# Patient Record
Sex: MALE | Race: ASIAN | NOT HISPANIC OR LATINO | Employment: PART TIME | URBAN - METROPOLITAN AREA
[De-identification: names, ages, dates, MRNs, and addresses within clinical notes are randomized per-mention and may not be internally consistent; named-entity substitution may affect disease eponyms.]

---

## 2017-01-18 ENCOUNTER — TRANSFERRED RECORDS (OUTPATIENT)
Dept: HEALTH INFORMATION MANAGEMENT | Facility: CLINIC | Age: 32
End: 2017-01-18

## 2017-02-03 ENCOUNTER — OFFICE VISIT (OUTPATIENT)
Dept: FAMILY MEDICINE | Facility: CLINIC | Age: 32
End: 2017-02-03

## 2017-02-03 VITALS
HEIGHT: 63 IN | DIASTOLIC BLOOD PRESSURE: 60 MMHG | BODY MASS INDEX: 31.18 KG/M2 | SYSTOLIC BLOOD PRESSURE: 130 MMHG | TEMPERATURE: 97.4 F | WEIGHT: 176 LBS | HEART RATE: 66 BPM

## 2017-02-03 DIAGNOSIS — E13.9 OTHER SPECIFIED DIABETES MELLITUS WITHOUT COMPLICATION, WITHOUT LONG-TERM CURRENT USE OF INSULIN (H): Primary | ICD-10-CM

## 2017-02-03 DIAGNOSIS — R19.7 DIARRHEA, UNSPECIFIED TYPE: ICD-10-CM

## 2017-02-03 DIAGNOSIS — F17.219 CIGARETTE NICOTINE DEPENDENCE WITH NICOTINE-INDUCED DISORDER: ICD-10-CM

## 2017-02-03 DIAGNOSIS — E11.9 TYPE 2 DIABETES MELLITUS WITHOUT COMPLICATION, WITHOUT LONG-TERM CURRENT USE OF INSULIN (H): ICD-10-CM

## 2017-02-03 DIAGNOSIS — B19.10 HEPATITIS B INFECTION WITHOUT DELTA AGENT WITHOUT HEPATIC COMA, UNSPECIFIED CHRONICITY: ICD-10-CM

## 2017-02-03 DIAGNOSIS — R11.2 NAUSEA AND VOMITING, INTRACTABILITY OF VOMITING NOT SPECIFIED, UNSPECIFIED VOMITING TYPE: ICD-10-CM

## 2017-02-03 LAB — HBA1C MFR BLD: 6.5 % (ref 4.1–5.7)

## 2017-02-03 NOTE — Clinical Note
February 3, 2017      Morhonda Burks Farhan  [address] [place] [address]        Dear Ministerio Martines,    This patient has been successfully treated for latent TB (this means he was exposed to TB but is not contagious).. He was treated with 9 months of a medication in [id number]. This was done at [facility]. We are currently trying to obtain those records. Therefore there is no point in doing a Mantoux skin test as it will always be positive with his history of latent TB        Sincerely,    Cristi Breen, DO

## 2017-02-03 NOTE — PROGRESS NOTES
Smoking Cessation Visit    Subjective  Discussed tobacco use history with patient.  Patient smokes 3 cigs per day and has smoked for 18 years.   Patient chews tobacco: No   Patient smokes cigars: No   Has tried to quit 0 times previously.     Successful cessation in the past: No    Pharmacotherapy used in the past:   None    Patient rated importance of quitting smoking: important 7-10/10  Patient believes they can be successful in smoking cessation: not established    Motivators for quitting smoking:  Health and co-morbid conditions  Triggers for smoking:  Eating food  Barriers to quittin years of smoking/habit    Objective  Fagerstrom Score: deferred    Assessment/Plan  Tobacco cessation stage of change: Action--Making specific, observable changes    Nicotrol Inhaler was prescribed.   1. Discussed health risks of tobacco use and health benefits of quitting.  2. Set a quit date with the patient for 17.  3. Discussed ways to cope with cravings and deal with triggers:  Using inhaler with mimmick act of smoking  4. Discussed different treatment options to assist in tobacco cessation.  The decision was made to use: Nicotrol as above.  Discussed mechanisms, proper use, and potential adverse effects.  Start date of medication will be 17.  Medication dose/schedule is as follows: 6-16 cartridges per day.  Prescription faxed to pharmacy.  5. Discussed getting ready for the quit date, such as telling friends/family, marking calendar, washing clothes/coats that smell like smoke, cleaning/freshening the home/car, and throwing out cigarettes/smoking paraphernalia the night before the quit date.  6. Discussed ways to start making changes before the quit date, such as rating each cigarette and only smoking those needed, starting to cut down, switching brands, only smoking outside.  7. Social support: Not discussed  8. Discussed that this is a long-term commitment and they can t even have  just one puff .    9. Discussed rewarding themselves for successful cessation.  10. Discussed other resources, such as help phone lines, support groups, online resources.    The smoking cessation plan above  was done in accordance with the collaborative practice agreement I have with Dr. Breen.  I spent 20 minutes counseling this patient.    Robbin Trejo MD PGY2  Suzanne Cruz, Pharm.D.    Patient s case reviewed. I agree with the written assessment and plan of care.    Suzanne Cruz, PharmD.

## 2017-02-03 NOTE — PROGRESS NOTES
Preceptor attestation:  Patient seen and discussed with the resident. Assessment and plan reviewed with resident and agreed upon.  Supervising physician: Jack Ventura  [facility]

## 2017-02-03 NOTE — MR AVS SNAPSHOT
"              After Visit Summary   2/3/2017    Reinaldo Real    MRN: [id number]           Patient Information     Date Of Birth          1985        Visit Information        Provider Department      2/3/2017 8:00 AM Cristi Breen DO [facility]        Today's Diagnoses     Other specified diabetes mellitus without complication, without long-term current use of insulin (H)    -  1     Nausea and vomiting, intractability of vomiting not specified, unspecified vomiting type         Diarrhea, unspecified type         Hepatitis B infection without delta agent without hepatic coma, unspecified chronicity         Type 2 diabetes mellitus without complication, without long-term current use of insulin (H)         Cigarette nicotine dependence with nicotine-induced disorder           Care Instructions    Diabetes: check hemoglobin A1c (measure of blood sugars over last 3 months), antibodies to see if can tell if you have type 1 or type 2  Need pre-op exam : can schedule next week        Follow-ups after your visit        Who to contact     Please call your clinic at [phone] to:    Ask questions about your health    Make or cancel appointments    Discuss your medicines    Learn about your test results    Speak to your doctor   If you have compliments or concerns about an experience at your clinic, or if you wish to file a complaint, please contact [facility] Patient Relations at [phone] or email us at [email or web address]         Additional Information About Your Visit        Care EveryWhere ID     This is your Care EveryWhere ID. This could be used by other organizations to access your [place] medical records  [id number]        Your Vitals Were     Pulse Temperature Height BMI (Body Mass Index)          66 97.4  F (36.3  C) (Oral) 5' 3\" (160 cm) 31.18 kg/m2         Blood Pressure from Last 3 Encounters:   02/03/17 130/60   12/19/16 120/81   11/30/16 121/81 "    Weight from Last 3 Encounters:   02/03/17 176 lb (79.833 kg)   12/19/16 178 lb 9.6 oz (81.012 kg)   11/30/16 176 lb 9.6 oz (80.105 kg)              We Performed the Following     C-peptide     Hemoglobin A1c ([facility])     Insulin antibody     Islet cell antibody IgG          Today's Medication Changes          These changes are accurate as of: 2/3/17  9:22 AM.  If you have any questions, ask your nurse or doctor.               Start taking these medicines.        Dose/Directions    nicotine 10 MG Inhaler   Commonly known as:  NICOTROL   Used for:  Cigarette nicotine dependence with nicotine-induced disorder   Started by:  Cristi Breen DO        Dose:  6-16 cartridge   Inhale 6-16 cartridge into the lungs daily as needed for smoking cessation   Quantity:  1 Box   Refills:  3            Where to get your medicines      These medications were sent to [facility] - [place], [place] - [address], [place] [address]     Phone:  [phone]    - nicotine 10 MG Inhaler  - ranitidine 150 MG tablet             Primary Care Provider Office Phone # Fax #    Cristi Kecia Breen [phone]       [address] [address]        Thank you!     Thank you for choosing [facility]  for your care. Our goal is always to provide you with excellent care. Hearing back from our patients is one way we can continue to improve our services. Please take a few minutes to complete the written survey that you may receive in the mail after your visit with us. Thank you!             Your Updated Medication List - Protect others around you: Learn how to safely use, store and throw away your medicines at www.disposemymeds.org.          This list is accurate as of: 2/3/17  9:22 AM.  Always use your most recent med list.                   Brand Name Dispense Instructions for use    acetaminophen 325 MG tablet    TYLENOL    100 tablet    Take 2 tablets  (650 mg) by mouth every 6 hours as needed for mild pain       bismuth subsalicylate 262 MG/15ML suspension    PEPTO BISMOL    840 mL    Take 15 mLs by mouth every 6 hours as needed for indigestion       cyclobenzaprine 5 MG tablet    FLEXERIL    60 tablet    Take 1 tablet (5 mg) by mouth 3 times daily as needed for muscle spasms       gabapentin 300 MG capsule    NEURONTIN    90 capsule    Take 1 capsule (300 mg) by mouth At Bedtime       metFORMIN 500 MG tablet    GLUCOPHAGE    180 tablet    Take 1 tablet (500 mg) by mouth 2 times daily (with meals)       nicotine 10 MG Inhaler    NICOTROL    1 Box    Inhale 6-16 cartridge into the lungs daily as needed for smoking cessation       ondansetron 4 MG tablet    ZOFRAN    24 tablet    Take 1 tablet (4 mg) by mouth every 6 hours as needed for nausea or vomiting       ranitidine 150 MG tablet    ZANTAC    180 tablet    Take 1 tablet (150 mg) by mouth 2 times daily

## 2017-02-03 NOTE — NURSING NOTE
name: Jj Najera  Language: Jenifer  Agency: [facility]  Phone number: [phone]

## 2017-02-03 NOTE — NURSING NOTE
Faxed release information to [facility] at [phone] to request TB treatment record per Dr Breen by chey

## 2017-02-03 NOTE — PATIENT INSTRUCTIONS
Diabetes: check hemoglobin A1c (measure of blood sugars over last 3 months), antibodies to see if can tell if you have type 1 or type 2  Need pre-op exam : can schedule next week

## 2017-02-03 NOTE — PROGRESS NOTES
"Nursing Notes:   Kandy Silva, FAISAL  2/3/2017  8:21 AM  Signed   name: Jj Najera  Language: Jenifer  Agency: [employer]  Phone number: [phone]      Kandy Silva FAISAL  2/3/2017  8:39 AM  Signed  Faxed release information to [facility] at [phone] to request TB treatment record per Dr Breen by kandy Silva Alfonsonataly [facility]  2/3/2017 10:22 AM  Signed  Faxed release information to [facility] at [phone] per Dr. Breen by kandy  Chief Complaint   Patient presents with     Physical     complete physical examination, quit smoking and talk about TB test     Blood pressure 130/60, pulse 66, temperature 97.4  F (36.3  C), temperature source Oral, height 5' 3\" (160 cm), weight 176 lb (79.833 kg).    Assessment and Plan     Continued dictation on Moo Thaw:     ASSESSMENT AND PLAN:     1.  Diabetes mellitus, unspecified.  Most likely Type II diabetes, given patient's BMI of 30; however, he has a sister who was diagnosed with diabetes in her early 30s as well.  I'm concerned that maybe he has a different subtype of diabetes, so we'll perform antibody testing to rule out Type I and screen for other types of diabetes at this time.  We'll also check C-peptide level.  We'll continue metformin 1000 mg a day.  We'll work on getting him set up for an eye exam at next visit.   2.  Chronic hepatitis B:  He follows with [facility] for this.  They would like to do a liver biopsy to delineate if we are dealing with fatty liver and cirrhosis or just fatty liver.   2.  Latent TB:  Patient states that he completed treatment for this in [id number].  We'll work on obtaining those records and we'll send his  a letter stating that he reports completing treatment through [facility].   4.  Postprandial vomiting:  We sent him to have EGD for this.  We'll work on getting him set up to see [facility] again to have this done.  He denies early satiety, so I don't think this is gastroparesis.  I think he likely " has H. pylori gastritis, with or without ulcer.         Options for treatment and follow-up care were reviewed with the patient and/or guardian. Reinaldo Real and/or guardian engaged in the decision making process and verbalized understanding of the options discussed and agreed with the final plan.  Patient discussed with Dr. Ventura.   DO KENYON Suarez       Reinaldo Real is a 31 year old  Male SUBJECTIVE:   Reinaldo Real presents today for followup.  He has past medical history of latent TB, chronic hepatitis B, and diabetes.   Patient still endorses postprandial vomiting with most meals.  He says that it is improved since he is on ranitidine b.i.d., which he continues to take.  He also notes left upper quadrant discomfort that is worsened with foods.  Of note, he was treated for H. pylori, based on antibody testing, in the spring of 2016.  He saw GI for this problem in 2014, and it was recommended that he have an EGD, which unfortunately he has not had.  Additionally, he has chronic hepatitis B, which he saw [facility] for in December 2016.  They recommended a liver biopsy because a recent ultrasound showed coarse echotexture changes that could be consistent with fibrosis and he likely had fatty liver disease as well. Biopsy would help delineate if there was cirrhosis.     In regards to patient's diabetes, he continues to take Metformin 1000 mg daily.  We talked about increasing this dose at the last visit, but he did not want to do that at the time.  He tolerates the medicine well and doesn't have loose stools.  He denies polyuria, polydipsia, or blurry vision.     The patient also requests Mantoux TB test for his .  Looking at the record, it looks like he was treated through [facility] (via [facility]) for latent TB in late 2013 through June 2014.  He stated that he had certificate stating that the treatment was completed, but he lost it.  I told  him that I could send this officer a letter stating that isn't worth doing a skin test, because it would be positive, and he was already treated for latent TB.  We can also work on obtaining those records and I told him to get that officer a release of information so they could access those records as well.  No active night sweats, fever, or chills.         Patient Active Problem List   Diagnosis     TB lung, latent     Hepatitis B infection     Immune to hepatitis A     Perianal abscess     External hemorrhoids     Loose stools     Left-sided low back pain without sciatica     Screening for depression     Type 2 diabetes mellitus without complication, without long-term current use of insulin (H)     Hepatic cirrhosis (H)     Current Outpatient Prescriptions   Medication Sig Dispense Refill     ranitidine (ZANTAC) 150 MG tablet Take 1 tablet (150 mg) by mouth 2 times daily 180 tablet 3     nicotine (NICOTROL) 10 MG Inhaler Inhale 6-16 cartridge into the lungs daily as needed for smoking cessation 1 Box 3     metFORMIN (GLUCOPHAGE) 500 MG tablet Take 1 tablet (500 mg) by mouth 2 times daily (with meals) 180 tablet 1     cyclobenzaprine (FLEXERIL) 5 MG tablet Take 1 tablet (5 mg) by mouth 3 times daily as needed for muscle spasms 60 tablet 1     ondansetron (ZOFRAN) 4 MG tablet Take 1 tablet (4 mg) by mouth every 6 hours as needed for nausea or vomiting 24 tablet 0     acetaminophen (TYLENOL) 325 MG tablet Take 2 tablets (650 mg) by mouth every 6 hours as needed for mild pain 100 tablet 0     bismuth subsalicylate (PEPTO BISMOL) 262 MG/15ML suspension Take 15 mLs by mouth every 6 hours as needed for indigestion 840 mL 6     gabapentin (NEURONTIN) 300 MG capsule Take 1 capsule (300 mg) by mouth At Bedtime 90 capsule 3     Allergies   Allergen Reactions     Nkda [No Known Drug Allergies]      Family History   Problem Relation Age of Onset     Coronary Artery Disease Mother      Hypertension Mother      DIABETES No  "family hx of      Results for orders placed or performed in visit on 02/03/17 (from the past 24 hour(s))   Hemoglobin A1c ([facility])   Result Value Ref Range    Hemoglobin A1C 6.5 (H) 4.1 - 5.7 %            Review of Systems:   As Above             Physical Exam:     Filed Vitals:    02/03/17 0816   BP: 130/60   Pulse: 66   Temp: 97.4  F (36.3  C)   TempSrc: Oral   Height: 5' 3\" (160 cm)   Weight: 176 lb (79.833 kg)     Body mass index is 31.18 kg/(m^2).    Continued dictation on Moo Farhan:     OBJECTIVE:     HEART:  Regular rate and rhythm without murmurs.   LUNGS:  Clear to auscultation throughout.   ABDOMEN:  Bowel sounds are active throughout.  Mild tenderness in the left upper quadrant, but no tenderness with palpation elsewhere.   EXTREMITIES:  No evidence of skin breakdown in both feet.  Dorsalis pedis pulses are +2.  Monofilament testing was 10/10, with good sensation bilaterally.         Office Visit on 12/19/2016   Component Date Value Ref Range Status     Microalbumin, Urine 12/19/2016 <0.50  0.00 - 1.99 mg/dL Final     Creatinine, Urine 12/19/2016 60.6   Final     Albumin Urine mg/g Cr 12/19/2016 See Note.  <=19.9 mg/g Final    Comment: \"Unable to calculate: Creatinine and/or Microalbumin value below detectable   level\"       TSH 12/19/2016 1.00  0.30 - 5.00 uIU/mL Final     Cholesterol 12/19/2016 216* <=199 mg/dL Final     Triglycerides 12/19/2016 244* <=149 mg/dL Final     HDL Cholesterol 12/19/2016 29* >=40 mg/dL Final     LDL Cholesterol Calculated 12/19/2016 138* <=129 mg/dL Final     Fasting? 12/19/2016 Unknown   Final     "

## 2017-02-03 NOTE — NURSING NOTE
Faxed release information to [facility] at [phone] per Dr. Breen by chey

## 2017-02-09 ENCOUNTER — OFFICE VISIT (OUTPATIENT)
Dept: FAMILY MEDICINE | Facility: CLINIC | Age: 32
End: 2017-02-09

## 2017-02-09 VITALS
OXYGEN SATURATION: 98 % | SYSTOLIC BLOOD PRESSURE: 122 MMHG | HEART RATE: 84 BPM | HEIGHT: 64 IN | DIASTOLIC BLOOD PRESSURE: 80 MMHG | TEMPERATURE: 98.1 F | WEIGHT: 177 LBS | BODY MASS INDEX: 30.22 KG/M2

## 2017-02-09 DIAGNOSIS — Z01.818 PREOP GENERAL PHYSICAL EXAM: Primary | ICD-10-CM

## 2017-02-09 DIAGNOSIS — R11.2 NAUSEA AND VOMITING, INTRACTABILITY OF VOMITING NOT SPECIFIED, UNSPECIFIED VOMITING TYPE: ICD-10-CM

## 2017-02-09 NOTE — PROGRESS NOTES
Preceptor attestation:  Patient seen and discussed with the resident. Assessment and plan reviewed with resident and agreed upon.  Supervising physician: Jack Ventura  [facility]

## 2017-02-09 NOTE — PATIENT INSTRUCTIONS
[facility]   [facility]  [facility], [address]  [place], [place] [address]  [place]  Phone: [phone]  EGD-Upper Endoscopy   Date: Thursday February 16 2017   Time: 10:45AM Dr Basilio     Nothing to eat or drink after 11:45 PM on Wednesday     If you have any questions or need to reschedule please call the number listed above.  Any other concerns please call our referral coordinator at [phone]    Le  Care Coordinator     GAVE TO AMARILYS GREEN  Presurgery Checklist  You are scheduled to have surgery. The healthcare staff will try to make your stay comfortable. Use the guidelines below to remind yourself what to do before surgery. Be sure to follow any specific pre-op instructions from your surgeon or nurse.   Preparing for Surgery  Ask your surgeon if you ll need a blood transfusion during surgery and if so, how to prepare for it. In some cases, you can donate blood before surgery. If needed, this blood can be given back (transfused) to you during or after surgery.  If you are having abdominal surgery, ask what you need to do to clear your bowel.  Tell your surgeon if you have allergies to any medications or foods.  Arrange for an adult family member or friend to drive you home after surgery. If possible, have someone ready to help you at home as you recover.  Call the surgeon if you get a cold, fever, sore throat, diarrhea, or other health problem just before surgery. Your surgeon can decide whether or not to postpone the surgery.  Medications  Tell your surgeon about all medications you take, including prescription and over-the-counter products such as herbal remedies and vitamins. Ask if you should continue taking them.  If you take ibuprofen, naproxen, or  blood thinners  such as aspirin, clopidogrel (Plavix), or warfarin (Coumadin), ask your surgeon whether you should stop taking them and how long before surgery you should stop.  You may be told to take  antibiotics just before surgery to prevent infection. If so, follow instructions carefully on how to take them.  If you are told to take medications called anticoagulants to prevent blood clots after surgery, be sure to follow the instructions on how to take them.  Stop Smoking  If you smoke, healing may take longer. So at least 2 week(s) before surgery, stop smoking.  Bathing or Showering Before Surgery  If instructed, wash with antibacterial soap. Afterward, do not use lotions or powders.  If you are having surgery on the head, you may be asked to shampoo with antibacterial soap. Follow instructions for doing so.  Do Not Remove Hair from the Surgery Site  Do not shave hair from the incision site, unless you are given specific instructions to do so. Usually, if hair needs to be removed, it will be done at the hospital right before surgery.  Don t Eat or Drink  Your doctor will tell you when to stop eating and drinking. If you do not follow your doctor's instructions, your procedure may be postponed or rescheduled for another day.  If your surgeon tells you to continue any medications, take them with small sips of water.  You can brush your teeth and rinse your mouth, but don t swallow any water.  Day of Surgery  Do not wear makeup. Do not use perfume, deodorant, or hairspray. Remove nail polish and artificial nails.  Leave jewelry (including rings), watches, and other valuables at home.  Be sure to bring health insurance cards or forms and a photo ID.  Bring a list of your medications (include the name, dose, how often you take them, and the time last dose was taken).  Arrive on time at the hospital or surgery facility.      - GI appointment is February 22nd at 7:40 AM  - we will call you to schedule EGD (upper GI scope)

## 2017-02-09 NOTE — PROGRESS NOTES
[address] [address]  Phone: [phone]  Fax: [phone]    2/9/2017    Adult PRE-OP Evaluation:    Reinaldo Real, 1985 presents for pre-operative evaluation and assessment as requested, prior to undergoing surgery/procedure for treatment of  Post prandial vomiting  Proposed procedure: EGD    Date of Surgery/ Procedure: Unsure  Hospital/Surgical Facility: [facility]     Primary Physician: Cristi Breen  Type of Anesthesia Anticipated: to be determined  History of anesthesia complications: NONE  History of  abnormal bleeding: NONE   History of blood transfusions: NO  Patient has a Health Care Directive or Living Will:  NO    Preoperative Questions   1. NO - Do you have a history of heart attack, stroke, stent, bypass or surgery on an artery in the head, neck, heart or legs?  2. NO - Do you ever have any pain or discomfort in your chest?  3. NO - Have you ever had a severe pain across the front of your chest lasting for half an hour or more?  4. NO - Do you have a history of Congestive Heart Failure?  5. NO - Are you troubled by shortness of breath when: walking on the level/ up a slight hill/ at night?  6. NO - Does your chest ever sound wheezy or whistling?  7. NO - Do you currently have a cold, bronchitis or other respiratory infection?  8. NO - Have you had a cold, bronchitis or other respiratory infection within the last 2 weeks?  9. NO - Do you usually have a cough?  10. NO - Do you sometimes get pains in the calves of your legs when you walk?  11. NO - Do you or anyone in your family have previous history of blood clots?  12. NO - Do you or does anyone in your family have a serious bleeding problem such as prolonged bleeding following surgeries or cuts?  13. NO - Have you ever had problems with anemia or been told to take iron pills?  14. NO - Have you had any abnormal blood loss such as black, tarry or bloody stools, or abnormal vaginal bleeding?  15. NO - Have you ever  had a blood transfusion?  16. NO - Have you or any of your relatives ever had problems with anesthesia?  17. NO - Do you have sleep apnea, excessive snoring or daytime drowsiness?  18. NO - Do you have any prosthetic heart valves?  19. NO - Do you have prosthetic joints?  20. NO - Is there any chance that you may be pregnant?    Patient Active Problem List   Diagnosis     TB lung, latent     Hepatitis B infection     Immune to hepatitis A     Perianal abscess     External hemorrhoids     Loose stools     Left-sided low back pain without sciatica     Screening for depression     Type 2 diabetes mellitus without complication, without long-term current use of insulin (H)     Hepatic cirrhosis (H)         Current Outpatient Prescriptions on File Prior to Visit:  ranitidine (ZANTAC) 150 MG tablet Take 1 tablet (150 mg) by mouth 2 times daily   nicotine (NICOTROL) 10 MG Inhaler Inhale 6-16 cartridge into the lungs daily as needed for smoking cessation   metFORMIN (GLUCOPHAGE) 500 MG tablet Take 1 tablet (500 mg) by mouth 2 times daily (with meals)     No current facility-administered medications on file prior to visit.    OTC products: None, except as noted above    Allergies   Allergen Reactions     Nkda [No Known Drug Allergies]      Latex Allergy: NO    Social History     Social History     Marital Status:      Spouse Name: N/A     Number of Children: N/A     Years of Education: N/A     Social History Main Topics     Smoking status: Current Some Day Smoker     Last Attempt to Quit: 11/29/2013     Smokeless tobacco: None     Alcohol Use: Yes      Comment: Drinks beer sometimes.     Drug Use: No     Sexual Activity: Not Asked     Other Topics Concern     None     Social History Narrative       REVIEW OF SYSTEMS:   Constitutional, HEENT, cardiovascular, pulmonary, GI, , musculoskeletal, neuro, skin, endocrine and psych systems are negative, except as otherwise noted.    EXAM:     Patient Vitals for the past 24  "hrs:   BP Temp Temp src Pulse SpO2 Height Weight   02/09/17 1409 122/80 mmHg 98.1  F (36.7  C) Oral 84 98 % 5' 3.5\" (161.3 cm) 177 lb (80.287 kg)     Body mass index is 30.86 kg/(m^2).  GENERAL: healthy, alert and no distress  EYES: Eyes grossly normal to inspection, extraocular movements - intact, and PERRL  HENT: ear canals- normal; TMs- normal; Nose- normal; Mouth- no ulcers, no lesions  NECK: no tenderness, no adenopathy  RESP: lungs clear to auscultation - no rales, no rhonchi, no wheezes  CV: regular rates and rhythm, normal S1 S2, no S3 or S4 and no murmur, no click or rub -  ABDOMEN: soft, mild LUQ tenderness,no masses, normal bowel sounds  MS: extremities- no gross deformities noted, no edema  SKIN: no suspicious lesions, no rashes  NEURO: strength and tone- normal, sensory exam- grossly normal, mentation- intact, speech- normal,   BACK: no CVA tenderness, no paralumbar tenderness  PSYCH: Alert and oriented times 3; speech- coherent , normal rate and volume; able to articulate logical thoughts  LYMPHATICS: ant. cervical- normal, post. cervical- normal    DIAGNOSTICS:      Hemoglobin (indicated for history of anemia or procedure with significant blood loss such as tonsillectomy, major intraperitoneal surgery, vascular surgery, major spine surgery, total joint replacement)  Serum Potassium  Serum Creatinine    RISK ASSESSMENT:     Cardiovascular Risk:  -Patient is able to participate in strenuous activities without chest pain.  -The patient does not have chest pain at rest or with exertion  -Patient does not have a history of congestive heart failure.    -The patient does not have a history of stroke and does not have a history of valvular disease.    Pulmonary Risk:  -In terms of risk factors for pulmonary complication, the patient has no risk factors    Perioperative Complications:  -The patient does not have a history of bleeding or clotting problems in the past.    -The patient has not had surgery " previously.    -The patient does not have a family history of any anesthesia or surgical complications.      IMPRESSION:   Reason for surgery/procedure: post prandial vomiting    The proposed surgical procedure is considered LOW risk.    For above listed surgery and anesthesia:   Patient is at low risk for surgery/procedure and perioperative/procedure complications.    RECOMMENDATIONS:     Labs:  Hgb, K+ and Creatininge    Fasting:  NPO for 12 hours prior to surgery    Preop Plan:  --Approval given to proceed with proposed procedure, without further diagnostic evaluation    Medications:  Patient should take their regular medications the morning of surgery unless otherwise instructed.      Staffed with Dr. Audrey Breen, DO    Please contact our office if there are any further questions or information required about this patient.

## 2017-02-09 NOTE — MR AVS SNAPSHOT
After Visit Summary   2/9/2017    Reinaldo Real    MRN: [id number]           Patient Information     Date Of Birth          1985        Visit Information        Provider Department      2/9/2017 2:10 PM Cristi Breen DO [facility]        Today's Diagnoses     Preop general physical exam    -  1     Nausea and vomiting, intractability of vomiting not specified, unspecified vomiting type           Care Instructions      Presurgery Checklist  You are scheduled to have surgery. The healthcare staff will try to make your stay comfortable. Use the guidelines below to remind yourself what to do before surgery. Be sure to follow any specific pre-op instructions from your surgeon or nurse.   Preparing for Surgery  Ask your surgeon if you ll need a blood transfusion during surgery and if so, how to prepare for it. In some cases, you can donate blood before surgery. If needed, this blood can be given back (transfused) to you during or after surgery.  If you are having abdominal surgery, ask what you need to do to clear your bowel.  Tell your surgeon if you have allergies to any medications or foods.  Arrange for an adult family member or friend to drive you home after surgery. If possible, have someone ready to help you at home as you recover.  Call the surgeon if you get a cold, fever, sore throat, diarrhea, or other health problem just before surgery. Your surgeon can decide whether or not to postpone the surgery.  Medications  Tell your surgeon about all medications you take, including prescription and over-the-counter products such as herbal remedies and vitamins. Ask if you should continue taking them.  If you take ibuprofen, naproxen, or  blood thinners  such as aspirin, clopidogrel (Plavix), or warfarin (Coumadin), ask your surgeon whether you should stop taking them and how long before surgery you should stop.  You may be told to take antibiotics just before surgery to prevent infection.  If so, follow instructions carefully on how to take them.  If you are told to take medications called anticoagulants to prevent blood clots after surgery, be sure to follow the instructions on how to take them.  Stop Smoking  If you smoke, healing may take longer. So at least 2 week(s) before surgery, stop smoking.  Bathing or Showering Before Surgery  If instructed, wash with antibacterial soap. Afterward, do not use lotions or powders.  If you are having surgery on the head, you may be asked to shampoo with antibacterial soap. Follow instructions for doing so.  Do Not Remove Hair from the Surgery Site  Do not shave hair from the incision site, unless you are given specific instructions to do so. Usually, if hair needs to be removed, it will be done at the hospital right before surgery.  Don t Eat or Drink  Your doctor will tell you when to stop eating and drinking. If you do not follow your doctor's instructions, your procedure may be postponed or rescheduled for another day.  If your surgeon tells you to continue any medications, take them with small sips of water.  You can brush your teeth and rinse your mouth, but don t swallow any water.  Day of Surgery  Do not wear makeup. Do not use perfume, deodorant, or hairspray. Remove nail polish and artificial nails.  Leave jewelry (including rings), watches, and other valuables at home.  Be sure to bring health insurance cards or forms and a photo ID.  Bring a list of your medications (include the name, dose, how often you take them, and the time last dose was taken).  Arrive on time at the hospital or surgery facility.      - GI appointment is February 22nd at 7:40 AM  - we will call you to schedule EGD (upper GI scope)        Follow-ups after your visit        Additional Services     GASTROENTEROLOGY ADULT REF PROCEDURE ONLY       Last Lab Result: CREATININE (mg/dL)       Date                     Value                 11/10/2016               0.7             "  ----------  Body mass index is 30.86 kg/(m^2).     Needed:  Yes  Language:  Jenifer    Patient will be contacted to schedule procedure.     Please be aware that coverage of these services is subject to the terms and limitations of your health insurance plan.  Call member services at your health plan with any benefit or coverage questions.  Any procedures must be performed at a [place] facility OR coordinated by your clinic's referral office.    Please bring the following with you to your appointment:    (1) Any X-Rays, CTs or MRIs which have been performed.  Contact the facility where they were done to arrange for  prior to your scheduled appointment.    (2) List of current medications   (3) This referral request   (4) Any documents/labs given to you for this referral                  Who to contact     Please call your clinic at [phone] to:    Ask questions about your health    Make or cancel appointments    Discuss your medicines    Learn about your test results    Speak to your doctor   If you have compliments or concerns about an experience at your clinic, or if you wish to file a complaint, please contact [facility] Patient Relations at [phone] or email us at [email or web address]         Additional Information About Your Visit        Care EveryWhere ID     This is your Care EveryWhere ID. This could be used by other organizations to access your [place] medical records  [id number]        Your Vitals Were     Pulse Temperature Height BMI (Body Mass Index) Pulse Oximetry       84 98.1  F (36.7  C) (Oral) 5' 3.5\" (161.3 cm) 30.86 kg/m2 98%        Blood Pressure from Last 3 Encounters:   02/09/17 122/80   02/03/17 130/60   12/19/16 120/81    Weight from Last 3 Encounters:   02/09/17 177 lb (80.287 kg)   02/03/17 176 lb (79.833 kg)   12/19/16 178 lb 9.6 oz (81.012 kg)              We Performed the Following     GASTROENTEROLOGY ADULT REF PROCEDURE " ONLY        Primary Care Provider Office Phone # Fax #    Cristi Breen [phone]       [address] [address]        Thank you!     Thank you for choosing [facility]  for your care. Our goal is always to provide you with excellent care. Hearing back from our patients is one way we can continue to improve our services. Please take a few minutes to complete the written survey that you may receive in the mail after your visit with us. Thank you!             Your Updated Medication List - Protect others around you: Learn how to safely use, store and throw away your medicines at www.disposemymeds.org.          This list is accurate as of: 2/9/17  3:09 PM.  Always use your most recent med list.                   Brand Name Dispense Instructions for use    metFORMIN 500 MG tablet    GLUCOPHAGE    180 tablet    Take 1 tablet (500 mg) by mouth 2 times daily (with meals)       nicotine 10 MG Inhaler    NICOTROL    1 Box    Inhale 6-16 cartridge into the lungs daily as needed for smoking cessation       ranitidine 150 MG tablet    ZANTAC    180 tablet    Take 1 tablet (150 mg) by mouth 2 times daily

## 2017-02-11 DIAGNOSIS — Z01.818 PREOPERATIVE EXAMINATION: Primary | ICD-10-CM

## 2017-02-14 DIAGNOSIS — Z01.818 PREOPERATIVE EXAMINATION: ICD-10-CM

## 2017-02-14 LAB
% GRANULOCYTES: 66.6 %G (ref 40–75)
BUN SERPL-MCNC: 8.1 MG/DL (ref 7–21)
CALCIUM SERPL-MCNC: 9.7 MG/DL (ref 8.5–10.1)
CHLORIDE SERPLBLD-SCNC: 101.7 MMOL/L (ref 98–110)
CO2 SERPL-SCNC: 30.1 MMOL/L (ref 20–32)
CREAT SERPL-MCNC: 0.7 MG/DL (ref 0.7–1.3)
GFR SERPL CREATININE-BSD FRML MDRD: 138 ML/MIN/1.7 M2
GLUCOSE SERPL-MCNC: 272.9 MG'DL (ref 70–99)
GRANULOCYTES #: 5.1 K/UL (ref 1.6–8.3)
HCT VFR BLD AUTO: 50.1 % (ref 40–53)
HEMOGLOBIN: 16.5 G/DL (ref 13.3–17.7)
LYMPHOCYTES # BLD AUTO: 2 K/UL (ref 0.8–5.3)
LYMPHOCYTES NFR BLD AUTO: 26.3 %L (ref 20–48)
MCH RBC QN AUTO: 30.6 PG (ref 26.5–35)
MCHC RBC AUTO-ENTMCNC: 32.9 G/DL (ref 32–36)
MCV RBC AUTO: 92.9 FL (ref 78–100)
MID #: 0.5 K/UL (ref 0–2.2)
MID %: 7.1 %M (ref 0–20)
PLATELET # BLD AUTO: 167 K/UL (ref 150–450)
POTASSIUM SERPL-SCNC: 3.9 MMOL/DL (ref 3.2–4.6)
RBC # BLD AUTO: 5.4 M/UL (ref 4.4–5.9)
SODIUM SERPL-SCNC: 134.5 MMOL/L (ref 132–142)
WBC # BLD AUTO: 7.6 K/UL (ref 4–11)

## 2017-02-16 ENCOUNTER — TRANSFERRED RECORDS (OUTPATIENT)
Dept: HEALTH INFORMATION MANAGEMENT | Facility: CLINIC | Age: 32
End: 2017-02-16

## 2017-03-17 ENCOUNTER — OFFICE VISIT (OUTPATIENT)
Dept: FAMILY MEDICINE | Facility: CLINIC | Age: 32
End: 2017-03-17

## 2017-03-17 DIAGNOSIS — H91.92 HEARING LOSS, LEFT: Primary | ICD-10-CM

## 2017-03-17 NOTE — MR AVS SNAPSHOT
"              After Visit Summary   3/17/2017    Reinaldo Real    MRN: [id number]           Patient Information     Date Of Birth          1985        Visit Information        Provider Department      3/17/2017 2:10 PM Cristi Breen DO [facility]        Today's Diagnoses     Hearing loss, left    -  1       Follow-ups after your visit        Who to contact     Please call your clinic at [phone] to:    Ask questions about your health    Make or cancel appointments    Discuss your medicines    Learn about your test results    Speak to your doctor   If you have compliments or concerns about an experience at your clinic, or if you wish to file a complaint, please contact [facility] Patient Relations at [phone] or email us at [email or web address]         Additional Information About Your Visit        Care EveryWhere ID     This is your Care EveryWhere ID. This could be used by other organizations to access your [place] medical records  [id number]        Your Vitals Were     Pulse Temperature Height Pulse Oximetry BMI (Body Mass Index)       78 98  F (36.7  C) (Oral) 5' 3.5\" (161.3 cm) 98% 30.2 kg/m2        Blood Pressure from Last 3 Encounters:   03/20/17 113/74   02/09/17 122/80   02/03/17 130/60    Weight from Last 3 Encounters:   03/20/17 173 lb 3.2 oz (78.6 kg)   02/09/17 177 lb (80.3 kg)   02/03/17 176 lb (79.8 kg)              Today, you had the following     No orders found for display       Primary Care Provider Office Phone # Fax #    Cristi Breen [phone]       [address] [address]        Thank you!     Thank you for choosing [facility]  for your care. Our goal is always to provide you with excellent care. Hearing back from our patients is one way we can continue to improve our services. Please take a few minutes to complete the written survey that you may receive in the " mail after your visit with us. Thank you!             Your Updated Medication List - Protect others around you: Learn how to safely use, store and throw away your medicines at www.disposemymeds.org.          This list is accurate as of: 3/17/17 11:59 PM.  Always use your most recent med list.                   Brand Name Dispense Instructions for use    metFORMIN 500 MG tablet    GLUCOPHAGE    180 tablet    Take 1 tablet (500 mg) by mouth 2 times daily (with meals)       nicotine 10 MG Inhaler    NICOTROL    1 Box    Inhale 6-16 cartridge into the lungs daily as needed for smoking cessation       ranitidine 150 MG tablet    ZANTAC    180 tablet    Take 1 tablet (150 mg) by mouth 2 times daily

## 2017-03-20 VITALS
SYSTOLIC BLOOD PRESSURE: 113 MMHG | WEIGHT: 173.2 LBS | BODY MASS INDEX: 29.57 KG/M2 | OXYGEN SATURATION: 98 % | HEART RATE: 78 BPM | TEMPERATURE: 98 F | HEIGHT: 64 IN | DIASTOLIC BLOOD PRESSURE: 74 MMHG

## 2017-03-20 NOTE — PROGRESS NOTES
There are no exam notes on file for this visit.  No chief complaint on file.    There were no vitals taken for this visit.    Assessment and Plan   There are no diagnoses linked to this encounter.    1.Unilateral hearing loss: ENT referral    2. Will work  On obtaining [facility] EGD results for the patient    Options for treatment and follow-up care were reviewed with the patient and/or guardian. Reinaldo Real and/or guardian engaged in the decision making process and verbalized understanding of the options discussed and agreed with the final plan.  Patient discussed with Dr. Mccoy.   Cristi Breen, DO         HPI       Reinaldo Real is a 32 year old  male presents today for follow up of lab results and ENT referral for left sided hearing loss.    Patient had an EGD a few weeks ago through [facility]. He has not heard the results of this study yet and we have not received those either.    Also he would like an ENT referral for left sided hearing loss. He has spoken of this before but there must have been some miscommunication as this referral never got set up. Has had hearing loss in that ear for over ten years. [place] and also had to have a cockroach removed from that ear. At that time he did have some white drainage in that ear after that was removed and he says he was put on penicillin at that time. No trauma to the head.      Patient Active Problem List   Diagnosis     TB lung, latent     Hepatitis B infection     Immune to hepatitis A     Perianal abscess     External hemorrhoids     Loose stools     Left-sided low back pain without sciatica     Screening for depression     Type 2 diabetes mellitus without complication, without long-term current use of insulin (H)     Hepatic cirrhosis (H)     Current Outpatient Prescriptions   Medication Sig Dispense Refill     ranitidine (ZANTAC) 150 MG tablet Take 1 tablet (150 mg) by mouth 2 times daily 180 tablet 3      nicotine (NICOTROL) 10 MG Inhaler Inhale 6-16 cartridge into the lungs daily as needed for smoking cessation 1 Box 3     metFORMIN (GLUCOPHAGE) 500 MG tablet Take 1 tablet (500 mg) by mouth 2 times daily (with meals) 180 tablet 1     Allergies   Allergen Reactions     Nkda [No Known Drug Allergies]      Family History   Problem Relation Age of Onset     Coronary Artery Disease Mother      Hypertension Mother      DIABETES No family hx of      No results found for this or any previous visit (from the past 24 hour(s)).         Review of Systems:   As Above             Physical Exam:   There were no vitals filed for this visit.  There is no height or weight on file to calculate BMI.    GENERAL:: healthy, alert and no distress  HENT: ear canals- normal; TMs- normal; Nose- normal; Mouth- no ulcers, no lesions    Orders Only on 02/14/2017   Component Date Value Ref Range Status     WBC 02/14/2017 7.6  4.0 - 11.0 K/uL Final     Lymphocytes # 02/14/2017 2.0  0.8 - 5.3 K/uL Final     % Lymphocytes 02/14/2017 26.3  20.0 - 48.0 %L Final     Mid # 02/14/2017 0.5  0.0 - 2.2 K/uL Final     Mid % 02/14/2017 7.1  0.0 - 20.0 %M Final     GRANULOCYTES # 02/14/2017 5.1  1.6 - 8.3 K/uL Final     % Granulocytes 02/14/2017 66.6  40.0 - 75.0 %G Final     RBC 02/14/2017 5.4  4.4 - 5.9 M/uL Final     Hemoglobin 02/14/2017 16.5  13.3 - 17.7 g/dL Final     Hematocrit 02/14/2017 50.1  40.0 - 53.0 % Final     MCV 02/14/2017 92.9  78.0 - 100.0 fL Final     MCH 02/14/2017 30.6  26.5 - 35.0 Final     MCHC 02/14/2017 32.9  32.0 - 36.0 g/dL Final     Platelets 02/14/2017 167.0  150.0 - 450.0 K/uL Final     Urea Nitrogen 02/14/2017 8.1  7.0 - 21.0 mg/dL Final     Calcium 02/14/2017 9.7  8.5 - 10.1 mg/dL Final     Chloride 02/14/2017 101.7  98.0 - 110.0 mmol/L Final     Carbon Dioxide 02/14/2017 30.1  20.0 - 32.0 mmol/L Final     Creatinine 02/14/2017 0.7  0.7 - 1.3 mg/dL Final     Glucose 02/14/2017 272.9* 70.0 - 99.0 mg'dL Final     Potassium  02/14/2017 3.9  3.2 - 4.6 mmol/dL Final     Sodium 02/14/2017 134.5  132.0 - 142.0 mmol/L Final     GFR Estimate 02/14/2017 138.0  mL/min/1.7 m2 Final     GFR Estimate If Black 02/14/2017 167.0  mL/min/1.7 m2 Final

## 2017-03-20 NOTE — NURSING NOTE
name: Farhan Chauhan  Language: Jenifer  Agency: [facility]  Phone number: [phone]

## 2017-03-22 ENCOUNTER — TRANSFERRED RECORDS (OUTPATIENT)
Dept: HEALTH INFORMATION MANAGEMENT | Facility: CLINIC | Age: 32
End: 2017-03-22

## 2017-03-26 NOTE — PROGRESS NOTES
Preceptor attestation:  Patient seen and discussed with the resident. Assessment and plan reviewed with resident and agreed upon.  Supervising physician: Francisco Burgess  [facility]

## 2017-04-11 DIAGNOSIS — H91.92 HEARING LOSS IN LEFT EAR: Primary | ICD-10-CM

## 2017-04-12 NOTE — PATIENT INSTRUCTIONS
[facility]  [address]  [place] [address]  Main Number: [phone]  Date: Wednesday April 26 2017   Time: 9:15 AM  Dr Torres     If you have any questions or need to reschedule please call the number listed above.  Any other concerns please call our referral coordinator at [phone]    Le  Care Coordinator     GAVE TO AMARILYS GREEN

## 2017-04-26 ENCOUNTER — TRANSFERRED RECORDS (OUTPATIENT)
Dept: HEALTH INFORMATION MANAGEMENT | Facility: CLINIC | Age: 32
End: 2017-04-26

## 2017-05-24 DIAGNOSIS — E11.9 TYPE 2 DIABETES MELLITUS WITHOUT COMPLICATION, WITHOUT LONG-TERM CURRENT USE OF INSULIN (H): ICD-10-CM

## 2017-07-03 ENCOUNTER — TRANSFERRED RECORDS (OUTPATIENT)
Dept: HEALTH INFORMATION MANAGEMENT | Facility: CLINIC | Age: 32
End: 2017-07-03

## 2017-07-24 ENCOUNTER — TRANSFERRED RECORDS (OUTPATIENT)
Dept: HEALTH INFORMATION MANAGEMENT | Facility: CLINIC | Age: 32
End: 2017-07-24

## 2017-10-13 ENCOUNTER — ALLIED HEALTH/NURSE VISIT (OUTPATIENT)
Dept: FAMILY MEDICINE | Facility: CLINIC | Age: 32
End: 2017-10-13

## 2017-10-13 DIAGNOSIS — Z23 NEED FOR IMMUNIZATION AGAINST INFLUENZA: Primary | ICD-10-CM

## 2017-10-13 NOTE — NURSING NOTE
"Injectable Influenza Immunization Documentation    1.  Has the patient received the information for the injectable influenza vaccine? YES     2. Is the patient 6 months of age or older? YES     3. Does the patient have any of the following contraindications?         Severe allergy to eggs? No     Severe allergic reaction to previous influenza vaccines? No   Severe allergy to latex? No       History of Guillain-Glenwood syndrome? No     Currently have a temperature greater than 100.4F? No        4.  Severely egg allergic patients should have flu vaccine eligibility assessed by an MD, RN, or pharmacist, and those who received flu vaccine should be observed for 15 min by an MD, RN, Pharmacist, Medical Technician, or member of clinic staff.\": YES    5. Latex-allergic patients should be given latex-free influenza vaccine. Please reference the Vaccine latex table to determine if your clinic s product is latex-containing.       Vaccination given by Jodee Jang CMA          "

## 2017-10-13 NOTE — MR AVS SNAPSHOT
After Visit Summary   10/13/2017    Reinaldo Real    MRN: [id number]           Patient Information     Date Of Birth          1985        Visit Information        Provider Department      10/13/2017 9:50 AM [facility] FLU CLINIC [facility]        Today's Diagnoses     Need for immunization against influenza    -  1       Follow-ups after your visit        Who to contact     Please call your clinic at [phone] to:    Ask questions about your health    Make or cancel appointments    Discuss your medicines    Learn about your test results    Speak to your doctor   If you have compliments or concerns about an experience at your clinic, or if you wish to file a complaint, please contact [facility] Patient Relations at [phone] or email us at [email or web address]         Additional Information About Your Visit        MyChart Information     [employer] is an electronic gateway that provides easy, online access to your medical records. With [employer], you can request a clinic appointment, read your test results, renew a prescription or communicate with your care team.     To sign up for Jajaht visit the website at [email or web address]   You will be asked to enter the access code listed below, as well as some personal information. Please follow the directions to create your username and password.     Your access code is: [email or web address]  Expires: 2018 11:43 AM     Your access code will  in 90 days. If you need help or a new code, please contact your [facility] or call [phone] for assistance.        Care EveryWhere ID     This is your Care EveryWhere ID. This could be used by other organizations to access your [place] medical records  [id number]         Blood Pressure from Last 3 Encounters:   17 113/74   17 122/80   17 130/60    Weight from Last 3 Encounters:   17 173 lb 3.2 oz (78.6 kg)    02/09/17 177 lb (80.3 kg)   02/03/17 176 lb (79.8 kg)              We Performed the Following     ADMIN VACCINE, INITIAL     FLU Vaccine, 3 YRS +, Quadrivalent        Primary Care Provider Office Phone # Fax #    Cristi Breen [phone]       [address] [facility] [address]        Equal Access to Services     [facility] : Hadii aad ku hadasho Soomaali, waaxda luqadaha, qaybta kaalmada adeegyada, waxay idiin hayaan adeeg kharash la'aan . So [facility] [phone].    ATENCIÓN: Si habla español, tiene a meraz disposición servicios gratuitos de asistencia lingüística. Llame al [phone].    We comply with applicable federal civil rights laws and [place] laws. We do not discriminate on the basis of race, color, national origin, age, disability, sex, sexual orientation, or gender identity.            Thank you!     Thank you for choosing [facility]  for your care. Our goal is always to provide you with excellent care. Hearing back from our patients is one way we can continue to improve our services. Please take a few minutes to complete the written survey that you may receive in the mail after your visit with us. Thank you!             Your Updated Medication List - Protect others around you: Learn how to safely use, store and throw away your medicines at www.disposemymeds.org.          This list is accurate as of: 10/13/17 11:43 AM.  Always use your most recent med list.                   Brand Name Dispense Instructions for use Diagnosis    metFORMIN 500 MG tablet    GLUCOPHAGE    180 tablet    Take 1 tablet (500 mg) by mouth 2 times daily (with meals)    Type 2 diabetes mellitus without complication, without long-term current use of insulin (H)       nicotine 10 MG Inhaler    NICOTROL    1 Box    Inhale 6-16 cartridge into the lungs daily as needed for smoking cessation    Cigarette nicotine dependence with nicotine-induced disorder       ranitidine 150 MG tablet    ZANTAC    180  tablet    Take 1 tablet (150 mg) by mouth 2 times daily    Nausea and vomiting, intractability of vomiting not specified, unspecified vomiting type, Diarrhea, unspecified type, Hepatitis B infection without delta agent without hepatic coma, unspecified chronicity

## 2017-11-09 ENCOUNTER — OFFICE VISIT (OUTPATIENT)
Dept: FAMILY MEDICINE | Facility: CLINIC | Age: 32
End: 2017-11-09

## 2017-11-09 VITALS
HEIGHT: 63 IN | TEMPERATURE: 97.8 F | SYSTOLIC BLOOD PRESSURE: 124 MMHG | HEART RATE: 72 BPM | BODY MASS INDEX: 30.62 KG/M2 | WEIGHT: 172.8 LBS | DIASTOLIC BLOOD PRESSURE: 79 MMHG

## 2017-11-09 DIAGNOSIS — G89.29 CHRONIC LEFT-SIDED LOW BACK PAIN WITHOUT SCIATICA: Primary | ICD-10-CM

## 2017-11-09 DIAGNOSIS — M54.50 CHRONIC LEFT-SIDED LOW BACK PAIN WITHOUT SCIATICA: Primary | ICD-10-CM

## 2017-11-09 RX ORDER — CYCLOBENZAPRINE HCL 5 MG
5 TABLET ORAL 3 TIMES DAILY PRN
Qty: 42 TABLET | Refills: 0 | Status: SHIPPED | OUTPATIENT
Start: 2017-11-09 | End: 2018-07-02

## 2017-11-09 RX ORDER — ACETAMINOPHEN 325 MG/1
650 TABLET ORAL EVERY 6 HOURS PRN
Qty: 100 TABLET | Refills: 1 | Status: SHIPPED | OUTPATIENT
Start: 2017-11-09 | End: 2018-07-02

## 2017-11-09 NOTE — PROGRESS NOTES
Preceptor attestation:  Patient seen and discussed with the resident. Assessment and plan reviewed with resident and agreed upon.  Supervising physician: Francisco Burgess  [facility]

## 2017-11-09 NOTE — PATIENT INSTRUCTIONS
Take tylenol as needed for pain    Take flexeril at night time to help with back pain    Try these home stretches at least twice a day    Work with our referral coordinators to set up pool therapy    Follow-up appointment with Dr. Breen in 2-3 weeks to discuss diabetes and follow-up on back pain          Referral for [facility] has [facility]  They will contact patient to schedule.   Phone:[phone]  Fax:[phone]  Lanie  11/09/17

## 2017-11-09 NOTE — MR AVS SNAPSHOT
After Visit Summary   11/9/2017    Reinaldo Real    MRN: [id number]           Patient Information     Date Of Birth          1985        Visit Information        Provider Department      11/9/2017 11:00 AM Robbin Trejo MD [facility]        Today's Diagnoses     Chronic left-sided low back pain without sciatica    -  1      Care Instructions    Take tylenol as needed for pain    Take flexeril at night time to help with back pain    Try these home stretches at least twice a day    Work with our referral coordinators to set up pool therapy    Follow-up appointment with Dr. Breen in 2-3 weeks to discuss diabetes and follow-up on back pain              Follow-ups after your visit        Additional Services     PHYSICAL THERAPY REFERRAL       Patient interested in pool therapy                  Future tests that were ordered for you today     Open Future Orders        Priority Expected Expires Ordered    PHYSICAL THERAPY REFERRAL Routine  1/9/2018 11/9/2017            Who to contact     Please call your clinic at [phone] to:    Ask questions about your health    Make or cancel appointments    Discuss your medicines    Learn about your test results    Speak to your doctor   If you have compliments or concerns about an experience at your clinic, or if you wish to file a complaint, please contact [facility] Patient Relations at [phone] or email us at [email or web address]         Additional Information About Your Visit        Zipongohart Information     Just[employer] is an electronic gateway that provides easy, online access to your medical records. With Just[employer], you can request a clinic appointment, read your test results, renew a prescription or communicate with your care team.     To sign up for G-modet visit the website at [email or web address]   You will be asked to enter the access code listed below, as well as some personal information. Please  "follow the directions to create your username and password.     Your access code is: [email or web address]  Expires: 2018 10:43 AM     Your access code will  in 90 days. If you need help or a new code, please contact your [facility] or call [phone] for assistance.        Care EveryWhere ID     This is your Care EveryWhere ID. This could be used by other organizations to access your [place] medical records  [id number]        Your Vitals Were     Pulse Temperature Height BMI (Body Mass Index)          72 97.8  F (36.6  C) (Oral) 5' 3\" (160 cm) 30.61 kg/m2         Blood Pressure from Last 3 Encounters:   17 124/79   17 113/74   17 122/80    Weight from Last 3 Encounters:   17 172 lb 12.8 oz (78.4 kg)   17 173 lb 3.2 oz (78.6 kg)   17 177 lb (80.3 kg)                 Today's Medication Changes          These changes are accurate as of: 17 11:10 AM.  If you have any questions, ask your nurse or doctor.               Start taking these medicines.        Dose/Directions    acetaminophen 325 MG tablet   Commonly known as:  TYLENOL   Used for:  Chronic left-sided low back pain without sciatica   Started by:  Robbin Trejo MD        Dose:  650 mg   Take 2 tablets (650 mg) by mouth every 6 hours as needed for mild pain   Quantity:  100 tablet   Refills:  1       cyclobenzaprine 5 MG tablet   Commonly known as:  FLEXERIL   Used for:  Chronic left-sided low back pain without sciatica   Started by:  Robbin Trejo MD        Dose:  5 mg   Take 1 tablet (5 mg) by mouth 3 times daily as needed for muscle spasms   Quantity:  42 tablet   Refills:  0            Where to get your medicines      These medications were sent to [facility] [place], [place] - [address], [place] [address]     Phone:  [phone]     acetaminophen 325 MG tablet    cyclobenzaprine 5 MG tablet                Primary Care " Provider Office Phone # Fax #    Cristi Breen [phone]       [address] [facility] [address]        Equal Access to Services     NELLI LUCAS : Hadii aad ku hadericka Sogetachew, waaxda luqadaha, qaybta kaalmada leighann, donald kitchen joshuaroman villasenorchintan odonnell. So [facility] [phone].    ATENCIÓN: Si habla español, tiene a meraz disposición servicios gratuitos de asistencia lingüística. Llame al [phone].    We comply with applicable federal civil rights laws and [place] laws. We do not discriminate on the basis of race, color, national origin, age, disability, sex, sexual orientation, or gender identity.            Thank you!     Thank you for choosing [facility]  for your care. Our goal is always to provide you with excellent care. Hearing back from our patients is one way we can continue to improve our services. Please take a few minutes to complete the written survey that you may receive in the mail after your visit with us. Thank you!             Your Updated Medication List - Protect others around you: Learn how to safely use, store and throw away your medicines at www.disposemymeds.org.          This list is accurate as of: 11/9/17 11:10 AM.  Always use your most recent med list.                   Brand Name Dispense Instructions for use Diagnosis    acetaminophen 325 MG tablet    TYLENOL    100 tablet    Take 2 tablets (650 mg) by mouth every 6 hours as needed for mild pain    Chronic left-sided low back pain without sciatica       cyclobenzaprine 5 MG tablet    FLEXERIL    42 tablet    Take 1 tablet (5 mg) by mouth 3 times daily as needed for muscle spasms    Chronic left-sided low back pain without sciatica       metFORMIN 500 MG tablet    GLUCOPHAGE    180 tablet    Take 1 tablet (500 mg) by mouth 2 times daily (with meals)    Type 2 diabetes mellitus without complication, without long-term current use of insulin (H)       nicotine 10 MG Inhaler    NICOTROL    1 Box     Inhale 6-16 cartridge into the lungs daily as needed for smoking cessation    Cigarette nicotine dependence with nicotine-induced disorder       ranitidine 150 MG tablet    ZANTAC    180 tablet    Take 1 tablet (150 mg) by mouth 2 times daily    Nausea and vomiting, intractability of vomiting not specified, unspecified vomiting type, Diarrhea, unspecified type, Hepatitis B infection without delta agent without hepatic coma, unspecified chronicity

## 2017-11-09 NOTE — PROGRESS NOTES
"       SUBJECTIVE       Moo Kimmie Real is a 32 year old  male with a PMH significant for:     Patient Active Problem List   Diagnosis     TB lung, latent     Hepatitis B infection     Immune to hepatitis A     Perianal abscess     External hemorrhoids     Loose stools     Left-sided low back pain without sciatica     Screening for depression     Type 2 diabetes mellitus without complication, without long-term current use of insulin (H)     Hepatic cirrhosis (H)     Patient presents with:  Back Pain: Pt. states that he has lower back pain on lower left side of back, sharp pain in back and pain shoots down left leg, has had for a long time pain comes and goes   Shoulder Pain: Pain in both shoulder that come and goes has had pain for a long time now  Normal bowel/bladder function. Says he is having left sided lower back pain and in his left flank. Aggravating factors include lifting. Patient had flexeril prescribed to him middle of last year, but states it can make him drowsy while on it.   He has not been taking pain meds for his condition. Did physical therapy in the past for his back , but doesn't want to go back as he didn't like it. Occasionally has sciatica down his left leg but not that frequently.     SH: has cut down smoking to 1-2 cigarettes per day    PMH, Medications and Allergies were reviewed and updated as needed.    ROS: As above per HPI        OBJECTIVE     Vitals:    11/09/17 1050   BP: 124/79   Pulse: 72   Temp: 97.8  F (36.6  C)   TempSrc: Oral   Weight: 172 lb 12.8 oz (78.4 kg)   Height: 5' 3\" (160 cm)     Body mass index is 30.61 kg/(m^2).    GEN: NAD, AAox3, appears stated age  CV: cap refill < 2 seconds  NECK: supple, trachea midline  HEENT: EOMI, head normocephalic, sclera anicteric, trachea midline  PULM: clear bilaterally without wheezes/rhonchi/rales, non-labored  ABD: obese, soft, non-tender, + BS in all quadrants  BACK: modified straight leg negative bilaterally, left paraspinal muscles are " more stiff than right side. No pain over SI joints. Intact sensation.   NEURO: GCS 15, patellar reflexes are 2+ bilaterally  GAIT: normal  PSYCH: euthymic, linear thoughts, no delusions/hallucinations, comprehensible    LABS/IMAGING/EKG  No results found for this or any previous visit (from the past 24 hour(s)).    ASSESSMENT AND PLAN     Chronic left-sided low back pain without sciatica  Try to set him up with pool therapy. I provided him with some home low back exercises. Flexeril at night time to help with muscle stiffness. Avoid NSAIDs given GI history, we'll try tylenol.   - acetaminophen (TYLENOL) 325 MG tablet  Dispense: 100 tablet; Refill: 1  - cyclobenzaprine (FLEXERIL) 5 MG tablet  Dispense: 42 tablet; Refill: 0  - PHYSICAL THERAPY REFERRAL    RTC in 2-3 weeks for follow up of back pain and review diabetes or sooner if develops new or worsening symptoms.      Robbin Trejo MD PGY3  [facility]    Discussed with Dr. Francisco Mccoy who agrees with the above assessment and plan.

## 2017-11-09 NOTE — NURSING NOTE
name: Farhan Chauhan  Language: Jenifer  Agency: [facility]  Phone number: [phone]

## 2017-12-05 ENCOUNTER — OFFICE VISIT (OUTPATIENT)
Dept: FAMILY MEDICINE | Facility: CLINIC | Age: 32
End: 2017-12-05

## 2017-12-05 VITALS
HEART RATE: 71 BPM | TEMPERATURE: 97.8 F | BODY MASS INDEX: 31.28 KG/M2 | DIASTOLIC BLOOD PRESSURE: 73 MMHG | WEIGHT: 176.6 LBS | SYSTOLIC BLOOD PRESSURE: 116 MMHG

## 2017-12-05 DIAGNOSIS — K74.60 CIRRHOSIS OF LIVER WITHOUT ASCITES, UNSPECIFIED HEPATIC CIRRHOSIS TYPE (H): ICD-10-CM

## 2017-12-05 DIAGNOSIS — E11.9 TYPE 2 DIABETES MELLITUS WITHOUT COMPLICATION, WITHOUT LONG-TERM CURRENT USE OF INSULIN (H): ICD-10-CM

## 2017-12-05 DIAGNOSIS — T16.1XXA FB EAR, RIGHT, INITIAL ENCOUNTER: ICD-10-CM

## 2017-12-05 DIAGNOSIS — I85.00 ESOPHAGEAL VARICES WITHOUT BLEEDING, UNSPECIFIED ESOPHAGEAL VARICES TYPE (H): Primary | ICD-10-CM

## 2017-12-05 DIAGNOSIS — E11.9 TYPE 2 DIABETES MELLITUS WITHOUT COMPLICATION, WITHOUT LONG-TERM CURRENT USE OF INSULIN (H): Primary | ICD-10-CM

## 2017-12-05 LAB
CHOLEST SERPL-MCNC: 200.9 MG/DL (ref 0–200)
CHOLEST/HDLC SERPL: 6.2 {RATIO} (ref 0–5)
CREAT UR-MCNC: 98.4 MG/DL
HBA1C MFR BLD: 7.1 % (ref 4.1–5.7)
HDLC SERPL-MCNC: 32.5 MG/DL
LDLC SERPL CALC-MCNC: 90 MG/DL (ref 0–129)
MICROALBUMIN UR-MCNC: <0.5 MG/DL (ref 0–1.99)
MICROALBUMIN/CREAT UR: NORMAL MG/G
TRIGL SERPL-MCNC: 394.2 MG/DL (ref 0–150)
VLDL CHOLESTEROL: 78.8 MG/DL (ref 7–32)

## 2017-12-05 RX ORDER — PROPRANOLOL HYDROCHLORIDE 20 MG/1
20 TABLET ORAL 2 TIMES DAILY
Qty: 90 TABLET | Refills: 1 | Status: SHIPPED | OUTPATIENT
Start: 2017-12-05 | End: 2018-08-30

## 2017-12-05 NOTE — PROGRESS NOTES
Nursing Notes:   Yue Cervantes  12/5/2017  9:39 AM  Signed   name: Farhan Chauhan  Language: Jenifer  Agency: [employer]  Phone number: [phone]  Chief Complaint   Patient presents with     RECHECK     f.u on back pain it is an on going pain     Mass     on his right side he has a mass that has been there for a month or so      Blood pressure 116/73, pulse 71, temperature 97.8  F (36.6  C), temperature source Oral, weight 176 lb 9.6 oz (80.1 kg).      (K74.60) Cirrhosis of liver without ascites, unspecified hepatic cirrhosis type (H)  Comment:   -  Patient needs to F/U with [place] [facility] (Mayra Strange) to discuss MRI results and to discuss possible iron testing. Patient has reportedly declined recommended liver biopsy.  - con to avoid alcohol  - [facility] not convinced varices are presents so will hold off on propanolol until F/U with [facility]     (T16.1XXA) FB ear, right, initial encounter  Comment: Removed    DM 2: Patient wanted to avoid increasing metformin.  - Had extensive discussion on diet and exercise. Patient will try and decrease carbs.  -recheck A1c in 3 months    Low back pain:  - low doses of tylenol with liver pathology  - low back exercises given  - F/U if no better in 10-14 days    F/U in 2-3 months    Options for treatment and follow-up care were reviewed with the patient and/or guardian. Reinaldo Real and/or guardian engaged in the decision making process and verbalized understanding of the options discussed and agreed with the final plan.  Patient discussed with Dr. Ling.   Cristi Breen, DO         HPI       Reinaldo Real is a 32 year old  male with a PMHX of hep b, DM 2 who is here today for Dm 2, right ear pain    - Currently hearing crunching sound or ringing in right ear. Feels like something stuck in ear, thinks it may be a Q tip Going on for 3 weeks. No pain. No decrease in hearing. No drainage. Of note, he has a hard time hearing form left ear (fell form tree as child) & reports seeing ENT and  they say they cannot help.     DM 2  - no check BS at home  - taking metformin: take two pills a day  - older sister has diabetes, reports that she uses insulin, she was diagnosed when she was diagnosed when she was 35  - no polydipsia, polyuria, N/T in feet  - not UTD on eye exam    Low back  - left side  - a few days  - non radiating  -achy  - intermittent  - PT in past has helped  - not taken meds this time  - no numbness, urinary/fecal incontinence      Liver: Hx hep B  - Patient needs to F/U with [place] [facility] (Mayra Strange) to discuss MRI results and to discuss possible iron testing. Patient has reportedly declined recommended liver biopsy.        Patient Active Problem List   Diagnosis     TB lung, latent     Hepatitis B infection     Immune to hepatitis A     Perianal abscess     External hemorrhoids     Loose stools     Left-sided low back pain without sciatica     Screening for depression     Type 2 diabetes mellitus without complication, without long-term current use of insulin (H)     Hepatic cirrhosis (H)     Current Outpatient Prescriptions   Medication Sig Dispense Refill     OMEPRAZOLE PO        acetaminophen (TYLENOL) 325 MG tablet Take 2 tablets (650 mg) by mouth every 6 hours as needed for mild pain 100 tablet 1     cyclobenzaprine (FLEXERIL) 5 MG tablet Take 1 tablet (5 mg) by mouth 3 times daily as needed for muscle spasms 42 tablet 0     metFORMIN (GLUCOPHAGE) 500 MG tablet Take 1 tablet (500 mg) by mouth 2 times daily (with meals) 180 tablet 1     ranitidine (ZANTAC) 150 MG tablet Take 1 tablet (150 mg) by mouth 2 times daily 180 tablet 3     nicotine (NICOTROL) 10 MG Inhaler Inhale 6-16 cartridge into the lungs daily as needed for smoking cessation (Patient not taking: Reported on 12/5/2017) 1 Box 3     Allergies   Allergen Reactions     Nkda [No Known Drug Allergies]      Family History   Problem Relation Age of Onset     Coronary Artery Disease Mother      Hypertension Mother      DIABETES No  family hx of      Results for orders placed or performed in visit on 12/05/17 (from the past 24 hour(s))   Hemoglobin A1c ([facility])   Result Value Ref Range    Hemoglobin A1C 7.1 (H) 4.1 - 5.7 %   Lipid Panel ([place])   Result Value Ref Range    Cholesterol 200.9 (H) 0.0 - 200.0 mg/dL    Cholesterol/HDL Ratio 6.2 (H) 0.0 - 5.0    HDL Cholesterol 32.5 (L) >40.0 mg/dL    LDL Cholesterol Calculated 90 0 - 129 mg/dL    Triglycerides 394.2 (H) 0.0 - 150.0 mg/dL    VLDL Cholesterol 78.8 (H) 7.0 - 32.0 mg/dL            Review of Systems:   As Above             Physical Exam:     Vitals:    12/05/17 0938   BP: 116/73   Pulse: 71   Temp: 97.8  F (36.6  C)   TempSrc: Oral   Weight: 176 lb 9.6 oz (80.1 kg)     Body mass index is 31.28 kg/(m^2).    GENERAL: healthy, alert, well nourished, well hydrated, no distress  HENT: right ear with 1 cm round cotton ball  NECK: no tenderness, no adenopathy, no asymmetry, no masses, no stiffness; thyroid- normal to palpation  RESP: lungs clear to auscultation - no rales, no rhonchi, no wheezes  CV: regular rates and rhythm, normal S1 S2, no S3 or S4 and no murmur, no click or rub -  ABDOMEN: soft, no tenderness, no  hepatosplenomegaly, no masses, normal bowel sounds      Obtained verbal consent with  to remove cotton ball from right ear. Risks (bleeding, infection, ear drum injury/perforation) and benefits explained and patient agreed to intervention. 1 cm round cotton object from end of Q tip removed on first try with alligator forceps.    Orders Only on 12/05/2017   Component Date Value Ref Range Status     Hemoglobin A1C 12/05/2017 7.1* 4.1 - 5.7 % Final     Cholesterol 12/05/2017 200.9* 0.0 - 200.0 mg/dL Final     Cholesterol/HDL Ratio 12/05/2017 6.2* 0.0 - 5.0 Final     HDL Cholesterol 12/05/2017 32.5* >40.0 mg/dL Final     LDL Cholesterol Calculated 12/05/2017 90  0 - 129 mg/dL Final     Triglycerides 12/05/2017 394.2* 0.0 - 150.0 mg/dL Final     VLDL Cholesterol  12/05/2017 78.8* 7.0 - 32.0 mg/dL Final

## 2017-12-05 NOTE — MR AVS SNAPSHOT
After Visit Summary   12/5/2017    Reinaldo Real    MRN: [id number]           Patient Information     Date Of Birth          1985        Visit Information        Provider Department      12/5/2017 9:20 AM Cristi Breen,  [facility]        Today's Diagnoses     Esophageal varices without bleeding, unspecified esophageal varices type (H)    -  1    Cirrhosis of liver without ascites, unspecified hepatic cirrhosis type (H)          Care Instructions    Diabetes: work on diet and exercsie: even walking 20-30 minutes a day    Stomach/iver: start propanolol 20 mg twice a day, make sure you have follow up with stomach/GI doctor.  - no alcohol    Low back: continue exercises: can do muscle relaxant, can do tylenol two pills three times a day    F/U in a 6-8 weeks or sooner if back pain worsens          Follow-ups after your visit        Who to contact     Please call your clinic at [phone] to:    Ask questions about your health    Make or cancel appointments    Discuss your medicines    Learn about your test results    Speak to your doctor   If you have compliments or concerns about an experience at your clinic, or if you wish to file a complaint, please contact [facility] Patient Relations at [phone] or email us at [email or web address]         Additional Information About Your Visit        MyChart Information     [employer] is an electronic gateway that provides easy, online access to your medical records. With Fair [employer], you can request a clinic appointment, read your test results, renew a prescription or communicate with your care team.     To sign up for [employer] visit the website at [email or web address]   You will be asked to enter the access code listed below, as well as some personal information. Please follow the directions to create your username and password.     Your access code is: [email or web address]  Expires: 1/11/2018 10:43 AM     Your  access code will  in 90 days. If you need help or a new code, please contact your [facility] or call [phone] for assistance.        Care EveryWhere ID     This is your Care EveryWhere ID. This could be used by other organizations to access your [place] medical records  [id number]        Your Vitals Were     Pulse Temperature BMI (Body Mass Index)             71 97.8  F (36.6  C) (Oral) 31.28 kg/m2          Blood Pressure from Last 3 Encounters:   17 116/73   17 124/79   17 113/74    Weight from Last 3 Encounters:   17 176 lb 9.6 oz (80.1 kg)   17 172 lb 12.8 oz (78.4 kg)   17 173 lb 3.2 oz (78.6 kg)              Today, you had the following     No orders found for display         Today's Medication Changes          These changes are accurate as of: 17 11:04 AM.  If you have any questions, ask your nurse or doctor.               Start taking these medicines.        Dose/Directions    propranolol 20 MG tablet   Commonly known as:  INDERAL   Used for:  Esophageal varices without bleeding, unspecified esophageal varices type (H)   Started by:  Cristi Breen DO        Dose:  20 mg   Take 1 tablet (20 mg) by mouth 2 times daily   Quantity:  90 tablet   Refills:  1            Where to get your medicines      These medications were sent to [facility] - [place], [place] - [address], [place] [address]     Phone:  [phone]     propranolol 20 MG tablet                Primary Care Provider Office Phone # Fax #    Cristi Breen [phone]       [address]  [facility] [address]        Equal Access to Services     NELLI LUCAS AH: Hadii sourav herediao Sojoséali, waaxda luqadaha, qaybta kaalmada adeegyada, donald herringn damon odonnell. So [facility] [phone].    ATENCIÓN: Si habla español, tiene a meraz disposición servicios gratuitos de asistencia lingüística.  Kasi saavedra [phone].    We comply with applicable federal civil rights laws and [place] laws. We do not discriminate on the basis of race, color, national origin, age, disability, sex, sexual orientation, or gender identity.            Thank you!     Thank you for choosing [facility]  for your care. Our goal is always to provide you with excellent care. Hearing back from our patients is one way we can continue to improve our services. Please take a few minutes to complete the written survey that you may receive in the mail after your visit with us. Thank you!             Your Updated Medication List - Protect others around you: Learn how to safely use, store and throw away your medicines at www.disposemymeds.org.          This list is accurate as of: 12/5/17 11:04 AM.  Always use your most recent med list.                   Brand Name Dispense Instructions for use Diagnosis    acetaminophen 325 MG tablet    TYLENOL    100 tablet    Take 2 tablets (650 mg) by mouth every 6 hours as needed for mild pain    Chronic left-sided low back pain without sciatica       cyclobenzaprine 5 MG tablet    FLEXERIL    42 tablet    Take 1 tablet (5 mg) by mouth 3 times daily as needed for muscle spasms    Chronic left-sided low back pain without sciatica       metFORMIN 500 MG tablet    GLUCOPHAGE    180 tablet    Take 1 tablet (500 mg) by mouth 2 times daily (with meals)    Type 2 diabetes mellitus without complication, without long-term current use of insulin (H)       nicotine 10 MG Inhaler    NICOTROL    1 Box    Inhale 6-16 cartridge into the lungs daily as needed for smoking cessation    Cigarette nicotine dependence with nicotine-induced disorder       OMEPRAZOLE PO           propranolol 20 MG tablet    INDERAL    90 tablet    Take 1 tablet (20 mg) by mouth 2 times daily    Esophageal varices without bleeding, unspecified esophageal varices type (H)       ranitidine 150 MG tablet    ZANTAC    180 tablet    Take 1  tablet (150 mg) by mouth 2 times daily    Nausea and vomiting, intractability of vomiting not specified, unspecified vomiting type, Diarrhea, unspecified type, Hepatitis B infection without delta agent without hepatic coma, unspecified chronicity

## 2017-12-05 NOTE — PROGRESS NOTES
Preceptor attestation:  Patient seen and discussed with the resident. Assessment and plan reviewed with resident and agreed upon.  Supervising physician: Bernardo Ling  [facility]  Body mass index is 31.28 kg/(m^2).

## 2018-02-13 ENCOUNTER — OFFICE VISIT (OUTPATIENT)
Dept: FAMILY MEDICINE | Facility: CLINIC | Age: 33
End: 2018-02-13
Payer: COMMERCIAL

## 2018-02-13 VITALS
SYSTOLIC BLOOD PRESSURE: 110 MMHG | TEMPERATURE: 97.8 F | DIASTOLIC BLOOD PRESSURE: 77 MMHG | HEART RATE: 63 BPM | BODY MASS INDEX: 29.26 KG/M2 | OXYGEN SATURATION: 99 % | HEIGHT: 64 IN | WEIGHT: 171.4 LBS

## 2018-02-13 DIAGNOSIS — M54.42 CHRONIC LEFT-SIDED LOW BACK PAIN WITH LEFT-SIDED SCIATICA: ICD-10-CM

## 2018-02-13 DIAGNOSIS — R30.0 DYSURIA: Primary | ICD-10-CM

## 2018-02-13 DIAGNOSIS — G89.29 CHRONIC LEFT-SIDED LOW BACK PAIN WITH LEFT-SIDED SCIATICA: ICD-10-CM

## 2018-02-13 LAB
BACTERIA: NORMAL
BILIRUBIN UR: NEGATIVE
BLOOD UR: ABNORMAL
CASTS: NORMAL /LPF
CRYSTAL URINE: NORMAL /LPF
EPITHELIAL CELLS UR: NORMAL /LPF (ref 0–2)
GLUCOSE URINE: ABNORMAL
KETONES UR QL: NEGATIVE
LEUKOCYTE ESTERASE UR: NEGATIVE
MUCOUS URINE: NORMAL LPF
NITRITE UR QL STRIP: NEGATIVE
PH UR STRIP: 5.5 [PH] (ref 5–7)
PROTEIN UR: NEGATIVE
RBC URINE: NORMAL /HPF
SP GR UR STRIP: 1.01
UROBILINOGEN UR STRIP-ACNC: ABNORMAL
WBC URINE: NORMAL /HPF

## 2018-02-13 RX ORDER — METHYLPREDNISOLONE 4 MG
TABLET, DOSE PACK ORAL
Qty: 21 TABLET | Refills: 0 | Status: SHIPPED | OUTPATIENT
Start: 2018-02-13 | End: 2018-07-02

## 2018-02-13 RX ORDER — CYCLOBENZAPRINE HCL 5 MG
5 TABLET ORAL 2 TIMES DAILY PRN
Qty: 30 TABLET | Refills: 0 | Status: SHIPPED | OUTPATIENT
Start: 2018-02-13 | End: 2018-07-02

## 2018-02-13 NOTE — PATIENT INSTRUCTIONS
CT ABDOMEN:  [facility]  [address] [place] [address]  [phone]  Date:   Friday February 16, 2018  Time:   2:30 PM  Nothing to eat or drink  3 hours prior to appointment.   has been requested for this appointment.  [facility] WILL PICK-UP AT 1:30 PM  [phone]  Given to .  Darlin Boogie  2/13/18

## 2018-02-13 NOTE — PROGRESS NOTES
"{  Family History   Problem Relation Age of Onset     Coronary Artery Disease Mother      Hypertension Mother      DIABETES No family hx of      Social History     Social History     Marital status:      Spouse name: N/A     Number of children: N/A     Years of education: N/A     Social History Main Topics     Smoking status: Current Some Day Smoker     Last attempt to quit: 11/29/2013     Smokeless tobacco: Never Used     Alcohol use Yes      Comment: Drinks beer sometimes.     Drug use: No     Sexual activity: Not Asked     Other Topics Concern     None     Social History Narrative       There are no exam notes on file for this visit.  Chief Complaint   Patient presents with     Back Pain     Chronic on going lower back pain, not getting any better.       Shoulder Pain     Wrist, shoulder and neck pain, started last night.       Other     Patient states he went to his GI appointment but when he arrived their machine was not working.  So he had to rescheudle it but never reschedule.       dysuria     Burning sensation when urinates for 2 days.       Blood pressure 110/77, pulse 63, temperature 97.8  F (36.6  C), temperature source Oral, height 5' 3.75\" (161.9 cm), weight 171 lb 6.4 oz (77.7 kg), SpO2 99 %.      S:    Patient reports that he has trouble urinating. Started 2 days ago. Reports that there is burning with urination. There is no rash, lesions, or discharge from penis. Has not happened before. No Flank pain but history of chronic back pain. Admits to feeling chills 2 days ago but improved after taking tylenol    Regarding back pain. Chronic pain. Inciting event is exercise 2- 3 days ago. Described as burning. Pain is  constant. Excerbated by lifting, relieved with \"pills\" given in the past. No Numbness, no tingling, no incontinence, and admits to radiation down to left back. Sensation is intact in groin area.    O:  /77  Pulse 63  Temp 97.8  F (36.6  C) (Oral)  Ht 5' 3.75\" (161.9 cm)  Wt " 171 lb 6.4 oz (77.7 kg)  SpO2 99%  BMI 29.65 kg/m2    General Appearance: healthy ,alert, active, no distress  Head/Neck: Normocephalic. No masses, lesions, tenderness or abnormalities  Eyes: conjunctiva clear, PERRL,EOM intact  Ears: External ears normal. Canals clear. TM's normal.  Heart: regular rate and rhythm with normal S1, S2; no murmur, rub or gallops  Lungs: clear to auscultation, with normal respiratory effort  Extremities: no peripheral edema, peripheral pulses normal  Back:normal Gait, full ROM. Pain with extension and SB left, no point tenderness, positve straight leg on left with radiation of pain to knee, Negative Dagmar. Sensation  intact, Strength 5/5  Mental Status: cooperative, normal affect, no gross thought process defects during casual conversation.    A/P:  1. Dysuria  Patient has burning with urination on groin and back. Increase frequency. No LE or nitrites but concern for stones as had trace intact blood on UAm, non noted on micro. Will get CT to scan for stones. DDx include STD/ STI, prostitis, epididymitis. If negative CT, will do prostate check,  exam and consider ABx  - Urinalysis, Micro If ([facility])  - CT ABDOMEN PELVIS W/O CONTRAST; Future  - Urine Microscopic ([facility])    2. Chronic left-sided low back pain with left-sided sciatica  Chronic issue but new sciatic on presentation today. No other neurological symptoms and most pain on paraspinal muscles. Discussed exercising, losing weight and treating symptomatically with steroids and flexeril. No imaging at this time as no other neurological symptoms but can consider MRI in future if pain worsens. Discussed when to call us or go to ED including increased pain, numbness, tingling, rash. Unlikely to be meningitis, fracture, spondy although on differential. Rx steroids, flexaril.  - methylPREDNISolone (MEDROL DOSEPAK) 4 MG tablet; Follow package instructions  Dispense: 21 tablet; Refill: 0  - cyclobenzaprine (FLEXERIL) 5 MG tablet; Take  1 tablet (5 mg) by mouth 2 times daily as needed for muscle spasms  Dispense: 30 tablet; Refill: 0  - F/u in 2 weeks or sooner if worsening pain.    Jj Higuera  Family Medicine Resident PGY3    Patient Instructions   CT ABDOMEN:  [facility]  [address] [place] [address]  [phone]  Date:   Friday February 16, 2018  Time:   2:30 PM  Nothing to eat or drink  3 hours prior to appointment.   has been requested for this appointment.  [facility] WILL PICK-UP AT 1:30 PM  [phone]  Given to .  Darlin PEREZ Pack  2/13/18

## 2018-02-13 NOTE — MR AVS SNAPSHOT
"              After Visit Summary   2018    Reinaldo Real    MRN: [id number]           Patient Information     Date Of Birth          1985        Visit Information        Provider Department      2018 8:40 AM Jj Higuera DO [facility]        Today's Diagnoses     Dysuria    -  1    Chronic left-sided low back pain with left-sided sciatica           Follow-ups after your visit        Future tests that were ordered for you today     Open Future Orders        Priority Expected Expires Ordered    CT ABDOMEN PELVIS W/O CONTRAST Routine  2019            Who to contact     Please call your clinic at [phone] to:    Ask questions about your health    Make or cancel appointments    Discuss your medicines    Learn about your test results    Speak to your doctor            Additional Information About Your Visit        MyChart Information     [employer] is an electronic gateway that provides easy, online access to your medical records. With [employer], you can request a clinic appointment, read your test results, renew a prescription or communicate with your care team.     To sign up for [employer] visit the website at [email or web address]   You will be asked to enter the access code listed below, as well as some personal information. Please follow the directions to create your username and password.     Your access code is: [email or web address]  Expires: 2018  9:22 AM     Your access code will  in 90 days. If you need help or a new code, please contact your [facility] or call [phone] for assistance.        Care EveryWhere ID     This is your Care EveryWhere ID. This could be used by other organizations to access your [place] medical records  [id number]        Your Vitals Were     Pulse Temperature Height Pulse Oximetry BMI (Body Mass Index)       63 97.8  F (36.6  C) (Oral) 5' 3.75\" (161.9 cm) 99% 29.65 kg/m2        Blood Pressure from Last 3 " Encounters:   02/13/18 110/77   12/05/17 116/73   11/09/17 124/79    Weight from Last 3 Encounters:   02/13/18 171 lb 6.4 oz (77.7 kg)   12/05/17 176 lb 9.6 oz (80.1 kg)   11/09/17 172 lb 12.8 oz (78.4 kg)              We Performed the Following     Urinalysis, Micro If ([facility])          Today's Medication Changes          These changes are accurate as of 2/13/18  9:22 AM.  If you have any questions, ask your nurse or doctor.               Start taking these medicines.        Dose/Directions    methylPREDNISolone 4 MG tablet   Commonly known as:  MEDROL DOSEPAK   Used for:  Chronic left-sided low back pain with left-sided sciatica   Started by:  Jj Higuera DO        Follow package instructions   Quantity:  21 tablet   Refills:  0         These medicines have changed or have updated prescriptions.        Dose/Directions    * cyclobenzaprine 5 MG tablet   Commonly known as:  FLEXERIL   This may have changed:  Another medication with the same name was added. Make sure you understand how and when to take each.   Used for:  Chronic left-sided low back pain without sciatica   Changed by:  Jj Higuera DO        Dose:  5 mg   Take 1 tablet (5 mg) by mouth 3 times daily as needed for muscle spasms   Quantity:  42 tablet   Refills:  0       * cyclobenzaprine 5 MG tablet   Commonly known as:  FLEXERIL   This may have changed:  You were already taking a medication with the same name, and this prescription was added. Make sure you understand how and when to take each.   Used for:  Chronic left-sided low back pain with left-sided sciatica   Changed by:  Jj Higuera DO        Dose:  5 mg   Take 1 tablet (5 mg) by mouth 2 times daily as needed for muscle spasms   Quantity:  30 tablet   Refills:  0       * Notice:  This list has 2 medication(s) that are the same as other medications prescribed for you. Read the directions carefully, and ask your doctor or other care provider to review them with you.         Where to get  your medicines      These medications were sent to [facility] - [place], [place] - [address]  [address], [place] [address]     Phone:  [phone]     cyclobenzaprine 5 MG tablet    methylPREDNISolone 4 MG tablet                Primary Care Provider Office Phone # Fax #    Cristi Breen [phone]       [address]  [facility] [address]        Equal Access to Services     NELLI LUCAS : Hadii aad ku hadasho Soomaali, waaxda luqadaha, qaybta kaalmada adeegyada, waxay idiin hayaan adeeg kharash latavia . So [facility] [phone].    ATENCIÓN: Si caroline valles, tiene a meraz disposición servicios gratuitos de asistencia lingüística. [facility] [phone].    We comply with applicable federal civil rights laws and [place] laws. We do not discriminate on the basis of race, color, national origin, age, disability, sex, sexual orientation, or gender identity.            Thank you!     Thank you for choosing [facility]  for your care. Our goal is always to provide you with excellent care. Hearing back from our patients is one way we can continue to improve our services. Please take a few minutes to complete the written survey that you may receive in the mail after your visit with us. Thank you!             Your Updated Medication List - Protect others around you: Learn how to safely use, store and throw away your medicines at www.disposemymeds.org.          This list is accurate as of 2/13/18  9:22 AM.  Always use your most recent med list.                   Brand Name Dispense Instructions for use Diagnosis    acetaminophen 325 MG tablet    TYLENOL    100 tablet    Take 2 tablets (650 mg) by mouth every 6 hours as needed for mild pain    Chronic left-sided low back pain without sciatica       * cyclobenzaprine 5 MG tablet    FLEXERIL    42 tablet    Take 1 tablet (5 mg) by mouth 3 times daily as needed for muscle spasms    Chronic left-sided low back pain without  sciatica       * cyclobenzaprine 5 MG tablet    FLEXERIL    30 tablet    Take 1 tablet (5 mg) by mouth 2 times daily as needed for muscle spasms    Chronic left-sided low back pain with left-sided sciatica       metFORMIN 500 MG tablet    GLUCOPHAGE    180 tablet    Take 1 tablet (500 mg) by mouth 2 times daily (with meals)    Type 2 diabetes mellitus without complication, without long-term current use of insulin (H)       methylPREDNISolone 4 MG tablet    MEDROL DOSEPAK    21 tablet    Follow package instructions    Chronic left-sided low back pain with left-sided sciatica       nicotine 10 MG Inhaler    NICOTROL    1 Box    Inhale 6-16 cartridge into the lungs daily as needed for smoking cessation    Cigarette nicotine dependence with nicotine-induced disorder       OMEPRAZOLE PO           propranolol 20 MG tablet    INDERAL    90 tablet    Take 1 tablet (20 mg) by mouth 2 times daily    Esophageal varices without bleeding, unspecified esophageal varices type (H)       ranitidine 150 MG tablet    ZANTAC    180 tablet    Take 1 tablet (150 mg) by mouth 2 times daily    Nausea and vomiting, intractability of vomiting not specified, unspecified vomiting type, Diarrhea, unspecified type, Hepatitis B infection without delta agent without hepatic coma, unspecified chronicity       * Notice:  This list has 2 medication(s) that are the same as other medications prescribed for you. Read the directions carefully, and ask your doctor or other care provider to review them with you.

## 2018-02-13 NOTE — PROGRESS NOTES
"Preceptor attestation:  Vital signs reviewed: /77  Pulse 63  Temp 97.8  F (36.6  C) (Oral)  Ht 5' 3.75\" (161.9 cm)  Wt 171 lb 6.4 oz (77.7 kg)  SpO2 99%  BMI 29.65 kg/m2    Patient seen and discussed with the resident. Assessment and plan reviewed with resident and agreed upon.    Supervising physician: Rekha Galeas MD  [facility]  "

## 2018-02-15 ASSESSMENT — PATIENT HEALTH QUESTIONNAIRE - PHQ9: SUM OF ALL RESPONSES TO PHQ QUESTIONS 1-9: 0

## 2018-02-19 DIAGNOSIS — R30.0 DYSURIA: ICD-10-CM

## 2018-07-02 ENCOUNTER — OFFICE VISIT (OUTPATIENT)
Dept: FAMILY MEDICINE | Facility: CLINIC | Age: 33
End: 2018-07-02
Payer: COMMERCIAL

## 2018-07-02 VITALS
WEIGHT: 170.2 LBS | TEMPERATURE: 98.1 F | HEIGHT: 64 IN | SYSTOLIC BLOOD PRESSURE: 112 MMHG | OXYGEN SATURATION: 98 % | HEART RATE: 72 BPM | BODY MASS INDEX: 29.06 KG/M2 | DIASTOLIC BLOOD PRESSURE: 73 MMHG | RESPIRATION RATE: 20 BRPM

## 2018-07-02 DIAGNOSIS — E11.9 TYPE 2 DIABETES MELLITUS WITHOUT COMPLICATION, WITHOUT LONG-TERM CURRENT USE OF INSULIN (H): ICD-10-CM

## 2018-07-02 DIAGNOSIS — E88.810 METABOLIC SYNDROME: Primary | ICD-10-CM

## 2018-07-02 DIAGNOSIS — K74.60 CIRRHOSIS OF LIVER WITHOUT ASCITES, UNSPECIFIED HEPATIC CIRRHOSIS TYPE (H): ICD-10-CM

## 2018-07-02 LAB
ALBUMIN SERPL-MCNC: 4.3 MG/DL (ref 3.9–5.1)
ALP SERPL-CCNC: 155.8 U/L (ref 40–150)
ALT SERPL-CCNC: 47.5 U/L (ref 0–45)
AST SERPL-CCNC: 30 U/L (ref 0–55)
BILIRUB SERPL-MCNC: 0.3 MG/DL (ref 0.2–1.3)
BUN SERPL-MCNC: 9.8 MG/DL (ref 7–21)
CALCIUM SERPL-MCNC: 9.5 MG/DL (ref 8.5–10.1)
CHLORIDE SERPLBLD-SCNC: 97.8 MMOL/L (ref 98–110)
CO2 SERPL-SCNC: 25.2 MMOL/L (ref 20–32)
CREAT SERPL-MCNC: 0.9 MG/DL (ref 0.7–1.3)
GFR SERPL CREATININE-BSD FRML MDRD: >90 ML/MIN/1.7 M2
GLUCOSE SERPL-MCNC: 457.3 MG'DL (ref 70–99)
HBA1C MFR BLD: 10.2 % (ref 4.1–5.7)
POTASSIUM SERPL-SCNC: 4.2 MMOL/DL (ref 3.2–4.6)
PROT SERPL-MCNC: 7.6 G/DL (ref 6.8–8.8)
SODIUM SERPL-SCNC: 132.1 MMOL/L (ref 132–142)

## 2018-07-02 NOTE — PATIENT INSTRUCTIONS
Labs today  Goal: Exercise every day, less rice  See liver doctor    Back in 2 weeks to discuss results  Dr. Lexi Parsons    GASTRO REFERRAL:  Orders and demographics faxed to [facility] and they will contact you for scheduling within 2-5 business days.   PH:  [phone]   FAX:  [phone]  Darlin Pack  7/2/18

## 2018-07-02 NOTE — MR AVS SNAPSHOT
After Visit Summary   7/2/2018    Reinaldo Real    MRN: [id number]           Patient Information     Date Of Birth          1985        Visit Information        Provider Department      7/2/2018 2:10 PM Lexi Parsons MD [facility]        Today's Diagnoses     Metabolic syndrome    -  1    Type 2 diabetes mellitus without complication, without long-term current use of insulin (H)        Cirrhosis of liver without ascites, unspecified hepatic cirrhosis type (H)          Care Instructions    Labs today  Goal: Exercise every day, less rice  See liver doctor    Back in 2 weeks to discuss results  Dr. Lexi Parsons          Follow-ups after your visit        Additional Services     GASTROENTEROLOGY ADULT REF CONSULT ONLY       Preferred Location: [place] [facility] [phone]    Hx of cirrhosis, was folllowing with [facility], lapse of insurance now reinstated      Please be aware that coverage of these services is subject to the terms and limitations of your health insurance plan.  Call member services at your health plan with any benefit or coverage questions.  Any procedures must be performed at a [facility] facility OR coordinated by your clinic's referral office.    Please bring the following with you to your appointment:    (1) Any X-Rays, CTs or MRIs which have been performed.  Contact the facility where they were done to arrange for  prior to your scheduled appointment.    (2) List of current medications   (3) This referral request   (4) Any documents/labs given to you for this referral                  Who to contact     Please call your clinic at [phone] to:    Ask questions about your health    Make or cancel appointments    Discuss your medicines    Learn about your test results    Speak to your doctor            Additional Information About Your Visit        [email or web address] Information     Tapomat is an electronic gateway that provides easy, online access to your medical records. With  "MyChart, you can request a clinic appointment, read your test results, renew a prescription or communicate with your care team.     To sign up for What[employer]ont visit the website at [email or web address]   You will be asked to enter the access code listed below, as well as some personal information. Please follow the directions to create your username and password.     Your access code is: [email or web address]  Expires: 2018  2:51 PM     Your access code will  in 90 days. If you need help or a new code, please contact your [facility] or call [phone] for assistance.        Care EveryWhere ID     This is your Care EveryWhere ID. This could be used by other organizations to access your [facility] medical records  [id number]        Your Vitals Were     Pulse Temperature Respirations Height Pulse Oximetry BMI (Body Mass Index)    72 98.1  F (36.7  C) (Oral) 20 5' 3.5\" (161.3 cm) 98% 29.68 kg/m2       Blood Pressure from Last 3 Encounters:   18 112/73   18 110/77   17 116/73    Weight from Last 3 Encounters:   18 170 lb 3.2 oz (77.2 kg)   18 171 lb 6.4 oz (77.7 kg)   17 176 lb 9.6 oz (80.1 kg)              We Performed the Following     Comprehensive Metabolic Panel ([place])     GASTROENTEROLOGY ADULT REF CONSULT ONLY     Hemoglobin A1c ([facility])          Today's Medication Changes          These changes are accurate as of 18  2:51 PM.  If you have any questions, ask your nurse or doctor.               Stop taking these medicines if you haven't already. Please contact your care team if you have questions.     acetaminophen 325 MG tablet   Commonly known as:  TYLENOL   Stopped by:  Lexi Parsons MD           cyclobenzaprine 5 MG tablet   Commonly known as:  FLEXERIL   Stopped by:  Lexi Parsons MD           methylPREDNISolone 4 MG tablet   Commonly known as:  MEDROL DOSEPAK   Stopped by:  Lexi Parsons MD           nicotine " 10 MG Inhaler   Commonly known as:  NICOTROL   Stopped by:  Lexi Parsons MD           OMEPRAZOLE PO   Stopped by:  Lexi Parsons MD           ranitidine 150 MG tablet   Commonly known as:  ZANTAC   Stopped by:  Lexi Parsons MD                    Primary Care Provider Office Phone #    Natalia Duarte [phone]       [facility] [address] [place] [address]        Equal Access to Services     : Hadii aad ku hadasho Soomaali, waaxda luqadaha, qaybta kaalmada adeegyada, waxay idiin hayaan adeeg kharash la'aan ah. So [facility] [phone].    ATENCIÓN: Si habla español, tiene a meraz disposición servicios gratuitos de asistencia lingüística. Llame al [phone].    We comply with applicable federal civil rights laws and [place] laws. We do not discriminate on the basis of race, color, national origin, age, disability, sex, sexual orientation, or gender identity.            Thank you!     Thank you for choosing [facility]  for your care. Our goal is always to provide you with excellent care. Hearing back from our patients is one way we can continue to improve our services. Please take a few minutes to complete the written survey that you may receive in the mail after your visit with us. Thank you!             Your Updated Medication List - Protect others around you: Learn how to safely use, store and throw away your medicines at www.disposemymeds.org.          This list is accurate as of 7/2/18  2:51 PM.  Always use your most recent med list.                   Brand Name Dispense Instructions for use Diagnosis    metFORMIN 500 MG tablet    GLUCOPHAGE    180 tablet    Take 1 tablet (500 mg) by mouth 2 times daily (with meals)    Type 2 diabetes mellitus without complication, without long-term current use of insulin (H)       propranolol 20 MG tablet    INDERAL    90 tablet    Take 1 tablet (20 mg) by mouth 2 times daily    Esophageal varices without bleeding,  unspecified esophageal varices type (H)

## 2018-07-02 NOTE — PROGRESS NOTES
SUBJECTIVE     Chief Complaint   Patient presents with     Diabetes     Other     Due to lack of insurance patient is currently not taking any of his medication       Subjective: Reinaldo Real is a 33 year old male with chronic hep B, cirrhosis, metabolic syndrome here for diabetes check.    Hasn't seen the doctor in awhile due to insurance issues. Hasn't taken medications for 3 months. No symptoms. Working on diet and losing weight. He does note that muscles are weak in his calves and also notes cramping. Notes sugary drinks makes his stomach hurt.    He feels like his diabetes is getting better, however on ROS endorses polyphagia, polydipsia, increased frequency of urination. Also down approx 5 lbs from 1 year ago. He tells me he eats traditional Jenifer diet with purvis, beef, pork, and veggies, but most of his plate is rice.     Also has hepatic cirrhosis. Hasn't been to see GI for *awhile*. Last visit I see was 7/3/17. Per that visit, he had sporadically been seen about every other year. Biopsy was recommended at one time. Endoscopy from 02/2017 showed possible small esophageal varices. Per their chart review there was an MRI that was reportably normal and the cirrhosis wasn't a for sure diagnosis. Elastography was ordered but I don't see a result for this. Has a remote history of alcohol use but nothing recently. Doesn't use over the counter or herbal remedies.       ROS:    General: No fevers  Skin: No new rashes  CV: No chest pain  Resp: No shortness of breath     PMH/PSH, FamHx, Meds, Allergies reviewed and updated as needed.    Social History     Social History     Marital status:      Spouse name: N/A     Number of children: N/A     Years of education: N/A     Social History Main Topics     Smoking status: Current Some Day Smoker     Last attempt to quit: 11/29/2013     Smokeless tobacco: Never Used     Alcohol use Yes      Comment: Drinks beer sometimes.     Drug use: No     Sexual activity: Not  "Asked     Other Topics Concern     None     Social History Narrative           OBJECTIVE     /73 (BP Location: Right arm, Patient Position: Sitting, Cuff Size: Adult Regular)  Pulse 72  Temp 98.1  F (36.7  C) (Oral)  Resp 20  Ht 5' 3.5\" (161.3 cm)  Wt 170 lb 3.2 oz (77.2 kg)  SpO2 98%  BMI 29.68 kg/m2  Body mass index is 29.68 kg/(m^2).    Physical exam:  Gen: No acute distress  HEENT: Sclera are anicteric.  CV: Regular rate and rhythm, no murmurs/rubs/gallops  Resp: Clear to auscultation bilaterally, no crackles or wheezes  ABD: soft, somewhat protuberant with possible mild bulging flanks but no fluid wave. nontender, no masses, no rebound. No hepatomegaly by percussion.   Skin: no obvious jaundice  Psych: Mood and affect appropriate, mentation appears normal.    Results for orders placed or performed in visit on 07/02/18 (from the past 24 hour(s))   Hemoglobin A1c ([facility])   Result Value Ref Range    Hemoglobin A1C 10.2 (H) 4.1 - 5.7 %   Comprehensive Metabolic Panel ([place])   Result Value Ref Range    Albumin 4.3 3.9 - 5.1 mg/dL    Alkaline Phosphatase 155.8 (H) 40.0 - 150.0 U/L    ALT 47.5 (H) 0.0 - 45.0 U/L    AST 30.0 0.0 - 55.0 U/L    Bilirubin Total 0.3 0.2 - 1.3 mg/dL    Urea Nitrogen 9.8 7.0 - 21.0 mg/dL    Calcium 9.5 8.5 - 10.1 mg/dL    Chloride 97.8 (L) 98.0 - 110.0 mmol/L    Carbon Dioxide 25.2 20.0 - 32.0 mmol/L    Creatinine 0.9 0.7 - 1.3 mg/dL    Glucose 457.3 (HH) 70.0 - 99.0 mg'dL    Potassium 4.2 3.2 - 4.6 mmol/dL    Sodium 132.1 132.0 - 142.0 mmol/L    Protein Total 7.6 6.8 - 8.8 g/dL    GFR Estimate >90 >60.0 mL/min/1.7 m2    GFR Estimate If Black >90 >60.0 mL/min/1.7 m2    Narrative    Panic Value reported and repeated back at 7/2/2018 4:09 PM to Camila by   orquidea/tyrone.         ASSESSMENT AND PLAN     Reinaldo Real is a 33 year old male here for diabetes check    1. Metabolic syndrome  2. Type 2 diabetes mellitus without complication, without long-term current use of insulin " (H)  Patient states he is working on diet and exercise, however specifically eats a lot of rice and has cut down on sugary drinks. He has symptomatic hyperglycemia with polyphagia, polydipsia, weight loss. His last A1C was 7.1 in 12/2017. He was originally prescribed metformin, however is now 10.2 with glucose >400. At the time of visit, patient had not gotten labs back yet and had been told to schedule a follow up appt in 2 weeks for results. He is significantly hyperglycemic, however no acidosis. Given the severity of his hyperglycemia, will have clinic staff try and contact him to come in at the end of this week. Will likely need to start on insulin as well as resume metformin. Also could consider statin, although outside of age range for ACC/AHA recommendations would likely have benefit. Will also need much diabetic education.   - Hemoglobin A1c ([facility])  - Comprehensive Metabolic Panel ([place])      3. Cirrhosis of liver without ascites, unspecified hepatic cirrhosis type (H)  Sounds like unclear diagnosis per GI's last note with intermittent follow up over the years. Also has chronic B and likely fatty liver. Open to seeing GI again now that he has insurance. Will defer hepatitis B labs until he sees GI again. Clearly has metabolic syndrome with hypertriglyceridemia, low HDL, abdominal obesity and diabetes.   - Comprehensive Metabolic Panel ([place])  - GASTROENTEROLOGY ADULT REF CONSULT ONLY              Lexi Parsons MD, PGY-2  I precepted today with Jack Ventura MD.

## 2018-07-02 NOTE — LETTER
July 12, 2018      Reinaldo Kimmie Real  [address] [place] [address]        Dear Reinaldo,  These labs show your blood sugar is very high and we would recommend medication for your diabetes control, which I think would help you feel a lot better. Please come back to clinic as soon as you are able to discuss this further.   Please see below for your test results.    Resulted Orders   Hemoglobin A1c ([facility])   Result Value Ref Range    Hemoglobin A1C 10.2 (H) 4.1 - 5.7 %   Comprehensive Metabolic Panel ([place])   Result Value Ref Range    Albumin 4.3 3.9 - 5.1 mg/dL    Alkaline Phosphatase 155.8 (H) 40.0 - 150.0 U/L    ALT 47.5 (H) 0.0 - 45.0 U/L    AST 30.0 0.0 - 55.0 U/L    Bilirubin Total 0.3 0.2 - 1.3 mg/dL    Urea Nitrogen 9.8 7.0 - 21.0 mg/dL    Calcium 9.5 8.5 - 10.1 mg/dL    Chloride 97.8 (L) 98.0 - 110.0 mmol/L    Carbon Dioxide 25.2 20.0 - 32.0 mmol/L    Creatinine 0.9 0.7 - 1.3 mg/dL    Glucose 457.3 (HH) 70.0 - 99.0 mg'dL    Potassium 4.2 3.2 - 4.6 mmol/dL    Sodium 132.1 132.0 - 142.0 mmol/L    Protein Total 7.6 6.8 - 8.8 g/dL    GFR Estimate >90 >60.0 mL/min/1.7 m2    GFR Estimate If Black >90 >60.0 mL/min/1.7 m2    Narrative    Panic Value reported and repeated back at 7/2/2018 4:09 PM to Camila by   orquidea/tyrone.       If you have any questions, please call the clinic to make an appointment.    Sincerely,    Lexi Parsons MD

## 2018-07-02 NOTE — NURSING NOTE
Due to patient being non-English speaking/uses sign language, an  was used for this visit. Only for face-to-face interpretation by an external agency, date and length of interpretation can be found on the scanned worksheet.     name: Jordan Damian  Agency: [employer]  Language: Jenifer   Telephone number: [phone]  Type of interpretation: Face-to-face, spoken

## 2018-07-03 NOTE — PROGRESS NOTES
Preceptor Attestation:   Patient seen, evaluated and discussed with the resident. I have verified the content of the note, which accurately reflects my assessment of the patient and the plan of care.   Supervising Physician:  Jack Ventura MD

## 2018-07-11 ENCOUNTER — DOCUMENTATION ONLY (OUTPATIENT)
Dept: FAMILY MEDICINE | Facility: CLINIC | Age: 33
End: 2018-07-11

## 2018-07-11 NOTE — PROGRESS NOTES
No showed 7/11 visit. Discussed with PCS, will have  (was seen by Farhan Chauhan last time) call and stress importance of making appointment in the next week.     Lexi Parsons MD, PGY-3  [facility]  Pager: [phone]

## 2018-07-20 ENCOUNTER — OFFICE VISIT (OUTPATIENT)
Dept: FAMILY MEDICINE | Facility: CLINIC | Age: 33
End: 2018-07-20
Payer: COMMERCIAL

## 2018-07-20 VITALS
SYSTOLIC BLOOD PRESSURE: 132 MMHG | WEIGHT: 169.8 LBS | DIASTOLIC BLOOD PRESSURE: 79 MMHG | RESPIRATION RATE: 20 BRPM | OXYGEN SATURATION: 97 % | TEMPERATURE: 98.7 F | HEART RATE: 72 BPM | BODY MASS INDEX: 29.61 KG/M2

## 2018-07-20 DIAGNOSIS — E11.9 TYPE 2 DIABETES MELLITUS WITHOUT COMPLICATION, WITHOUT LONG-TERM CURRENT USE OF INSULIN (H): Primary | ICD-10-CM

## 2018-07-20 DIAGNOSIS — H92.02 LEFT EAR PAIN: ICD-10-CM

## 2018-07-20 RX ORDER — LANCING DEVICE
EACH MISCELLANEOUS
Qty: 1 EACH | Refills: 0 | Status: SHIPPED | OUTPATIENT
Start: 2018-07-20 | End: 2022-12-15

## 2018-07-20 NOTE — MR AVS SNAPSHOT
After Visit Summary   2018    Reinaldo Real    MRN: [id number]           Patient Information     Date Of Birth          1985        Visit Information        Provider Department      2018 1:10 PM Natalia Duarte MD [facility]        Today's Diagnoses     Type 2 diabetes mellitus without complication, without long-term current use of insulin (H)    -  1    Right ear pain           Follow-ups after your visit        Follow-up notes from your care team     Return in about 4 weeks (around 2018).      Who to contact     Please call your clinic at [phone] to:    Ask questions about your health    Make or cancel appointments    Discuss your medicines    Learn about your test results    Speak to your doctor            Additional Information About Your Visit        MyChart Information     Excelerat is an electronic gateway that provides easy, online access to your medical records. With Skwibl, you can request a clinic appointment, read your test results, renew a prescription or communicate with your care team.     To sign up for Excelerat visit the website at [email or web address]   You will be asked to enter the access code listed below, as well as some personal information. Please follow the directions to create your username and password.     Your access code is: [email or web address]  Expires: 2018  2:51 PM     Your access code will  in 90 days. If you need help or a new code, please contact your [facility] or call [phone] for assistance.        Care EveryWhere ID     This is your Care EveryWhere ID. This could be used by other organizations to access your [facility] medical records  [id number]        Your Vitals Were     Pulse Temperature Respirations Pulse Oximetry BMI (Body Mass Index)       72 98.7  F (37.1  C) (Oral) 20 97% 29.61 kg/m2        Blood Pressure from Last 3 Encounters:   18 132/79   18 112/73   18  110/77    Weight from Last 3 Encounters:   07/20/18 169 lb 12.8 oz (77 kg)   07/02/18 170 lb 3.2 oz (77.2 kg)   02/13/18 171 lb 6.4 oz (77.7 kg)              Today, you had the following     No orders found for display         Today's Medication Changes          These changes are accurate as of 7/20/18  2:03 PM.  If you have any questions, ask your nurse or doctor.               Start taking these medicines.        Dose/Directions    blood glucose lancing device   Commonly known as:  no brand specified   Used for:  Type 2 diabetes mellitus without complication, without long-term current use of insulin (H)   Started by:  Natalia Duarte MD        Use to test blood sugars 2 times weekly or as directed.   Quantity:  1 each   Refills:  0       blood glucose monitoring meter device kit   Commonly known as:  no brand specified   Used for:  Type 2 diabetes mellitus without complication, without long-term current use of insulin (H)   Started by:  Natalia Duarte MD        Use to test blood sugar 2 times weekly or as directed.   Quantity:  1 kit   Refills:  0       blood glucose monitoring test strip   Commonly known as:  no brand specified   Used for:  Type 2 diabetes mellitus without complication, without long-term current use of insulin (H)   Started by:  Natalia Duarte MD        Use to test blood sugars 2 times weekly or as directed   Quantity:  100 strip   Refills:  0         These medicines have changed or have updated prescriptions.        Dose/Directions    metFORMIN 500 MG tablet   Commonly known as:  GLUCOPHAGE   This may have changed:    - how much to take  - how to take this  - when to take this  - additional instructions   Used for:  Type 2 diabetes mellitus without complication, without long-term current use of insulin (H)   Changed by:  Natalia Duarte MD        1 tablet in am week 1. 1 tablet in am and pm week 2. 2 tablets in am and 1 tablet in the pm week 3. 2 tablets in am and pm week  4.   Quantity:  180 tablet   Refills:  0            Where to get your medicines      These medications were sent to [facility] - [place], [place] - [address]  [address], [place] [address]     Phone:  [phone]     blood glucose lancing device    blood glucose monitoring meter device kit    blood glucose monitoring test strip    metFORMIN 500 MG tablet                Primary Care Provider Office Phone #    Natalia Duarte -[phone]       [facility] [address] [place] [address]        Equal Access to Services     NELLI LUCAS : Hadii aad ku hadasho Soomaali, waaxda luqadaha, qaybta kaalmada adeegyada, waxay idiin hayaan adeeg kharash latavia odonnell. So [facility] [phone].    ATENCIÓN: Si habla español, tiene a meraz disposición servicios gratuitos de asistencia lingüística. [facility] [phone].    We comply with applicable federal civil rights laws and [place] laws. We do not discriminate on the basis of race, color, national origin, age, disability, sex, sexual orientation, or gender identity.            Thank you!     Thank you for choosing [facility]  for your care. Our goal is always to provide you with excellent care. Hearing back from our patients is one way we can continue to improve our services. Please take a few minutes to complete the written survey that you may receive in the mail after your visit with us. Thank you!             Your Updated Medication List - Protect others around you: Learn how to safely use, store and throw away your medicines at www.disposemymeds.org.          This list is accurate as of 7/20/18  2:03 PM.  Always use your most recent med list.                   Brand Name Dispense Instructions for use Diagnosis    blood glucose lancing device    no brand specified    1 each    Use to test blood sugars 2 times weekly or as directed.    Type 2 diabetes mellitus without complication, without long-term current use of insulin (H)        blood glucose monitoring meter device kit    no brand specified    1 kit    Use to test blood sugar 2 times weekly or as directed.    Type 2 diabetes mellitus without complication, without long-term current use of insulin (H)       blood glucose monitoring test strip    no brand specified    100 strip    Use to test blood sugars 2 times weekly or as directed    Type 2 diabetes mellitus without complication, without long-term current use of insulin (H)       metFORMIN 500 MG tablet    GLUCOPHAGE    180 tablet    1 tablet in am week 1. 1 tablet in am and pm week 2. 2 tablets in am and 1 tablet in the pm week 3. 2 tablets in am and pm week 4.    Type 2 diabetes mellitus without complication, without long-term current use of insulin (H)       propranolol 20 MG tablet    INDERAL    90 tablet    Take 1 tablet (20 mg) by mouth 2 times daily    Esophageal varices without bleeding, unspecified esophageal varices type (H)

## 2018-07-20 NOTE — PROGRESS NOTES
Preceptor Attestation:   Patient seen, evaluated and discussed with the resident. I have verified the content of the note, which accurately reflects my assessment of the patient and the plan of care.   Supervising Physician:  Francisco Burgess MD

## 2018-07-20 NOTE — NURSING NOTE
Due to patient being non-English speaking/uses sign language, an  was used for this visit. Only for face-to-face interpretation by an external agency, date and length of interpretation can be found on the scanned worksheet.     name: Jordan Damian  Agency: [employer]  Language: Jenifer   Telephone number: [phone]  Type of interpretation: Face-to-face, spoken

## 2018-07-20 NOTE — PROGRESS NOTES
SUBJECTIVE       Reinaldo Real is a 33 year old  male with a PMH significant for:     Patient Active Problem List   Diagnosis     TB lung, latent     Hepatitis B infection     Immune to hepatitis A     Perianal abscess     External hemorrhoids     Loose stools     Left-sided low back pain without sciatica     Screening for depression     Type 2 diabetes mellitus without complication, without long-term current use of insulin (H)     Hepatic cirrhosis (H)     Metabolic syndrome     He presents for follow-up of his diabetes and a new pain behind his left ear.    Patient states pain started when he woke up 3 days ago.  He describes it as shooting and locates it in front of and behind his left ear.  The pain is constant but is drastically worsened with chewing on that side of his mouth.  Patient has tried Tylenol for the pain which seems to help.  He denies any radiating across his face.  Feels like he is noticing a bump behind the ear.  He has never had anything like this in the past.  Patient has no pain in the ear or mouth.  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    Patient was diagnosed with diabetes last year and started on metformin.  He lost his insurance in December.  He stopped taking his metformin at that time.  He has no reacquired insurance and is following up.  Patient has not taken anything for his diabetes since December.  Patient does not have supplies to check his blood sugars.  Currently he denies frequency, polydipsia, numbness in his toes, change in vision.  Patient has had episodes of frequency and polydipsia in the past though.  Patient's family members have diabetes and he is well aware of how to check sugars.  He has been educated on diet changes in the past.    PMH, Medications and Allergies were reviewed and updated as needed.        REVIEW OF SYSTEMS     CONSTITUTIONAL: NEGATIVE for fever, chills  INTEGUMENTARY/SKIN: NEGATIVE for worrisome rashes, moles or  lesions  EYES: NEGATIVE for vision changes or irritation  ENT/MOUTH: NEGATIVE for ear, mouth and throat problems  RESP: NEGATIVE for significant cough or SOB  CV: NEGATIVE for chest pain, palpitations or peripheral edema  GI: NEGATIVE for nausea, abdominal pain, heartburn, or change in bowel habits  : NEGATIVE for frequency, dysuria, or hematuria  MUSCULOSKELETAL: NEGATIVE for significant arthralgias or myalgia  NEURO: NEGATIVE for weakness, dizziness or paresthesias        OBJECTIVE     Vitals:    07/20/18 1313   BP: 132/79   Pulse: 72   Resp: 20   Temp: 98.7  F (37.1  C)   TempSrc: Oral   SpO2: 97%   Weight: 169 lb 12.8 oz (77 kg)     Body mass index is 29.61 kg/(m^2).    Constitutional: Awake, alert, cooperative, no apparent distress, and appears stated age.  Eyes: Lids and lashes normal, pupils equal, round and reactive to light, extra ocular muscles intact, sclera clear, conjunctiva normal.  ENT: Normocephalic, without obvious abnormality, atramatic, sinuses nontender on palpation, external ears without lesions, TMs gray, translucent, with good landmarks.  Oral pharynx with moist mucus membranes, no swellings or erythremia,  tonsils 2+ without shift, erythema or exudates, gums normal and dentition shows signs of beetle nut change but teeth are non-tender to percussion.  Neck: Supple, symmetrical, trachea midline, no adenopathy, thyroid symmetric, not enlarged and no tenderness, skin normal. No bump felt on head or neck.   Lungs: No increased work of breathing, good air exchange, clear to auscultation bilaterally, no crackles or wheezing.  Cardiovascular: Regular rate and rhythm, normal S1 and S2, no S3 or S4, and no murmur noted.  Musculoskeletal: No redness, warmth, or swelling of the joints.  Full range of motion noted.    Neurologic: Awake, alert, oriented to name, place and time.  Cranial nerves II-XII are grossly intact.  gait is normal.  Skin: No rashes, erythema, pallor, petechia or purpura.    No  results found for this or any previous visit (from the past 24 hour(s)).    ASSESSMENT AND PLAN     Moo was seen today for mass and diabetes.    Diagnoses and all orders for this visit:    Type 2 diabetes mellitus without complication, without long-term current use of insulin (H): Patient's glucose and A1c significantly elevated on labs earlier this month but were well controlled in December and previous on patient claims to be on metformin.  Will opt to restart metformin today and titrate to full dose.  Encourage patient to check his blood sugar 2 times weekly to monitor.  Have patient return in 4-6 weeks to evaluate sugar log and need for additional medications.  -     metFORMIN (GLUCOPHAGE) 500 MG tablet; 1 tablet in am week 1. 1 tablet in am and pm week 2. 2 tablets in am and 1 tablet in the pm week 3. 2 tablets in am and pm week 4.  -     blood glucose (NO BRAND SPECIFIED) lancing device; Use to test blood sugars 2 times weekly or as directed.  -     blood glucose monitoring (NO BRAND SPECIFIED) meter device kit; Use to test blood sugar 2 times weekly or as directed.  -     blood glucose monitoring (NO BRAND SPECIFIED) test strip; Use to test blood sugars 2 times weekly or as directed  -     blood glucose monitoring (ACCU-CHEK MULTICLIX) lancets; Use to test blood sugar 2 times daily or as directed.    Left ear pain: Unclear etiology at this time.  TM normal, oropharynx normal, and teeth normal on exam.  Unlikely to be trigeminal neuralgia based on patient's symptoms.  Patient may continue to use Tylenol as needed for pain and should continue to monitor closely.  Advised to follow-up if noticing any new symptoms or worsening in symptoms.    RTC in 4 for follow up of diabetes or sooner if develops new or worsening symptoms.  I discussed the patient with Dr.Van Callahan who is in agreement with the assessment and plan.     Natalia Duarte

## 2018-07-26 ENCOUNTER — DOCUMENTATION ONLY (OUTPATIENT)
Dept: FAMILY MEDICINE | Facility: CLINIC | Age: 33
End: 2018-07-26

## 2018-07-27 NOTE — PROGRESS NOTES
Patient's wife picked up her 's glucometer from [facility] and came up to have someone teach her how to use the glucometer.  November (PCS) interpreted.  Set up Contour glucometer for pt.  Discussed testing BG twice a day (fasting and 2 hours after largest meal of the day).  Showed pt's wife how to use lancet device, inserting lancet, using a new lancet each time he checks his BG, how to adjust the depth, where to poke his finger and need to choose different site each time, and how to dispose of lancets.  Discussed the importance of washing his hands prior to testing and making sure that they are dry.  Showed her how to insert the test strip and where/how to apply the drop of blood.  Showed her how to document in log book and stressed importance of him bringing the glucometer and log book to every doctor's visit.  She was able to demonstrate back all steps and verbalized understanding.  All questions were answered./NG    After pt's wife left discussed discrepenancy with directions for testing on labels with [facility] (Lancets say test 2 x's daily and test strips 2 x's weekly).  Pharmacist states that they clarified with triage nurse and were told it was 2 x's weekly.  Reviewed Dr Duarte's visit note and the majority of supplies say to check BG 2 x's weekly.      Gave info to November and she TCC for pt to let him know./NG

## 2018-07-30 NOTE — PROGRESS NOTES
Patient was called and notified.  He clearly understands he has to take his blood sugar test twice a week, once before meal and the other time is two hours after big meal.  Thank you.  MANUEL Ortez

## 2018-07-31 NOTE — PROGRESS NOTES
7/30/18 4:59 PMPatient was called and notified.  He clearly understands he has to take his blood sugar test twice a week, once before meal and the other time is two hours after big meal.  Thank you.  MANUEL Ortez

## 2018-08-30 ENCOUNTER — OFFICE VISIT (OUTPATIENT)
Dept: FAMILY MEDICINE | Facility: CLINIC | Age: 33
End: 2018-08-30
Payer: COMMERCIAL

## 2018-08-30 VITALS
WEIGHT: 172 LBS | SYSTOLIC BLOOD PRESSURE: 123 MMHG | HEART RATE: 69 BPM | HEIGHT: 64 IN | RESPIRATION RATE: 22 BRPM | TEMPERATURE: 97.8 F | BODY MASS INDEX: 29.37 KG/M2 | DIASTOLIC BLOOD PRESSURE: 83 MMHG | OXYGEN SATURATION: 98 %

## 2018-08-30 DIAGNOSIS — E11.9 TYPE 2 DIABETES MELLITUS WITHOUT COMPLICATION, WITHOUT LONG-TERM CURRENT USE OF INSULIN (H): Primary | ICD-10-CM

## 2018-08-30 LAB — HBA1C MFR BLD: 8 % (ref 4.1–5.7)

## 2018-08-30 RX ORDER — ALOGLIPTIN 12.5 MG/1
12.5 TABLET, FILM COATED ORAL DAILY
Qty: 30 TABLET | Refills: 1 | Status: SHIPPED | OUTPATIENT
Start: 2018-08-30 | End: 2022-09-27

## 2018-08-30 ASSESSMENT — PAIN SCALES - GENERAL: PAINLEVEL: NO PAIN (0)

## 2018-08-30 NOTE — NURSING NOTE
Due to patient being non-English speaking/uses sign language, an  was used for this visit. Only for face-to-face interpretation by an external agency, date and length of interpretation can be found on the scanned worksheet.       name:  Jordan Damian  Agency: [employer]  Language: Jenifer  Telephone number:   [phone]  Type of interpretation:  Face-to-face, Spoken

## 2018-08-30 NOTE — PROGRESS NOTES
Preceptor Attestation:   Patient seen, evaluated and discussed with the resident. I have verified the content of the note, which accurately reflects my assessment of the patient and the plan of care.   Supervising Physician:  Sharon Zamora MD

## 2018-08-30 NOTE — PROGRESS NOTES
SUBJECTIVE       Reinaldo Real is a 33 year old  male with a PMH significant for:     Patient Active Problem List   Diagnosis     TB lung, latent     Hepatitis B infection     Immune to hepatitis A     Perianal abscess     External hemorrhoids     Loose stools     Left-sided low back pain without sciatica     Screening for depression     Type 2 diabetes mellitus without complication, without long-term current use of insulin (H)     Hepatic cirrhosis (H)     Metabolic syndrome     He presents for follow up of his diabetes.    Since our last visit patient has been able to obtain supplies to check his blood sugars, start checking his blood sugars, and taper up to his full metformin dosage.  Patient has noticed some bloating and abdominal pain with the metformin.  He is only been on 1000 mg twice daily for the past week.  Patient has been checking his blood sugars randomly throughout the week.  He noted with 1 to high blood sugar he had a large amount of rice sugar pop for dinner prior to checking his sugar.  Patient denies any problems taking his blood sugar or other side effects of this medication.  Patient denies any low symptoms.  He notes he has had some issues with headaches when his sugars are.    Type 2 Diabetes Assessment  Do you have any questions about your diabetes today? no  How often do you check your blood sugar? Twice weekly  What are your average readings? 163-190 in am   Have you had any low blood sugars? no  Do/will you need Refills on meds or testing supplies soon? no  How often do you miss taking your medicine? No  Changes to diabetes care today: yes, will add another medication  F/u plan (next appt date or testing): 1 month for repeat A1C and recheck    PMH, Medications and Allergies were reviewed and updated as needed.        REVIEW OF SYSTEMS     INTEGUMENTARY/SKIN: NEGATIVE for worrisome rashes, moles or lesions  EYES: NEGATIVE for vision changes or irritation  ENT/MOUTH: NEGATIVE for ear,  "mouth and throat problems  RESP: NEGATIVE for significant cough or SOB  CV: NEGATIVE for chest pain, palpitations or peripheral edema  GI: NEGATIVE for nausea, heartburn, or change in bowel habits, Some abdominal pain  : NEGATIVE for frequency, dysuria, or hematuria  MUSCULOSKELETAL: Some occasional back pain  NEURO: NEGATIVE for weakness, dizziness or paresthesias, Some headaches         OBJECTIVE     Vitals:    08/30/18 1114   BP: 123/83   Pulse: 69   Resp: 22   Temp: 97.8  F (36.6  C)   TempSrc: Oral   SpO2: 98%   Weight: 172 lb (78 kg)   Height: 5' 3.5\" (161.3 cm)     Body mass index is 29.99 kg/(m^2).    Constitutional: Awake, alert, cooperative, no apparent distress, and appears stated age.  Eyes: Lids and lashes normal, sclera clear, conjunctiva normal.  ENT: Normocephalic, without obvious abnormality, atramatic,  Lungs: No increased work of breathing, good air exchange, clear to auscultation bilaterally, no crackles or wheezing.  Cardiovascular: Regular rate and rhythm, normal S1 and S2, no S3 or S4, and no murmur noted.  Abdomen: No scars, normal bowel sounds, soft, non-distended, non-tender, no masses palpated, no hepatosplenomegally.  Musculoskeletal: No redness, warmth, or swelling of the joints.  Full range of motion noted.   Neurologic: Awake, alert, oriented to name, place and time.  Cranial nerves II-XII are grossly intact and gait is normal.  Skin: No rashes, erythema, pallor, petechia or purpura.    A1C 8.0 today      ASSESSMENT AND PLAN     Morhonda was seen today for diabetes and medication reconciliation.    Diagnoses and all orders for this visit:    Type 2 diabetes mellitus without complication, without long-term current use of insulin (H)  -     sitagliptin (JANUVIA) 50 MG tablet; Take 1 tablet (50 mg) by mouth daily (DICONTINUE DUE TO INSURANCE NOT COVERING)   -     metFORMIN (GLUCOPHAGE) 1000 MG tablet; 1 tablet in am week 1. 1 tablet in am and pm week 2. 2 tablets in am and 1 tablet in the pm " week 3. 2 tablets in am and pm week 4.  -     Hemoglobin A1c ([facility])  -     alogliptin (NESINA) 12.5 MG tablet; Take 1 tablet (12.5 mg) by mouth daily  -     Continue to check your blood sugars a few times a week    RTC in 1 month for follow up of Diabetes or sooner if develops new or worsening symptoms.  I discussed the patient with  who is in agreement with the assessment and plan.     Natalia Duarte

## 2018-08-30 NOTE — MR AVS SNAPSHOT
After Visit Summary   2018    Reinaldo Real    MRN: [id number]           Patient Information     Date Of Birth          1985        Visit Information        Provider Department      2018 11:20 AM Natalia Duarte MD [facility]        Today's Diagnoses     Type 2 diabetes mellitus without complication, without long-term current use of insulin (H)    -  1      Care Instructions    Continue to check your blood sugars a few times a week          Follow-ups after your visit        Follow-up notes from your care team     Return in about 1 month (around 2018) for Lab Work.      Your next 10 appointments already scheduled     Sep 27, 2018 10:40 AM CDT   Return Visit with Natalia Duarte MD   [facility] ([facility])    [address] [place] [address]   [phone]              Who to contact     Please call your clinic at [phone] to:    Ask questions about your health    Make or cancel appointments    Discuss your medicines    Learn about your test results    Speak to your doctor            Additional Information About Your Visit        MyChart Information     [employer] is an electronic gateway that provides easy, online access to your medical records. With [employer], you can request a clinic appointment, read your test results, renew a prescription or communicate with your care team.     To sign up for [employer] visit the website at [email or web address]   You will be asked to enter the access code listed below, as well as some personal information. Please follow the directions to create your username and password.     Your access code is: [email or web address]  Expires: 2018  2:51 PM     Your access code will  in 90 days. If you need help or a new code, please contact your [facility] or call [phone] for assistance.        Care EveryWhere ID     This is your Care EveryWhere ID. This could be used by other organizations to  "access your Rew medical records  [id number]        Your Vitals Were     Pulse Temperature Respirations Height Pulse Oximetry BMI (Body Mass Index)    69 97.8  F (36.6  C) (Oral) 22 5' 3.5\" (161.3 cm) 98% 29.99 kg/m2       Blood Pressure from Last 3 Encounters:   08/30/18 123/83   07/20/18 132/79   07/02/18 112/73    Weight from Last 3 Encounters:   08/30/18 172 lb (78 kg)   07/20/18 169 lb 12.8 oz (77 kg)   07/02/18 170 lb 3.2 oz (77.2 kg)              We Performed the Following     Hemoglobin A1c ([facility])          Today's Medication Changes          These changes are accurate as of 8/30/18 12:07 PM.  If you have any questions, ask your nurse or doctor.               Start taking these medicines.        Dose/Directions    sitagliptin 50 MG tablet   Commonly known as:  JANUVIA   Used for:  Type 2 diabetes mellitus without complication, without long-term current use of insulin (H)   Started by:  Natalia Duarte MD        Dose:  50 mg   Take 1 tablet (50 mg) by mouth daily   Quantity:  30 tablet   Refills:  1         These medicines have changed or have updated prescriptions.        Dose/Directions    metFORMIN 1000 MG tablet   Commonly known as:  GLUCOPHAGE   This may have changed:  medication strength   Used for:  Type 2 diabetes mellitus without complication, without long-term current use of insulin (H)   Changed by:  Natalia Duarte MD        1 tablet in am week 1. 1 tablet in am and pm week 2. 2 tablets in am and 1 tablet in the pm week 3. 2 tablets in am and pm week 4.   Quantity:  60 tablet   Refills:  1            Where to get your medicines      These medications were sent to [facility] - [place], [place] - [address], [place] [address]     Phone:  [phone]     metFORMIN 1000 MG tablet    sitagliptin 50 MG tablet                Primary Care Provider Office Phone #    Natalia Duarte [phone]       [facility] [address] [place] [address]        Equal Access to Services     [facility] : Hadii aad ku hadbethrhonda Sojoséali, waaxda luqadaha, qaybta kaalmada leighann, donald odonnell. So [facility] [place] [phone].    ATENCIÓN: Si habla español, tiene a meraz disposición servicios gratuitos de asistencia lingüística. Llame al [phone].    We comply with applicable federal civil rights laws and [place] laws. We do not discriminate on the basis of race, color, national origin, age, disability, sex, sexual orientation, or gender identity.            Thank you!     Thank you for choosing [facility]  for your care. Our goal is always to provide you with excellent care. Hearing back from our patients is one way we can continue to improve our services. Please take a few minutes to complete the written survey that you may receive in the mail after your visit with us. Thank you!             Your Updated Medication List - Protect others around you: Learn how to safely use, store and throw away your medicines at www.disposemymeds.org.          This list is accurate as of 8/30/18 12:07 PM.  Always use your most recent med list.                   Brand Name Dispense Instructions for use Diagnosis    blood glucose lancing device    no brand specified    1 each    Use to test blood sugars 2 times weekly or as directed.    Type 2 diabetes mellitus without complication, without long-term current use of insulin (H)       blood glucose monitoring lancets     100 each    Use to test blood sugar 2 times daily or as directed.    Type 2 diabetes mellitus without complication, without long-term current use of insulin (H)       blood glucose monitoring meter device kit    no brand specified    1 kit    Use to test blood sugar 2 times weekly or as directed.    Type 2 diabetes mellitus without complication, without long-term current use of insulin (H)       blood glucose monitoring test strip    no brand specified    100 strip    Use to test  blood sugars 2 times weekly or as directed    Type 2 diabetes mellitus without complication, without long-term current use of insulin (H)       metFORMIN 1000 MG tablet    GLUCOPHAGE    60 tablet    1 tablet in am week 1. 1 tablet in am and pm week 2. 2 tablets in am and 1 tablet in the pm week 3. 2 tablets in am and pm week 4.    Type 2 diabetes mellitus without complication, without long-term current use of insulin (H)       sitagliptin 50 MG tablet    JANUVIA    30 tablet    Take 1 tablet (50 mg) by mouth daily    Type 2 diabetes mellitus without complication, without long-term current use of insulin (H)

## 2018-10-10 ENCOUNTER — OFFICE VISIT - HEALTHEAST (OUTPATIENT)
Dept: ADDICTION MEDICINE | Facility: CLINIC | Age: 33
End: 2018-10-10

## 2018-10-10 DIAGNOSIS — F10.20 MODERATE ALCOHOL USE DISORDER (H): ICD-10-CM

## 2018-11-13 ENCOUNTER — OFFICE VISIT - HEALTHEAST (OUTPATIENT)
Dept: ADDICTION MEDICINE | Facility: HOSPITAL | Age: 33
End: 2018-11-13

## 2018-11-13 DIAGNOSIS — F10.20 MODERATE ALCOHOL USE DISORDER (H): ICD-10-CM

## 2018-11-20 ENCOUNTER — OFFICE VISIT - HEALTHEAST (OUTPATIENT)
Dept: ADDICTION MEDICINE | Facility: HOSPITAL | Age: 33
End: 2018-11-20

## 2018-11-20 DIAGNOSIS — F10.20 MODERATE ALCOHOL USE DISORDER (H): ICD-10-CM

## 2018-11-23 ENCOUNTER — AMBULATORY - HEALTHEAST (OUTPATIENT)
Dept: ADDICTION MEDICINE | Facility: HOSPITAL | Age: 33
End: 2018-11-23

## 2018-11-27 ENCOUNTER — OFFICE VISIT - HEALTHEAST (OUTPATIENT)
Dept: ADDICTION MEDICINE | Facility: HOSPITAL | Age: 33
End: 2018-11-27

## 2018-11-27 DIAGNOSIS — F10.20 MODERATE ALCOHOL USE DISORDER (H): ICD-10-CM

## 2018-11-29 ENCOUNTER — OFFICE VISIT - HEALTHEAST (OUTPATIENT)
Dept: ADDICTION MEDICINE | Facility: HOSPITAL | Age: 33
End: 2018-11-29

## 2018-11-29 ENCOUNTER — AMBULATORY - HEALTHEAST (OUTPATIENT)
Dept: ADDICTION MEDICINE | Facility: HOSPITAL | Age: 33
End: 2018-11-29

## 2018-11-29 DIAGNOSIS — F10.20 ALCOHOL USE DISORDER, MODERATE, DEPENDENCE (H): ICD-10-CM

## 2018-12-04 ENCOUNTER — OFFICE VISIT - HEALTHEAST (OUTPATIENT)
Dept: ADDICTION MEDICINE | Facility: HOSPITAL | Age: 33
End: 2018-12-04

## 2018-12-04 DIAGNOSIS — F10.20 ALCOHOL USE DISORDER, MODERATE, DEPENDENCE (H): ICD-10-CM

## 2018-12-06 ENCOUNTER — OFFICE VISIT - HEALTHEAST (OUTPATIENT)
Dept: ADDICTION MEDICINE | Facility: HOSPITAL | Age: 33
End: 2018-12-06

## 2018-12-06 DIAGNOSIS — F10.20 ALCOHOL USE DISORDER, MODERATE, DEPENDENCE (H): ICD-10-CM

## 2018-12-07 ENCOUNTER — AMBULATORY - HEALTHEAST (OUTPATIENT)
Dept: ADDICTION MEDICINE | Facility: HOSPITAL | Age: 33
End: 2018-12-07

## 2018-12-11 ENCOUNTER — OFFICE VISIT - HEALTHEAST (OUTPATIENT)
Dept: ADDICTION MEDICINE | Facility: HOSPITAL | Age: 33
End: 2018-12-11

## 2018-12-11 DIAGNOSIS — F10.20 ALCOHOL USE DISORDER, MODERATE, DEPENDENCE (H): ICD-10-CM

## 2018-12-13 ENCOUNTER — OFFICE VISIT - HEALTHEAST (OUTPATIENT)
Dept: ADDICTION MEDICINE | Facility: HOSPITAL | Age: 33
End: 2018-12-13

## 2018-12-13 DIAGNOSIS — F10.20 ALCOHOL USE DISORDER, MODERATE, DEPENDENCE (H): ICD-10-CM

## 2018-12-14 ENCOUNTER — AMBULATORY - HEALTHEAST (OUTPATIENT)
Dept: ADDICTION MEDICINE | Facility: HOSPITAL | Age: 33
End: 2018-12-14

## 2018-12-18 ENCOUNTER — OFFICE VISIT - HEALTHEAST (OUTPATIENT)
Dept: ADDICTION MEDICINE | Facility: HOSPITAL | Age: 33
End: 2018-12-18

## 2018-12-18 DIAGNOSIS — F10.20 ALCOHOL USE DISORDER, MODERATE, DEPENDENCE (H): ICD-10-CM

## 2018-12-20 ENCOUNTER — OFFICE VISIT - HEALTHEAST (OUTPATIENT)
Dept: ADDICTION MEDICINE | Facility: HOSPITAL | Age: 33
End: 2018-12-20

## 2018-12-20 DIAGNOSIS — F10.20 ALCOHOL USE DISORDER, MODERATE, DEPENDENCE (H): ICD-10-CM

## 2018-12-21 ENCOUNTER — AMBULATORY - HEALTHEAST (OUTPATIENT)
Dept: ADDICTION MEDICINE | Facility: HOSPITAL | Age: 33
End: 2018-12-21

## 2018-12-27 ENCOUNTER — OFFICE VISIT - HEALTHEAST (OUTPATIENT)
Dept: ADDICTION MEDICINE | Facility: HOSPITAL | Age: 33
End: 2018-12-27

## 2018-12-27 ENCOUNTER — AMBULATORY - HEALTHEAST (OUTPATIENT)
Dept: ADDICTION MEDICINE | Facility: HOSPITAL | Age: 33
End: 2018-12-27

## 2018-12-27 DIAGNOSIS — F10.20 ALCOHOL USE DISORDER, MODERATE, DEPENDENCE (H): ICD-10-CM

## 2019-01-03 ENCOUNTER — OFFICE VISIT - HEALTHEAST (OUTPATIENT)
Dept: ADDICTION MEDICINE | Facility: HOSPITAL | Age: 34
End: 2019-01-03

## 2019-01-03 DIAGNOSIS — F10.20 ALCOHOL USE DISORDER, MODERATE, DEPENDENCE (H): ICD-10-CM

## 2019-01-08 ENCOUNTER — OFFICE VISIT - HEALTHEAST (OUTPATIENT)
Dept: ADDICTION MEDICINE | Facility: HOSPITAL | Age: 34
End: 2019-01-08

## 2019-01-08 DIAGNOSIS — F10.20 ALCOHOL USE DISORDER, MODERATE, DEPENDENCE (H): ICD-10-CM

## 2019-01-10 ENCOUNTER — OFFICE VISIT - HEALTHEAST (OUTPATIENT)
Dept: ADDICTION MEDICINE | Facility: HOSPITAL | Age: 34
End: 2019-01-10

## 2019-01-10 DIAGNOSIS — F10.20 ALCOHOL USE DISORDER, MODERATE, DEPENDENCE (H): ICD-10-CM

## 2019-01-11 ENCOUNTER — AMBULATORY - HEALTHEAST (OUTPATIENT)
Dept: ADDICTION MEDICINE | Facility: HOSPITAL | Age: 34
End: 2019-01-11

## 2019-01-15 ENCOUNTER — OFFICE VISIT - HEALTHEAST (OUTPATIENT)
Dept: ADDICTION MEDICINE | Facility: HOSPITAL | Age: 34
End: 2019-01-15

## 2019-01-15 DIAGNOSIS — F10.20 ALCOHOL USE DISORDER, MODERATE, DEPENDENCE (H): ICD-10-CM

## 2019-01-17 ENCOUNTER — OFFICE VISIT - HEALTHEAST (OUTPATIENT)
Dept: ADDICTION MEDICINE | Facility: HOSPITAL | Age: 34
End: 2019-01-17

## 2019-01-17 DIAGNOSIS — F10.20 ALCOHOL USE DISORDER, MODERATE, DEPENDENCE (H): ICD-10-CM

## 2019-01-18 ENCOUNTER — AMBULATORY - HEALTHEAST (OUTPATIENT)
Dept: ADDICTION MEDICINE | Facility: HOSPITAL | Age: 34
End: 2019-01-18

## 2019-01-22 ENCOUNTER — OFFICE VISIT - HEALTHEAST (OUTPATIENT)
Dept: ADDICTION MEDICINE | Facility: HOSPITAL | Age: 34
End: 2019-01-22

## 2019-01-22 DIAGNOSIS — F10.20 ALCOHOL USE DISORDER, MODERATE, DEPENDENCE (H): ICD-10-CM

## 2019-01-24 ENCOUNTER — OFFICE VISIT - HEALTHEAST (OUTPATIENT)
Dept: ADDICTION MEDICINE | Facility: HOSPITAL | Age: 34
End: 2019-01-24

## 2019-01-24 DIAGNOSIS — F10.20 ALCOHOL USE DISORDER, MODERATE, DEPENDENCE (H): ICD-10-CM

## 2019-01-25 ENCOUNTER — AMBULATORY - HEALTHEAST (OUTPATIENT)
Dept: ADDICTION MEDICINE | Facility: HOSPITAL | Age: 34
End: 2019-01-25

## 2019-01-29 ENCOUNTER — OFFICE VISIT - HEALTHEAST (OUTPATIENT)
Dept: ADDICTION MEDICINE | Facility: HOSPITAL | Age: 34
End: 2019-01-29

## 2019-01-29 DIAGNOSIS — F10.20 ALCOHOL USE DISORDER, MODERATE, DEPENDENCE (H): ICD-10-CM

## 2019-01-31 ENCOUNTER — OFFICE VISIT - HEALTHEAST (OUTPATIENT)
Dept: ADDICTION MEDICINE | Facility: HOSPITAL | Age: 34
End: 2019-01-31

## 2019-01-31 DIAGNOSIS — F10.20 ALCOHOL USE DISORDER, MODERATE, DEPENDENCE (H): ICD-10-CM

## 2019-02-01 ENCOUNTER — AMBULATORY - HEALTHEAST (OUTPATIENT)
Dept: ADDICTION MEDICINE | Facility: HOSPITAL | Age: 34
End: 2019-02-01

## 2019-02-05 ENCOUNTER — OFFICE VISIT - HEALTHEAST (OUTPATIENT)
Dept: ADDICTION MEDICINE | Facility: HOSPITAL | Age: 34
End: 2019-02-05

## 2019-02-05 DIAGNOSIS — F10.20 ALCOHOL USE DISORDER, MODERATE, DEPENDENCE (H): ICD-10-CM

## 2019-02-07 ENCOUNTER — OFFICE VISIT - HEALTHEAST (OUTPATIENT)
Dept: ADDICTION MEDICINE | Facility: HOSPITAL | Age: 34
End: 2019-02-07

## 2019-02-07 DIAGNOSIS — F10.20 ALCOHOL USE DISORDER, MODERATE, DEPENDENCE (H): ICD-10-CM

## 2019-02-08 ENCOUNTER — AMBULATORY - HEALTHEAST (OUTPATIENT)
Dept: ADDICTION MEDICINE | Facility: HOSPITAL | Age: 34
End: 2019-02-08

## 2019-02-13 ENCOUNTER — OFFICE VISIT - HEALTHEAST (OUTPATIENT)
Dept: ADDICTION MEDICINE | Facility: HOSPITAL | Age: 34
End: 2019-02-13

## 2019-02-13 DIAGNOSIS — F10.20 ALCOHOL USE DISORDER, MODERATE, DEPENDENCE (H): ICD-10-CM

## 2019-02-15 ENCOUNTER — AMBULATORY - HEALTHEAST (OUTPATIENT)
Dept: ADDICTION MEDICINE | Facility: HOSPITAL | Age: 34
End: 2019-02-15

## 2019-02-20 ENCOUNTER — AMBULATORY - HEALTHEAST (OUTPATIENT)
Dept: ADDICTION MEDICINE | Facility: HOSPITAL | Age: 34
End: 2019-02-20

## 2019-02-20 ENCOUNTER — OFFICE VISIT - HEALTHEAST (OUTPATIENT)
Dept: ADDICTION MEDICINE | Facility: HOSPITAL | Age: 34
End: 2019-02-20

## 2019-02-20 DIAGNOSIS — F10.20 ALCOHOL USE DISORDER, MODERATE, DEPENDENCE (H): ICD-10-CM

## 2019-02-27 ENCOUNTER — OFFICE VISIT - HEALTHEAST (OUTPATIENT)
Dept: ADDICTION MEDICINE | Facility: HOSPITAL | Age: 34
End: 2019-02-27

## 2019-02-27 DIAGNOSIS — F10.20 ALCOHOL USE DISORDER, MODERATE, DEPENDENCE (H): ICD-10-CM

## 2019-02-28 ENCOUNTER — AMBULATORY - HEALTHEAST (OUTPATIENT)
Dept: ADDICTION MEDICINE | Facility: HOSPITAL | Age: 34
End: 2019-02-28

## 2019-03-06 ENCOUNTER — OFFICE VISIT - HEALTHEAST (OUTPATIENT)
Dept: ADDICTION MEDICINE | Facility: HOSPITAL | Age: 34
End: 2019-03-06

## 2019-03-06 DIAGNOSIS — F10.20 ALCOHOL USE DISORDER, MODERATE, DEPENDENCE (H): ICD-10-CM

## 2019-03-07 ENCOUNTER — AMBULATORY - HEALTHEAST (OUTPATIENT)
Dept: ADDICTION MEDICINE | Facility: HOSPITAL | Age: 34
End: 2019-03-07

## 2019-03-13 ENCOUNTER — OFFICE VISIT - HEALTHEAST (OUTPATIENT)
Dept: ADDICTION MEDICINE | Facility: HOSPITAL | Age: 34
End: 2019-03-13

## 2019-03-13 DIAGNOSIS — F10.20 ALCOHOL USE DISORDER, MODERATE, DEPENDENCE (H): ICD-10-CM

## 2019-03-15 ENCOUNTER — AMBULATORY - HEALTHEAST (OUTPATIENT)
Dept: ADDICTION MEDICINE | Facility: HOSPITAL | Age: 34
End: 2019-03-15

## 2019-03-20 ENCOUNTER — OFFICE VISIT - HEALTHEAST (OUTPATIENT)
Dept: ADDICTION MEDICINE | Facility: HOSPITAL | Age: 34
End: 2019-03-20

## 2019-03-20 DIAGNOSIS — F10.20 ALCOHOL USE DISORDER, MODERATE, DEPENDENCE (H): ICD-10-CM

## 2019-03-21 ENCOUNTER — AMBULATORY - HEALTHEAST (OUTPATIENT)
Dept: ADDICTION MEDICINE | Facility: HOSPITAL | Age: 34
End: 2019-03-21

## 2019-03-26 ENCOUNTER — COMMUNICATION - HEALTHEAST (OUTPATIENT)
Dept: FAMILY MEDICINE | Facility: CLINIC | Age: 34
End: 2019-03-26

## 2019-03-27 ENCOUNTER — OFFICE VISIT - HEALTHEAST (OUTPATIENT)
Dept: ADDICTION MEDICINE | Facility: HOSPITAL | Age: 34
End: 2019-03-27

## 2019-03-27 DIAGNOSIS — F10.20 ALCOHOL USE DISORDER, MODERATE, DEPENDENCE (H): ICD-10-CM

## 2019-03-28 ENCOUNTER — AMBULATORY - HEALTHEAST (OUTPATIENT)
Dept: ADDICTION MEDICINE | Facility: HOSPITAL | Age: 34
End: 2019-03-28

## 2019-04-03 ENCOUNTER — OFFICE VISIT - HEALTHEAST (OUTPATIENT)
Dept: ADDICTION MEDICINE | Facility: HOSPITAL | Age: 34
End: 2019-04-03

## 2019-04-03 DIAGNOSIS — F10.20 ALCOHOL USE DISORDER, MODERATE, DEPENDENCE (H): ICD-10-CM

## 2019-04-04 ENCOUNTER — OFFICE VISIT - HEALTHEAST (OUTPATIENT)
Dept: FAMILY MEDICINE | Facility: CLINIC | Age: 34
End: 2019-04-04

## 2019-04-04 DIAGNOSIS — F10.20 ALCOHOL USE DISORDER, MODERATE, DEPENDENCE (H): ICD-10-CM

## 2019-04-04 DIAGNOSIS — B18.1 CHRONIC VIRAL HEPATITIS B WITHOUT DELTA AGENT AND WITHOUT COMA (H): ICD-10-CM

## 2019-04-04 DIAGNOSIS — E11.9 DIABETES MELLITUS TYPE 2 WITHOUT RETINOPATHY (H): ICD-10-CM

## 2019-04-04 LAB — HBA1C MFR BLD: 8.1 % (ref 3.5–6)

## 2019-04-04 ASSESSMENT — MIFFLIN-ST. JEOR: SCORE: 1587.65

## 2019-04-05 ENCOUNTER — AMBULATORY - HEALTHEAST (OUTPATIENT)
Dept: ADDICTION MEDICINE | Facility: HOSPITAL | Age: 34
End: 2019-04-05

## 2019-04-05 LAB
ALBUMIN SERPL-MCNC: 4.3 G/DL (ref 3.5–5)
ALP SERPL-CCNC: 103 U/L (ref 45–120)
ALT SERPL W P-5'-P-CCNC: 57 U/L (ref 0–45)
ANION GAP SERPL CALCULATED.3IONS-SCNC: 14 MMOL/L (ref 5–18)
AST SERPL W P-5'-P-CCNC: 33 U/L (ref 0–40)
BILIRUB SERPL-MCNC: 0.3 MG/DL (ref 0–1)
BUN SERPL-MCNC: 18 MG/DL (ref 8–22)
CALCIUM SERPL-MCNC: 10.1 MG/DL (ref 8.5–10.5)
CHLORIDE BLD-SCNC: 106 MMOL/L (ref 98–107)
CO2 SERPL-SCNC: 20 MMOL/L (ref 22–31)
CREAT SERPL-MCNC: 0.86 MG/DL (ref 0.7–1.3)
CREAT UR-MCNC: 109.2 MG/DL
GFR SERPL CREATININE-BSD FRML MDRD: >60 ML/MIN/1.73M2
GLUCOSE BLD-MCNC: 122 MG/DL (ref 70–125)
MICROALBUMIN UR-MCNC: <0.5 MG/DL (ref 0–1.99)
MICROALBUMIN/CREAT UR: NORMAL MG/G
POTASSIUM BLD-SCNC: 4 MMOL/L (ref 3.5–5)
PROT SERPL-MCNC: 7.8 G/DL (ref 6–8)
SODIUM SERPL-SCNC: 140 MMOL/L (ref 136–145)

## 2019-04-09 LAB
HBV DNA SERPL NAA+PROBE-ACNC: 43 [IU]/ML
HBV DNA SERPL NAA+PROBE-LOG IU: 1.6 {LOG_IU}/ML

## 2019-04-10 ENCOUNTER — OFFICE VISIT - HEALTHEAST (OUTPATIENT)
Dept: ADDICTION MEDICINE | Facility: HOSPITAL | Age: 34
End: 2019-04-10

## 2019-04-10 DIAGNOSIS — F10.20 ALCOHOL USE DISORDER, MODERATE, DEPENDENCE (H): ICD-10-CM

## 2019-04-11 ENCOUNTER — AMBULATORY - HEALTHEAST (OUTPATIENT)
Dept: ADDICTION MEDICINE | Facility: CLINIC | Age: 34
End: 2019-04-11

## 2019-04-17 ENCOUNTER — OFFICE VISIT - HEALTHEAST (OUTPATIENT)
Dept: ADDICTION MEDICINE | Facility: HOSPITAL | Age: 34
End: 2019-04-17

## 2019-04-17 DIAGNOSIS — F10.20 ALCOHOL USE DISORDER, MODERATE, DEPENDENCE (H): ICD-10-CM

## 2019-04-18 ENCOUNTER — AMBULATORY - HEALTHEAST (OUTPATIENT)
Dept: ADDICTION MEDICINE | Facility: HOSPITAL | Age: 34
End: 2019-04-18

## 2019-04-24 ENCOUNTER — OFFICE VISIT - HEALTHEAST (OUTPATIENT)
Dept: ADDICTION MEDICINE | Facility: HOSPITAL | Age: 34
End: 2019-04-24

## 2019-04-24 DIAGNOSIS — F10.20 ALCOHOL USE DISORDER, MODERATE, DEPENDENCE (H): ICD-10-CM

## 2019-04-25 ENCOUNTER — AMBULATORY - HEALTHEAST (OUTPATIENT)
Dept: ADDICTION MEDICINE | Facility: HOSPITAL | Age: 34
End: 2019-04-25

## 2019-05-01 ENCOUNTER — OFFICE VISIT - HEALTHEAST (OUTPATIENT)
Dept: ADDICTION MEDICINE | Facility: HOSPITAL | Age: 34
End: 2019-05-01

## 2019-05-01 DIAGNOSIS — F10.20 ALCOHOL USE DISORDER, MODERATE, DEPENDENCE (H): ICD-10-CM

## 2019-05-02 ENCOUNTER — OFFICE VISIT - HEALTHEAST (OUTPATIENT)
Dept: FAMILY MEDICINE | Facility: CLINIC | Age: 34
End: 2019-05-02

## 2019-05-02 ENCOUNTER — AMBULATORY - HEALTHEAST (OUTPATIENT)
Dept: ADDICTION MEDICINE | Facility: HOSPITAL | Age: 34
End: 2019-05-02

## 2019-05-02 DIAGNOSIS — Z20.1 TUBERCULOSIS EXPOSURE: ICD-10-CM

## 2019-05-02 DIAGNOSIS — Z22.7 LATENT TUBERCULOSIS INFECTION: ICD-10-CM

## 2019-05-02 ASSESSMENT — MIFFLIN-ST. JEOR: SCORE: 1592.43

## 2020-03-16 ENCOUNTER — OFFICE VISIT - HEALTHEAST (OUTPATIENT)
Dept: FAMILY MEDICINE | Facility: CLINIC | Age: 35
End: 2020-03-16

## 2020-03-16 DIAGNOSIS — R12 HEARTBURN: ICD-10-CM

## 2020-03-16 DIAGNOSIS — M54.50 CHRONIC LEFT-SIDED LOW BACK PAIN WITHOUT SCIATICA: ICD-10-CM

## 2020-03-16 DIAGNOSIS — G89.29 CHRONIC LEFT-SIDED LOW BACK PAIN WITHOUT SCIATICA: ICD-10-CM

## 2020-03-16 DIAGNOSIS — E78.1 HYPERTRIGLYCERIDEMIA: ICD-10-CM

## 2020-03-16 DIAGNOSIS — F10.20 ALCOHOL USE DISORDER, MODERATE, DEPENDENCE (H): ICD-10-CM

## 2020-03-16 DIAGNOSIS — E11.9 DM TYPE 2 (DIABETES MELLITUS, TYPE 2) (H): ICD-10-CM

## 2020-03-16 DIAGNOSIS — B18.1 CHRONIC VIRAL HEPATITIS B WITHOUT DELTA AGENT AND WITHOUT COMA (H): ICD-10-CM

## 2020-03-16 DIAGNOSIS — K29.20 CHRONIC ALCOHOLIC GASTRITIS WITHOUT HEMORRHAGE: ICD-10-CM

## 2020-03-16 LAB
ALBUMIN SERPL-MCNC: 4 G/DL (ref 3.5–5)
ALBUMIN SERPL-MCNC: 4 G/DL (ref 3.5–5)
ALP SERPL-CCNC: 146 U/L (ref 45–120)
ALP SERPL-CCNC: 146 U/L (ref 45–120)
ALT SERPL W P-5'-P-CCNC: 91 U/L (ref 0–45)
ALT SERPL W P-5'-P-CCNC: 91 U/L (ref 0–45)
ANION GAP SERPL CALCULATED.3IONS-SCNC: 13 MMOL/L (ref 5–18)
AST SERPL W P-5'-P-CCNC: 56 U/L (ref 0–40)
AST SERPL W P-5'-P-CCNC: 56 U/L (ref 0–40)
BILIRUB DIRECT SERPL-MCNC: 0.1 MG/DL
BILIRUB SERPL-MCNC: 0.4 MG/DL (ref 0–1)
BILIRUB SERPL-MCNC: 0.4 MG/DL (ref 0–1)
BUN SERPL-MCNC: 11 MG/DL (ref 8–22)
CALCIUM SERPL-MCNC: 9.3 MG/DL (ref 8.5–10.5)
CHLORIDE BLD-SCNC: 102 MMOL/L (ref 98–107)
CHOLEST SERPL-MCNC: 225 MG/DL
CO2 SERPL-SCNC: 23 MMOL/L (ref 22–31)
CREAT SERPL-MCNC: 0.82 MG/DL (ref 0.7–1.3)
FASTING STATUS PATIENT QL REPORTED: NO
GFR SERPL CREATININE-BSD FRML MDRD: >60 ML/MIN/1.73M2
GGT SERPL-CCNC: 230 U/L (ref 0–50)
GLUCOSE BLD-MCNC: 275 MG/DL (ref 70–125)
HBA1C MFR BLD: 9.6 % (ref 3.5–6)
HDLC SERPL-MCNC: 31 MG/DL
LDLC SERPL CALC-MCNC: 131 MG/DL
LDLC SERPL CALC-MCNC: ABNORMAL MG/DL
POTASSIUM BLD-SCNC: 4.1 MMOL/L (ref 3.5–5)
PROT SERPL-MCNC: 7.4 G/DL (ref 6–8)
PROT SERPL-MCNC: 7.4 G/DL (ref 6–8)
SODIUM SERPL-SCNC: 138 MMOL/L (ref 136–145)
TRIGL SERPL-MCNC: 608 MG/DL

## 2020-03-16 ASSESSMENT — MIFFLIN-ST. JEOR: SCORE: 1587.38

## 2020-03-25 ENCOUNTER — AMBULATORY - HEALTHEAST (OUTPATIENT)
Dept: LAB | Facility: CLINIC | Age: 35
End: 2020-03-25

## 2020-03-25 DIAGNOSIS — K29.20 CHRONIC ALCOHOLIC GASTRITIS WITHOUT HEMORRHAGE: ICD-10-CM

## 2020-03-27 ENCOUNTER — COMMUNICATION - HEALTHEAST (OUTPATIENT)
Dept: FAMILY MEDICINE | Facility: CLINIC | Age: 35
End: 2020-03-27

## 2020-03-27 DIAGNOSIS — A04.8 H. PYLORI INFECTION: ICD-10-CM

## 2020-03-27 LAB — H PYLORI AG STL QL IA: POSITIVE

## 2020-06-20 ENCOUNTER — COMMUNICATION - HEALTHEAST (OUTPATIENT)
Dept: FAMILY MEDICINE | Facility: CLINIC | Age: 35
End: 2020-06-20

## 2020-06-20 DIAGNOSIS — M54.50 CHRONIC LEFT-SIDED LOW BACK PAIN WITHOUT SCIATICA: ICD-10-CM

## 2020-06-20 DIAGNOSIS — G89.29 CHRONIC LEFT-SIDED LOW BACK PAIN WITHOUT SCIATICA: ICD-10-CM

## 2021-02-08 ENCOUNTER — OFFICE VISIT - HEALTHEAST (OUTPATIENT)
Dept: FAMILY MEDICINE | Facility: CLINIC | Age: 36
End: 2021-02-08

## 2021-02-08 DIAGNOSIS — E78.1 HYPERTRIGLYCERIDEMIA: ICD-10-CM

## 2021-02-08 DIAGNOSIS — Z23 NEED FOR IMMUNIZATION AGAINST INFLUENZA: ICD-10-CM

## 2021-02-08 DIAGNOSIS — R11.11 NON-INTRACTABLE VOMITING WITHOUT NAUSEA, UNSPECIFIED VOMITING TYPE: ICD-10-CM

## 2021-02-08 DIAGNOSIS — F10.20 ALCOHOL USE DISORDER, MODERATE, DEPENDENCE (H): ICD-10-CM

## 2021-02-08 DIAGNOSIS — E11.9 TYPE 2 DIABETES MELLITUS WITHOUT COMPLICATION, WITHOUT LONG-TERM CURRENT USE OF INSULIN (H): ICD-10-CM

## 2021-02-08 DIAGNOSIS — B18.1 CHRONIC VIRAL HEPATITIS B WITHOUT DELTA AGENT AND WITHOUT COMA (H): ICD-10-CM

## 2021-02-08 LAB
ALBUMIN SERPL-MCNC: 4 G/DL (ref 3.5–5)
ALP SERPL-CCNC: 210 U/L (ref 45–120)
ALT SERPL W P-5'-P-CCNC: 60 U/L (ref 0–45)
AST SERPL W P-5'-P-CCNC: 44 U/L (ref 0–40)
BILIRUB DIRECT SERPL-MCNC: 0.1 MG/DL
BILIRUB SERPL-MCNC: 0.4 MG/DL (ref 0–1)
CHOLEST SERPL-MCNC: 277 MG/DL
FASTING STATUS PATIENT QL REPORTED: NO
GGT SERPL-CCNC: 338 U/L (ref 0–50)
HBA1C MFR BLD: 9.3 %
HDLC SERPL-MCNC: ABNORMAL MG/DL
LDLC SERPL CALC-MCNC: ABNORMAL MG/DL
LDLC SERPL CALC-MCNC: NORMAL MG/DL
PROT SERPL-MCNC: 7.9 G/DL (ref 6–8)
TRIGL SERPL-MCNC: 1252 MG/DL

## 2021-02-08 ASSESSMENT — MIFFLIN-ST. JEOR: SCORE: 1582.27

## 2021-02-11 ENCOUNTER — AMBULATORY - HEALTHEAST (OUTPATIENT)
Dept: LAB | Facility: CLINIC | Age: 36
End: 2021-02-11

## 2021-02-11 DIAGNOSIS — R11.11 NON-INTRACTABLE VOMITING WITHOUT NAUSEA, UNSPECIFIED VOMITING TYPE: ICD-10-CM

## 2021-02-12 LAB
HBV DNA SERPL NAA+PROBE-ACNC: <20 [IU]/ML
HBV DNA SERPL NAA+PROBE-LOG IU: <1.3 {LOG_IU}/ML

## 2021-02-15 ENCOUNTER — COMMUNICATION - HEALTHEAST (OUTPATIENT)
Dept: FAMILY MEDICINE | Facility: CLINIC | Age: 36
End: 2021-02-15

## 2021-02-15 DIAGNOSIS — A04.8 H. PYLORI INFECTION: ICD-10-CM

## 2021-02-15 DIAGNOSIS — R12 HEARTBURN: ICD-10-CM

## 2021-02-15 LAB — H PYLORI AG STL QL IA: POSITIVE

## 2021-02-25 ENCOUNTER — OFFICE VISIT - HEALTHEAST (OUTPATIENT)
Dept: PHARMACY | Facility: CLINIC | Age: 36
End: 2021-02-25

## 2021-02-25 DIAGNOSIS — A04.8 H. PYLORI INFECTION: ICD-10-CM

## 2021-03-02 ENCOUNTER — AMBULATORY - HEALTHEAST (OUTPATIENT)
Dept: EDUCATION SERVICES | Facility: CLINIC | Age: 36
End: 2021-03-02

## 2021-03-02 DIAGNOSIS — E11.9 TYPE 2 DIABETES MELLITUS WITHOUT COMPLICATION, WITHOUT LONG-TERM CURRENT USE OF INSULIN (H): ICD-10-CM

## 2021-03-08 ENCOUNTER — COMMUNICATION - HEALTHEAST (OUTPATIENT)
Dept: FAMILY MEDICINE | Facility: CLINIC | Age: 36
End: 2021-03-08

## 2021-03-08 ENCOUNTER — OFFICE VISIT - HEALTHEAST (OUTPATIENT)
Dept: FAMILY MEDICINE | Facility: CLINIC | Age: 36
End: 2021-03-08

## 2021-03-08 DIAGNOSIS — E78.1 HYPERTRIGLYCERIDEMIA: ICD-10-CM

## 2021-03-08 DIAGNOSIS — A04.8 H. PYLORI INFECTION: ICD-10-CM

## 2021-03-08 DIAGNOSIS — E11.9 TYPE 2 DIABETES MELLITUS WITHOUT COMPLICATION, WITHOUT LONG-TERM CURRENT USE OF INSULIN (H): ICD-10-CM

## 2021-03-08 LAB
CREAT UR-MCNC: 72.1 MG/DL
MICROALBUMIN UR-MCNC: <0.5 MG/DL (ref 0–1.99)
MICROALBUMIN/CREAT UR: NORMAL MG/G{CREAT}

## 2021-03-08 ASSESSMENT — MIFFLIN-ST. JEOR: SCORE: 1587.94

## 2021-03-30 ENCOUNTER — COMMUNICATION - HEALTHEAST (OUTPATIENT)
Dept: FAMILY MEDICINE | Facility: CLINIC | Age: 36
End: 2021-03-30

## 2021-04-13 ENCOUNTER — COMMUNICATION - HEALTHEAST (OUTPATIENT)
Dept: EDUCATION SERVICES | Facility: CLINIC | Age: 36
End: 2021-04-13

## 2021-04-15 ENCOUNTER — COMMUNICATION - HEALTHEAST (OUTPATIENT)
Dept: FAMILY MEDICINE | Facility: CLINIC | Age: 36
End: 2021-04-15

## 2021-04-15 DIAGNOSIS — E11.9 TYPE 2 DIABETES MELLITUS WITHOUT COMPLICATION, WITHOUT LONG-TERM CURRENT USE OF INSULIN (H): ICD-10-CM

## 2021-04-15 DIAGNOSIS — A04.8 H. PYLORI INFECTION: ICD-10-CM

## 2021-04-15 DIAGNOSIS — R12 HEARTBURN: ICD-10-CM

## 2021-04-21 ENCOUNTER — AMBULATORY - HEALTHEAST (OUTPATIENT)
Dept: EDUCATION SERVICES | Facility: CLINIC | Age: 36
End: 2021-04-21

## 2021-04-21 DIAGNOSIS — E11.9 TYPE 2 DIABETES MELLITUS WITHOUT COMPLICATION, WITHOUT LONG-TERM CURRENT USE OF INSULIN (H): ICD-10-CM

## 2021-04-29 ENCOUNTER — OFFICE VISIT - HEALTHEAST (OUTPATIENT)
Dept: PHARMACY | Facility: CLINIC | Age: 36
End: 2021-04-29

## 2021-04-29 DIAGNOSIS — K21.9 GASTROESOPHAGEAL REFLUX DISEASE, UNSPECIFIED WHETHER ESOPHAGITIS PRESENT: ICD-10-CM

## 2021-04-29 DIAGNOSIS — E11.9 TYPE 2 DIABETES MELLITUS WITHOUT COMPLICATION, WITHOUT LONG-TERM CURRENT USE OF INSULIN (H): ICD-10-CM

## 2021-04-29 DIAGNOSIS — E78.1 HYPERTRIGLYCERIDEMIA: ICD-10-CM

## 2021-04-29 PROCEDURE — 99605 MTMS BY PHARM NP 15 MIN: CPT | Performed by: PHARMACIST

## 2021-04-29 PROCEDURE — 99607 MTMS BY PHARM ADDL 15 MIN: CPT | Performed by: PHARMACIST

## 2021-05-10 ENCOUNTER — RECORDS - HEALTHEAST (OUTPATIENT)
Dept: ADMINISTRATIVE | Facility: OTHER | Age: 36
End: 2021-05-10

## 2021-05-10 ENCOUNTER — OFFICE VISIT - HEALTHEAST (OUTPATIENT)
Dept: FAMILY MEDICINE | Facility: CLINIC | Age: 36
End: 2021-05-10

## 2021-05-10 DIAGNOSIS — A04.8 H. PYLORI INFECTION: ICD-10-CM

## 2021-05-10 DIAGNOSIS — E11.9 TYPE 2 DIABETES MELLITUS WITHOUT COMPLICATION, WITHOUT LONG-TERM CURRENT USE OF INSULIN (H): ICD-10-CM

## 2021-05-10 DIAGNOSIS — M54.50 CHRONIC LEFT-SIDED LOW BACK PAIN WITHOUT SCIATICA: ICD-10-CM

## 2021-05-10 DIAGNOSIS — R74.8 ELEVATED LIVER ENZYMES: ICD-10-CM

## 2021-05-10 DIAGNOSIS — B18.1 CHRONIC VIRAL HEPATITIS B WITHOUT DELTA AGENT AND WITHOUT COMA (H): ICD-10-CM

## 2021-05-10 DIAGNOSIS — G89.29 CHRONIC LEFT-SIDED LOW BACK PAIN WITHOUT SCIATICA: ICD-10-CM

## 2021-05-10 LAB
ALBUMIN SERPL-MCNC: 3.8 G/DL (ref 3.5–5)
ALP SERPL-CCNC: 103 U/L (ref 45–120)
ALT SERPL W P-5'-P-CCNC: 109 U/L (ref 0–45)
AST SERPL W P-5'-P-CCNC: 82 U/L (ref 0–40)
BILIRUB DIRECT SERPL-MCNC: 0.3 MG/DL
BILIRUB SERPL-MCNC: 1 MG/DL (ref 0–1)
GGT SERPL-CCNC: 386 U/L (ref 0–50)
HBA1C MFR BLD: 8.4 %
PROT SERPL-MCNC: 7 G/DL (ref 6–8)

## 2021-05-10 ASSESSMENT — MIFFLIN-ST. JEOR: SCORE: 1585.67

## 2021-05-11 LAB
HBV DNA SERPL NAA+PROBE-ACNC: 39 [IU]/ML
HBV DNA SERPL NAA+PROBE-LOG IU: 1.6 {LOG_IU}/ML

## 2021-05-12 ENCOUNTER — COMMUNICATION - HEALTHEAST (OUTPATIENT)
Dept: FAMILY MEDICINE | Facility: CLINIC | Age: 36
End: 2021-05-12

## 2021-05-12 DIAGNOSIS — K76.0 NAFLD (NONALCOHOLIC FATTY LIVER DISEASE): ICD-10-CM

## 2021-05-14 ENCOUNTER — AMBULATORY - HEALTHEAST (OUTPATIENT)
Dept: LAB | Facility: CLINIC | Age: 36
End: 2021-05-14

## 2021-05-14 DIAGNOSIS — A04.8 H. PYLORI INFECTION: ICD-10-CM

## 2021-05-14 LAB
ANION GAP SERPL CALCULATED.3IONS-SCNC: 14 MMOL/L (ref 5–18)
BUN SERPL-MCNC: 11 MG/DL (ref 8–22)
CALCIUM SERPL-MCNC: 8.8 MG/DL (ref 8.5–10.5)
CHLORIDE BLD-SCNC: 103 MMOL/L (ref 98–107)
CO2 SERPL-SCNC: 21 MMOL/L (ref 22–31)
CREAT SERPL-MCNC: 0.95 MG/DL (ref 0.7–1.3)
GFR SERPL CREATININE-BSD FRML MDRD: >60 ML/MIN/1.73M2
GLUCOSE BLD-MCNC: 277 MG/DL (ref 70–125)
POTASSIUM BLD-SCNC: 3.9 MMOL/L (ref 3.5–5)
SODIUM SERPL-SCNC: 138 MMOL/L (ref 136–145)

## 2021-05-17 LAB — H PYLORI AG STL QL IA: NEGATIVE

## 2021-05-19 ENCOUNTER — COMMUNICATION - HEALTHEAST (OUTPATIENT)
Dept: FAMILY MEDICINE | Facility: CLINIC | Age: 36
End: 2021-05-19

## 2021-05-27 VITALS
WEIGHT: 167.25 LBS | BODY MASS INDEX: 28.55 KG/M2 | RESPIRATION RATE: 16 BRPM | OXYGEN SATURATION: 97 % | SYSTOLIC BLOOD PRESSURE: 130 MMHG | HEIGHT: 64 IN | HEART RATE: 72 BPM | TEMPERATURE: 97.3 F | DIASTOLIC BLOOD PRESSURE: 72 MMHG

## 2021-05-27 NOTE — PROGRESS NOTES
Reinaldo Real attended 2 hours of group therapy today.    Total group size of 5.    4/10/2019 4:03 PM Lachelle Guillaume

## 2021-05-27 NOTE — TELEPHONE ENCOUNTER
I can only see him on my clinic days - Mon, Tues, Thurs.   I do not have appointments on Wed.   He can have an MICD or reserve slot on my clinic days.     Dr. Queenie Castanon  3/28/2019

## 2021-05-27 NOTE — PROGRESS NOTES
Weekly Progress Note  Reinaldo Real  1985  [id number]      D) Pt attended 1 groups this week with 0 absences. Patient attended 0 individual sessions this week. A) Staff facilitated groups and reviewed tx progress. Assessed for [facility]. R) No VAP needed at this time.   Any significant events, defines as events that impact patients relationship with others inside and outside of treatment No  Indicate any changes or monitoring of physical or mental health problems No    Indicate involvement by any outside supports No  IAPP reviewed and modified as needed. NA  Pt working on the following dimensions:  Dimension #1 - Withdrawal Potential - Risk 0. Patient reports continued use of alcohol, last date of use 11/8/2018.  Specific goals from treatment plan addressed this week:  Patient to maintain abstinence throughout outpatient treatment.   Effectiveness of strategies:  Progressing: Client reported no use this week during check-in, and that his last use occurred before starting this treatment program.    Dimension #2 - Biomedical - Risk 1. Patient reports chronic conditions of diabetes, lower back pain, occasional headaches  Specific goals from treatment plan addressed this week:  Patient to maintain stable health throughout outpatient treatment.   Effectiveness of strategies:  Progressing: No changes noted.  Dimension #3 - Emotional/Behavioral/Cognitive - Risk 0. Patient reports past MH therapy and past issues w/gambling, but no current issues.  Specific goals from treatment plan addressed this week:  No plan needed at this time  Effectiveness of strategies:  N/A    Dimension #4 - Treatment Acceptance/Resistance - Risk 1. Patient appears to lack insight into his drinking behaviors due in part to cultural barriers  Specific goals from treatment plan addressed this week:   Patient to increase motivation towards recovery by participating in outpatient programming.   Effectiveness of strategies:    Progressing: Client attended group  "this week and actively participated in group discussion without prompting. Client stated during check-in that he was grateful to be done with probation.    Dimension #5 - Relapse Potential - Risk 3. Patient lacks understanding of relapse issues and appears to be at high risk for further substance use.  Specific goals from treatment plan addressed this week:  Client will gain insight into situations and emotions that trigger his drinking.  Effectiveness of strategies:  Progressing: Client stated during check-in that he had no cravings or thoughts of drinking this week.    Dimension #6 - Recovery Environment - Risk 2. Patient does not consistently attend sober support meetings or other activities that help support sobriety.  Specific goals from treatment plan addressed this week:  Patient will explore, identify and participate in activities that help support sobriety in his community.  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    T) Treatment plan updated no.  Patient notified and in agreement NA.  Patient educated on \"Refusal Skills\".  Patient has completed 83 of 110 program hours at this time. Projected discharge date is 05/01/2019. Current discharge plan is not yet developed.     KECIA Thurston  4/18/2019, 10:56 AM  "

## 2021-05-27 NOTE — PROGRESS NOTES
Jenifer Whitehead MD followup visit (post-eval)    HPI - 34 y.o. yo Jenifer male here to discuss his substance use.   He previously got primary care at [place].     He will complete the [facility] outpatient group treatment on 12 week treatment and after care program 5/1/19  Rule 25 on 10/10/18 and started intake 11/13/18    Collateral Info:  Info obtained from group treatment notes.   Number of treatment groups completed out of 24 sessions: - will be done on May 1  Number of Jenifer community support (AA type) meetings attended since starting treatment: not yet    H/o DUI x 2, on probation that will end in 4/17/19  Needs to wait 3 years to apply for citizenship    Today he has the following concerns:    Patient Active Problem List   Diagnosis     Latent Tuberculosis     Fever (Symptom)     Hepatitis, B Virus     Lower Back Pain     Alcohol use disorder, moderate, dependence (H)     Past Medical History:   Diagnosis Date     Alcohol use disorder, moderate, dependence (H) 3/6/2019     Hepatitis B      TB (tuberculosis)      H/o HBV  abdo U/s - 2013  HBV DNA 94 on 11/2016  Elevated LFT in 2015    H/o DM  A1c 8.0 on 8/30/18   microalbumin neg 12/2017  BP wnl  He has been taking brothers metformin b/c he ran out of his own b/c of insurance problems  Needs lancets and test strips    Since finishing treatment, have you used any drugs or alcohol?   Date of last use: Nov 2018      Since you started treatment, have you done any of the following things:  1. Were you a bully or threatened other people? No  2. Started physical fights with other people? No  3. Problems with work or school because of using drugs or alcohol? No  4. Relationship problems because of using drugs or alcohol? No  5. Any NEW legal troubles because of using drugs or alcohol? No  Explain: things have improved since starting Jenifer recovery program    Mental Health Screening:   PHQ9 score: 0      Current Outpatient Medications   Medication Sig     baclofen (LIORESAL) 10  MG tablet Take 10 mg by mouth 3 (three) times a day.     cyclobenzaprine (FLEXERIL) 10 MG tablet Take 10 mg by mouth 3 (three) times a day as needed for muscle spasms.     diphenhydrAMINE (BENADRYL) 25 mg capsule Take 25 mg by mouth every 6 (six) hours as needed for itching.     omeprazole (PRILOSEC) 40 MG capsule Take 40 mg by mouth daily.         Physical Exam:  Vitals:    04/04/19 1452   BP: 132/62   Pulse: 78   Resp: 18   Temp: 97.4  F (36.3  C)   SpO2: 99%     General Appearance: awake and alert, in no acute distress  CV: regular rate  Resp: No respiratory distress, breathing comfortably  MSK: moving limbs comfortably with no deficits or deformities  Skin: no rashes noted      Labs Ordered:  LFT  GGT  Utox screen    Assessment and Plan:  Substance use/abuse -  Completed [facility] for 12 weeks of group treatment and aftercare recovery maintenance program   remission  a. Pending Discharge Summary the plan of care is the following  b. Encour[facility] and/or the [facility] Community Support (AA type) meetings weekly  c. Follow-up as needed     H/o chronic HBV  abdo U/s - 2013  HBV DNA 94 on 11/2016  Elevated LFT in 2015  Will check HBV DNA and LFT    H/o DM  A1c 8.0 on 8/30/18 - recheck    microalbumin neg 12/2017 - recheck   BP wnl  rx for metformin lancets and test strips      Orders Placed This Encounter   Procedures     Comprehensive Metabolic Panel     Microalbumin, Random Urine     Glycosylated Hemoglobin A1c     Hepatitis B DNA Quantitative Real-Time PCR(HBQNT)     Spent 25 min face to face with patient with more the 50% spent in counseling, reviewing chart, and coordination of care and discussing problems listed above.

## 2021-05-27 NOTE — TELEPHONE ENCOUNTER
Talked to pt, he states he can only be seen for MICD on Wednesdays, told him we only Schedule those appts for Mondays with Dr. Castanon around 1:00. He states he got an approval from her. Checked with her nurse, there are no documentations regarding this request. Nurse sent message to Provider, waiting to hear back from Dr. Castanon to see pt for MICD on Wednesday. Will call pt back when provider responds.

## 2021-05-27 NOTE — TELEPHONE ENCOUNTER
Patient Returning Call  Reason for call:  Patient called back  Information relayed to patient:  Relayed Dr. Castanon's message below  Patient has additional questions:  No  If YES, what are your questions/concerns:  none  Okay to leave a detailed message?: No call back needed     Patient scheduled for 04/04/19 at 2:40pm

## 2021-05-27 NOTE — PROGRESS NOTES
Reinaldo Real attended 2 hours of group therapy today.    Total group size of 5.    4/17/2019 3:09 PM Yaniv Peterson

## 2021-05-27 NOTE — PROGRESS NOTES
Weekly Progress Note  Reinaldo Real  1985  [id number]      D) Pt attended 1 groups this week with 0 absences. Patient attended 0 individual sessions this week. A) Staff facilitated groups and reviewed tx progress. Assessed for [facility]. R) No VAP needed at this time.   Any significant events, defines as events that impact patients relationship with others inside and outside of treatment No  Indicate any changes or monitoring of physical or mental health problems No    Indicate involvement by any outside supports No  IAPP reviewed and modified as needed. NA  Pt working on the following dimensions:  Dimension #1 - Withdrawal Potential - Risk 0. Patient reports continued use of alcohol, last date of use 11/8/2018.  Specific goals from treatment plan addressed this week:  Patient to maintain abstinence throughout outpatient treatment.   Effectiveness of strategies:  Progressing: Client reported no use this week during check-in, and that his last use occurred before starting this treatment program.    Dimension #2 - Biomedical - Risk 1. Patient reports chronic conditions of diabetes, lower back pain, occasional headaches  Specific goals from treatment plan addressed this week:  Patient to maintain stable health throughout outpatient treatment.   Effectiveness of strategies:  Progressing: Client reported that his medical coverage will begin on April 1st.  Dimension #3 - Emotional/Behavioral/Cognitive - Risk 0. Patient reports past MH therapy and past issues w/gambling, but no current issues.  Specific goals from treatment plan addressed this week:  No plan needed at this time  Effectiveness of strategies:  N/A    Dimension #4 - Treatment Acceptance/Resistance - Risk 1. Patient appears to lack insight into his drinking behaviors due in part to cultural barriers  Specific goals from treatment plan addressed this week:   Patient to increase motivation towards recovery by participating in outpatient programming.   Effectiveness of  strategies:    Progressing: Client attended group this week and actively participated in group discussion without prompting. Client stated during check-in that he was grateful to see his friends finishing treatment..    Dimension #5 - Relapse Potential - Risk 3. Patient lacks understanding of relapse issues and appears to be at high risk for further substance use.  Specific goals from treatment plan addressed this week:  Client will gain insight into situations and emotions that trigger his drinking.  Effectiveness of strategies:  Progressing: Client stated during check-in that he had no cravings or thoughts of drinking this week.    Dimension #6 - Recovery Environment - Risk 2. Patient does not consistently attend sober support meetings or other activities that help support sobriety.  Specific goals from treatment plan addressed this week:  Patient will explore, identify and participate in activities that help support sobriety in his community.  Effectiveness of strategies:  Progressing: Client stated he did not attend the [facility] Support Group this week because he stayed home with his new baby.    T) Treatment plan updated no.  Patient notified and in agreement NA.  Patient educated on  Program Graduation . Patient has completed 79 of 110 program hours at this time. Projected discharge date is 05/01/2019. Current discharge plan is not yet developed.     KECIA Thurston  4/5/2019, 2:05 PM

## 2021-05-27 NOTE — TELEPHONE ENCOUNTER
New Appointment Needed  What is the reason for the visit:    Patient called requesting to schedule an appointment, stating that it was for a [facility] recovery Program and he needed to be seen by Dr Castanon and can't be seen by another provider.  He hasn't been seen in over 3 years by any provider.  Provider Preference: PCP only  How soon do you need to be seen?: next week  Waitlist offered?: No  Okay to leave a detailed message:  Yes

## 2021-05-27 NOTE — PROGRESS NOTES
Reinaldo Real attended 2 hours of group therapy today.    Total group size of 7.    4/3/2019 3:06 PM Yaniv Peterson

## 2021-05-27 NOTE — PROGRESS NOTES
Weekly Progress Note  Reinaldo Real  1985  [id number]      D) Pt attended 1 groups this week with 0 absences. Patient attended 0 individual sessions this week. A) Staff facilitated groups and reviewed tx progress. Assessed for [facility]. R) No VAP needed at this time.   Any significant events, defines as events that impact patients relationship with others inside and outside of treatment No  Indicate any changes or monitoring of physical or mental health problems No    Indicate involvement by any outside supports No  IAPP reviewed and modified as needed. NA  Pt working on the following dimensions:  Dimension #1 - Withdrawal Potential - Risk 0. Patient reports continued use of alcohol, last date of use 11/8/2018.  Specific goals from treatment plan addressed this week:  Patient to maintain abstinence throughout outpatient treatment.   Effectiveness of strategies:  Progressing: Client reported no use this week during check-in, and that his last use occurred before starting this treatment program.    Dimension #2 - Biomedical - Risk 1. Patient reports chronic conditions of diabetes, lower back pain, occasional headaches  Specific goals from treatment plan addressed this week:  Patient to maintain stable health throughout outpatient treatment.   Effectiveness of strategies:  Progressing: Client reported that he had an appt w/Dr Castanon in the past week and will now be transitioning to a new PCP-1st appt will be in June.  He reports that overall he feels much better since getting sober - has more energy, doesn't wake up w/aches and pains.  Dimension #3 - Emotional/Behavioral/Cognitive - Risk 0. Patient reports past MH therapy and past issues w/gambling, but no current issues.  Specific goals from treatment plan addressed this week:  No plan needed at this time  Effectiveness of strategies:  N/A    Dimension #4 - Treatment Acceptance/Resistance - Risk 1. Patient appears to lack insight into his drinking behaviors due in part to  "cultural barriers  Specific goals from treatment plan addressed this week:   Patient to increase motivation towards recovery by participating in outpatient programming.   Effectiveness of strategies:    Progressing: Client attended group this week and actively participated in group discussion without prompting. Client stated during check-in that he was grateful for the help he has gotten from Dr Castanon and the people in this treatment program.    Dimension #5 - Relapse Potential - Risk 3. Patient lacks understanding of relapse issues and appears to be at high risk for further substance use.  Specific goals from treatment plan addressed this week:  Client will gain insight into situations and emotions that trigger his drinking.  Effectiveness of strategies:  Progressing: Client stated during check-in that he had no cravings or thoughts of drinking this week.    Dimension #6 - Recovery Environment - Risk 2. Patient does not consistently attend sober support meetings or other activities that help support sobriety.  Specific goals from treatment plan addressed this week:  Patient will explore, identify and participate in activities that help support sobriety in his community.  Effectiveness of strategies:  Progressing: Client stated that things are going well at home.    T) Treatment plan updated no.  Patient notified and in agreement NA.  Patient educated on Triggers, Stress, How to Say \"No\", How to Take Care of Yourself.  Patient has completed 81 of 110 program hours at this time. Projected discharge date is 05/01/2019. Current discharge plan is not yet developed.     KECIA Schofield  4/11/2019, 12:39 PM  "

## 2021-05-27 NOTE — PROGRESS NOTES
Weekly Progress Note  Reinaldo Real  1985  [id number]      D) Pt attended 1 groups this week with 0 absences. Patient attended 0 individual sessions this week. A) Staff facilitated groups and reviewed tx progress. Assessed for VA. R) No VAP needed at this time.   Any significant events, defines as events that impact patients relationship with others inside and outside of treatment No  Indicate any changes or monitoring of physical or mental health problems No    Indicate involvement by any outside supports No  IAPP reviewed and modified as needed. NA  Pt working on the following dimensions:  Dimension #1 - Withdrawal Potential - Risk 0. Patient reports continued use of alcohol, last date of use 11/8/2018.  Specific goals from treatment plan addressed this week:  Patient to maintain abstinence throughout outpatient treatment.   Effectiveness of strategies:  Progressing: Client reported no use this week during check-in, and that his last use occurred before starting this treatment program.    Dimension #2 - Biomedical - Risk 1. Patient reports chronic conditions of diabetes, lower back pain, occasional headaches  Specific goals from treatment plan addressed this week:  Patient to maintain stable health throughout outpatient treatment.   Effectiveness of strategies:  Progressing: Client reported that his medical coverage will begin on April 1st.  Dimension #3 - Emotional/Behavioral/Cognitive - Risk 0. Patient reports past MH therapy and past issues w/gambling, but no current issues.  Specific goals from treatment plan addressed this week:  No plan needed at this time  Effectiveness of strategies:  N/A    Dimension #4 - Treatment Acceptance/Resistance - Risk 1. Patient appears to lack insight into his drinking behaviors due in part to cultural barriers  Specific goals from treatment plan addressed this week:   Patient to increase motivation towards recovery by participating in outpatient programming.   Effectiveness of  strategies:    Progressing: Client attended group this week and actively participated in group discussion without prompting. Client stated during check-in that he was grateful for a healthy new baby.    Dimension #5 - Relapse Potential - Risk 3. Patient lacks understanding of relapse issues and appears to be at high risk for further substance use.  Specific goals from treatment plan addressed this week:  Client will gain insight into situations and emotions that trigger his drinking.  Effectiveness of strategies:  Progressing: Client stated during check-in that he had no cravings or thoughts of drinking this week, and that he had spent some time after his new daughter was born thinking about things that are important in Recovery.    Dimension #6 - Recovery Environment - Risk 2. Patient does not consistently attend sober support meetings or other activities that help support sobriety.  Specific goals from treatment plan addressed this week:  Patient will explore, identify and participate in activities that help support sobriety in his community.  Effectiveness of strategies:  Progressing: Client stated he did not attend the [facility] Community Support Group this week because his wife was delivering a baby. Client also reported he heard from his  that he should wait 3 years after he is off of probation to apply for citizenship..    T) Treatment plan updated no.  Patient notified and in agreement NA.  Patient educated on  Recovery Resources . Patient has completed 77 of 110 program hours at this time. Projected discharge date is 05/08/2019. Current discharge plan is not yet developed.     KECIA Thurston  3/28/2019, 10:56 AM

## 2021-05-27 NOTE — PROGRESS NOTES
Reinaldo Real attended 2 hours of group therapy today.    Total group size of 7.    3/27/2019 3:00 PM Yaniv Peterson

## 2021-05-28 NOTE — PROGRESS NOTES
Weekly Progress Note  Reinaldo Real  1985  [id number]      D) Pt attended 1 groups this week with 0 absences. Patient attended 0 individual sessions this week. A) Staff facilitated groups and reviewed tx progress. Assessed for [facility]. R) No VAP needed at this time.   Any significant events, defines as events that impact patients relationship with others inside and outside of treatment No  Indicate any changes or monitoring of physical or mental health problems No    Indicate involvement by any outside supports No  IAPP reviewed and modified as needed. NA  Pt working on the following dimensions:  Dimension #1 - Withdrawal Potential - Risk 0. Patient reports continued use of alcohol, last date of use 11/8/2018.  Specific goals from treatment plan addressed this week:  Patient to maintain abstinence throughout outpatient treatment.   Effectiveness of strategies:  Progressing: Client reported no use this week during check-in, and that his last use occurred before starting this treatment program.    Dimension #2 - Biomedical - Risk 1. Patient reports chronic conditions of diabetes, lower back pain, occasional headaches  Specific goals from treatment plan addressed this week:  Patient to maintain stable health throughout outpatient treatment.   Effectiveness of strategies:  Progressing: No changes noted.  Dimension #3 - Emotional/Behavioral/Cognitive - Risk 0. Patient reports past MH therapy and past issues w/gambling, but no current issues.  Specific goals from treatment plan addressed this week:  No plan needed at this time  Effectiveness of strategies:  N/A    Dimension #4 - Treatment Acceptance/Resistance - Risk 1. Patient appears to lack insight into his drinking behaviors due in part to cultural barriers  Specific goals from treatment plan addressed this week:   Patient to increase motivation towards recovery by participating in outpatient programming.   Effectiveness of strategies:    Progressing: Client attended group  "this week and actively participated in group discussion without prompting. Client stated during check-in that he was grateful to be completing treatment soon.    Dimension #5 - Relapse Potential - Risk 3. Patient lacks understanding of relapse issues and appears to be at high risk for further substance use.  Specific goals from treatment plan addressed this week:  Client will gain insight into situations and emotions that trigger his drinking.  Effectiveness of strategies:  Progressing: Client stated during check-in that he had no cravings or thoughts of drinking this week.    Dimension #6 - Recovery Environment - Risk 2. Patient does not consistently attend sober support meetings or other activities that help support sobriety.  Specific goals from treatment plan addressed this week:  Patient will explore, identify and participate in activities that help support sobriety in his community.  Effectiveness of strategies:  Progressing: Client stated he did not attend the [facility] Community Support Group last weekend. 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    T) Treatment plan updated no.  Patient notified and in agreement NA.  Patient educated on \"A Recovery Community\".  Patient has completed 85 of 110 program hours at this time. Projected discharge date is 05/01/2019. Current discharge plan is under development.     KECIA Thurston  4/25/2019, 10:47 AM  "

## 2021-05-28 NOTE — PROGRESS NOTES
Reinaldo Real attended 2 hours of group therapy today.    Total group size of 5.    5/1/2019 3:09 PM Yaniv Peterson

## 2021-05-28 NOTE — PROGRESS NOTES
"HPI - 35 yo male here possible pneumonia.       He has been exposed to someone with TB. His wife PCA for your friend who is an old man who lived with them. This man was admitted to [facility] and passed away from active TB on 19.      Per chart review:  Refugee screening done 13 with Dr Marino, he had a positive QFT and dx'd with LTBI and referral to TB clinic.   Reinaldo Real was treated previously for TB at [facility] TB clinic at [address] [place] [address].     Today, no cough, no hemoptysis, no nightsweats, lost some weight intentionally b/c of diabetes      Current Outpatient Medications   Medication Sig     blood glucose test strips Use 1 each As Directed as needed. Dispense brand per patient's insurance at pharmacy discretion.     generic lancets (FINGERSTIX LANCETS) Use 1 each As Directed daily. Dispense brand per patient's insurance at pharmacy discretion.     metFORMIN (GLUCOPHAGE) 1000 MG tablet Take 1 tablet (1,000 mg total) by mouth 2 (two) times a day with meals.     Vitals:    19 1551   BP: 120/70   Pulse: 79   Resp: 14   Temp: 97.7  F (36.5  C)   TempSrc: Oral   SpO2: 97%   Weight: 169 lb 9.6 oz (76.9 kg)   Height: 5' 3.5\" (1.613 m)     OBJECTIVE:  Vitals listed above within normal limits  General appearance: well groomed, pleasant, well hydrated, nontoxic appearing  ENT: PERRL, throat clear  Neck: neck supple, no lymphadenopathy, no thyromegaly  Lungs: lungs clear to auscultation bilaterally, no wheezes or rhonchi  Heart: regular rate and rhythm, no murmurs, rubs or gallops  Abdomen: soft, nontender  Neuro: no focal deficits, CN II-XII grossly intact, alert and oriented  Psych:  mood stable, appears to have good insight and judgment    Xr Chest 2 Views  Result Date: 2019  EXAM: XR CHEST 2 VIEWS LOCATION: [facility] DATE/TIME: 2019 4:28 PM INDICATION: h/o tx for LTBI in  but exposure to patient who  of active TB on 19 COMPARISON: None. FINDINGS: Heart size appears normal. Lungs " appear clear. No evidence tuberculosis.    A/P  TB exposure and prior tx for LTBI  [facility] for TB Clinic to confirm completed full treatment  CXR today was normal  Asx  Advised reassessment in 6 months.     He will establish care with new PCP,Dr Berrios on 6/12/19

## 2021-05-28 NOTE — PROGRESS NOTES
[facility] SUBSTANCE USE DISORDER  DISCHARGE SUMMARY            Name:  Reinaldo Real   :  KECIA Thurston   Admit Date: 18   Discharge Date: 19   :  1985   Hours Completed: 87   Initial Diagnosis:  Alcohol Use Disorder-Moderate (F10.20) Final Diagnosis:  Alcohol Use Disorder-Moderate (F10.20)   Discharge Address:    [address] [place] [address]   Funding Source:    [facility]     Discharge Type:  With Staff Approval (WSA)    Client was receiving residential services at the time of discharge:   No    Reasons for and circumstances of service termination:  Reinaldo Real is a 34 y.o. year-old male who admitted to [facility] Program on 18 and has completed 87  hours as of 2019. Client has successfully completed OUTPATIENT TREATMENT with staff approval.  Client received Basic Education on group norms and treatment orientation, and information on the following topics; Group Rules and Norms, Healthy Life [place], What is Stress?, Stress Management, Setting a Personal Goal, What is Addiction?, Describe Your Use Behavior, Benefits and Consequences of Drinking and Using, Understanding Cravings and Withdrawal, What is Recovery, The Power of Positive Thinking, Thinking Comes Before Drinking, Doctor's Presentation - Physical Effects of Addiction, How to Say No, Effects of Alcohol and Drugs on the Body, Recognizing and Handling Stress, Anger Management, Levels of Treatment, Stress Management, Having [place] in Yourself, Goals and Recovery, DWI Laws and Penalties, Recovery Resources, Healthy Family Communication and Rebuilding Healthy Families.     If program discharge status was At Staff Request, the license mariee must identify the following:    Other interested parties conferred with: N/A  Referrals provided: N/A  Alternatives considered and attempted before deciding to discharge: N/A      Dimension/Course of Treatment/Individualized Care:   1.   Withdrawal Potential - Intake Risk level -  0 Discharge Risk level - 0  Narrative supporting risk description:  On admission, the client reported his last date of use as 11/08/2018. No signs of intoxication or withdrawal were noted or reported.  Treatment plan goals and progress towards those goals:  Goal: Maintain abstinence.  Client reports last date of use of alcohol as 11/08/18. No signs of intoxication or withdrawal noted or reported and the client appeared to maintain abstinence throughout treatment.   2. Biomedical Conditions and Complications - Intake Risk level - 1 Discharge Risk level - 0  Narrative supporting risk description:  The client reported conditions of diabetes, lower back pain and occasional headaches but reported he was able to obtain any necessary care on his own.   Treatment plan goals and progress towards those goals:  Goal: Patient to maintain stable health throughout outpatient treatment.   Client reported receiving regular care for all his medical needs during treatment.    3.  Emotional/Behavioral/Cognitive Conditions and Complications - Intake Risk level -  0 Discharge Risk level - 0  Narrative supporting risk description:  Client arrived to the  as a refugee about 6 years ago and is currently a green card mariee but does not speak English and is still in the process of acculturating. Client reported past MH therapy and past issues w/gambling, but no existing mental health problems.   Treatment plan goals and progress towards those goals:  Goal: Maintain Stability of Mental Health.  The Client was an active and engaged program participant during OUTPATIENT TREATMENT, and did not report or demonstrate any symptoms consistent with any mental health diagnosis.   4.  Readiness for Change - Intake Risk level -  1 Discharge Risk level - 0  Narrative supporting risk description:  On admission, Client had external motivation for treatment in the form of probation for DWI in [facility]. The  Client appeared to lack insight into his substance use problems due in part to cultural barriers that interfered with his understanding of the need for treatment, but did express willingness to attend treatment.  Treatment plan goals and progress towards those goals:  Goal: Patient to increase motivation towards recovery by participating in outpatient programming.   Client was consistently vocal in group and actively participated without prompting. Client was frequently jovial in groups and frequently made supportive comments to his peers. Client did not report much outside support group attendance in part due to limited resources for Jenifer speaking individuals.   5.  Relapse/Continued Use/Continued Problem Potential - Intake Risk level -  3 Discharge Risk level - 1  Narrative supporting risk description:  On admission client demonstrated limited awareness of situations or emotions that trigger his drinking and did not display much useful knowledge or understanding of the skills or coping mechanisms necessary to avoid those triggers as demonstrated by his return to drinking behaviors after a previous treatment experience in 2017. The client appeared to be at high vulnerability for further substance use as evidenced by continued drinking episodes despite his legal mandates.  Treatment plan goals and progress towards those goals:  Goal: Client will gain understanding of relapse triggers and stressors while learning coping skills so that he can respond to life events without drinking or using drugs.  Client participated in group discussion of relapse triggers and coping skills. Client attended and participated in OUTPATIENT TREATMENT groups, and accepted counselor feedback willingly which he then appeared to integrate into his daily routine(s).   6.  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 Client reported having family support for sobriety and occasionally attending the [facility] Community Support Group on Saturdays. Client also reported attending [facility] which he stated was helpful in supporting sobriety. Client was on Probation for his 2nd DWI.  Treatment plan goals and progress towards those goals:  Goal: Client will explore and identify healthy sober supports in his community.  Client became familiar with opportunities for fellowship in Recovery by attending [facility] Community Support groups and participating in group discussions on expanding recovery support and participating in the Recovery Community. Client remains on probation in [facility] but successful completion of treatment meets requirements of his probation.   Strengths: Client identified strengths included being able to fix household items and caring for elderly vulnerable adults..  Needs: ESL Classes; Ongoing Recovery Support  Services Provided: Intake; assessment; treatment planning; Group Therapy; Psychoeducation; Service Coordination; lectures; 1x1 therapy; and recommendations at discharge.     Program Involvement: Good  Attendance: Good  Ability to relate in group/   Other program activities: Good  Assignment Completion: Good  Overall Behavior: Excellent  Reported Family/Significant   Other Involvement: N/A    Prognosis: Good    Recommendations                                                                                                        Continue to Attend [facility] Support Group, Identify and Maintain a Sober Social Network of Friends, continue to follow requirements of probation.     Mental Health Referral  None   Physical Health Referral:  Primary Care Physician     Counselor Name and Title:  KECIA Thurston      Date:  5/2/2019  Time:  12:46 PM

## 2021-06-01 ENCOUNTER — OFFICE VISIT - HEALTHEAST (OUTPATIENT)
Dept: PHARMACY | Facility: CLINIC | Age: 36
End: 2021-06-01

## 2021-06-01 DIAGNOSIS — R03.0 ELEVATED BP WITHOUT DIAGNOSIS OF HYPERTENSION: ICD-10-CM

## 2021-06-01 DIAGNOSIS — E78.1 HYPERTRIGLYCERIDEMIA: ICD-10-CM

## 2021-06-01 DIAGNOSIS — K21.9 GASTROESOPHAGEAL REFLUX DISEASE, UNSPECIFIED WHETHER ESOPHAGITIS PRESENT: ICD-10-CM

## 2021-06-01 DIAGNOSIS — E11.9 TYPE 2 DIABETES MELLITUS WITHOUT COMPLICATION, WITHOUT LONG-TERM CURRENT USE OF INSULIN (H): ICD-10-CM

## 2021-06-01 PROCEDURE — 99606 MTMS BY PHARM EST 15 MIN: CPT | Performed by: PHARMACIST

## 2021-06-01 PROCEDURE — 99607 MTMS BY PHARM ADDL 15 MIN: CPT | Performed by: PHARMACIST

## 2021-06-02 VITALS — HEIGHT: 64 IN | WEIGHT: 168.56 LBS | BODY MASS INDEX: 28.78 KG/M2

## 2021-06-03 VITALS — HEIGHT: 64 IN | WEIGHT: 169.6 LBS | BODY MASS INDEX: 28.95 KG/M2

## 2021-06-04 VITALS
TEMPERATURE: 98.4 F | SYSTOLIC BLOOD PRESSURE: 124 MMHG | DIASTOLIC BLOOD PRESSURE: 86 MMHG | BODY MASS INDEX: 30.16 KG/M2 | HEIGHT: 63 IN | OXYGEN SATURATION: 98 % | WEIGHT: 170.25 LBS | RESPIRATION RATE: 16 BRPM | HEART RATE: 79 BPM

## 2021-06-05 VITALS
DIASTOLIC BLOOD PRESSURE: 76 MMHG | HEART RATE: 76 BPM | WEIGHT: 167.75 LBS | HEIGHT: 64 IN | SYSTOLIC BLOOD PRESSURE: 114 MMHG | BODY MASS INDEX: 28.64 KG/M2 | TEMPERATURE: 97.7 F | OXYGEN SATURATION: 98 % | RESPIRATION RATE: 16 BRPM

## 2021-06-05 VITALS
WEIGHT: 166.5 LBS | DIASTOLIC BLOOD PRESSURE: 86 MMHG | HEART RATE: 88 BPM | HEIGHT: 64 IN | SYSTOLIC BLOOD PRESSURE: 120 MMHG | RESPIRATION RATE: 20 BRPM | TEMPERATURE: 97.9 F | OXYGEN SATURATION: 98 % | BODY MASS INDEX: 28.42 KG/M2
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
## 2021-06-07 NOTE — TELEPHONE ENCOUNTER
Please call patient and explain that he DOES have the bacteria in his stomach that we checked for in his stool- H.PYLORI.     I am going to send 4 medications to his pharmacy and he will take them for 2 weeks. Three of the medications are for 1 pill twice daily and the other one is for 2 pills twice daily.    He should look at the bottles before he leaves the pharmacy and ask the pharmacist if he has questions (they will hopefully have  capabilities because it is [facility]).     Cassy Berrios MD

## 2021-06-09 NOTE — TELEPHONE ENCOUNTER
RN cannot approve Refill Request    RN can NOT refill this medication med is not covered by policy/route to provider. Last office visit: 3/16/2020 Cassy Berrios MD Last Physical: Visit date not found Last MTM visit: Visit date not found Last visit same specialty: 3/16/2020 Cassy Berrios MD.  Next visit within 3 mo: Visit date not found  Next physical within 3 mo: Visit date not found      Karolyn Quintero, [facility] Triage/Med Refill 6/21/2020    Requested Prescriptions   Pending Prescriptions Disp Refills     naproxen (NAPROSYN) 500 MG tablet [Pharmacy Med Name: NAPROXEN 500 MG TABLET 500 TAB] 60 tablet 1     Sig: TAKE 1 TABLET (500 MG TOTAL) BY MOUTH 2 (TWO) TIMES A DAY WITH MEALS FOR PAIN       There is no refill protocol information for this order

## 2021-06-15 NOTE — TELEPHONE ENCOUNTER
Patient notified per MD note below. Patient advised to  medications at [facility]. Patient advised not to take any medication until he meets with pharmacist on 2/25/21.

## 2021-06-15 NOTE — PROGRESS NOTES
"CINTHYA Real is a 35 y.o. male here for follow up on diabetes and H.pylori.     He had a teaching visit with Hugo Vallecillo, PharmD for the H.pylori medication and is taking the triple therapy. Has 5 days left. Nausea has improved a lot. He does have some achiness which he attributes to the H.pylori medications.     Wondering what kinds of food he should or shouldn't eat for his cholesterol. He does like to eat pork fat. He does not eat fast food.     Has been checking blood sugars in the morning but forgot to bring his logbook. The last 3 mornings, results were 110, 119, 93.     States he has not drank alcohol at all. He drinks Coke if his sugar goes down to the 80s. When this happens, he gets a headache and then he feels better if he drinks Coke.      O  /76   Pulse 76   Temp 97.7  F (36.5  C) (Oral)   Resp 16   Ht 5' 3.75\" (1.619 m)   Wt 167 lb 12 oz (76.1 kg)   SpO2 98% Comment: ra  BMI 29.02 kg/m     Vitals reviewed. Nursing note reviewed.  General Appearance: Pleasant and alert, in no acute distress  HEENT: mucous membranes moist  Ext: No peripheral edema, good distal perfusion. Able to feel monofilament on both soles in all areas tested.   Skin: warm, dry, intact, no rash noted  Neuro: no focal deficits, CNs II-XII normal.   Psych: mood and affect are normal.    A/P  Reinaldo was seen today for diabetes and emesis.    Diagnoses and all orders for this visit:    Type 2 diabetes mellitus without complication, without long-term current use of insulin (H): since he is experiencing symptomatic hypoglycemia (though it does not sound severe if it is only in the 80s), will discontinue glipizide and start sitagliptin instead. He declines to use anything injectable.    -     Microalbumin, Random Urine  -     SITagliptin (JANUVIA) 100 MG tablet; Take 1 tablet (100 mg total) by mouth daily. With or without food    H. pylori infection: advised to finish out 2 week course    Hypertriglyceridemia: will increase " atorvastatin to 40 mg/day.   -     atorvastatin (LIPITOR) 40 MG tablet; Take 1 tablet (40 mg total) by mouth daily.         Return in about 2 months (around 5/8/2021) for diabetes.      The entire visit was conducted through a professional .   Options for treatment and follow-up care were reviewed with the patient and/or guardian. Reinaldo Burks Farhan and/or guardian engaged in the decision making process and verbalized understanding of the options discussed and agreed with the final plan.    Cassy Berrios MD

## 2021-06-15 NOTE — PROGRESS NOTES
MTM Consult Encounter  Reinaldo Real is a 35 y.o. male referred for a clinical pharmacist consult from Cassy Berrios MD  for H Pylori treatment education. Professional telephonic Jenifer  utilized today.    Discussion: Patient currently does not feeling good and hasn't started his antibiotics yet. He reports that he usually manages his own medications, knows how to read the label, and will take meds from the vial. He reports he never misses a dose. Patient brings in all of his medications today - including the following:      amoxicillin (AMOXIL) 500 MG capsule 1,000 mg, Oral, 2 times daily     levoFLOXacin (LEVAQUIN) 500 MG tablet 500 mg, Oral, DAILY     omeprazole (PRILOSEC) 20 MG capsule 20 mg, Oral, 2 times daily before meals     Patient previously completed 4 drug H pylori treatment regimen in March 2020. At this visit, we discussed how to take his medications, importance of finishing full course of therapy, possible side effects, if his antibiotics interacts with any of his meds, and to follow-up with PCP if symptoms don't improve once therapy complete.     Plan:  1. Patinet to start new 3 drug H pylori regimen tomorrow as instructed x 14 days.   2. Patient to follow-up with clinic if symptoms worsen.     Miriam Vallecillo (Anny), PharmD, BCACP  Medication Therapy Management Pharmacist  [facility]      Current Outpatient Medications   Medication Sig Dispense Refill     amoxicillin (AMOXIL) 500 MG capsule Take 2 capsules (1,000 mg total) by mouth 2 (two) times a day for 14 days. 56 capsule 0     atorvastatin (LIPITOR) 20 MG tablet Take 1 tablet (20 mg total) by mouth daily. 90 tablet 3     blood glucose test strips Use 1 each As Directed as needed. Dispense brand per patient's insurance at pharmacy discretion. 200 strip 3     empagliflozin (JARDIANCE) 25 mg Tab Take 1 tablet (25 mg total) by mouth daily. 90 tablet 3     generic lancets (FINGERSTIX LANCETS) Use 1 each As Directed  daily. Dispense brand per patient's insurance at pharmacy discretion. 200 each 1     glipiZIDE (GLUCOTROL) 5 MG tablet Take 1 tablet (5 mg total) by mouth 2 (two) times a day before meals. 1/2 Hour BEFORE meals 180 tablet 3     lancing device (LANCING DEVICE WITH LANCETS) Misc Use to test blood sugars 2 times weekly or as directed.       levoFLOXacin (LEVAQUIN) 500 MG tablet Take 1 tablet (500 mg total) by mouth daily for 14 days. 14 tablet 0     metFORMIN (GLUCOPHAGE) 1000 MG tablet Take 1 tablet (1,000 mg total) by mouth 2 (two) times a day with meals. 180 tablet 3     naproxen (NAPROSYN) 500 MG tablet TAKE 1 TABLET (500 MG TOTAL) BY MOUTH 2 (TWO) TIMES A DAY WITH MEALS FOR PAIN 60 tablet 1     omeprazole (PRILOSEC) 20 MG capsule Take 1 capsule (20 mg total) by mouth 2 (two) times a day before meals. 28 capsule 0     No current facility-administered medications for this visit.         Medication Therapy Recommendations  H. pylori infection    Current Medication: amoxicillin (AMOXIL) 500 MG capsule   Rationale: Does not understand instructions - Adherence - Adherence   Recommendation: Provide Education - educate on meds   Status: Patient Agreed - Adherence/Education

## 2021-06-15 NOTE — TELEPHONE ENCOUNTER
Please call pt and explain his H.pylori stool test was POSITIVE again. This is probably why he is having an upset stomach and vomiting.   I sent 3 medications to his pharmacy and it will take 2 weeks for him to complete them.  Please schedule a medication teaching visit for him with Hugo Vallecillo, Pharm.D., and have him bring all 3 medications to this appointment to take before he starts them.    Thank you.     Cassy Berrios MD

## 2021-06-15 NOTE — PROGRESS NOTES
"CINTHYA Real is a 35 y.o. male here for follow up on diabetes and on vomiting after eating.  This happens almost every day. If he eats too much, he throws up. He hasn't drank any alcohol for 4-5 months. Smoking 1-2 cigarettes per day.     He did have H.pylori in March 2020. He took at all 4 medications prescribed and his stomach felt better for awhile. One month ago, his vomiting came back.     A1C is 9.3. He is taking his 2 diabetes medications. He does not want to use anything injectable.     Doesn't remember the last time he saw an eye doctor- a long time ago.       O  /86   Pulse 88   Temp 97.9  F (36.6  C) (Oral)   Resp 20   Ht 5' 3.75\" (1.619 m)   Wt 166 lb 8 oz (75.5 kg)   SpO2 98% Comment: ra  BMI 28.80 kg/m     Vitals reviewed. Nursing note reviewed.  General Appearance: Pleasant and alert, in no acute distress  HEENT: mucous membranes moist  Ext: No peripheral edema, good distal perfusion  Skin: warm, dry, intact, no rash noted  Neuro: no focal deficits, CNs II-XII normal.   Psych: mood and affect are normal.    A/P  Reinaldo was seen today for diabetes and emesis.    Diagnoses and all orders for this visit:    Type 2 diabetes mellitus without complication, without long-term current use of insulin (H): A1C is 9.3. He does not want to use anything injectable so I advised we could try glipizide. Recommended he check his sugars in the morning and bring me the results in 1 month. Also referred to diabetes education again. I also suspect he is still drinking (see note about high GGT below), which is likely contributing to his high sugars.   -     Glycosylated Hemoglobin A1c  -     Lipid Cascade RANDOM  -     metFORMIN (GLUCOPHAGE) 1000 MG tablet; Take 1 tablet (1,000 mg total) by mouth 2 (two) times a day with meals.  -     empagliflozin (JARDIANCE) 25 mg Tab; Take 1 tablet (25 mg total) by mouth daily.  -     glipiZIDE (GLUCOTROL) 5 MG tablet; Take 1 tablet (5 mg total) by mouth 2 (two) times a day " before meals. 1/2 Hour BEFORE meals  -     generic lancets (FINGERSTIX LANCETS); Use 1 each As Directed daily. Dispense brand per patient's insurance at pharmacy discretion.  -     blood glucose test strips; Use 1 each As Directed as needed. Dispense brand per patient's insurance at pharmacy discretion.  -     atorvastatin (LIPITOR) 20 MG tablet; Take 1 tablet (20 mg total) by mouth daily.  -     Ambulatory referral to Diabetes Education Program (CDE)  -     Custom LDL Cholesterol, Direct  -     Ambulatory referral to Optometry    Chronic viral hepatitis B without delta agent and without coma (H)  -     Hepatic Profile  -     Hepatitis B DNA Quantitative Real-Time PCR(HBQNT)    Need for immunization against influenza  -     Influenza, Recombinant, Inj, Quadrivalent, PF, 18+YRS    Alcohol use disorder, moderate, dependence (H): GGT is 338. Suspect he is still drinking more than he is letting on.   -     GGT (Gamma GT)    Non-intractable vomiting without nausea, unspecified vomiting type: if he is still drinking, this is likely contributing to alcoholic gastritis, but will check for H.pylori recurrence.   -     H. pylori Antigen, Stool(HPSAG); Future    Hypertriglyceridemia  -     atorvastatin (LIPITOR) 20 MG tablet; Take 1 tablet (20 mg total) by mouth daily.         Return in about 1 month (around 3/8/2021) for diabetes.      The entire visit was conducted through a professional .   Options for treatment and follow-up care were reviewed with the patient and/or guardian. Reinaldo Burks Farhan and/or guardian engaged in the decision making process and verbalized understanding of the options discussed and agreed with the final plan.    Cassy Berrios MD

## 2021-06-16 NOTE — TELEPHONE ENCOUNTER
Tried calling the patient. Left them a voicemail and also the call back number. Please contact patient to reschedule.    Thanks  Mary Ellen Parks RDN, NICKI  Diabetes Care and Education

## 2021-06-16 NOTE — TELEPHONE ENCOUNTER
----- Message from Miriam Mccormick, PharmD sent at 3/20/2021 12:51 PM CDT -----  Hello,    Based on A1c, this patient would benefit from visiting with MTM pharmacist.  Can we reach out to schedule an initial visit with me for MTM? Based on their current insurance, a visit with me is fully covered and free of charge.     Please either schedule a Virtual a Medication Management TELE visit with me for 60 minutes on a Tuesday or Wednesday OR a Medication Management Initial visit on a Thursday or Friday.     Thank you  Hugo Mccormick, PharmD, BCACP

## 2021-06-16 NOTE — TELEPHONE ENCOUNTER
Reason for Call:  Medication or medication refill:    Do you use a [facility]?  Name of the pharmacy and phone number for the current request: [facility] - [place], [place] - [address]    Name of the medication requested: NEW GLUCOMETER     Other request: Patient threw glucometer away, need replacement/new machine.     Can we leave a detailed message on this number? No call back needed    Best Time:  ASAP     Call taken on 4/15/2021 at 12:26 PM by Drew Whitman

## 2021-06-16 NOTE — TELEPHONE ENCOUNTER
Dr. Berrios,    I called patient to assist in scheduling with Hugo (Scheduled on 04/29).  He asked If he can get a new glucose meter and strips as his old one broke awhile ago.    [facility] [phone].    Hina Quick  03/30/2021

## 2021-06-16 NOTE — TELEPHONE ENCOUNTER
Refill Approved    Rx renewed per Medication Renewal Policy. Medication was last renewed on 2/15/21.    Jack Wayne, [facility] Triage/Med Refill 4/16/2021     Requested Prescriptions   Pending Prescriptions Disp Refills     omeprazole (PRILOSEC) 20 MG capsule [Pharmacy Med Name: OMEPRAZOLE DR 20 MG CAPSULE 20 Capsule] 28 capsule 0     Sig: TAKE 1 CAPSULE (20 MG TOTAL) BY MOUTH 2 (TWO) TIMES A DAY BEFORE MEALS       GI Medications Refill Protocol Passed - 4/15/2021 11:18 AM        Passed - PCP or prescribing provider visit in last 12 or next 3 months.     Last office visit with prescriber/PCP: 3/8/2021 Cassy Berrios MD OR same dept: 3/8/2021 Cassy Berrios MD OR same specialty: 3/8/2021 Cassy Berrios MD  Last physical: Visit date not found Last MTM visit: Visit date not found   Next visit within 3 mo: Visit date not found  Next physical within 3 mo: Visit date not found  Prescriber OR PCP: Cassy Berrios MD  Last diagnosis associated with med order: 1. Heartburn  - omeprazole (PRILOSEC) 20 MG capsule [Pharmacy Med Name: OMEPRAZOLE DR 20 MG CAPSULE 20 Capsule]; Take 1 capsule (20 mg total) by mouth 2 (two) times a day before meals.  Dispense: 28 capsule; Refill: 0    2. H. pylori infection  - omeprazole (PRILOSEC) 20 MG capsule [Pharmacy Med Name: OMEPRAZOLE DR 20 MG CAPSULE 20 Capsule]; Take 1 capsule (20 mg total) by mouth 2 (two) times a day before meals.  Dispense: 28 capsule; Refill: 0    If protocol passes may refill for 12 months if within 3 months of last provider visit (or a total of 15 months).

## 2021-06-17 NOTE — TELEPHONE ENCOUNTER
Please call pt and explain his stool showed he NO LONGER has the H.pylori bacteria. Thank you.     Cassy Berrios MD

## 2021-06-17 NOTE — TELEPHONE ENCOUNTER
Please call patient and explain his liver testing showed his liver has signs of damage which are likely related to his diabetes and high cholesterol.     I would like him to see [facility]. Please help him to arrange an appointment. Thank you.   Cassy Berrios MD

## 2021-06-17 NOTE — PROGRESS NOTES
Medication Therapy Management (MTM) Encounter  Assessment:                                                      1. Type 2 diabetes mellitus without complication, without long-term current use of insulin (H)  A1c not yet at goal of less than 7%. Patient reports appropriately taking metformin and januvia. At times, patient taking higher than prescribed dose of jardiance, recommend taking jardiance as prescribed, only once daily vs. Two times a day.  Additionally, reviewed with patient that his current nauseousness and vomiting may be related to metformin therapy as this is a common side effect.  Patient has been taking with food and still experiencing side effects.  Therefore, offered alternative extended release Metformin which has lower risk of GI disturbance, patient agreeable to switch at this time.  Additionally, patient no longer taking glipizide, removed from med list today.  ACE inhibitor not currently indicated for patient.    2. Hypertriglyceridemia  Patient's triglycerides remain vastly elevated, despite moderate intensity, atorvastatin 20 mg daily.  PCP attempted to increase to high intensity, atorvastatin 40 mg daily at last visit, change has not yet been implemented.  MTM pharmacist called retail pharmacy to discontinue atorvastatin 20 mg and switch to 40 mg daily.  Pharmacy will fill updated prescription today.  Could consider follow-up lipid cascade in 2 to 3 months to determine efficacy of therapy on triglycerides, if unimproved would highly recommend adding fenofibrate or fish oil such as Lovaza to help with elevated triglycerides due to concern for pancreatitis.    3. Gastroesophageal reflux disease, unspecified whether esophagitis present  As discussed above, will change metformin dosage form with hopes of improving nausea/GI effects. Also reiterated the importance of non-pharmacologic adjustments to improve nausea/GI effects. Patient agreeable to decrease portions size per meal.     COVID-19  vaccine: patient requesting appointment for vaccine.    Plan:                                                     1.  Change Metformin dosage form to extended release to avoid GI disturbance; updated prescription today and informed pharmacy  2.  Change atorvastatin from 20 mg daily to 40 mg daily-[facility] pharmacist updated retail pharmacy today  3.  Reiterated importance of only taking Jardiance once daily (patient had been taking twice daily)  4.  Removed glipizide from medication list and updated retail pharmacy today  5. Routing to clinic staff to call patient and schedule appt for COVID-19 vaccine    Future:  1. Recommend f/u lipids in 8-12 weeks, if trigs remain elevated,elevated, recommend adding fenofibrate or Lovaza     Follow Up  Return in about 29 days (around 5/28/2021) for Medication Review.  PCP 5/10, CDE 6/28  Subjective & Objective                                                     Reinaldo Real is a 36 y.o. male called for an initial visit for Medication Therapy Management. He was referred to me from Cassy Berrios MD. Professional telephonic Jenifer  utilized today.    Reason for visit: MTM initial; A1c >9  Medication Adherence/Access: Self management of medications. Takes medications from the vials at home, not using a pillbox. States that he gets his refills from the pharmacy, picks them up on his own. Takes medications two times a day. Per Epic refill history, all medications have been filled on time recently.   -- Called [facility] to update medication list today, remove glipizide and metformin IR 1000 today.     Diabetes:  Pt currently taking metformin 1000 mg two times a day with meals and januvia 100 mg daily. Unable to clarify over the phone if patient has been taking jardiance 25 mg daily, states that sometimes he took two times a day when he had higher BG (which is why he ran out of medication early). patient is experiencing the following side effects: Persistent nausea and vomiting.   Most recent episode of vomiting was yesterday due to nauseousness.   SMB-3 times daily    Ranges (patient reported) : 186, 217, 231, 110, 270, 231, 270, 134, 335, 280, 331, 186, Unable to clarify times of day these were taken.   Patient is not experiencing hypoglycemia - only when taking glipizide  Recent symptoms of high blood sugar? Reports none  ACEi/ARB:  None, not indicated  Aspirin: None  Diet/Exercise: Patient eating 2-3 meals a day.  Unable to go into full detail about current diet, though patient does visit with certified diabetic educator.  Next visit in .  Lab Results   Component Value Date    HGBA1C 9.3 (H) 2021    HGBA1C 9.6 (H) 2020    HGBA1C 8.1 (H) 2019     Lab Results   Component Value Date    MICROALBUR <0.50 2021    LDLCALC  2021      Comment:      Invalid, Triglyceride >400           CREATININE 0.82 2020     Lab Results   Component Value Date    K 4.1 2020     Hypercholesteremia: Per pharmacy, has NOT yet started increased dose of atorvastatin 40 mg daily, continues to take atorv 20 mg daily at this time.   Lab Results   Component Value Date    CHOL 277 (H) 2021    CHOL 225 (H) 2020    CHOL 216 (H) 2016     Lab Results   Component Value Date    HDL  2021      Comment:      Invalid, Triglyceride >1100           HDL 31 (L) 2020    HDL 29 (L) 2016     Lab Results   Component Value Date    LDLCALC  2021      Comment:      Invalid, Triglyceride >400           LDLCALC  2020      Comment:      Invalid, Triglycerides >400    LDLCALC 138 (H) 2016     Lab Results   Component Value Date    TRIG 1,252 (H) 2021    TRIG 608 (H) 2020    TRIG 244 (H) 2016     GERD: omeprazole 20 mg two times a day before meals. History of H pylori treatment in 2021. Patient states that he sometimes feels nauseous after eating and has vomiting. States this is getting less. Yesterday had vomiting.  Only feeling nausea after eating - no particular food, only if he fills up his stomach full. Patient has been working to decrease his portion sizes to avoid side effects.     PMH: reviewed in EPIC   Allergies/ADRs: reviewed in EPIC   Alcohol: Unable to review  Tobacco:   Social History     Tobacco Use   Smoking Status Current Every Day Smoker     Packs/day: 0.20     Types: Cigarettes   Smokeless Tobacco Current User     Types: Chew   Tobacco Comment    smokes 0-1/day--Chew betel nut; pt states he started smoking at 10 yo     ----------------    The patient declined an after visit summary    I spent 60 minutes with this patient today.   All changes were made via collaborative practice agreement with Cassy Berrios MD. A copy of the visit note was provided to the patient's provider.     Miriam Bay), PharmD, BCACP  Medication Therapy Management Pharmacist  [facility]    Telemedicine Visit Details    Type of service:  Telephone     Start Time: 9:00 AM  End Time: 10:00 aM    Originating Location (pt. Location): Home    Distant Location (provider location):  [place] [facility]    Mode of Communication: Telephone    Current Outpatient Medications   Medication Sig Dispense Refill     atorvastatin (LIPITOR) 40 MG tablet Take 1 tablet (40 mg total) by mouth daily. 90 tablet 3     blood glucose test strips Use 1 each As Directed as needed. Dispense brand per patient's insurance at pharmacy discretion. 200 strip 3     blood-glucose meter Misc Use 1 Device As Directed daily for 1 day. 1 each 0     empagliflozin (JARDIANCE) 25 mg Tab Take 1 tablet (25 mg total) by mouth daily. 90 tablet 3     generic lancets (FINGERSTIX LANCETS) Use 1 each As Directed daily. Dispense brand per patient's insurance at pharmacy discretion. 200 each 3     lancing device (LANCING DEVICE WITH LANCETS) Misc Use to test blood sugars 2 times weekly or as directed.       metFORMIN  (GLUCOPHAGE-XR) 500 MG 24 hr tablet Take 2 tablets (1,000 mg total) by mouth daily with breakfast. 120 tablet 11     naproxen (NAPROSYN) 500 MG tablet TAKE 1 TABLET (500 MG TOTAL) BY MOUTH 2 (TWO) TIMES A DAY WITH MEALS FOR PAIN 60 tablet 1     omeprazole (PRILOSEC) 20 MG capsule TAKE 1 CAPSULE (20 MG TOTAL) BY MOUTH 2 (TWO) TIMES A DAY BEFORE MEALS 180 capsule 3     SITagliptin (JANUVIA) 100 MG tablet Take 1 tablet (100 mg total) by mouth daily. With or without food 90 tablet 3     No current facility-administered medications for this visit.         Medication Therapy Recommendations  Diabetes mellitus, type 2 (H)    Current Medication: empagliflozin (JARDIANCE) 25 mg Tab   Rationale: Dose too high - Dosage too high - Safety   Recommendation: Decrease Frequency - take as prescribed - once dialy   Status: Patient Agreed - Adherence/Education          Current Medication: metFORMIN (GLUCOPHAGE-XR) 500 MG 24 hr tablet   Rationale: Dosage form inappropriate - Ineffective medication - Effectiveness   Recommendation: Change Medication Formulation  - change from IR to ER d/t side effects   Status: Accepted per CPA         Hypertriglyceridemia    Current Medication: atorvastatin (LIPITOR) 40 MG tablet   Rationale: Dose too low - Dosage too low - Effectiveness   Recommendation: Increase Dose - 40 mg instead of 20   Status: Patient Agreed - Adherence/Education

## 2021-06-17 NOTE — TELEPHONE ENCOUNTER
Called pt and relayed to wife as pt at work, PCP message regarding stool test.  Wife understands and will pass on to pt. thanks

## 2021-06-17 NOTE — PROGRESS NOTES
ASSESSMENT/PLAN:    Problem List Items Addressed This Visit     Chronic viral hepatitis B without delta agent and without coma (H)    Relevant Orders    Hepatitis B DNA Quantitative Real-Time PCR(HBQNT)    Lower Back Pain    Relevant Medications    naproxen (NAPROSYN) 500 MG tablet    Diabetes mellitus, type 2 (H) - Primary    Relevant Medications    empagliflozin (JARDIANCE) 25 mg Tab tablet    metFORMIN (GLUCOPHAGE-XR) 500 MG 24 hr tablet    exenatide microspheres 2 mg SERR    Other Relevant Orders    Glycosylated Hemoglobin A1c (Completed)    Basic Metabolic Panel    Ambulatory referral to Ophthalmology    H. pylori infection    Relevant Orders    H. pylori Antigen, Stool(HPSAG)      Other Visit Diagnoses     Elevated liver enzymes        Relevant Orders    Hepatic Profile (Completed)    GGT (Gamma GT) (Completed)      -A1C down to 8.4 today from 9.3 three months ago. He states he is eating fewer sweets.  I explained we would still like his A1c to be under 7, and I recommended starting Bydureon weekly. He agrees.   -recommended if blood sugar is low (though he says it never gets below 80-90), eat fruit rather than candy.  -Re-sent referral for optho.     -Rechecking liver enzymes which were elevated at last check, along with HEp B viral load.  He states he is not drinking any alcohol and if this is true, his elevated enzymes may be due to fatty liver disease and/or his hepatitis B.    -We will do test of cure for H. Pylori. Abdominal pain has improved with extended-release metformin.       SUBJECTIVE:  Reinaldo Real is a 36 y.o. male here for diabetes follow-up.     Got 1st COVID vaccine.     Wants low back pain med refilled (naproxen, based on records).     Started extended-release metformin and nausea is better.     He did take H. pylori meds for 2 weeks. Will check test of cure today.     He is not drinking any alcohol. Sometimes he craves soda. But knows this is not good because sometimes if he eats something  with a lot of sugar, he feels like he will almost pass out. Highest fasting glucose recently has been 227. Sometimes lower, 80-90. If it's this low, he gets a headache and he eats some candy.     Patient Active Problem List   Diagnosis     Nonspecific reaction to tuberculin skin test without active tuberculosis     Chronic viral hepatitis B without delta agent and without coma (H)     Lower Back Pain     Alcohol use disorder, moderate, dependence (H)     Diabetes mellitus, type 2 (H)     Hypertriglyceridemia     H. pylori infection     Current Outpatient Medications on File Prior to Visit   Medication Sig Dispense Refill     atorvastatin (LIPITOR) 40 MG tablet Take 1 tablet (40 mg total) by mouth daily. 90 tablet 3     blood glucose test strips Use 1 each As Directed as needed. Dispense brand per patient's insurance at pharmacy discretion. 200 strip 3     generic lancets (FINGERSTIX LANCETS) Use 1 each As Directed daily. Dispense brand per patient's insurance at pharmacy discretion. 200 each 3     lancing device (LANCING DEVICE WITH LANCETS) Misc Use to test blood sugars 2 times weekly or as directed.       metFORMIN (GLUCOPHAGE-XR) 500 MG 24 hr tablet Take 2 tablets (1,000 mg total) by mouth daily with breakfast. 120 tablet 11     omeprazole (PRILOSEC) 20 MG capsule TAKE 1 CAPSULE (20 MG TOTAL) BY MOUTH 2 (TWO) TIMES A DAY BEFORE MEALS 180 capsule 3     SITagliptin (JANUVIA) 100 MG tablet Take 1 tablet (100 mg total) by mouth daily. With or without food 90 tablet 3     [DISCONTINUED] empagliflozin (JARDIANCE) 25 mg Tab Take 1 tablet (25 mg total) by mouth daily. 90 tablet 3     blood-glucose meter Misc Use 1 Device As Directed daily for 1 day. 1 each 0     naproxen (NAPROSYN) 500 MG tablet TAKE 1 TABLET (500 MG TOTAL) BY MOUTH 2 (TWO) TIMES A DAY WITH MEALS FOR PAIN 60 tablet 1     No current facility-administered medications on file prior to visit.         Social History     Tobacco Use   Smoking Status Current  "Every Day Smoker     Packs/day: 0.20     Types: Cigarettes   Smokeless Tobacco Current User     Types: Chew   Tobacco Comment    smokes 0-1/day--Chew betel nut; pt states he started smoking at 12 yo       DIABETIC MEASURES:  Hemoglobin A1c   Date Value Ref Range Status   02/08/2021 9.3 (H) <=5.6 % Final   03/16/2020 9.6 (H) 3.5 - 6.0 % Final   04/04/2019 8.1 (H) 3.5 - 6.0 % Final        Lipids:   Lab Results   Component Value Date    HDL  02/08/2021      Comment:      Invalid, Triglyceride >1100           HDL 31 (L) 03/16/2020    HDL 29 (L) 12/19/2016    Lab Results   Component Value Date    LDLCALC  02/08/2021      Comment:      Invalid, Triglyceride >400           LDLCALC  03/16/2020      Comment:      Invalid, Triglycerides >400    LDLCALC 138 (H) 12/19/2016    Lab Results   Component Value Date    TRIG 1,252 (H) 02/08/2021    TRIG 608 (H) 03/16/2020    TRIG 244 (H) 12/19/2016        Microalbumin  Lab Results   Component Value Date    MICROALBUR <0.50 03/08/2021        Last eye exam: unknown      Recent blood pressures:   BP Readings from Last 3 Encounters:   05/10/21 130/72   03/08/21 114/76   02/08/21 120/86        Recent weight:   Wt Readings from Last 3 Encounters:   05/10/21 167 lb 4 oz (75.9 kg)   03/08/21 167 lb 12 oz (76.1 kg)   02/08/21 166 lb 8 oz (75.5 kg)         OBJECTIVE:  :  /72 (Patient Site: Right Arm, Patient Position: Sitting, Cuff Size: Adult Regular)   Pulse 72   Temp 97.3  F (36.3  C) (Oral)   Resp 16   Ht 5' 3.75\" (1.619 m)   Wt 167 lb 4 oz (75.9 kg)   SpO2 97%   BMI 28.93 kg/m      Gen:  A&A, NAD  Neck:  supple, no sig LAD or thyromegally  CV:  HRRR, no M/R/G  Resp:  CTAB  Ext:  W&D, no edema. DP pulses 2+      Lab results:    Results for orders placed or performed in visit on 03/08/21   Microalbumin, Random Urine   Result Value Ref Range    Microalbumin, Random Urine <0.50 0.00 - 1.99 mg/dL    Creatinine, Urine 72.1 mg/dL    Microalbumin/Creatinine Ratio Random Urine   "       Cassy Berrios MD

## 2021-06-20 NOTE — PROGRESS NOTES
"Rule 25 Assessment  Background Information   1. Date of Assessment Request  2. Date of Assessment  10/110/18 3. Date Service Authorized     4.   KECIA Crabtree 5.  Phone Number [phone] 6. Referent  Shruthi @ [facility]  7. Assessment Site  [facility]     8. Client Name Reinaldo Real 9. Date of Birth  1985 Age  33 y.o. 10. Gender  male  11. PMI/ Insurance No.   12. Client's Primary Language:  Jenifer  13. Do you require special accommodations, such as an  or assistance with written material? Yes,  Jenifer interpreted    14. Current Address: [address] [place] [address]   15. Client Phone Numbers: [phone] (home)    16.  Alternate (cell) Phone Number:       17. Tell me what has happened to bring you here today.     \"For probation and to get into treatment.\"     18. Have you had other rule 25 assessments? yes, when, where, and what circumstances:  In the past     DIMENSION I - Acute Intoxication /Withdrawal Potential   1. Chemical use most recent 12 months outside a facility and other significant use history (client self-report)             X = Primary Drug Used   Age of First Use Most Recent Pattern of Use and Duration   Need enough information to show pattern (both frequency and amounts) and to show tolerance for each chemical that has a diagnosis   Date of last use and time, if needed   Withdrawal Potential? Requiring special care Method of use  (oral, smoked, snort, IV, etc)   [] Alcohol 10  2-5 cans of beer. At least 2x per month 2-3 months ago   Oral    [] Marijuana/Hashish  Denies      [] Cocaine/Crack  Denies      [] Meth/Amphetamines  Denies      [] Heroin  Denies      [] Other Opiates/Synthetics  Denies      [] Inhalants  Denies      [] Benzodiazepines  Denies      [] Hallucinogens  Denies      [] Barbiturates/Sedatives/Hypnotics  Denies      [] Over-the-Counter Drugs  Denies      [] Other  Denies      [] Nicotine 10  1-4 per day 10/9/18       2. " Do you use greater amounts of alcohol/other drugs to feel intoxicated or achieve the desired effect? no.  Or use the same amount and get less of an effect? No (DSM)  Example: Has always stayed at 2-5 cans of beer     3A. Have you ever been to detox? no    3B. When was the first time? NA    3C. How many times since then? NA    3D. Date of most recent detox: NA      4.  Withdrawal symptoms: Have you had any of the following withdrawal symptoms?  no  Past 12 months Recent (past 30 days)   None None     Notes:  NA    's Visual Observations and Symptoms: No withdrawal symptoms observed  Based on the above information, is withdrawal likely to require attention as part of treatment participation?  no    Dimension I Ratings   Acute intoxication/Withdrawal potential - The placing authority must use the criteria in Dimension I to determine a client s acute intoxication and withdrawal potential.    RISK DESCRIPTIONS - Severity ratin  Client displays full functioning with good ability to tolerate and cope with withdrawal discomfort.  No signs or symptoms of intoxication or withdrawal or resolving signs or symptoms    REASONS SEVERITY WAS ASSIGNED (What about the amount of the person s use and date of most recent use and history of withdrawal problems suggests the potential of withdrawal symptoms requiring professional assistance? )    The patient reports his date of last use of alcohol to be 2-3 months ago, when asked about specific date patient does not recall. The patient did not endorse any withdrawal concerns and no withdrawal symptoms were observed.      DIMENSION II - Biomedical Complications and Conditions   1. Do you have any current health/medical conditions?(Include any infectious diseases, allergies, or chronic or acute pain, history of chronic conditions)    Diabetes, Gastroulcer, lower back pain, abdominal pain.     2. Do you have a health care provider? When was your most recent appointment? What  "concerns were identified?    No insurance currently- Was being seen at [facility]. Taking back up medications at this time due to not having insurance.    3. If indicated by answers to items 1 or 2: How do you deal with these concerns? Is that working for you? If you are not receiving care for this problem, why not?    Take medications.       4A. List current medication(s) including over-the-counter or herbal supplements--including pain management:    Alogliptin, tylenol     4B. Do you follow current medical recommendations/take medications as prescribed?   Patient is taking \"back up\" medications that he has stored up however due to lack of insurance the patients medications will run out.   4C. When did you last take your medication?  10/10/18 in the AM     5. Has a health care provider/healer ever recommended that you reduce or quit alcohol/drug use?  Yes    6. Are you pregnant?  NA      6B.  Receiving prenatal care?  NA      6C.  When is your baby due?  NA    7. Have you had any injuries, assaults/violence towards you, accidents, health related issues, overdose(s) or hospitalizations related to your use of alcohol or other drugs:  yes, Explain:  due to vomiting and thought it may be a gasterointestional issue.     8. Do you have any specific physical needs/accommodations? no    Dimension II Ratings   Biomedical Conditions and Complications - The placing authority must use the criteria in Dimension II to determine a client s biomedical conditions and complications.   RISK DESCRIPTIONS - Severity ratin  Client tolerates and mannie with physical discomfort and is able to get hte services that the client needs.    REASONS SEVERITY WAS ASSIGNED (What physical/medical problems does this person have that would inhibit his or her ability to participate in treatment? What issues does he or she have that require assistance to address?)    The patient is able to cope with his physical health. A barrier at this time " "is that the patient does not have insurance. This writer and the patient completed the [facility] Rule 25 Assessment Paperwork for [facility] funding.      DIMENSION III - Emotional, Behavioral, Cognitive Conditions and Complications   1. (Optional) Tell me what it was like growing up in your family. (substance use, mental health, discipline, abuse, support)     \"Everything was fine\"     2.  When was the last time that you had significant problems  Past month 2-12 months ago 1+ years ago Never   A. With feeling very trapped, lonely, sad, blue, depressed or hopeless about the future? []  []  [] [x]     B. With sleep trouble, such as bad dreams, sleeping restlessly, or falling asleep during the day? [] [] [] [x]   C. With feeling very anxious, nervous, tense, scared, panicked, or like something bad was going to happen? [x] [] [] []   D. With becoming very distressed and upset when something reminded you of the past? [] [] [] [x]   E. With thinking about ending your life or committing suicide?  [] [] [] [x]     3.  When was the last time that you did the following things two or more times? Past month 2-12 months ago 1+ years ago Never   A. Lied or conned to get things you wanted or to avoid having to do something? [] [] [] [x]   B. Had a hard time paying attention at school, work, or home? [] [] [] [x]   C. Had a hard time listening to instructions at school, work, or home?  [] [] [] [x]   D. Were a bully or threatened other people? [] [] [] [x]   E. Started physical fights with other people? [] [] [] [x]     Note: These questions are from the Global Appraisal of Individual Needs--Short Screener. Any item marked  past month  or  2 to 12 months ago  will be scored with a severity rating of at least 2.  For each item that has occurred in the past month or past year ask follow up questions to determine how often the person has felt this way or has the behavior occurred? How recently? How has it affected their daily " living? And, whether they were using or in withdrawal at the time?    2A. NA  2B. NA  2C. Worried about deportation.  has said that he may get deported if the government wants to do it.   2D. NA  2E. NA    3A. NA  3B. NA  3C. NA  3D. NA  3E. NA    4A. If the person has answered item 2E with  in the past year  or  the past month , ask about frequency and history of suicide in the family or someone close and whether they were under the influence. NA    Any history of suicide in your family? Or someone close to you?  no      4B. If the person answered item 2E  in the past month  ask about  intent, plan, means and access and any other follow-up information  to determine imminent risk. Document any actions taken to intervene  on any identified imminent risk.     NA    5A. Have you ever been diagnosed with a mental health problem?  No- Depression runs in the family   5B. Are you receiving care for any mental health issues? NA  If yes, what is the focus of that care or treatment?  Are you satisfied with the service?  Most recent appointment?  How has it been helpful?    NA    6A.  Have you been prescribed medications for emotional/psychological problems?  NA  6B.  Current mental health medication(s) If these medications are listed for Dimension II, reference item II-5.  6C.  Are you taking your medications as instructed?  NA    7A. Does your MH provider know about your use?  NA  7B.  What does he or she have to say about it? (DSM)  NA    8A. Have you ever been verbally, emotionally, physically or sexually abused? no   Follow up questions to learn current risk, continuing emotional impact.  NA  8B. Have you received counseling for abuse?  NA    9A. Have you ever experienced or been part of a group that experienced community violence, historical trauma, rape or assault? no  9B.  How has that affected you?  NA  9C.  Have you received counseling for that?  NA    10A. : no  10B.  Exposure to Combat?  NA    11. Do  you have problems with any of the following things in your daily life?  Headaches, Dizziness and Remembering    Note: If the person has any of the above problems, how do they deal with them, have they developed coping mechanisms?  Have they received treatment?  Follow up with items 12, 13, and 14. If none of the issues in item 11 are a problem for the person, skip to item 15.    Headaches: Related to blood sugar  Dizziness: Related to blood sugar  Problem solving: NA  Concentrating: NA  Preforming Job/School/Work:  NA  Remembering: Forgets things easily   Relationships with others: NA  Reading/Writing/Calculating: NA  Fights/Being Fired/Arrest: NA    12. Have you been diagnosed with traumatic brain injury or Alzheimer s?  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  If the answer to #12 is no, ask the following questions:    Have you ever hit your head or been hit on the head? yes    Were you ever seen in the Emergency Room, hospital, or by a doctor because of an injury to your head? yes    Have you had any significant illness that affected your brain (brain tumor, meningitis, West Nile Virus, stroke or seizure, heart attack, near drowning or near suffocation)?  no    14.  If the answer to # 12 is yes, ask if any of the problems identified in #11 occurred since the head injury or loss of oxygen yes- Passed out for a period of time    15A. Highest grade of school completed:  High School   15B. Do you have a learning disability? no  15C. Did you ever have tutoring in Math or English? no.  15D. Have you ever been diagnosed with Fetal Alcohol Effects or Fetal Alcohol Syndrome? no    Explain:  NA    16. If yes to item 15 B, C, or D: How has this affected your use or been affected by your use?     NA    Dimension III Ratings   Emotional/Behavioral/Cognitive - The placing authority must use the criteria in Dimension III to determine a client s emotional, behavioral, and cognitive conditions and complications.   RISK DESCRIPTIONS -  "Severity ratin  Client has good impulse control and coping skills and presents no risk of harm to self or others.  Client functions in all life areas and displays no emotional, behavioral. or cognitive problems or the problems are stable.    REASONS SEVERITY WAS ASSIGNED - What current issues might with thinking, feelings or behavior pose barriers to participation in a treatment program? What coping skills or other assets does the person have to offset those issues? Are these problems that can be initially accommodated by a treatment provider? If not, what specialized skills or attributes must a provider have?    The patient does not have a mental health diagnoses and at the time of this assessment appeared stable. The patient did not report any thoughts of harming himself or others. The patient scored low on the GAIN-SS and low risk on the [place].     DIMENSION IV - Readiness for Change   1. You ve told me what brought you here today. (first section) What do you think the problem really is?     \"No other big problems, I need to complete the class\"     2. Tell me how things are going. Ask enough questions to determine whether the person has use related problems or assets that can be built upon in the following areas: Family/friends/relationships; Legal; Financial; Emotional; Educational; Recreational/ leisure; Vocational/employment; Living arrangements (DSM)     Family/Friends: Everything is good   Financial: Good  Emotional: Everything is good, normal   Recreational: Able and engaging in activities. Fishing, spending time with friends.     3. What activities have you engaged in when using alcohol/other drugs that could be hazardous to you or others (i.e. driving a car/motorcycle/boat, operating machinery, unsafe sex, sharing needles for drugs or tattoos, etc     Driving under the influence    4. How much time do you spend getting, using or getting over using alcohol or drugs? (DSM)     2-3 hours     5. " "Reasons for drinking/drug use (Use the space below to record answers. It may not be necessary to ask each item.)  Like the feeling No    Trying to forget problems No    To cope with stress No    To relieve physical pain No    To cope with anxiety No    To cope with depression No   To relax or unwind Yes   Makes it easier to talk with people Yes    Partner encourages use Yes   Most friends drink or use Yes    To cope with family problems No    Afraid of withdrawal symptoms/to feel better NA    Other (specify)      A. What concerns other people about your alcohol or drug use/Has anyone told you that you use too much? What did they say? (DSM)    \"My wife and my mom\"     B. What did you think about that/ do you think you have a problem with alcohol or drug use?     \"Lack of understanding of the laws and expectations.\"    6. What changes are you willing to make? What substance are you willing to stop using? How are you going to do that? Have you tried that before? What interfered with your success with that goal?     Attending a R25, treatment and quit drinking, if I drink dont drive     7. What would be helpful to you in making this change?     Learning     Dimension IV Ratings   Readiness for Change - The placing authority must use the criteria in Dimension IV to determine a client s readiness for change.   RISK DESCRIPTIONS - Severity ratin  Clinet is motivated with active reinforcement, to explore treatment and strategies for change, but ambivalent about illness or need for change.    REASONS SEVERITY WAS ASSIGNED - (What information did the person provide that supports your assessment of his or her readiness to change? How aware is the person of problems caused by continued use? How willing is she or he to make changes? What does the person feel would be helpful? What has the person been able to do without help?)    The patient is motivated through probation to complete this assessment. The patient is willing to " complete and follow the recommendations made. The patient may have some ambivalence as evidence by his statement that he may drink however just wont drive.      DIMENSION V - Relapse, Continued Use, and Continued Problem Potential   1. In what ways have you tried to control, cut-down or quit your use? If you have had periods of sobriety, how did you accomplish that? What was helpful? What happened to prevent you from continuing your sobriety? (DSM)     Yes- 3 months of sobriety in the past did it by making a decision not to drink       2. Have you experienced cravings? If yes, ask follow up questions to determine if the person recognizes triggers and if the person has had any success in dealing with them.     In social settings     3A. Have you been treated for alcohol/other drug abuse/dependence? yes  3B.  Number of times (Lifetime) (over what period):  1  3C.  Number of times completed treatment (Lifetime):  1  3D.  During the past 3 years have you participated in outpatient and/or residential?  yes  3E.  When and where? [facility] (no longer in business)- around 2016  3F.  What was helpful?  What was not? NA     4. Support group participation: Have you/do you attend support group meetings to reduce/stop your alcohol/drug use? How recently? What was your experience? Are you willing to restart? If the person has not participated, is he or she willing?     Went to an AA meeting before and is currently attending     5. What would assist you in staying sober/straight?     Learning more about it     Dimension V Ratings   Relapse/Continued Use/Continued problem potential - The placing authority must use the criteria in Dimension V to determine a client s relapse, continued use, and continued problem potential.   RISK DESCRIPTIONS - Severity rating:  3  Jennifer has poor recognition and understanding of relapse and recidivism issues and displays moderately high vulnerability for further substance use or mental health  problems.  Client has few coping skills and rarely applies coping skills.    REASONS SEVERITY WAS ASSIGNED - (What information did the person provide that indicates his or her understanding of relapse issues? What about the person s experience indicates how prone he or she is to relapse? What coping skills does the person have that decrease relapse potential?)      The patient has poor recognition and understanding of relapse and recidivism issues. The patient is at high vulnerability for further substance use as evidence by his lack of understanding of addiction issues as well as a lack of coping skills.      DIMENSION VI - Recovery Environment   1. Are you employed/attending school? Tell me about that.     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 Describe a typical day; evening for you. Work, school, social, leisure, volunteer, spiritual practices. Include time spent obtaining, using, recovering from drugs or alcohol. (DSM)     Wake up, get ready, PCA work, come home, eat, bed    2B. How often do you spend more time than you planned using or use more than you planned? (DSM)     When using it is social and on the weekend.     3. How important is using to your social connections? Do many of your family or friends use?     Friends use     4A. Are you currently in a significant relationship?  yes  4B.  If yes, how long?  10 years   4C. Sexual Orientation:  Heterosexual     5A. Who do you live with? Wife and 2 kids and the man that patient takes care of.  5B. Tell me about their alcohol/drug use and mental health issues. No .  5C. Are you concerned for your safety there? no  5D. Are you concerned about the safety of anyone else who lives with you? no    6A. Do you have children who live with you? yes, Explain:  2 children   If the person lives with children, ask follow-up questions to determine the person's relationship and responsibility, both legal and care giving; and what arrangements are made for  supervision for the children when the person is not available.      6B. Do you have children who do not live with you?  no  If yes, ask follow up questions to learn where the children are, who has custody and what the person't relaltionship and responsibility is with these children and what hopes the person has for his or her future with these children.    7A. Who supports you in making changes in your alcohol or drug use? What are they willing to do to support you? Who is upset or angry about you making changes in your alcohol or drug use? How big a problem is this for you?      Family       7B. This table is provided to record information about the person s relationships and available support It is not necessary to ask each item; only to get a comprehensive picture of their support system.  How often can you count on the following people when you need someone?   Partner / Spouse Always supportive   Parent(s)/Aunt(s)/Uncle(s)/Grandparents Always supportive   Sibling(s)/Cousin(s) Always supportive   Child(jean-claude) Always supportive   Other relative(s) NA   Friend(s)/neighbor(s) Usually supportive   Child(jean-claude) s father(s)/mother(s) Always supportive   Support group member(s) Always supportive   Community of jr members Always supportive   /counselor/therapist/healer NA   Other (specify) NA     8A. What is your current living situation?     Patient resides with wife and children. 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     8B. What is your long term plan for where you will be living?    Stay in home     8C. Tell me about your living environment/neighborhood? Ask enough follow up questions to determine safety, criminal activity, availability of alcohol and drugs, supportive or antagonistic to the person making changes.      Feels safe in environment     9. Criminal justice history: Gather current/recent history and any significant history related to substance use--Arrests? Convictions? Circumstances?  Alcohol or drug involvement? Sentences? Still on probation or parole? Expectations of the court? Current court order? Any sex offenses - lifetime? What level? (DSM)  1st DUI in 2016  2nd DUI in 2018    10. What obstacles exist to participating in treatment? (Time off work, childcare, funding, transportation, pending retirement time, living situation)    None at this time work will be able to be resettled     Dimension VI Ratings   Recovery environment - The placing authority must use the criteria in Dimension VI to determine a client s recovery environment.   RISK DESCRIPTIONS - Severity ratin  Client is engaged in structured, meaningful activity, but peers, family, significant other, living environment are unsupportive, or there is criminal justice involvement by the client or among the client's peers, significatn others, or in the client's environment.    REASONS SEVERITY WAS ASSIGNED - (What support does the person have for making changes? What structure/stability does the person have in his or her daily life that willincrease the likelihood that changes can be sustained? 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 The patient is residing with family and his client and reports it is a safe environment. The patient does have 2 DUI charges and most recently in 2018.     Client Choice/Exceptions     Would you like services specific to language, age, gender, culture, Zoroastrianism preference, race, ethnicity, sexual orientation or disability?  yes    If yes, specify:      What particular treatment choices and options would you like to have?  Outpatient    Do you have a preference for a particular treatment program?  [facility]     DSM-V Criteria for Substance Abuse  Instructions  Determine whether the client currently meets the criteria for a Substance Use  Disorder using the diagnostic criteria in the DSM-V, pp. 481-589. Current means during the most recent 12 months outside a facility that controls access to substances.    Category of substance Severity ICD-10 Code/DSM V Code   [x]  Alcohol Use Disorder [] Mild  [x] Moderate  [] Severe [] (F10.10) (305.00)  [x] (F10.20) (303.90)  [] (F10.20) (303.90)   []  Cannabis Use Disorder [] Mild  [] Moderate  [] Severe [] (F12.10) (305.20)  [] (F12.20) (304.30)  [] (F12.20) (304.30)   [] Hallucinogen Use Disorder [] Mild  [] Moderate  [] Severe [] (F16.10) (305.30)  [] (F16.20) (304.50)  [] (F16.20) (304.50)   []  Inhalant Use Disorder [] Mild  [] Moderate  [] Severe [] (F18.10) (305.90)  [] (F18.20) (304.60)  [] (F18.20) (304.60)   []  Opioid Use Disorder [] Mild  [] Moderate  [] Severe [] (F11.10) (305.50)  [] (F11.20) (304.00)  [] (F11.20) (304.00)   []  Sedative, Hypnotic, or Anxiolytic Use Disorder [] Mild  [] Moderate  [] Severe [] (F13.10) (305.40)   [] (F13.20) (304.10)  [] (F13.20) (304.10)   []  Stimulant Related Disorders [] Mild  [] Moderate  [] Severe [] (F15.10) (305.70) Amphetamine type substance  [] (F14.10) (305.60) Cocaine  [] (F15.10) (305.70) Other or unspecified stimulant  [] (F15.20) (304.40) Amphetamine type substance  [] (F14.20) (304.20) Cocaine  [] (F15.20) (304.40) Other or unspecified stimulant  [] (F15.20) (304.40) Amphetamine type substance  [] (F14.20) (304.20) Cocaine  [] (F15.20) (304.40) Other or unspecified stimulant   []  Tobacco use Disorder [] Mild  [] Moderate  [] Severe [] (Z72.0) (305.1)  [] (F17.200) (305.1)  [] (F17.200) (305.1)   []  Other (or unknown) Substance Use Disorder [] Mild  [] Moderate  [] Severe [] (F19.10) (305.90)  [] (F19.20) (304.90)  [] (F19.20) (304.90)     Suggested Level of Care Necessary for Recovery  []  Inpatient  []  Extended Care []  Residential [x]  Outpatient  []  None      Collateral Contact Summary   Number of contacts made:  1  Contact with referring  "person:  yes     If court related records were reviewed, summarize here:  NA     [x]   Information from collateral contacts supported/largely agreed with information from the client and associated risk ratings.   []   Information from collateral contacts was significantly different from information from the client and lead to different risk ratings.      Summarize new information here:    Rule 25 Assessment Summary and Plan   's Recommendation    Based on the information gathered in this assessment and from collateral information, the client Alcohol Use Disorder, Moderate  Remain Law Abiding  Outpatient Services- [facility]  Remain Abstinent From All [employer] Unless Prescribed   This assessment can be updated with additional information within a 6 month period.    Collateral Contacts     Please duplicate this page for each contact.  If this includes information which is sensitive and not public, separate this page from the rest of the assessment before sharing.  Retain the page in the assessment file.   Name    Sammy More  Relationship     Phone Number    [phone] Releases    yes       Information Provided:      \"180 days over his head. Blew a .26 at the time of arrest. This is the patients 1st DUI.     Collateral Contacts     Name    JAYLON   Relationship    NA Phone Number    NA Releases    NA       Information Provided:      JAYLON    Staff Name and Title:  KECIA Taveras    Date:  10/10/2018  Time:  9:16 AM      "

## 2021-06-21 NOTE — PROGRESS NOTES
Weekly Progress Note  Reinaldo Real  1985  [id number]      D) Pt attended 1 groups this week with 0 absences. Patient attended 0 individual sessions this week. A) Staff facilitated groups and reviewed tx progress. Assessed for [facility]. R) No VAP needed at this time.   Any significant events, defines as events that impact patients relationship with others inside and outside of treatment No  Indicate any changes or monitoring of physical or mental health problems No    Indicate involvement by any outside supports No  IAPP reviewed and modified as needed. NA  Pt working on the following dimensions:  Dimension #1 - Withdrawal Potential - Risk 0. Patient reports continued use of alcohol, last date of use 11/8/2018.  Specific goals from treatment plan addressed this week:  Patient to maintain abstinence throughout outpatient treatment.   Effectiveness of strategies:  Progressing: Client reported no use this week during check-in.    Dimension #2 - Biomedical - Risk 1. Patient reports chronic conditions of diabetes, lower back pain, occasional headaches  Specific goals from treatment plan addressed this week:  Patient to maintain stable health throughout outpatient treatment.   Effectiveness of strategies:  Progressing: The client reports he has no medical needs unmet and is able to obtain any necessary care on his own.  Dimension #3 - Emotional/Behavioral/Cognitive - Risk 0. Patient reports past MH therapy and past issues w/gambling, but no current issues.  Specific goals from treatment plan addressed this week:  No plan needed at this time  Effectiveness of strategies:  N/A  Dimension #4 - Treatment Acceptance/Resistance - Risk 1. Patient appears to lack insight into his drinking behaviors due in part to cultural barriers  Specific goals from treatment plan addressed this week:   Patient to increase motivation towards recovery by participating in outpatient programming.   Effectiveness of strategies:    Progressing: Client  attended both groups this week and participated in group discussion without prompting       Dimension #5 - Relapse Potential - Risk 3. Patient lacks understanding of relapse issues and appears to be at high risk for further substance use.  Specific goals from treatment plan addressed this week:  Client lacks understanding of relapse issues and appears to be at high risk for further substance use.  Effectiveness of strategies:  Progressing: Client shared his most recent date of use during check-in.    Dimension #6 - Recovery Environment - Risk 2. Patient does not consistently attend sober support meetings or other activities that help support sobriety.  Specific goals from treatment plan addressed this week:  Patient will explore, identify and participate in activities that help support sobriety in his community.  Effectiveness of strategies:  Progressing: Client was given information regarding the [facility] Community Groups and encouraged to attend.    T) Treatment plan updated no.  Patient notified and in agreement NA.  Patient educated on Group Norms and What is Addiction. Patient has completed 4 of 96 program hours at this time. Projected discharge date is 05/08/2019. Current discharge plan is not yet developed.     KECIA Thurston  11/23/2018, 11:41 AM

## 2021-06-21 NOTE — PROGRESS NOTES
"Intake Note (late entry for 11/13/18):     D) Reinaldo Real is a 33 y.o.    male who is referred to [facility] CD OP tx program via [facility] and [facility]'s assessment, with funding from [facility]. Patient orientated x 3. Patient meets criteria for Alcohol Use Disorder-Moderate (F10.20).  Patient appears appropriate for [facility]'s OP CD tx.   A) Met with patient for 90 minutes with Jenifer .  Completed intake assessment and preliminary paperwork. Patient was given and explained counselor & supervisor license number and contact info, Patient Bill of Rights, program rules/regulations, Program Abuse Prevention Plan, confidentiality & HIPPA policies, grievance procedure, presented ROIs, TB & HIV/AIDS policies & resources, and Vulnerable Adult policy.   Conducted Vulnerable Adult Assessment.   R)No special Vulnerable Adult needed at this time.  Patient signed and agreed to counselor & supervisor license number and contact info., Patient Bill of Rights, group rules/regulations, Program Abuse Prevention Plan, confidentiality & HIPPA policies, grievance procedure,  ROIs, TB & HIV/AIDS policies & resources, and Vulnerable Adult policy. Patient scored Low Risk on C-SSRS screening questions. Patient denied suicidal ideation/intent/plan/means at this time.     Opioid Use Disorder: No   Provided \"Options for Opioid Treatment in [place] and Overdose Prevention\" No     Dimension 1: Acute Intoxication/Withdrawal Potential, Risk level: 0  No Risk  Patient reports drinking occasionally, 1-4 times per months, 2 beers when at home, more if w/friends.  He denies current or recent WD symptoms, none observed.      Dimension 2: Biomedical Conditions/Complications, Risk level: 1  Mild Risk  Patient reports current physical health conditions of Type 2 diabetes, lower back pain and occasional headaches.  He reports taking a daily med for diabetes as presribed. He accesses care at the [facility].  No " current health insurance.  Patient reports smoking 2-3 cigarettes daily.      Dimension 3: Emotional/Behavioral/Cognitive, Risk level: 1  Mild Risk  Patient reports no current MH diagnoses, services or meds.  He reports receiving therapy services in 2016 but states he didn't know what his diagnosis was.  Patient states that gambling has caused him some problems in the past, but not currently. Patient denies current or past SI/HI/intent/plans/SIBS    Dimension 4: Readiness to Change, Risk level 1 Mild Risk  Patent states he is willing to follow treatment requirement of no use of alcohol, and that he believes treatment will be helpful to him.  Patent will benefit from the services of the [facility] OP treatment program.  A barrier to attendance could be transportation-at intake, [employer] options were given ([address]) and Patient may need further assistance with accessing transportation so that he can consistently attend group sessions.     Dimension 5: Relapse/Continued Use/Continued Problem Potential, Risk level: 3  Serious Risk  Patient has poor recognition and understanding of relapse and recidivism issues, as evidenced by his return to drinking behaviors after a previous treatment experience in 2017.  He appears to be at high vulnerability for further substance use as evidenced by continued recent drinking episodes despite his current legal mandates.  Last date of use-alcohol: 11/8/2018.     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  He lives w/his wife and extended family, reports having family support for sobriety and also, sometimes attends a Saturday AA meeting at a [employer] on [facility].  Patient reports sometimes attending [employer] which he finds also helpful towards supporting sobriety.  Hx of 2 DUIs and current [employer] probation, PO Sammy HELMS) Explained counselor & supervisor  license number and contact info, Patient Bill of Rights, program rules/regulations, Program Abuse Prevention Plan, confidentiality & HIPPA policies, grievance procedure, presented ROIs, TB & HIV/AIDS policies & resources, and Vulnerable Adult policy. Patient expected to start group on 11/20/2019.  (Patient will be invited to onboarding group for new members on 11/19/2018.)      KECIA Schofield  11/14/2018, 11:08 AM

## 2021-06-21 NOTE — PROGRESS NOTES
Addiction Services - Initial Services Plan     Patient  Name: Reinaldo Real  MRN: [id number]   : 1985  Admit Date: 2018       Patient describes their immediate need: To learn recovery skills to prevent relapse.    Are there any immediate Safety Needs such as (physical, stability, mobility): Pt is able to get medical care as needed. Pt denies immediate health concerns. Transportation concerns were discussed.( Patient not able to drive at this time.) [employer] trip planner was utilized to provide busing info to Pt ([address]).    Immediate Health Needs and Plan:   Lower back pain and diabetes - ongoing care for these    Vulnerable Adult: No     Issues to be addressed in the first sessions:   Introductions, become accustomed to group practices and norms    Patient strengths and needs:   Strengths identified as taking care of household stuff, taking care of the grandpa in the house.  Needs identified as wanting a better life, change the behavior of drinking and driving    Plan for patient for time between intake and completion of the treatment plan:   Attend all group therapy sessions as directed, complete all written and oral assignments as directed, and remain clean and sober. A relapse, if any, must be reported to staff immediately in order to ensure you are receiving the proper level of care. If you cannot attend a group therapy session you must call contact information provided in intake folder and leave a message before or during group hours.      Vulnerable Adult Review   [X] Review of the Facility Abuse Prevention plan was reviewed with the patient   [X] No Individual Abuse Plan is necessary   [ ] In addition to the Facility Abuse Prevention plan, an Individual Abuse Plan will be put in place       Staff Name/Title: KECIA Schofield   Date: 2018  Time: 10:17 AM

## 2021-06-21 NOTE — PROGRESS NOTES
Individual Treatment Plan    Patient  Name: Reinaldo Real  MRN: [id number]   : 1985  Admit Date: 2018  Date of Initial Service Plan: 2018  Tentative Discharge Date: 3/13/2019    Diagnosis: Alcohol Use Disorder-Moderate (F10.20)    Counselor: KECIA Schofield      Dimension 1: Acute Intoxication/Withdrawal Potential, Risk level: 0    Problem Statement from Comprehensive Assessment:  Patient reports drinking occasionally, 1-4 times per months, 2 beers when at home, more if w/friends.  He denies current or recent WD symptoms, none observed.    Problem: Patient reports continued use of alcohol, last date of use 2018  Goal: Patient to maintain abstinence throughout outpatient treatment.   Must be reached to complete treatment? Yes  Methods/Strategies (must include amount and frequency):   1. Patient to report any substance/alcohol use to counselor to determine if any changes need to be made to address withdrawal symptoms.   2. Patient to complete any requested UAs.   Target Date: 3/13/2019  Completion Date:     Dimension 2: Biomedical Conditions/Complications, Risk level: 1    Problem Statement from Comprehensive Assessment:  Patient reports current physical health conditions of Type 2 diabetes, lower back pain and occasional headaches.  He reports taking a daily med for diabetes as presribed. He accesses care at the [facility].  No current health insurance.  Patient reports smoking 2-3 cigarettes daily.    Problem: Patient reports chronic conditions of diabetes, lower back pain, occasional headaches  Goal: Patient to maintain stable health throughout outpatient treatment.   Must be reached to complete treatment? No  Methods/Strategies (must include amount and frequency):   1. Patient to report any changes to physical health to counselor.   2. Patient to attend all scheduled doctor s appointments.   3. Patient to take medications as prescribed.   Target Date: 3/13/2019  Completion Date:  Ya states the patient is mostly having pain when she eats. When she opens and closes her mouth to chew she complains of pain in the back of her jaw by the bottom of her ear.   The area is tender to the touch.   Pt stated her throat did not hurt.   Pt does wear a hearing aid and stated she feels like water gets in her ear after bathing.     Nurse states her balance has been off a little and she is a fall risk   She gets tylenol scheduled but that does not seem to help the pain.     Pt wants something to be done about the pain. Nurse is concerned about putting her on any pain meds due to fall risk.   Please advise is appt needed?       Problem: Patient reports current tobacco use.   Goal: Patient to receive information about smoking cessation.   Must be reached to complete treatment? No  Methods/Strategies (must include amount and frequency):   1. Staff to provide patient with nicotine cessation information and help on how to quit use.   2. Patient to report any progress on stopping nicotine use to staff.   Target Date: 3/13/2019  Completion Date:     Dimension 3: Emotional/Behavioral/Cognitive, Risk level: 1    Problem Statement from Comprehensive Assessment:  Patient reports no current MH diagnoses, services or meds.  He reports receiving therapy services in 2016 but states he didn't know what his diagnosis was.  Patient states that gambling has caused him some problems in the past, but not currently. Patient denies current or past SI/HI/intent/plans/SIBS    Problem: Patient reports past MH therapy and past issues w/gambling, but no current issues  Goal: No plan needed at this time  Must be reached to complete treatment? N/A  Methods/Strategies (must include amount and frequency): NA  Target Date: NA  Completion Date:       Dimension 4: Readiness to Change, Risk level 1    Problem Statement from Comprehensive Assessment:  Patent states he is willing to follow treatment requirement of no use of alcohol, and that he believes treatment will be helpful to him.  Tobi will benefit from the services of the [facility] OP treatment program.  A barrier to attendance could be transportation-at intake, [employer] options were given ([address]) and Patient may need further assistance with accessing transportation so that he can consistently attend group sessions    Problem: Patient appears to lack insight into his drinking behaviors that may be due in part to cultural barriers  Goal: Patient to increase motivation towards recovery by participating in outpatient programming.   Must be reached to complete treatment? Yes  Methods/Strategies (must  include amount and frequency):   1. Patient to attend 2, 2-hour groups per week and all scheduled 1:1s.  2. Patient will contact staff if unable to attend.  Target Date: 3/13/2019  Completion Date:     Dimension 5: Relapse/Continued Use/Continued Problem Potential, Risk level: 3    Problem Statement from Comprehensive Assessment:  Patient has poor recognition and understanding of relapse and recidivism issues, as evidenced by his return to drinking behaviors after a previous treatment experience in 2017.  He appears to be at high vulnerability for further substance use as evidenced by continued recent drinking episodes despite his current legal mandates.  Last date of use-alcohol: 11/8/2018.    Problem: Patient lacks understanding of relapse issues and appears to be at high risk for further substance use.  Goal: Patient will gain understanding of relapse triggers and stressors while learning coping skills so that he can respond to life events without drinking or using drugs.  Must be reached to complete treatment? Yes  Methods/Strategies (must include amount and frequency):   Patient to share in daily check-in any urges and addictive thinking to better understand his pattern of use and to prevent relapse in the future.   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  He lives w/his wife and extended family, reports having family support for sobriety and also, sometimes attends a Saturday AA meeting at a [employer] on [facility].  Patient reports sometimes attending [employer] which he finds also helpful towards supporting sobriety.  Hx of 2 DUIs and current [employer] [place] probation, PO Sammy [place]    Problem: Patient does not consistently attend sober support meetings or other activities that help support sobriety.  Goal: Patient will explore,  identify and participate in activities that help support sobriety in his community.  Must be reached to complete treatment? Yes  Methods/Strategies (must include amount and frequency): Patient will attend 1 [facility] support activity per week and report to counselors and group on his experiences.  Target Date: 3/13/2019  Completion Date:     Resources  Resources to which the patient is being referred for problems when problems are to be addressed concurrently by another provider: None      By signing this document, I am acknowledging that I was actively and directly involved in the development of my treatment plan.         Patient  Signature_________________________________________         Date__________________        Staff Signature  KECIA Schofield    Date: 11/14/2019, 10:56 AM

## 2021-06-21 NOTE — PROGRESS NOTES
Reinaldo Real attended 3 hours of group therapy today.    Total group size of 8.    11/20/2018 10:48 PM Yaniv Peterson

## 2021-06-21 NOTE — LETTER
Letter by Cassy Berrios MD at      Author: Cassy Berrios MD Service: -- Author Type: --    Filed:  Encounter Date: 3/8/2021 Status: (Other)         Reinaldo Real  [address] [place] [address]         March 8, 2021         Dear Reinaldo Real,  Below are the results from your recent visit:        Your urine test is NORMAL. Your kidneys are doing fine.       Please call with questions or contact us using [employer].    Sincerely,        Electronically signed by Cassy Berrios MD

## 2021-06-21 NOTE — PROGRESS NOTES
Reinaldo Real attended 3 hours of group therapy today.    Total group size of 8.    11/27/2018 4:24 PM Yaniv Peterson

## 2021-06-22 NOTE — PROGRESS NOTES
Reinaldo Real attended 3 hours of group therapy today.    Total group size of 5.    1/8/2019 4:17 PM Yaniv Peterson

## 2021-06-22 NOTE — PROGRESS NOTES
Reinaldo Real attended 3 hours of group therapy today.    Total group size of 5.    12/20/2018 4:20 PM Yaniv Peterson

## 2021-06-22 NOTE — PROGRESS NOTES
Reinaldo Real attended 3 hours of group therapy today.    Total group size of 5.    12/18/2018 4:18 PM Yaniv Peterson

## 2021-06-22 NOTE — PROGRESS NOTES
Weekly Progress Note  Reinaldo Real  1985  [id number]      D) Pt attended 2 groups this week with 0 absences. Patient attended 0 individual sessions this week. A) Staff facilitated groups and reviewed tx progress. [facility] for [facility]. R) No VAP needed at this time.   Any significant events, defines as events that impact patients relationship with others inside and outside of treatment No  Indicate any changes or monitoring of physical or mental health problems No    Indicate involvement by any outside supports No  IAPP reviewed and modified as needed. NA  Pt working on the following dimensions:  Dimension #1 - Withdrawal Potential - Risk 0. Patient reports continued use of alcohol, last date of use 11/8/2018.  Specific goals from treatment plan addressed this week:  Patient to maintain abstinence throughout outpatient treatment.   Effectiveness of strategies:  Progressing: Client reported no use this week during check-in.    Dimension #2 - Biomedical - Risk 1. Patient reports chronic conditions of diabetes, lower back pain, occasional headaches  Specific goals from treatment plan addressed this week:  Patient to maintain stable health throughout outpatient treatment.   Effectiveness of strategies:  Progressing: The client reports he has no medical needs unmet and is able to obtain any necessary care on his own.  Dimension #3 - Emotional/Behavioral/Cognitive - Risk 0. Patient reports past MH therapy and past issues w/gambling, but no current issues.  Specific goals from treatment plan addressed this week:  No plan needed at this time  Effectiveness of strategies:  N/A    Dimension #4 - Treatment Acceptance/Resistance - Risk 1. Patient appears to lack insight into his drinking behaviors due in part to cultural barriers  Specific goals from treatment plan addressed this week:   Patient to increase motivation towards recovery by participating in outpatient programming.   Effectiveness of strategies:    Progressing: Client  "attended both groups this week and actively participated in group discussion without prompting       Dimension #5 - Relapse Potential - Risk 3. Patient lacks understanding of relapse issues and appears to be at high risk for further substance use.  Specific goals from treatment plan addressed this week:  Client lacks understanding of relapse issues and appears to be at high risk for further substance use.  Effectiveness of strategies:  Progressing: Client stated during check-in that he had experienced no cravings or thoughts of drinking this week.    Dimension #6 - Recovery Environment - Risk 2. Patient does not consistently attend sober support meetings or other activities that help support sobriety.  Specific goals from treatment plan addressed this week:  Patient will explore, identify and participate in activities that help support sobriety in his community.  Effectiveness of strategies:  Progressing: Client stated he did not attend the [facility] Community Support Group last weekend due to helping a friend fix his car.    T) Treatment plan updated no.  Patient notified and in agreement NA.  Patient educated on \"What is Addiction and Understanding Blood Alcohol Content\". Patient has completed 34 of 110 program hours at this time. Projected discharge date is 05/08/2019. Current discharge plan is not yet developed.     KECIA Thurston  12/21/2018, 10:33 AM    "

## 2021-06-22 NOTE — PROGRESS NOTES
Reinaldo Real attended 3 hours of group therapy today.    Total group size of 8.    12/6/2018 4:41 PM Yaniv Peterson

## 2021-06-22 NOTE — PROGRESS NOTES
LATE ENTRY  Rienaldo Real attended 3 hours of group therapy today.    Total group size of 6.    1/4/2019 1:07 PM Yaniv Peterson

## 2021-06-22 NOTE — PROGRESS NOTES
Weekly Progress Note  Reinaldo Real  1985  [id number]      D) Pt attended 2 groups this week with 0 absences. Patient attended 0 individual sessions this week. A) Staff facilitated groups and reviewed tx progress. [facility] for [facility]. R) No VAP needed at this time.   Any significant events, defines as events that impact patients relationship with others inside and outside of treatment No  Indicate any changes or monitoring of physical or mental health problems No    Indicate involvement by any outside supports No  IAPP reviewed and modified as needed. NA  Pt working on the following dimensions:  Dimension #1 - Withdrawal Potential - Risk 0. Patient reports continued use of alcohol, last date of use 11/8/2018.  Specific goals from treatment plan addressed this week:  Patient to maintain abstinence throughout outpatient treatment.   Effectiveness of strategies:  Progressing: Client reported no use this week during check-in.    Dimension #2 - Biomedical - Risk 1. Patient reports chronic conditions of diabetes, lower back pain, occasional headaches  Specific goals from treatment plan addressed this week:  Patient to maintain stable health throughout outpatient treatment.   Effectiveness of strategies:  Progressing: The client reports he has no medical needs unmet and is able to obtain any necessary care on his own.  Dimension #3 - Emotional/Behavioral/Cognitive - Risk 0. Patient reports past MH therapy and past issues w/gambling, but no current issues.  Specific goals from treatment plan addressed this week:  No plan needed at this time  Effectiveness of strategies:  N/A  Dimension #4 - Treatment Acceptance/Resistance - Risk 1. Patient appears to lack insight into his drinking behaviors due in part to cultural barriers  Specific goals from treatment plan addressed this week:   Patient to increase motivation towards recovery by participating in outpatient programming.   Effectiveness of strategies:    Progressing: Client  "attended both groups this week and actively participated in group discussion without prompting       Dimension #5 - Relapse Potential - Risk 3. Patient lacks understanding of relapse issues and appears to be at high risk for further substance use.  Specific goals from treatment plan addressed this week:  Client lacks understanding of relapse issues and appears to be at high risk for further substance use.  Effectiveness of strategies:  Progressing: Client shared his most recent date of use during check-in, and stated he had no thoughts or cravings for use this week.    Dimension #6 - Recovery Environment - Risk 2. Patient does not consistently attend sober support meetings or other activities that help support sobriety.  Specific goals from treatment plan addressed this week:  Patient will explore, identify and participate in activities that help support sobriety in his community.  Effectiveness of strategies:  Progressing: Client stated he attended one meeting of the [facility] Community Support Group this week.    T) Treatment plan updated no.  Patient notified and in agreement NA.  Patient educated on \"Healthy Life [place] and Feelings in Addiction\". Patient has completed 10 of 110 program hours at this time. Projected discharge date is 05/08/2019. Current discharge plan is not yet developed.     Yaniv Peterson, [facility]  11/29/2018, 5:02 PM    "

## 2021-06-22 NOTE — PROGRESS NOTES
Reinaldo Real attended 3 hours of group therapy today.    Total group size of 6.    12/27/2018 4:14 PM Lachelle Guillaume

## 2021-06-22 NOTE — PROGRESS NOTES
Reinaldo Real attended 3 hours of group therapy today.    Total group size of 7.    12/13/2018 4:25 PM Yaniv Peterson

## 2021-06-22 NOTE — PROGRESS NOTES
Weekly Progress Note  Reinaldo Real  1985  [id number]      D) Pt attended 2 groups this week with 0 absences. Patient attended 0 individual sessions this week. A) Staff facilitated groups and reviewed tx progress. [facility] for [facility]. R) No VAP needed at this time.   Any significant events, defines as events that impact patients relationship with others inside and outside of treatment No  Indicate any changes or monitoring of physical or mental health problems No    Indicate involvement by any outside supports No  IAPP reviewed and modified as needed. NA  Pt working on the following dimensions:  Dimension #1 - Withdrawal Potential - Risk 0. Patient reports continued use of alcohol, last date of use 11/8/2018.  Specific goals from treatment plan addressed this week:  Patient to maintain abstinence throughout outpatient treatment.   Effectiveness of strategies:  Progressing: Client reported no use this week during check-in.    Dimension #2 - Biomedical - Risk 1. Patient reports chronic conditions of diabetes, lower back pain, occasional headaches  Specific goals from treatment plan addressed this week:  Patient to maintain stable health throughout outpatient treatment.   Effectiveness of strategies:  Progressing: The client reports he has no medical needs unmet and is able to obtain any necessary care on his own.  Dimension #3 - Emotional/Behavioral/Cognitive - Risk 0. Patient reports past MH therapy and past issues w/gambling, but no current issues.  Specific goals from treatment plan addressed this week:  No plan needed at this time  Effectiveness of strategies:  N/A    Dimension #4 - Treatment Acceptance/Resistance - Risk 1. Patient appears to lack insight into his drinking behaviors due in part to cultural barriers  Specific goals from treatment plan addressed this week:   Patient to increase motivation towards recovery by participating in outpatient programming.   Effectiveness of strategies:    Progressing: Client  "attended both groups this week and actively participated in group discussion without prompting       Dimension #5 - Relapse Potential - Risk 3. Patient lacks understanding of relapse issues and appears to be at high risk for further substance use.  Specific goals from treatment plan addressed this week:  Client lacks understanding of relapse issues and appears to be at high risk for further substance use.  Effectiveness of strategies:  Progressing: Client shared his most recent date of use during check-in, and stated he went to a holiday party where drinking was going on but had his medallion with him and handled the temptation without drinking.    Dimension #6 - Recovery Environment - Risk 2. Patient does not consistently attend sober support meetings or other activities that help support sobriety.  Specific goals from treatment plan addressed this week:  Patient will explore, identify and participate in activities that help support sobriety in his community.  Effectiveness of strategies:  Progressing: Client stated he attended the [facility] Community Support Group last weekend.     T) Treatment plan updated no.  Patient notified and in agreement NA.  Patient educated on \"Anger Management and Domestic Assault\". Patient has completed 22 of 110 program hours at this time. Projected discharge date is 05/08/2019. Current discharge plan is not yet developed.     KECIA Thurston  12/14/2018, 12:50 PM    "

## 2021-06-22 NOTE — PROGRESS NOTES
Weekly Progress Note  Reinaldo Real  1985  [id number]      D) Pt attended 1 group this week with 0 absences. Patient attended 0 individual sessions this week. A) Staff facilitated groups and reviewed tx progress. [facility] for [facility]. R) No VAP needed at this time.   Any significant events, defines as events that impact patients relationship with others inside and outside of treatment No  Indicate any changes or monitoring of physical or mental health problems No    Indicate involvement by any outside supports No  IAPP reviewed and modified as needed. NA  Pt working on the following dimensions:  Dimension #1 - Withdrawal Potential - Risk 0. Patient reports continued use of alcohol, last date of use 11/8/2018.  Specific goals from treatment plan addressed this week:  Patient to maintain abstinence throughout outpatient treatment.   Effectiveness of strategies:  Progressing: Client reported no use this week during check-in, and that his last use occurred before starting this treatment program.    Dimension #2 - Biomedical - Risk 1. Patient reports chronic conditions of diabetes, lower back pain, occasional headaches  Specific goals from treatment plan addressed this week:  Patient to maintain stable health throughout outpatient treatment.   Effectiveness of strategies:  Progressing: Client reported that he and his family don't have insurance at this time but that their coverage is supposed to reopen in January.  Dimension #3 - Emotional/Behavioral/Cognitive - Risk 0. Patient reports past MH therapy and past issues w/gambling, but no current issues.  Specific goals from treatment plan addressed this week:  No plan needed at this time  Effectiveness of strategies:  N/A    Dimension #4 - Treatment Acceptance/Resistance - Risk 1. Patient appears to lack insight into his drinking behaviors due in part to cultural barriers  Specific goals from treatment plan addressed this week:   Patient to increase motivation towards recovery  "by participating in outpatient programming.   Effectiveness of strategies:    Progressing: Client attended group this week and actively participated in group discussion without prompting.       Dimension #5 - Relapse Potential - Risk 3. Patient lacks understanding of relapse issues and appears to be at high risk for further substance use.  Specific goals from treatment plan addressed this week:  Client lacks understanding of relapse issues and appears to be at high risk for further substance use.  Effectiveness of strategies:  Progressing: Client stated during check-in that he still experiences some cravings and thoughts of drinking, but he is able to manage them without drinking.    Dimension #6 - Recovery Environment - Risk 2. Patient does not consistently attend sober support meetings or other activities that help support sobriety.  Specific goals from treatment plan addressed this week:  Patient will explore, identify and participate in activities that help support sobriety in his community.  Effectiveness of strategies:  Progressing: Client stated he did attend the [facility] Community Support Group last weekend.    T) Treatment plan updated no.  Patient notified and in agreement NA.  Patient educated on \"What is Addiction/Denial and Enabling\". Patient has completed 37 of 110 program hours at this time. Projected discharge date is 05/08/2019. Current discharge plan is not yet developed.     KECIA Schofield  12/27/2018, 5:47 PM  "

## 2021-06-22 NOTE — PROGRESS NOTES
Reinaldo Real attended 3 hours of group therapy today.    Total group size of 7.    12/11/2018 3:32 PM Yaniv Peterson

## 2021-06-22 NOTE — PROGRESS NOTES
Weekly Progress Note  Reinaldo Real  1985  [id number]      D) Pt attended 2 groups this week with 0 absences. Patient attended 0 individual sessions this week. A) Staff facilitated groups and reviewed tx progress. Ass[facility] for [facility]. R) No VAP needed at this time.   Any significant events, defines as events that impact patients relationship with others inside and outside of treatment No  Indicate any changes or monitoring of physical or mental health problems No    Indicate involvement by any outside supports No  IAPP reviewed and modified as needed. NA  Pt working on the following dimensions:  Dimension #1 - Withdrawal Potential - Risk 0. Patient reports continued use of alcohol, last date of use 11/8/2018.  Specific goals from treatment plan addressed this week:  Patient to maintain abstinence throughout outpatient treatment.   Effectiveness of strategies:  Progressing: Client reported no use this week during check-in.    Dimension #2 - Biomedical - Risk 1. Patient reports chronic conditions of diabetes, lower back pain, occasional headaches  Specific goals from treatment plan addressed this week:  Patient to maintain stable health throughout outpatient treatment.   Effectiveness of strategies:  Progressing: The client reports he has no medical needs unmet and is able to obtain any necessary care on his own.  Dimension #3 - Emotional/Behavioral/Cognitive - Risk 0. Patient reports past MH therapy and past issues w/gambling, but no current issues.  Specific goals from treatment plan addressed this week:  No plan needed at this time  Effectiveness of strategies:  N/A    Dimension #4 - Treatment Acceptance/Resistance - Risk 1. Patient appears to lack insight into his drinking behaviors due in part to cultural barriers  Specific goals from treatment plan addressed this week:   Patient to increase motivation towards recovery by participating in outpatient programming.   Effectiveness of strategies:    Progressing: Client  "attended both groups this week and actively participated in group discussion without prompting       Dimension #5 - Relapse Potential - Risk 3. Patient lacks understanding of relapse issues and appears to be at high risk for further substance use.  Specific goals from treatment plan addressed this week:  Client lacks understanding of relapse issues and appears to be at high risk for further substance use.  Effectiveness of strategies:  Progressing: Client shared his most recent date of use during check-in, and stated he had no thoughts or cravings for use this week.    Dimension #6 - Recovery Environment - Risk 2. Patient does not consistently attend sober support meetings or other activities that help support sobriety.  Specific goals from treatment plan addressed this week:  Patient will explore, identify and participate in activities that help support sobriety in his community.  Effectiveness of strategies:  Progressing: Client stated he did not attend the [facility] Community Support Group this week due to staying home to care for a sick child. Client reported talking to his father-in-law, on the phone from [facility], for the first time since getting together with his wife 6 years ago, and mentioned that it is sad that he can't go back to visit.    T) Treatment plan updated no.  Patient notified and in agreement NA.  Patient educated on \"What is Stress, Stress Management\". Patient has completed 16 of 110 program hours at this time. Projected discharge date is 05/08/2019. Current discharge plan is not yet developed.     KECIA Thurston  12/7/2018, 11:23 AM    "

## 2021-06-22 NOTE — PROGRESS NOTES
Reinaldo Real attended 9 hours of group therapy today.    Total group size of 3.    11/29/2018 4:10 PM Yaniv Peterson

## 2021-06-22 NOTE — PROGRESS NOTES
Reinaldo Real attended 3 hours of group therapy today.    Total group size of 8.    12/4/2018 4:00 PM Lachelle Guillaume

## 2021-06-23 NOTE — PROGRESS NOTES
Weekly Progress Note  Reinaldo Real  1985  [id number]      D) Pt attended 2 groups this week with 0 absences. Patient attended 0 individual sessions this week. A) Staff facilitated groups and reviewed tx progress. Assessed for [facility]. R) No VAP needed at this time.   Any significant events, defines as events that impact patients relationship with others inside and outside of treatment No  Indicate any changes or monitoring of physical or mental health problems No    Indicate involvement by any outside supports No  IAPP reviewed and modified as needed. NA  Pt working on the following dimensions:  Dimension #1 - Withdrawal Potential - Risk 0. Patient reports continued use of alcohol, last date of use 11/8/2018.  Specific goals from treatment plan addressed this week:  Patient to maintain abstinence throughout outpatient treatment.   Effectiveness of strategies:  Progressing: Client reported no use this week during check-in, and that his last use occurred before starting this treatment program.    Dimension #2 - Biomedical - Risk 1. Patient reports chronic conditions of diabetes, lower back pain, occasional headaches  Specific goals from treatment plan addressed this week:  Patient to maintain stable health throughout outpatient treatment.   Effectiveness of strategies:  Progressing: Client reported that he and his family don't have insurance at this time but that their coverage is supposed to reopen in January.  Dimension #3 - Emotional/Behavioral/Cognitive - Risk 0. Patient reports past MH therapy and past issues w/gambling, but no current issues.  Specific goals from treatment plan addressed this week:  No plan needed at this time  Effectiveness of strategies:  N/A    Dimension #4 - Treatment Acceptance/Resistance - Risk 1. Patient appears to lack insight into his drinking behaviors due in part to cultural barriers  Specific goals from treatment plan addressed this week:   Patient to increase motivation towards recovery  by participating in outpatient programming.   Effectiveness of strategies:    Progressing: Client attended group this week and actively participated in group discussion without prompting.     Dimension #5 - Relapse Potential - Risk 3. Patient lacks understanding of relapse issues and appears to be at high risk for further substance use.  Specific goals from treatment plan addressed this week:  Client lacks understanding of relapse issues and appears to be at high risk for further substance use.  Effectiveness of strategies:  Progressing: Client stated during check-in that he had no cravings or thoughts of drinking this week.    Dimension #6 - Recovery Environment - Risk 2. Patient does not consistently attend sober support meetings or other activities that help support sobriety.  Specific goals from treatment plan addressed this week:  Patient will explore, identify and participate in activities that help support sobriety in his community.  Effectiveness of strategies:  Progressing: Client stated he did not attend the [facility] Community Support Group last weekend do to it being moved and he could not find the room. Client stated during check in that he has not been able to look for a job, but is looking forward to going back to work soon.    T) Treatment plan updated no.  Patient notified and in agreement NA.  Patient educated on  How to Say No  and  Recovery Skills and Tools . Patient has completed 63 of 110 program hours at this time. Client has completed Phase One of the [facility]'s Recovery Program and is being transferred to Phase Two as of this date. Projected discharge date is 05/08/2019. Current discharge plan is not yet developed.     KECIA Thurston  2/8/2019, 3:32 PM

## 2021-06-23 NOTE — PROGRESS NOTES
Reinaldo Real attended 3 hours of group therapy today.    Total group size of 5.    1/24/2019 4:07 PM Yaniv Peterson

## 2021-06-23 NOTE — PROGRESS NOTES
Reinaldo Real attended 3 hours of group therapy today.    Total group size of 6.    1/10/2019 4:55 PM Yaniv Peterson

## 2021-06-23 NOTE — PROGRESS NOTES
Reinaldo Real attended 2 hours of group therapy today.    Total group size of 5.    2/7/2019 5:44 PM Yaniv Peterson

## 2021-06-23 NOTE — PROGRESS NOTES
Weekly Progress Note  Reinaldo Real  1985  [id number]      D) Pt attended 2 groups this week with 0 absences. Patient attended 0 individual sessions this week. A) Staff facilitated groups and reviewed tx progress. [facility] for [facility]. R) No VAP needed at this time.   Any significant events, defines as events that impact patients relationship with others inside and outside of treatment No  Indicate any changes or monitoring of physical or mental health problems No    Indicate involvement by any outside supports No  IAPP reviewed and modified as needed. NA  Pt working on the following dimensions:  Dimension #1 - Withdrawal Potential - Risk 0. Patient reports continued use of alcohol, last date of use 11/8/2018.  Specific goals from treatment plan addressed this week:  Patient to maintain abstinence throughout outpatient treatment.   Effectiveness of strategies:  Progressing: Client reported no use this week during check-in, and that his last use occurred before starting this treatment program.    Dimension #2 - Biomedical - Risk 1. Patient reports chronic conditions of diabetes, lower back pain, occasional headaches  Specific goals from treatment plan addressed this week:  Patient to maintain stable health throughout outpatient treatment.   Effectiveness of strategies:  Progressing: Client reported that he and his family don't have insurance at this time but that their coverage is supposed to reopen in January.  Dimension #3 - Emotional/Behavioral/Cognitive - Risk 0. Patient reports past MH therapy and past issues w/gambling, but no current issues.  Specific goals from treatment plan addressed this week:  No plan needed at this time  Effectiveness of strategies:  N/A    Dimension #4 - Treatment Acceptance/Resistance - Risk 1. Patient appears to lack insight into his drinking behaviors due in part to cultural barriers  Specific goals from treatment plan addressed this week:   Patient to increase motivation towards recovery  by participating in outpatient programming.   Effectiveness of strategies:    Progressing: Client attended group this week and actively participated in group discussion without prompting. Client stated during check-in that he was grateful to be able to help a friend this week.      Dimension #5 - Relapse Potential - Risk 3. Patient lacks understanding of relapse issues and appears to be at high risk for further substance use.  Specific goals from treatment plan addressed this week:  Client lacks understanding of relapse issues and appears to be at high risk for further substance use.  Effectiveness of strategies:  Progressing: Client stated during check-in that he had no cravings or thoughts of drinking this week.    Dimension #6 - Recovery Environment - Risk 2. Patient does not consistently attend sober support meetings or other activities that help support sobriety.  Specific goals from treatment plan addressed this week:  Patient will explore, identify and participate in activities that help support sobriety in his community.  Effectiveness of strategies:  Progressing: Client stated he attended the [facility] Community Support Group last weekend.    T) Treatment plan updated no.  Patient notified and in agreement NA.  Patient educated on  Doctor's Presentation and DWI Laws and Penalties . Patient has completed 46 of 110 program hours at this time. Projected discharge date is 05/08/2019. Current discharge plan is not yet developed.     KECIA Thurston  1/18/2019, 11:57 AM

## 2021-06-23 NOTE — PROGRESS NOTES
Reinaldo Real attended 3 hours of group therapy today.    Total group size of 8.    1/15/2019 4:04 PM Yaniv Peterson

## 2021-06-23 NOTE — PROGRESS NOTES
Reinaldo Real attended 3 hours of group therapy today.    Total group size of 5.    1/29/2019 4:07 PM Yaniv Peterson
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
## 2021-06-23 NOTE — PROGRESS NOTES
Reinaldo Real attended 3 hours of group therapy today.    Total group size of 5.    1/31/2019 4:12 PM Yaniv Peterson

## 2021-06-23 NOTE — PROGRESS NOTES
Reinaldo Real attended 3 hours of group therapy today.    Total group size of 5.    2/5/2019 9:08 PM Yaniv Peterson

## 2021-06-23 NOTE — PROGRESS NOTES
Weekly Progress Note  Reinaldo Real  1985  [id number]      D) Pt attended 2 groups this week with 0 absences. Patient attended 0 individual sessions this week. A) Staff facilitated groups and reviewed tx progress. Assessed for [facility]. R) No VAP needed at this time.   Any significant events, defines as events that impact patients relationship with others inside and outside of treatment No  Indicate any changes or monitoring of physical or mental health problems No    Indicate involvement by any outside supports No  IAPP reviewed and modified as needed. NA  Pt working on the following dimensions:  Dimension #1 - Withdrawal Potential - Risk 0. Patient reports continued use of alcohol, last date of use 11/8/2018.  Specific goals from treatment plan addressed this week:  Patient to maintain abstinence throughout outpatient treatment.   Effectiveness of strategies:  Progressing: Client reported no use this week during check-in, and that his last use occurred before starting this treatment program.    Dimension #2 - Biomedical - Risk 1. Patient reports chronic conditions of diabetes, lower back pain, occasional headaches  Specific goals from treatment plan addressed this week:  Patient to maintain stable health throughout outpatient treatment.   Effectiveness of strategies:  Progressing: Client reported that he and his family don't have insurance at this time but that their coverage is supposed to reopen in January.  Dimension #3 - Emotional/Behavioral/Cognitive - Risk 0. Patient reports past MH therapy and past issues w/gambling, but no current issues.  Specific goals from treatment plan addressed this week:  No plan needed at this time  Effectiveness of strategies:  N/A    Dimension #4 - Treatment Acceptance/Resistance - Risk 1. Patient appears to lack insight into his drinking behaviors due in part to cultural barriers  Specific goals from treatment plan addressed this week:   Patient to increase motivation towards recovery  by participating in outpatient programming.   Effectiveness of strategies:    Progressing: Client attended group this week and actively participated in group discussion without prompting. Client stated during check-in that he was grateful for medical Assistance and Rides to treatment.      Dimension #5 - Relapse Potential - Risk 3. Patient lacks understanding of relapse issues and appears to be at high risk for further substance use.  Specific goals from treatment plan addressed this week:  Client lacks understanding of relapse issues and appears to be at high risk for further substance use.  Effectiveness of strategies:  Progressing: Client stated during check-in that he still experiences some cravings and thoughts of drinking, but he is able to manage them without drinking.    Dimension #6 - Recovery Environment - Risk 2. Patient does not consistently attend sober support meetings or other activities that help support sobriety.  Specific goals from treatment plan addressed this week:  Patient will explore, identify and participate in activities that help support sobriety in his community.  Effectiveness of strategies:  Progressing: Client stated he did attend the [facility] Community Support Group last weekend.    T) Treatment plan updated no.  Patient notified and in agreement NA.  Patient educated on  Reviewing Life Experiences  and  How Do I Want My Life to Be Different? . Patient has completed 40 of 110 program hours at this time. Projected discharge date is 05/08/2019. Current discharge plan is not yet developed.     KECIA Thurston  1/11/2019, 3:51 PM

## 2021-06-23 NOTE — PROGRESS NOTES
Reinaldo Real attended 3 hours of group therapy today.    Total group size of 9.    1/17/2019 4:03 PM Yaniv Peterson

## 2021-06-23 NOTE — PROGRESS NOTES
Reinaldo Real attended 3 hours of group therapy today.    Total group size of 5.    1/22/2019 4:02 PM Yaniv Peterson

## 2021-06-24 NOTE — PROGRESS NOTES
Weekly Progress Note  Reinaldo Real  1985  [id number]      D) Pt attended 1 groups this week with 0 absences. Patient attended 0 individual sessions this week. A) Staff facilitated groups and reviewed tx progress. Assessed for [facility]. R) No VAP needed at this time.   Any significant events, defines as events that impact patients relationship with others inside and outside of treatment No  Indicate any changes or monitoring of physical or mental health problems No    Indicate involvement by any outside supports No  IAPP reviewed and modified as needed. NA  Pt working on the following dimensions:  Dimension #1 - Withdrawal Potential - Risk 0. Patient reports continued use of alcohol, last date of use 11/8/2018.  Specific goals from treatment plan addressed this week:  Patient to maintain abstinence throughout outpatient treatment.   Effectiveness of strategies:  Progressing: Client reported no use this week during check-in, and that his last use occurred before starting this treatment program.    Dimension #2 - Biomedical - Risk 1. Patient reports chronic conditions of diabetes, lower back pain, occasional headaches  Specific goals from treatment plan addressed this week:  Patient to maintain stable health throughout outpatient treatment.   Effectiveness of strategies:  Progressing: Client reported that he and his family don't have insurance at this time but that their coverage is supposed to reopen in January.  Dimension #3 - Emotional/Behavioral/Cognitive - Risk 0. Patient reports past MH therapy and past issues w/gambling, but no current issues.  Specific goals from treatment plan addressed this week:  No plan needed at this time  Effectiveness of strategies:  N/A    Dimension #4 - Treatment Acceptance/Resistance - Risk 1. Patient appears to lack insight into his drinking behaviors due in part to cultural barriers  Specific goals from treatment plan addressed this week:   Patient to increase motivation towards recovery  by participating in outpatient programming.   Effectiveness of strategies:    Progressing: Client attended group this week and actively participated in group discussion without prompting.     Dimension #5 - Relapse Potential - Risk 3. Patient lacks understanding of relapse issues and appears to be at high risk for further substance use.  Specific goals from treatment plan addressed this week:  Client lacks understanding of relapse issues and appears to be at high risk for further substance use.  Effectiveness of strategies:  Progressing: Client stated during check-in that he had no cravings or thoughts of drinking this week.    Dimension #6 - Recovery Environment - Risk 2. Patient does not consistently attend sober support meetings or other activities that help support sobriety.  Specific goals from treatment plan addressed this week:  Patient will explore, identify and participate in activities that help support sobriety in his community.  Effectiveness of strategies:  Progressing: Client stated he did not attend the [facility] Support Group last weekend due to his wife being sick.    T) Treatment plan updated no.  Patient notified and in agreement NA.  Patient educated on  Consequences of My Use . Patient has completed 65 of 110 program hours at this time. Projected discharge date is 05/08/2019. Current discharge plan is not yet developed.     KECIA Thurston  2/15/2019, 6:34 PM

## 2021-06-24 NOTE — PROGRESS NOTES
Reinaldo Real attended 2 hours of group therapy today.    Total group size of 7.    3/6/2019 3:10 PM Yaniv Peterson

## 2021-06-24 NOTE — PROGRESS NOTES
Reinaldo Real attended 2 hours of group therapy today.    Total group size of 6.    3/13/2019 3:21 PM Yaniv Peterson

## 2021-06-24 NOTE — PROGRESS NOTES
Reinaldo Real attended 2 hours of group therapy today.    Total group size of 4.    2/20/2019 4:26 PM Yaniv Peterson

## 2021-06-24 NOTE — PROGRESS NOTES
Weekly Progress Note  Reinaldo Real  1985  [id number]      D) Pt attended 1 groups this week with 0 absences. Patient attended 0 individual sessions this week. A) Staff facilitated groups and reviewed tx progress. Assessed for [facility]. R) No VAP needed at this time.   Any significant events, defines as events that impact patients relationship with others inside and outside of treatment No  Indicate any changes or monitoring of physical or mental health problems No    Indicate involvement by any outside supports No  IAPP reviewed and modified as needed. NA  Pt working on the following dimensions:  Dimension #1 - Withdrawal Potential - Risk 0. Patient reports continued use of alcohol, last date of use 11/8/2018.  Specific goals from treatment plan addressed this week:  Patient to maintain abstinence throughout outpatient treatment.   Effectiveness of strategies:  Progressing: Client reported no use this week during check-in, and that his last use occurred before starting this treatment program.    Dimension #2 - Biomedical - Risk 1. Patient reports chronic conditions of diabetes, lower back pain, occasional headaches  Specific goals from treatment plan addressed this week:  Patient to maintain stable health throughout outpatient treatment.   Effectiveness of strategies:  Progressing: Client reported that he and his family don't have insurance at this time but that their coverage is supposed to reopen in January.  Dimension #3 - Emotional/Behavioral/Cognitive - Risk 0. Patient reports past MH therapy and past issues w/gambling, but no current issues.  Specific goals from treatment plan addressed this week:  No plan needed at this time  Effectiveness of strategies:  N/A    Dimension #4 - Treatment Acceptance/Resistance - Risk 1. Patient appears to lack insight into his drinking behaviors due in part to cultural barriers  Specific goals from treatment plan addressed this week:   Patient to increase motivation towards recovery  by participating in outpatient programming.   Effectiveness of strategies:    Progressing: Client attended group this week and actively participated in group discussion without prompting. Client stated during check-in that he was grateful for the chance to do good things in life.    Dimension #5 - Relapse Potential - Risk 3. Patient lacks understanding of relapse issues and appears to be at high risk for further substance use.  Specific goals from treatment plan addressed this week:  Client will gain insight into situations and emotions that trigger his drinking.  Effectiveness of strategies:  Progressing: Client stated during check-in that he had no cravings or thoughts of drinking this week, but also stated he had attended a party with some friends he used to drink with where drinking was going on. Client stated he did feel tempted, but stayed strong and did not drink. Client was introduced to the concept of 'risky places and risky behaviors', and advised to avoid such temptations in the future.    Dimension #6 - Recovery Environment - Risk 2. Patient does not consistently attend sober support meetings or other activities that help support sobriety.  Specific goals from treatment plan addressed this week:  Patient will explore, identify and participate in activities that help support sobriety in his community.  Effectiveness of strategies:  Progressing: Client stated he did not attend the [facility] Community Support Group this week due to the meeting being cancelled.    T) Treatment plan updated no.  Patient notified and in agreement NA.  Patient educated on  How and When to Help Others . Patient has completed 69 of 110 program hours at this time. Projected discharge date is 05/08/2019. Current discharge plan is not yet developed.     [facility]iv Peterson [facility]  2/28/2019, 9:09 AM

## 2021-06-24 NOTE — PROGRESS NOTES
Weekly Progress Note  Reinaldo Rela  1985  [id number]      D) Pt attended 1 groups this week with 0 absences. Patient attended 0 individual sessions this week. A) Staff facilitated groups and reviewed tx progress. Assessed for [facility]. R) No VAP needed at this time.   Any significant events, defines as events that impact patients relationship with others inside and outside of treatment No  Indicate any changes or monitoring of physical or mental health problems No    Indicate involvement by any outside supports No  IAPP reviewed and modified as needed. NA  Pt working on the following dimensions:  Dimension #1 - Withdrawal Potential - Risk 0. Patient reports continued use of alcohol, last date of use 11/8/2018.  Specific goals from treatment plan addressed this week:  Patient to maintain abstinence throughout outpatient treatment.   Effectiveness of strategies:  Progressing: Client reported no use this week during check-in, and that his last use occurred before starting this treatment program.    Dimension #2 - Biomedical - Risk 1. Patient reports chronic conditions of diabetes, lower back pain, occasional headaches  Specific goals from treatment plan addressed this week:  Patient to maintain stable health throughout outpatient treatment.   Effectiveness of strategies:  Progressing: Client reported that he and his family don't have insurance at this time but that their coverage is supposed to reopen in January.  Dimension #3 - Emotional/Behavioral/Cognitive - Risk 0. Patient reports past MH therapy and past issues w/gambling, but no current issues.  Specific goals from treatment plan addressed this week:  No plan needed at this time  Effectiveness of strategies:  N/A    Dimension #4 - Treatment Acceptance/Resistance - Risk 1. Patient appears to lack insight into his drinking behaviors due in part to cultural barriers  Specific goals from treatment plan addressed this week:   Patient to increase motivation towards recovery  by participating in outpatient programming.   Effectiveness of strategies:    Progressing: Client attended group this week and actively participated in group discussion without prompting. Client stated during check-in that he was grateful that his medical insurance would be valid April 1st.    Dimension #5 - Relapse Potential - Risk 3. Patient lacks understanding of relapse issues and appears to be at high risk for further substance use.  Specific goals from treatment plan addressed this week:  Client will gain insight into situations and emotions that trigger his drinking.  Effectiveness of strategies:  Progressing: Client stated during check-in that he had no cravings or thoughts of drinking this week, but also stated he had attended a party with some friends he used to drink with where drinking was going on. Client stated he did feel tempted, but stayed strong and did not drink. Client was introduced to the concept of 'risky places and risky behaviors', and advised to avoid such temptations in the future.    Dimension #6 - Recovery Environment - Risk 2. Patient does not consistently attend sober support meetings or other activities that help support sobriety.  Specific goals from treatment plan addressed this week:  Patient will explore, identify and participate in activities that help support sobriety in his community.  Effectiveness of strategies:  Progressing: Client stated he did not attend the [facility] Community Support Group this week because he has to stay home with his children on Saturdays. Client was encouraged to seek support for his meeting attendance from his family and reminded of the expectation.    T) Treatment plan updated no.  Patient notified and in agreement NA.  Patient educated on  Taking Responsibility . Patient has completed 71 of 110 program hours at this time. Projected discharge date is 05/08/2019. Current discharge plan is not yet developed.     Yaniv Peterson, [facility]  3/7/2019, 4:53  PM

## 2021-06-24 NOTE — PROGRESS NOTES
Reinaldo Real attended 2 hours of group therapy today.    Total group size of 7.    2/13/2019 3:26 PM Yaniv Peterson

## 2021-06-24 NOTE — PROGRESS NOTES
Weekly Progress Note  Reinaldo Real  1985  [id number]      D) Pt attended 1 groups this week with 0 absences. Patient attended 0 individual sessions this week. A) Staff facilitated groups and reviewed tx progress. Assessed for [facility]. R) No VAP needed at this time.   Any significant events, defines as events that impact patients relationship with others inside and outside of treatment No  Indicate any changes or monitoring of physical or mental health problems No    Indicate involvement by any outside supports No  IAPP reviewed and modified as needed. NA  Pt working on the following dimensions:  Dimension #1 - Withdrawal Potential - Risk 0. Patient reports continued use of alcohol, last date of use 11/8/2018.  Specific goals from treatment plan addressed this week:  Patient to maintain abstinence throughout outpatient treatment.   Effectiveness of strategies:  Progressing: Client reported no use this week during check-in, and that his last use occurred before starting this treatment program.    Dimension #2 - Biomedical - Risk 1. Patient reports chronic conditions of diabetes, lower back pain, occasional headaches  Specific goals from treatment plan addressed this week:  Patient to maintain stable health throughout outpatient treatment.   Effectiveness of strategies:  Progressing: Client reported that he and his family don't have insurance at this time but that their coverage is supposed to reopen in January.  Dimension #3 - Emotional/Behavioral/Cognitive - Risk 0. Patient reports past MH therapy and past issues w/gambling, but no current issues.  Specific goals from treatment plan addressed this week:  No plan needed at this time  Effectiveness of strategies:  N/A    Dimension #4 - Treatment Acceptance/Resistance - Risk 1. Patient appears to lack insight into his drinking behaviors due in part to cultural barriers  Specific goals from treatment plan addressed this week:   Patient to increase motivation towards recovery  by participating in outpatient programming.   Effectiveness of strategies:    Progressing: Client attended group this week and actively participated in group discussion without prompting. Client stated during check-in that he was grateful that the weather has not turned colder.    Dimension #5 - Relapse Potential - Risk 3. Patient lacks understanding of relapse issues and appears to be at high risk for further substance use.  Specific goals from treatment plan addressed this week:  Client lacks understanding of relapse issues and appears to be at high risk for further substance use.  Effectiveness of strategies:  Progressing: Client stated during check-in that he had no cravings or thoughts of drinking this week.    Dimension #6 - Recovery Environment - Risk 2. Patient does not consistently attend sober support meetings or other activities that help support sobriety.  Specific goals from treatment plan addressed this week:  Patient will explore, identify and participate in activities that help support sobriety in his community.  Effectiveness of strategies:  Progressing: Client stated he did not attend the [facility] Support Group last weekend due to not being able to reach the  by phone to request a ride.    T) Treatment plan updated no.  Patient notified and in agreement NA.  Patient educated on  Helping Other in a Recovery Community . Patient has completed 67 of 110 program hours at this time. Projected discharge date is 05/08/2019. Current discharge plan is not yet developed.     KECIA Thurston  2/20/2019, 4:38 PM

## 2021-06-24 NOTE — PROGRESS NOTES
Late Entry  Reinaldo Real attended 2 hours of group therapy today.    Total group size of 7.    2/28/2019 8:33 AM Yaniv Peterson

## 2021-06-25 NOTE — PROGRESS NOTES
Medication Therapy Management (MTM) Encounter  Assessment:                                                      1. Type 2 diabetes mellitus without complication, without long-term current use of insulin (H)  A1c recently improved, still not at goal of less than 7%. Patient reports appropriately taking medications as prescribed, except never started the once weekly Bydureon, no longer manufactured product. Additionally reviewed with patient that the once weekly options would likely not be covered by his insurance. As an alternative to this, recommend switching to alternative DPP 4 for now to avoid increased GI symptoms if initiating Victoza which is the covered alternatives per his insurance, patient agreeable. If A1c not improved in August, would recommend switching to once weekly GLP-1 agonist such as Trulicity or Ozempic, this would be covered with prior authorization due to patient trying and failing both Januvia and Onglyza. ACE inhibitor not currently indicated for patient, though will continue to monitor patient's blood pressure.    2. Hypertriglyceridemia  Patient appropriately taking high intensity, atorvastatin 40 mg daily as prescribed.  Recommend follow-up lipid cascade in 2 to 3 months to determine efficacy of therapy and patient's elevated triglycerides.  If triglycerides remain unimproved, could consider adding fenofibrate or fish oil such as Lovaza due to concern for pancreatitis, though recommend close monitoring of patient's liver function as it remains elevated recently.    3. Gastroesophageal reflux disease, unspecified whether esophagitis present  Patient to be seen by [facility] for GI symptoms, and elevated liver enzymes as well as hep B.  MTM to ensure with clinic staff patient has an appointment scheduled for GI.    4. Elevated BP without diagnosis of hypertension  BP elevated today with unknown reason.  Recommend continued follow-up monitoring of blood pressure.  Would recommend initiating  lisinopril at follow-up visit if blood pressure remains elevated.    Plan:                                                     1.  Stop januvia and start onglyza 5 mg daily  2.  Routing to care team to ensure patient has scheduled appointment with Denisse BYNUM per previous request from PCP    Future:  1.  If A1c elevated in August, recommend switching to Trulicity or Ozempic via PA  2.  Will reassess A1c and lipids in August, could consider additional therapy if triglycerides remain elevated    Follow Up  Return in about 10 weeks (around 8/10/2021) for Medication Review.  CDE   Subjective & Objective                                                     Reinaldo Real is a 36 y.o. male coming in for a follow up visit for Medication Therapy Management. He was referred to me from Cassy Berrios MD. Professional telephonic Jenifer  utilized today.    Reason for visit: Follow Up from [facility] visit on 21  Medication Adherence/Access: Self management of medications. Takes medications from the vials at home, not using a pillbox. States that he gets his refills from the pharmacy, picks them up on his own. Takes medications two times a day. Per Epic refill history, all medications have been filled on time recently.     Diabetes:  Pt currently taking metformin  mg - 2 tabs two times a day with meals, januvia 100 mg daily, and jardiance 25 mg daily. patient is experiencing the following side effects: Persistent nausea and vomiting.  Most recent episode of vomiting was today. Eating something sour helps him and smaller meals.   SMB-3 times daily    Ranges (patient reported) : Requesting refill of lancets and test strips today. Does not bring his glucometer with him today. After eating is about 225. Reports fasting from 115, 105, 102, 170, the number fluctuates. Highest was over 270.  Patient is not experiencing hypoglycemia - only when taking glipizide, not anymore.   Recent symptoms of high blood sugar? Reports  none  ACEi/ARB:  None, not indicated  Statin: Atorvastatin 40 mg daily  Aspirin: None  Diet/Exercise: Patient eating 2-3 meals a day.  Unable to go into full detail about current diet, though patient does visit with certified diabetic educator.  Next visit in June.  Lab Results   Component Value Date    HGBA1C 8.4 (H) 05/10/2021    HGBA1C 9.3 (H) 02/08/2021    HGBA1C 9.6 (H) 03/16/2020     Lab Results   Component Value Date    MICROALBUR <0.50 03/08/2021    LDLCALC  02/08/2021      Comment:      Invalid, Triglyceride >400           CREATININE 0.95 05/10/2021     Lab Results   Component Value Date    K 3.9 05/10/2021     Hypercholesteremia: Taking atorvastatin 40 mg daily. Stopped taking the atorvastatin 20 mg.   Lab Results   Component Value Date    CHOL 277 (H) 02/08/2021    CHOL 225 (H) 03/16/2020    CHOL 216 (H) 12/19/2016     Lab Results   Component Value Date    HDL  02/08/2021      Comment:      Invalid, Triglyceride >1100           HDL 31 (L) 03/16/2020    HDL 29 (L) 12/19/2016     Lab Results   Component Value Date    LDLCALC  02/08/2021      Comment:      Invalid, Triglyceride >400           LDLCALC  03/16/2020      Comment:      Invalid, Triglycerides >400    LDLCALC 138 (H) 12/19/2016     Lab Results   Component Value Date    TRIG 1,252 (H) 02/08/2021    TRIG 608 (H) 03/16/2020    TRIG 244 (H) 12/19/2016     GERD: omeprazole 20 mg two times a day before meals. Per last visit with PCP, patient directed to see [facility]. Still having vomiting, sometimes once a day or all the time depending on how much he eats. Cannot eat tomatoes, they cause vomiting. But states that he can eat mangos. Does not like spices.     Elevated BP without diagnosis of HTN: States that he checks his BP at home. States that sometimes his diastolic BP at homes goes up to 99. Admits sometimes he has headaches or dizziness. If he cannot sleep he will check his BP and that is usually when it is high.   BP Readings from Last 3  Encounters:   06/01/21 (!) 122/94   05/10/21 130/72   03/08/21 114/76     PMH: reviewed in EPIC   Allergies/ADRs: reviewed in EPIC   Alcohol: Unable to review  Tobacco:   Social History     Tobacco Use   Smoking Status Current Every Day Smoker     Packs/day: 0.20     Types: Cigarettes   Smokeless Tobacco Current User     Types: Chew   Tobacco Comment    smokes 0-1/day--Chew betel nut; pt states he started smoking at 12 yo     Vitals:    06/01/21 1533   BP: (!) 122/94     ----------------  The patient declined an after visit summary    I spent 60 minutes with this patient today.   All changes were made via collaborative practice agreement with Cassy Berrios MD. A copy of the visit note was provided to the patient's provider.     Miriam Vallecillo ([place]), PharmD, BCACP  Medication Therapy Management Pharmacist  [facility]    Current Outpatient Medications   Medication Sig Dispense Refill     atorvastatin (LIPITOR) 40 MG tablet Take 1 tablet (40 mg total) by mouth daily. 90 tablet 3     blood glucose test strips Use 1 each As Directed 2 (two) times a day. Dispense brand per patient's insurance at pharmacy discretion. 200 strip 3     blood-glucose meter Misc Use 1 Device As Directed daily for 1 day. 1 each 0     empagliflozin (JARDIANCE) 25 mg Tab tablet Take 1 tablet (25 mg total) by mouth daily. 90 tablet 3     generic lancets (FINGERSTIX LANCETS) Use 1 each As Directed 2 (two) times a day. Dispense brand per patient's insurance at pharmacy discretion. 200 each 3     lancing device (LANCING DEVICE WITH LANCETS) Misc Use to test blood sugars 2 times weekly or as directed.       metFORMIN (GLUCOPHAGE-XR) 500 MG 24 hr tablet Take 2 tablets (1,000 mg total) by mouth 2 (two) times a day. 360 tablet 3     naproxen (NAPROSYN) 500 MG tablet Take 1 tablet (500 mg total) by mouth 2 (two) times a day as needed. 180 tablet 1     omeprazole (PRILOSEC) 20 MG capsule TAKE 1 CAPSULE (20 MG TOTAL) BY MOUTH 2  (TWO) TIMES A DAY BEFORE MEALS 180 capsule 3     sAXagliptin (ONGLYZA) 5 mg Tab tablet Take 1 tablet (5 mg total) by mouth daily. Stop januvia and start this instead. 30 tablet 11     No current facility-administered medications for this visit.         Medication Therapy Recommendations  Diabetes mellitus, type 2 (H)    Current Medication: sAXagliptin (ONGLYZA) 5 mg Tab tablet   Rationale: Condition refractory to medication - Ineffective medication - Effectiveness   Recommendation: Change Medication - stop januvia, start onglyza   Status: Accepted per CPA

## 2021-06-25 NOTE — PROGRESS NOTES
Weekly Progress Note  Reinaldo Real  1985  [id number]      D) Pt attended 1 groups this week with 0 absences. Patient attended 0 individual sessions this week. A) Staff facilitated groups and reviewed tx progress. Assessed for [facility]. R) No VAP needed at this time.   Any significant events, defines as events that impact patients relationship with others inside and outside of treatment No  Indicate any changes or monitoring of physical or mental health problems No    Indicate involvement by any outside supports No  IAPP reviewed and modified as needed. NA  Pt working on the following dimensions:  Dimension #1 - Withdrawal Potential - Risk 0. Patient reports continued use of alcohol, last date of use 11/8/2018.  Specific goals from treatment plan addressed this week:  Patient to maintain abstinence throughout outpatient treatment.   Effectiveness of strategies:  Progressing: Client reported no use this week during check-in, and that his last use occurred before starting this treatment program.    Dimension #2 - Biomedical - Risk 1. Patient reports chronic conditions of diabetes, lower back pain, occasional headaches  Specific goals from treatment plan addressed this week:  Patient to maintain stable health throughout outpatient treatment.   Effectiveness of strategies:  Progressing: Client reported that his medical coverage will begin on April 1st.  Dimension #3 - Emotional/Behavioral/Cognitive - Risk 0. Patient reports past MH therapy and past issues w/gambling, but no current issues.  Specific goals from treatment plan addressed this week:  No plan needed at this time  Effectiveness of strategies:  N/A    Dimension #4 - Treatment Acceptance/Resistance - Risk 1. Patient appears to lack insight into his drinking behaviors due in part to cultural barriers  Specific goals from treatment plan addressed this week:   Patient to increase motivation towards recovery by participating in outpatient programming.   Effectiveness of  strategies:    Progressing: Client attended group this week and actively participated in group discussion without prompting. 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    Dimension #5 - Relapse Potential - Risk 3. Patient lacks understanding of relapse issues and appears to be at high risk for further substance use.  Specific goals from treatment plan addressed this week:  Client will gain insight into situations and emotions that trigger his drinking.  Effectiveness of strategies:  Progressing: Client stated during check-in that he had no cravings or thoughts of drinking this week.    Dimension #6 - Recovery Environment - Risk 2. Patient does not consistently attend sober support meetings or other activities that help support sobriety.  Specific goals from treatment plan addressed this week:  Patient will explore, identify and participate in activities that help support sobriety in his community.  Effectiveness of strategies:  Progressing: Client stated he did not attend the [facility] Community Support Group this week because he has to stay home with his children on Saturdays. Client was reminded of the expectation.    T) Treatment plan updated no.  Patient notified and in agreement NA.  Patient educated on  Recovery for the Long Term . Patient has completed 73 of 110 program hours at this time. Projected discharge date is 05/08/2019. Current discharge plan is not yet developed.     KECIA Thurston  3/15/2019, 10:49 AM

## 2021-06-25 NOTE — PROGRESS NOTES
Per Marva: Pt was referred to [facility], Phone: [phone].     Called patient today with Jenifer , did not answer the phone. Left VM for patient to call number above to schedule initial appt with GI.     Miriam Vallecillo ([place]), PharmD, BCACP  Medication Therapy Management Pharmacist  [facility]

## 2021-06-25 NOTE — PROGRESS NOTES
Reinaldo Real attended 2 hours of group therapy today.    Total group size of 6.    3/20/2019 3:10 PM Yaniv Peterson

## 2021-06-25 NOTE — PROGRESS NOTES
Weekly Progress Note  Reinaldo Real  1985  [id number]      D) Pt attended 1 groups this week with 0 absences. Patient attended 0 individual sessions this week. A) Staff facilitated groups and reviewed tx progress. Assessed for [facility]. R) No VAP needed at this time.   Any significant events, defines as events that impact patients relationship with others inside and outside of treatment No  Indicate any changes or monitoring of physical or mental health problems No    Indicate involvement by any outside supports No  IAPP reviewed and modified as needed. NA  Pt working on the following dimensions:  Dimension #1 - Withdrawal Potential - Risk 0. Patient reports continued use of alcohol, last date of use 11/8/2018.  Specific goals from treatment plan addressed this week:  Patient to maintain abstinence throughout outpatient treatment.   Effectiveness of strategies:  Progressing: Client reported no use this week during check-in, and that his last use occurred before starting this treatment program.    Dimension #2 - Biomedical - Risk 1. Patient reports chronic conditions of diabetes, lower back pain, occasional headaches  Specific goals from treatment plan addressed this week:  Patient to maintain stable health throughout outpatient treatment.   Effectiveness of strategies:  Progressing: Client reported that his medical coverage will begin on April 1st.  Dimension #3 - Emotional/Behavioral/Cognitive - Risk 0. Patient reports past MH therapy and past issues w/gambling, but no current issues.  Specific goals from treatment plan addressed this week:  No plan needed at this time  Effectiveness of strategies:  N/A    Dimension #4 - Treatment Acceptance/Resistance - Risk 1. Patient appears to lack insight into his drinking behaviors due in part to cultural barriers  Specific goals from treatment plan addressed this week:   Patient to increase motivation towards recovery by participating in outpatient programming.   Effectiveness of  strategies:    Progressing: Client attended group this week and actively participated in group discussion without prompting.     Dimension #5 - Relapse Potential - Risk 3. Patient lacks understanding of relapse issues and appears to be at high risk for further substance use.  Specific goals from treatment plan addressed this week:  Client will gain insight into situations and emotions that trigger his drinking.  Effectiveness of strategies:  Progressing: Client stated during check-in that he had no cravings or thoughts of drinking this week.    Dimension #6 - Recovery Environment - Risk 2. Patient does not consistently attend sober support meetings or other activities that help support sobriety.  Specific goals from treatment plan addressed this week:  Patient will explore, identify and participate in activities that help support sobriety in his community.  Effectiveness of strategies:  Progressing: Client stated he did not attend the [facility] Support Group this week because his wife is overdue to deliver a baby. Client also reported he heard from his  that his probation will end on 4/17/19.    T) Treatment plan updated no.  Patient notified and in agreement NA.  Patient educated on  Goals and Recovery . Patient has completed 75 of 110 program hours at this time. Projected discharge date is 05/08/2019. Current discharge plan is not yet developed.     KECIA Thurston  3/21/2019, 5:54 PM

## 2021-06-30 NOTE — PROGRESS NOTES
Progress Notes by Mary Ellen Parks, Diabetes Ed at 3/2/2021 11:00 AM     Author: Mary Ellen Parks, Diabetes Ed Service: -- Author Type: Diabetes Ed    Filed: 3/2/2021 12:30 PM Encounter Date: 3/2/2021 Status: Attested    : Mary Ellen Parks, Diabetes Ed (Diabetes Ed) Cosigner: Cassy Berrios MD at 3/3/2021  5:05 PM    Attestation signed by Cassy Berrios MD at 3/3/2021  5:05 PM    Agree with orders.     Cassy Berrios MD                  Type of Service: Telephone Visit    Patient would like to receive their AVS by --    Assessment:   Initial visit for DSME, conducted with the help of a professional   Previous dx   Uncontrolled NIDDM, pt is not open to injectables  SMBG indicates BS are are starting to trend in the right direction, not optimal though  Recent H pylori infection - on antibiotics, reports feeling much better  Reports occasional headaches, denies any other s/s of hypo/hyperglycemia  No other concerns today    Currently taking:metformin (GLUCOPHAGE) 2000 MG daily with meals, empagliflozin (JARDIANCE) 25 mg and glipizide (GLUCOTROL) 10 mg daily before meals  No SE/missed doses     SMBG:  Testing 1x/d. BS starting   BS readings throughout the day: 162, 166, 159, 111, 120, 220, 167,   180, 166, 173, 140, 140, 166, 119, 201, 122, 122, 138, 136, 170, 150, 96  Encouraged pt to test 1st thing in the morning before eating anything to help us assess accurately  Lows: denies any s/s  Reviewed s/s of hypoglycemia and treatment: pt expressed understanding  We discussed the importance of taking glipizide with food and not skipping meals.    Irregular meal habits. Does skip meals at times  Food recall:  BF: chicken, rice, peppers  L: rice soup  D: veg, fish, rice     Denies any sweets/sugary drinks     Beverages: water     Activity: does squats and some stretches almost daily     Education/Discussion: Reviewed diabetes basics, AIC and BS goals, sx and tx of hypo and hyperglycemia, complications of uncontrolled diabetes,  general activity and diet guidelines, CHO foods,  meter and testing basics: pt expressed understanding.      Plan:   SMBG indicates BS are are starting to trend in the right direction, not optimal though  Continue with the current therapy and DM management plan - regular monitoring of BG, daily physical activity as able/safe, eating healthy & watching portions of carbs  Goals as below.  To follow up in ~ 6 -8 weeks (or sooner if concerns) - phone number provided   PCP:3/8, encouraged pt to bring his meter/BS log to the appointment      Subjective and Objective:      Reinaldo Real is referred by Dr. Berrios for Diabetes Education.     Lab Results   Component Value Date    HGBA1C 9.3 (H) 02/08/2021     Goals:  Test BS 1x/d and keep a BS log for review  Take meds as prescribed   Eat regular meals and NOT skip  Limit rice to 1 cup per meal   Activity as tolerated/able  Adequate hydration       Education:     Monitoring   Meter (per above goals): Assessed and Discussed  Monitoring: Assessed and Discussed  BG goals: Assessed and Discussed    Nutrition Management  Nutrition Management: Assessed and Discussed  Weight: Assessed  Portions/Balance: Assessed and Discussed  Carb ID/Count: Assessed  Label Reading: Not addressed  Heart Healthy Fats: Not addressed  Menu Planning: Assessed  Dining Out: Assessed  Physical Activity: Assessed and Discussed  Medications: Assessed and Discussed  Orals: Assessed and Discussed  Injected Medications: Assessed and Discussed     Diabetes Disease Process: Assessed and Discussed    Acute Complications: Prevent, Detect, Treat:  Hypoglycemia: Assessed and Discussed  Hyperglycemia: Assessed and Discussed  Sick Days: Assessed  Driving: Assessed    Chronic Complications  Foot Care:Assessed  Skin Care: Assessed  Eye: Assessed  ABC: Assessed  Teeth:Assessed  Goal Setting and Problem Solving: Assessed and Discussed  Barriers: Assessed and Discussed  Psychosocial Adjustments: Assessed      Time spent with  the patient: 60 minutes for diabetes education and counseling.   Previous Education: no  Visit Type:PRAVEEN Parks  3/2/2021

## 2021-06-30 NOTE — PROGRESS NOTES
Progress Notes by Mary Ellen Parks, Diabetes Ed at 4/21/2021 10:00 AM     Author: Mary Ellen Parks, Diabetes Ed Service: -- Author Type: Diabetes Ed    Filed: 4/21/2021  2:28 PM Encounter Date: 4/21/2021 Status: Attested    : Mary Ellen Parks, Diabetes Ed (Diabetes Ed) Cosigner: Cassy Berrios MD at 4/21/2021  6:14 PM    Attestation signed by Cassy Berrios MD at 4/21/2021  6:14 PM    Cassy Berrios MD                  Type of Service: Telephone Visit    Patient would like to receive their AVS by --    Assessment:   F/u visit to assess glycemic control/review DSME, conducted with the help of a professional   Uncontrolled NIDDM   Unable to fully assess - no current SMBG data available   Reports fatigue, occasional headaches.   Wt has held steady  No other concerns today    Current medications:  Metformin (GLUCOPHAGE) 2000 MG daily with meals, jardiance 25 mg daily, Januvia 100 mg daily  - with no SE. Reports occasional missed doses ~ 2-3 x/month.  We discussed the use of a pill box for med compliance    Glipizide was stopped on 3/8     SMBG:  Had been testing 1x/d but reports that his meter broke down 3 weeks ago.   Lows: reports a BS of 63 a few days ago,  is not sure if it was before or after stopping glipizide.  Reviewed s/s of hypoglycemia and treatment: pt expressed understanding    Diabetes educator called [facility] later and spoke to Laura. They have a Contour Next meter ready for him to  and will call the pt to let him know.They will also try and provide a pill box to the pt.    Has been trying to eat 2-3 regular meals now - more vegetables, less rice.  Has stopped eating potatoes now but likes to have sweets sometimes.     Beverages: water. Denies any sugary drinks      Activity: is active around the house/does squats and some stretches 2-3 x/wk. States, he just doesn't feel like doing anything at all some days.    Education/discussion: reviewed diabetes basics, BS and A1C targets, sx and tx of  hyper/hypoglycemia, complications of uncontrolled diabetes, diet and activity guidelines.       Plan:   Unable to fully assess - no current SMBG data available   Pt's glucometer had broken down - diabetes educator called [facility], they have one ready for him and will call pt to let him know. They will also provide a sample pill box to him.  Continue with the current therapy and DM management plan - regular monitoring of BG, daily physical activity as able/safe, eating healthy & watching portions of carbs  Goals as below.  MTM:4/29, instructed pt to bring his meter/BS log to all his appointments  PCP:5/10, pt will also be due for A1C draw  To follow up in ~ 8 weeks (or sooner if concerns) - phone number provided      Subjective and Objective:      Reinaldo Real is referred by Dr. Berrios for Diabetes Education.     Lab Results   Component Value Date    HGBA1C 9.3 (H) 02/08/2021     Goals:  Test BS 1x/d and keep a BS log for review - not doing  Take meds as prescribed - doing  Eat regular meals and NOT skip - doing some  Limit rice to 1 cup per meal - doing  Activity as tolerated/able - doing some   Adequate hydration - doing some       Education:     Monitoring   Meter (per above goals): Assessed and Discussed  Monitoring: Assessed  BG goals: Assessed    Nutrition Management  Nutrition Management: Assessed  Weight: Assessed  Portions/Balance: Assessed and Discussed  Carb ID/Count: Assessed  Label Reading: Not addressed  Heart Healthy Fats: Not addressed  Menu Planning: Assessed  Dining Out: Assessed  Physical Activity: Assessed and Discussed  Medications: Assessed and Discussed  Orals: Assessed and Discussed  Injected Medications: Assessed     Diabetes Disease Process: Assessed    Acute Complications: Prevent, Detect, Treat:  Hypoglycemia: Assessed and Discussed  Hyperglycemia: Assessed and Discussed  Sick Days: Assessed  Driving: Not addressed    Chronic Complications  Foot Care:Assessed  Skin Care:  Assessed  Eye: Assessed  ABC: Assessed  Teeth:Assessed  Goal Setting and Problem Solving: Assessed and Discussed  Barriers: Assessed and Discussed  Psychosocial Adjustments: Assessed      Time spent with the patient: 70 mins for diabetes education and counseling.   Previous Education: yes  Visit Type:PRAVEEN Parks  4/21/2021

## 2021-07-04 NOTE — PROGRESS NOTES
"Rule 31 by Lachelle Guillaume LADC at 2018 10:00 AM     Author: Lachelle Guillaume LADC Service: Addiction Care Author Type: Licensed Alcohol and Drug Counselor    Filed: 2018 10:58 AM Encounter Date: 2018 Status: Addendum    : Lachelle Guillaume LADC (Licensed Alcohol and Drug Counselor)    Related Notes: Original Note by Lachelle Guillaume LADC (Licensed Alcohol and Drug Counselor) filed at 2018 12:48 PM         [facility] Assessment   Date: 2018        : KECIA Schofield    Name: Reinaldo Real  Address: [address] [place] [address]  Phone: [phone] (home)   Referral Source: Dwaine Jang probation  : 1983  Age: 33 y.o.  Race/Ethnicity:   Marital Status:   Employment: Home [employer] 28 hours/week (for now, planning to quit next month)                                                                                                                      Level of Education: [address] Socio-economic (yearly Income) Status: \"every year it's different\"  Sexual Orientation: Hetero   Last 4 digits of Social Security: 7111    Is assistance required in the ability to read and understand written material?  Can read English some, Can read and write [place]    Reason for seeking services:  I would like to stop drinking    Dimension I Acute intoxication/Withdrawal Potential:    Symptomology (past 12 months, check all that apply)  Sometimes weekly intoxication    Observed or reported (withdrawal symptoms, check all that apply)  Sometimes vomiting after drinking, in the past.  (In [place] sometimes \"blackout\", not in [place])    Chemical use most recent 12 months outside a facility and other significant use history (client self-report)  Primary Drug Used  Age of First Use  Most Recent Pattern of Use and Duration    Date of last use  Time if substance use in the last 30 days Withdrawal Potential? Requiring special care  Method of use   (oral, smoked, " snort, IV, etc)    Alcohol  About age 10 1-4 X per month, sometimes 1-2 beers, other times more w/friends 2018  No oral   Marijuana/Hashish          Cocaine/Crack          Meth/Amphetamines          Heroin          Other Opiates/Synthetics          Inhalants          Benzodiazepines          Hallucinogens          Barbiturates/Sedatives/Hypnotics          Over-the-Counter Drugs          Other          Nicotine  11 Daily 2-3 cigs (after eating) 2018          Dimension I Risk Ratin  Reason Risk Rating Assigned: Patient reports drinking occasionally, 1-4 times per months, 2 beers when at home, more if w/friends.  He denies current or recent WD symptoms, none observed.        Dimension II Biomedical Conditions:    Any known health conditions: Yes    Ever previously treated/diagnosed with any eating disorder?  no     List Health Concerns/Conditions Reported: Type 2 diabetes.  Lower back pain (tree fell on back).  Headaches sometimes    Does patient indicate awareness of any association between substance use and listed health concerns/conditions? Yes    Physical/Health Conditions which are associated with substance use:  has said don't drink    Are Health Concerns/Conditions being treated? Yes  By Whom? [facility], different doctors    Patient Self-Reported Medications:  Medication Dosage Frequency   Med for diabetes, can't remember name 1 pill daily   Tylenol  When needed          Are you pregnant: NA OB care received:NA CPS call needed: NA    Dimension II Risk Ratin  Reason Risk Rating Assigned: Patient reports current physical health conditions of Type 2 diabetes, lower back pain and occasional headaches.  He reports taking a daily med for diabetes as presribed. He accesses care at the [facility].  No current health insurance.  Patient reports smoking 2-3 cigarettes daily.        Dimension III Emotional/Behavioral/Cognitive:    Oriented to person, place, time, situation?  Yes     Current  Mental Health Services: no (but in the past, 2016-yes)    Past Hospitalization for MH or psychiatric problems: No    How many Hospitalizations: 0   Last Hospitalization; date and location: NA      Past or Current Issues with Gambling (Explain): yes - sometimes it's been a problem    Prior Treatment for Gambling: No     MH Diagnoses:  No, was depressed at age 13 when father   Psychiatrist: [facility]     Clinic: [facility]      Current Psychotropic Medications:  none    Taking medications as prescribed:  [facility]  Medications Helpful: NA    Current Suicidal Ideation: No  If yes, any plan? NA What is plan?: NA    Previous Suicide Attempts?  No   Explain: NA     Current Homicidal Ideation: No  If yes, any plan? NA What is plan?: NA    Previous Homicide Attempts? No Explain: NA    Suicidal/Homicidal Ideation in last 30 days? No  Explain: NA     Hazardous behavior engaged in which placed self or others in danger (i.e., operating a motor vehicle, unsafe sex, sharing needles, etc.)?   Driving    Family history of substance and/or mental health diagnosis/issues?  No  Explain: NA    History of abuse (Physical, Emotional, Sexual)? No Explain: NA       Dimension III Risk Ratin  Reason Risk Rating Assigned: Patient reports no current MH diagnoses, services or meds.  He reports receiving therapy services in 2016 but states he didn't know what his diagnosis was.  Patient states that gambling has caused him some problems in the past, but not currently. Patient denies current or past SI/HI/intent/plans/SIBS        Dimension IV Readiness to Change:    Mandated, or coerced into assessment or treatment:  Yes (probation mandate)    Does client feel there is a problem:   Yes    Verbalization of need/desire to change:   Yes     Willing to follow treatment recommendations: Yes     Impression of : (Check all that apply):    cooperative, motivated    Are there any spiritual, cultural, or other special needs to be addressed for client to be  successful in treatment? [place] language and culture        Dimension IV Risk Ratin  Reason Risk Rating Assigned: Tobi states he is willing to follow treatment requirement of no use of alcohol, and that he believes treatment will be helpful to him.  Tobi will benefit from the services of the [place]-specific OP treatment program.  A barrier to attendance could be transportation-at intake, [employer] options were given ([address]) and Patient may need further assistance with accessing transportation so that he can consistently attend group sessions.        Dimension V Relapse/Continued Use/Continued Problem Potential     Client age at First Treatment: 32    Lifetime # of CD Treatments:  1  List program, dates, and status of completion (within last five years): [place]    Longest Period of Abstinence: 4-5 years, when a student.  6-7 months more recently  How did you accomplish this? didn't drink while in treatment    Circumstances which led to Relapse: Treatment ended    Risk Taking/Problem Behaviors Related to Use and/or Under the Influence: Driving, and wanting to go everywhere and not stay home when drinking      Dimension V Risk Rating: 3  Reason Risk Rating Assigned:  Patient has poor recognition and understanding of relapse and recidivism issues, as evidenced by his return to drinking behaviors after a previous treatment experience in 2017.  He appears to be at high vulnerability for further substance use as evidenced by continued recent drinking episodes despite his current legal mandates.  Last date of use-alcohol: 2018.        Dimension VI Recovery Environment   Family support:  Yes  Peer Sober Support:  Yes    Current living circumstances:  With wife and other extended family members    Environment supportive of recovery:  Yes    Specific activities participating in which do not involve substance use:  Most daily activities    Specific activities participating in which do  involve substance use:  Parties    People, things that threaten recovery:  Maybe parties    Expected family involvement during treatment services:  No    Current Legal Involvement:  [place] Probation    Legal Consequences related to use: PV    Occupational/Academic consequences related to use: None    Current ability to function in a work and/or education setting: currently working PT    Current support network for recovery (including community-based recovery support): Yes, sometimes go to AA meetings on Saturday, [facility] on [facility]    Do you belong to a [place]: No Which [place]? 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  He lives w/his wife and extended family, reports having family support for sobriety and also, sometimes attends a Saturday AA meeting at a [employer] on [facility].  Patient reports sometimes attending [employer] which he finds also helpful towards supporting sobriety.  Hx of 2 DUIs and current [facility] probation, PO Sammy Campbell.          DSM-V Criteria for Substance Abuse  Instructions:  Determine whether the client currently meets the criteria for a Substance Use Disorder using the diagnostic criteria in the  DSM-V, pp. 481-589. Current means during the most recent 12 months outside a facility that controls access to substances.    Category of substance Severity ICD-10 Code/DSM V Code  Alcohol Use Disorder Mild  Moderate  Severe (F10.10) (305.00)  (F10.20) (303.90)  (F10.20) (303.90)   Cannabis Use Disorder Mild  Moderate  Severe (F12.10) (305.20)  (F12.20) (304.30)  (F12.20) (304.30)   Hallucinogen Use Disorder Mild  Moderate  Severe (F16.10) (305.30)  (F16.20) (304.50)  (F16.20) (304.50)   Inhalant Use Disorder Mild  Moderate  Severe (F18.10) (305.90)  (F18.20) (304.60)  (F18.20) (304.60)   Opioid Use Disorder Mild  Moderate  Severe (F11.10) (305.50)  (F11.20)  (304.00)  (F11.20) (304.00)   Sedative, Hypnotic, or Anxiolytic Use Disorder Mild  Moderate  Severe (F13.10) (305.40)  (F13.20) (304.10)  (F13.20) (304.10)   Stimulant Related Disorders Mild              Moderate              Severe   (F15.10) (305.70) Amphetamine type substance  (F14.10) (305.60) Cocaine  (F15.10) (305.70) Other or unspecified stimulant    (F15.20) (304.40) Amphetamine type substance  (F14.20) (304.20) Cocaine  (F15.20) (304.40) Other or unspecified stimulant    (F15.20) (304.40) Amphetamine type substance  (F14.20) (304.20) Cocaine  (F15.20) (304.40) Other or unspecified stimulant   DisorderTobacco use Disorder Mild  Moderate  Severe (Z72.0) (305.1)  (F17.200) (305.1)  (F17.200) (305.1)   Other (or unknown) Substance Use Disorder Mild  Moderate  Severe (F19.10) (305.90)  (F19.20) (304.90)  (F19.20) (304.90)     Diagnostic Impression: Alcohol Use Disorder-Moderate (F10.20)    Assessment Completed Within 3 Sessions of Admission: Yes  If NO, date assessment to be completed noted in Treatment Plan: NA      Signature of Counselor: KECIA Schofield  Date and Time of Signature: 11/13/2018, 12:48 PM

## 2021-07-06 VITALS — SYSTOLIC BLOOD PRESSURE: 122 MMHG | DIASTOLIC BLOOD PRESSURE: 94 MMHG

## 2022-01-26 DIAGNOSIS — Z76.0 ENCOUNTER FOR MEDICATION REFILL: Primary | ICD-10-CM

## 2022-01-28 RX ORDER — NAPROXEN 500 MG/1
TABLET ORAL
Qty: 180 TABLET | Refills: 3 | OUTPATIENT
Start: 2022-01-28

## 2022-09-26 ENCOUNTER — APPOINTMENT (OUTPATIENT)
Dept: CT IMAGING | Facility: HOSPITAL | Age: 37
End: 2022-09-26
Attending: STUDENT IN AN ORGANIZED HEALTH CARE EDUCATION/TRAINING PROGRAM
Payer: COMMERCIAL

## 2022-09-26 PROBLEM — A04.8 HELICOBACTER PYLORI INFECTION: Status: ACTIVE | Noted: 2021-03-08

## 2022-09-26 PROBLEM — F10.20 ALCOHOL USE DISORDER, MODERATE, DEPENDENCE (H): Status: ACTIVE | Noted: 2019-03-06

## 2022-09-26 LAB
ALBUMIN SERPL BCG-MCNC: 4.6 G/DL (ref 3.5–5.2)
ALBUMIN UR-MCNC: NEGATIVE MG/DL
ALP SERPL-CCNC: 112 U/L (ref 40–129)
ALT SERPL W P-5'-P-CCNC: 46 U/L (ref 10–50)
ANION GAP SERPL CALCULATED.3IONS-SCNC: 13 MMOL/L (ref 7–15)
APPEARANCE UR: CLEAR
AST SERPL W P-5'-P-CCNC: 38 U/L (ref 10–50)
BASE EXCESS BLDV CALC-SCNC: 3.6 MMOL/L
BASOPHILS # BLD AUTO: 0 10E3/UL (ref 0–0.2)
BASOPHILS NFR BLD AUTO: 0 %
BILIRUB DIRECT SERPL-MCNC: 0.26 MG/DL (ref 0–0.3)
BILIRUB SERPL-MCNC: 1 MG/DL
BILIRUB UR QL STRIP: NEGATIVE
BUN SERPL-MCNC: 8.5 MG/DL (ref 6–20)
CALCIUM SERPL-MCNC: 9.9 MG/DL (ref 8.6–10)
CHLORIDE SERPL-SCNC: 95 MMOL/L (ref 98–107)
COLOR UR AUTO: ABNORMAL
CREAT SERPL-MCNC: 0.78 MG/DL (ref 0.67–1.17)
CRP SERPL-MCNC: 75.6 MG/L
DEPRECATED HCO3 PLAS-SCNC: 26 MMOL/L (ref 22–29)
EOSINOPHIL # BLD AUTO: 0.1 10E3/UL (ref 0–0.7)
EOSINOPHIL NFR BLD AUTO: 1 %
ERYTHROCYTE [DISTWIDTH] IN BLOOD BY AUTOMATED COUNT: 11.4 % (ref 10–15)
ETHANOL SERPL-MCNC: <0.01 G/DL
GFR SERPL CREATININE-BSD FRML MDRD: >90 ML/MIN/1.73M2
GLUCOSE SERPL-MCNC: 195 MG/DL (ref 70–99)
GLUCOSE UR STRIP-MCNC: >1000 MG/DL
HCO3 BLDV-SCNC: 25 MMOL/L (ref 24–30)
HCT VFR BLD AUTO: 50.6 % (ref 40–53)
HGB BLD-MCNC: 18 G/DL (ref 13.3–17.7)
HGB UR QL STRIP: NEGATIVE
HOLD SPECIMEN: NORMAL
IMM GRANULOCYTES # BLD: 0.1 10E3/UL
IMM GRANULOCYTES NFR BLD: 1 %
INR PPP: 1.04 (ref 0.85–1.15)
KETONES UR STRIP-MCNC: NEGATIVE MG/DL
LACTATE SERPL-SCNC: 1.9 MMOL/L (ref 0.7–2)
LEUKOCYTE ESTERASE UR QL STRIP: NEGATIVE
LIPASE SERPL-CCNC: 20 U/L (ref 13–60)
LYMPHOCYTES # BLD AUTO: 1.9 10E3/UL (ref 0.8–5.3)
LYMPHOCYTES NFR BLD AUTO: 16 %
MAGNESIUM SERPL-MCNC: 2.2 MG/DL (ref 1.7–2.3)
MCH RBC QN AUTO: 31.5 PG (ref 26.5–33)
MCHC RBC AUTO-ENTMCNC: 35.6 G/DL (ref 31.5–36.5)
MCV RBC AUTO: 89 FL (ref 78–100)
MONOCYTES # BLD AUTO: 1.1 10E3/UL (ref 0–1.3)
MONOCYTES NFR BLD AUTO: 9 %
NEUTROPHILS # BLD AUTO: 9 10E3/UL (ref 1.6–8.3)
NEUTROPHILS NFR BLD AUTO: 73 %
NITRATE UR QL: NEGATIVE
NRBC # BLD AUTO: 0 10E3/UL
NRBC BLD AUTO-RTO: 0 /100
OXYHGB MFR BLDV: 45 % (ref 70–75)
PCO2 BLDV: 45 MM HG (ref 35–50)
PH BLDV: 7.41 [PH] (ref 7.35–7.45)
PH UR STRIP: 6 [PH] (ref 5–7)
PLATELET # BLD AUTO: 244 10E3/UL (ref 150–450)
PO2 BLDV: 25 MM HG (ref 25–47)
POTASSIUM SERPL-SCNC: 4.2 MMOL/L (ref 3.4–5.3)
PROCALCITONIN SERPL IA-MCNC: 0.09 NG/ML
PROT SERPL-MCNC: 8.6 G/DL (ref 6.4–8.3)
RBC # BLD AUTO: 5.71 10E6/UL (ref 4.4–5.9)
RBC URINE: 1 /HPF
SAO2 % BLDV: 46 % (ref 70–75)
SARS-COV-2 RNA RESP QL NAA+PROBE: NEGATIVE
SODIUM SERPL-SCNC: 134 MMOL/L (ref 136–145)
SP GR UR STRIP: 1.04 (ref 1–1.03)
TSH SERPL DL<=0.005 MIU/L-ACNC: 1.51 UIU/ML (ref 0.3–4.2)
UROBILINOGEN UR STRIP-MCNC: <2 MG/DL
WBC # BLD AUTO: 12.2 10E3/UL (ref 4–11)
WBC URINE: 1 /HPF

## 2022-09-26 PROCEDURE — 85025 COMPLETE CBC W/AUTO DIFF WBC: CPT | Performed by: EMERGENCY MEDICINE

## 2022-09-26 PROCEDURE — 36415 COLL VENOUS BLD VENIPUNCTURE: CPT | Performed by: EMERGENCY MEDICINE

## 2022-09-26 PROCEDURE — 80053 COMPREHEN METABOLIC PANEL: CPT | Performed by: EMERGENCY MEDICINE

## 2022-09-26 PROCEDURE — 250N000011 HC RX IP 250 OP 636: Performed by: EMERGENCY MEDICINE

## 2022-09-26 PROCEDURE — 96361 HYDRATE IV INFUSION ADD-ON: CPT

## 2022-09-26 PROCEDURE — 84443 ASSAY THYROID STIM HORMONE: CPT | Performed by: EMERGENCY MEDICINE

## 2022-09-26 PROCEDURE — 74177 CT ABD & PELVIS W/CONTRAST: CPT

## 2022-09-26 PROCEDURE — 96374 THER/PROPH/DIAG INJ IV PUSH: CPT | Mod: 59

## 2022-09-26 PROCEDURE — 83735 ASSAY OF MAGNESIUM: CPT | Performed by: EMERGENCY MEDICINE

## 2022-09-26 PROCEDURE — 83690 ASSAY OF LIPASE: CPT | Performed by: EMERGENCY MEDICINE

## 2022-09-26 PROCEDURE — 82248 BILIRUBIN DIRECT: CPT | Performed by: EMERGENCY MEDICINE

## 2022-09-26 PROCEDURE — 99285 EMERGENCY DEPT VISIT HI MDM: CPT | Mod: 25

## 2022-09-26 PROCEDURE — 82077 ASSAY SPEC XCP UR&BREATH IA: CPT | Performed by: EMERGENCY MEDICINE

## 2022-09-26 PROCEDURE — 86140 C-REACTIVE PROTEIN: CPT | Performed by: EMERGENCY MEDICINE

## 2022-09-26 PROCEDURE — U0005 INFEC AGEN DETEC AMPLI PROBE: HCPCS | Performed by: EMERGENCY MEDICINE

## 2022-09-26 PROCEDURE — C9803 HOPD COVID-19 SPEC COLLECT: HCPCS

## 2022-09-26 PROCEDURE — 87040 BLOOD CULTURE FOR BACTERIA: CPT | Performed by: EMERGENCY MEDICINE

## 2022-09-26 PROCEDURE — 258N000003 HC RX IP 258 OP 636: Performed by: EMERGENCY MEDICINE

## 2022-09-26 PROCEDURE — 82805 BLOOD GASES W/O2 SATURATION: CPT | Performed by: EMERGENCY MEDICINE

## 2022-09-26 PROCEDURE — 81001 URINALYSIS AUTO W/SCOPE: CPT | Performed by: EMERGENCY MEDICINE

## 2022-09-26 PROCEDURE — 84145 PROCALCITONIN (PCT): CPT | Performed by: EMERGENCY MEDICINE

## 2022-09-26 PROCEDURE — 83036 HEMOGLOBIN GLYCOSYLATED A1C: CPT | Performed by: INTERNAL MEDICINE

## 2022-09-26 PROCEDURE — 85610 PROTHROMBIN TIME: CPT | Performed by: EMERGENCY MEDICINE

## 2022-09-26 PROCEDURE — 83605 ASSAY OF LACTIC ACID: CPT | Performed by: EMERGENCY MEDICINE

## 2022-09-26 RX ORDER — CEFTRIAXONE 1 G/1
1 INJECTION, POWDER, FOR SOLUTION INTRAMUSCULAR; INTRAVENOUS ONCE
Status: COMPLETED | OUTPATIENT
Start: 2022-09-27 | End: 2022-09-27

## 2022-09-26 RX ORDER — IOPAMIDOL 755 MG/ML
100 INJECTION, SOLUTION INTRAVASCULAR ONCE
Status: COMPLETED | OUTPATIENT
Start: 2022-09-26 | End: 2022-09-26

## 2022-09-26 RX ORDER — KETOROLAC TROMETHAMINE 15 MG/ML
15 INJECTION, SOLUTION INTRAMUSCULAR; INTRAVENOUS ONCE
Status: COMPLETED | OUTPATIENT
Start: 2022-09-26 | End: 2022-09-26

## 2022-09-26 RX ADMIN — KETOROLAC TROMETHAMINE 15 MG: 15 INJECTION, SOLUTION INTRAMUSCULAR; INTRAVENOUS at 22:57

## 2022-09-26 RX ADMIN — IOPAMIDOL 100 ML: 755 INJECTION, SOLUTION INTRAVENOUS at 22:56

## 2022-09-26 RX ADMIN — SODIUM CHLORIDE 1000 ML: 9 INJECTION, SOLUTION INTRAVENOUS at 22:56

## 2022-09-26 RX ADMIN — SODIUM CHLORIDE 1224 ML: 9 INJECTION, SOLUTION INTRAVENOUS at 21:14

## 2022-09-26 ASSESSMENT — ENCOUNTER SYMPTOMS
PALPITATIONS: 0
COUGH: 1
CHILLS: 0
BACK PAIN: 1
FEVER: 0
SHORTNESS OF BREATH: 0
HEADACHES: 0
NECK PAIN: 0

## 2022-09-27 ENCOUNTER — HOSPITAL ENCOUNTER (INPATIENT)
Facility: HOSPITAL | Age: 37
LOS: 5 days | Discharge: HOME | End: 2022-10-02
Attending: EMERGENCY MEDICINE | Admitting: INTERNAL MEDICINE
Payer: COMMERCIAL

## 2022-09-27 ENCOUNTER — APPOINTMENT (OUTPATIENT)
Dept: CT IMAGING | Facility: HOSPITAL | Age: 37
End: 2022-09-27
Attending: EMERGENCY MEDICINE
Payer: COMMERCIAL

## 2022-09-27 DIAGNOSIS — B19.10 HEPATITIS B INFECTION WITHOUT DELTA AGENT WITHOUT HEPATIC COMA, UNSPECIFIED CHRONICITY: Primary | ICD-10-CM

## 2022-09-27 DIAGNOSIS — K74.60 CIRRHOSIS OF LIVER WITHOUT ASCITES, UNSPECIFIED HEPATIC CIRRHOSIS TYPE (H): ICD-10-CM

## 2022-09-27 DIAGNOSIS — J18.9 PNEUMONIA OF LEFT LOWER LOBE DUE TO INFECTIOUS ORGANISM: ICD-10-CM

## 2022-09-27 DIAGNOSIS — J85.1 ABSCESS OF LOWER LOBE OF LEFT LUNG WITH PNEUMONIA (H): ICD-10-CM

## 2022-09-27 LAB
CRP SERPL-MCNC: 75.7 MG/L
ERYTHROCYTE [SEDIMENTATION RATE] IN BLOOD BY WESTERGREN METHOD: 28 MM/HR (ref 0–15)
GLUCOSE BLDC GLUCOMTR-MCNC: 191 MG/DL (ref 70–99)
GLUCOSE BLDC GLUCOMTR-MCNC: 240 MG/DL (ref 70–99)
GLUCOSE BLDC GLUCOMTR-MCNC: 274 MG/DL (ref 70–99)
HBA1C MFR BLD: 8.2 %
HOLD SPECIMEN: NORMAL
HOLD SPECIMEN: NORMAL

## 2022-09-27 PROCEDURE — 250N000013 HC RX MED GY IP 250 OP 250 PS 637: Performed by: INTERNAL MEDICINE

## 2022-09-27 PROCEDURE — 86606 ASPERGILLUS ANTIBODY: CPT | Performed by: INTERNAL MEDICINE

## 2022-09-27 PROCEDURE — 86612 BLASTOMYCES ANTIBODY: CPT | Performed by: INTERNAL MEDICINE

## 2022-09-27 PROCEDURE — 250N000011 HC RX IP 250 OP 636: Performed by: EMERGENCY MEDICINE

## 2022-09-27 PROCEDURE — 99254 IP/OBS CNSLTJ NEW/EST MOD 60: CPT | Performed by: INTERNAL MEDICINE

## 2022-09-27 PROCEDURE — 85652 RBC SED RATE AUTOMATED: CPT | Performed by: STUDENT IN AN ORGANIZED HEALTH CARE EDUCATION/TRAINING PROGRAM

## 2022-09-27 PROCEDURE — 96365 THER/PROPH/DIAG IV INF INIT: CPT

## 2022-09-27 PROCEDURE — 250N000011 HC RX IP 250 OP 636: Performed by: INTERNAL MEDICINE

## 2022-09-27 PROCEDURE — 99222 1ST HOSP IP/OBS MODERATE 55: CPT | Mod: AI | Performed by: INTERNAL MEDICINE

## 2022-09-27 PROCEDURE — 99207 PR NO BILLABLE SERVICE THIS VISIT: CPT | Performed by: STUDENT IN AN ORGANIZED HEALTH CARE EDUCATION/TRAINING PROGRAM

## 2022-09-27 PROCEDURE — 250N000012 HC RX MED GY IP 250 OP 636 PS 637: Performed by: STUDENT IN AN ORGANIZED HEALTH CARE EDUCATION/TRAINING PROGRAM

## 2022-09-27 PROCEDURE — 36415 COLL VENOUS BLD VENIPUNCTURE: CPT | Performed by: INTERNAL MEDICINE

## 2022-09-27 PROCEDURE — 120N000001 HC R&B MED SURG/OB

## 2022-09-27 PROCEDURE — 258N000003 HC RX IP 258 OP 636: Performed by: EMERGENCY MEDICINE

## 2022-09-27 PROCEDURE — 86481 TB AG RESPONSE T-CELL SUSP: CPT | Performed by: INTERNAL MEDICINE

## 2022-09-27 PROCEDURE — 71250 CT THORAX DX C-: CPT

## 2022-09-27 PROCEDURE — 96366 THER/PROPH/DIAG IV INF ADDON: CPT

## 2022-09-27 PROCEDURE — 86140 C-REACTIVE PROTEIN: CPT | Performed by: STUDENT IN AN ORGANIZED HEALTH CARE EDUCATION/TRAINING PROGRAM

## 2022-09-27 PROCEDURE — 86698 HISTOPLASMA ANTIBODY: CPT | Performed by: INTERNAL MEDICINE

## 2022-09-27 PROCEDURE — 87206 SMEAR FLUORESCENT/ACID STAI: CPT | Performed by: INTERNAL MEDICINE

## 2022-09-27 PROCEDURE — 250N000013 HC RX MED GY IP 250 OP 250 PS 637: Performed by: STUDENT IN AN ORGANIZED HEALTH CARE EDUCATION/TRAINING PROGRAM

## 2022-09-27 RX ORDER — DEXTROSE MONOHYDRATE 25 G/50ML
25-50 INJECTION, SOLUTION INTRAVENOUS
Status: DISCONTINUED | OUTPATIENT
Start: 2022-09-27 | End: 2022-10-02 | Stop reason: HOSPADM

## 2022-09-27 RX ORDER — ACETAMINOPHEN 650 MG/1
650 SUPPOSITORY RECTAL EVERY 6 HOURS PRN
Status: DISCONTINUED | OUTPATIENT
Start: 2022-09-27 | End: 2022-10-02 | Stop reason: HOSPADM

## 2022-09-27 RX ORDER — PIPERACILLIN SODIUM, TAZOBACTAM SODIUM 3; .375 G/15ML; G/15ML
3.38 INJECTION, POWDER, LYOPHILIZED, FOR SOLUTION INTRAVENOUS ONCE
Status: COMPLETED | OUTPATIENT
Start: 2022-09-27 | End: 2022-09-27

## 2022-09-27 RX ORDER — ONDANSETRON 2 MG/ML
4 INJECTION INTRAMUSCULAR; INTRAVENOUS EVERY 6 HOURS PRN
Status: DISCONTINUED | OUTPATIENT
Start: 2022-09-27 | End: 2022-10-02 | Stop reason: HOSPADM

## 2022-09-27 RX ORDER — ACETAMINOPHEN 325 MG/1
650 TABLET ORAL EVERY 6 HOURS PRN
Status: DISCONTINUED | OUTPATIENT
Start: 2022-09-27 | End: 2022-10-02 | Stop reason: HOSPADM

## 2022-09-27 RX ORDER — PANTOPRAZOLE SODIUM 20 MG/1
40 TABLET, DELAYED RELEASE ORAL
Status: DISCONTINUED | OUTPATIENT
Start: 2022-09-28 | End: 2022-10-02 | Stop reason: HOSPADM

## 2022-09-27 RX ORDER — ATORVASTATIN CALCIUM 40 MG/1
40 TABLET, FILM COATED ORAL EVERY EVENING
Status: DISCONTINUED | OUTPATIENT
Start: 2022-09-27 | End: 2022-10-02 | Stop reason: HOSPADM

## 2022-09-27 RX ORDER — OXYCODONE HYDROCHLORIDE 5 MG/1
5 TABLET ORAL EVERY 4 HOURS PRN
Status: DISCONTINUED | OUTPATIENT
Start: 2022-09-27 | End: 2022-10-02 | Stop reason: HOSPADM

## 2022-09-27 RX ORDER — AZITHROMYCIN 250 MG/1
250 TABLET, FILM COATED ORAL DAILY
Status: DISCONTINUED | OUTPATIENT
Start: 2022-09-27 | End: 2022-09-27

## 2022-09-27 RX ORDER — PIPERACILLIN SODIUM, TAZOBACTAM SODIUM 3; .375 G/15ML; G/15ML
3.38 INJECTION, POWDER, LYOPHILIZED, FOR SOLUTION INTRAVENOUS EVERY 8 HOURS
Status: DISCONTINUED | OUTPATIENT
Start: 2022-09-27 | End: 2022-10-01

## 2022-09-27 RX ORDER — NICOTINE POLACRILEX 4 MG
15-30 LOZENGE BUCCAL
Status: DISCONTINUED | OUTPATIENT
Start: 2022-09-27 | End: 2022-10-02 | Stop reason: HOSPADM

## 2022-09-27 RX ORDER — ONDANSETRON 4 MG/1
4 TABLET, ORALLY DISINTEGRATING ORAL EVERY 6 HOURS PRN
Status: DISCONTINUED | OUTPATIENT
Start: 2022-09-27 | End: 2022-10-02 | Stop reason: HOSPADM

## 2022-09-27 RX ORDER — LIDOCAINE 40 MG/G
CREAM TOPICAL
Status: DISCONTINUED | OUTPATIENT
Start: 2022-09-27 | End: 2022-10-02 | Stop reason: HOSPADM

## 2022-09-27 RX ORDER — PIPERACILLIN SODIUM, TAZOBACTAM SODIUM 3; .375 G/15ML; G/15ML
3.38 INJECTION, POWDER, LYOPHILIZED, FOR SOLUTION INTRAVENOUS EVERY 8 HOURS
Status: DISCONTINUED | OUTPATIENT
Start: 2022-09-27 | End: 2022-09-27

## 2022-09-27 RX ADMIN — INSULIN ASPART 3 UNITS: 100 INJECTION, SOLUTION INTRAVENOUS; SUBCUTANEOUS at 17:33

## 2022-09-27 RX ADMIN — CEFTRIAXONE SODIUM 1 G: 1 INJECTION, POWDER, FOR SOLUTION INTRAMUSCULAR; INTRAVENOUS at 00:03

## 2022-09-27 RX ADMIN — AZITHROMYCIN MONOHYDRATE 500 MG: 500 INJECTION, POWDER, LYOPHILIZED, FOR SOLUTION INTRAVENOUS at 00:53

## 2022-09-27 RX ADMIN — ATORVASTATIN CALCIUM 40 MG: 40 TABLET, FILM COATED ORAL at 20:29

## 2022-09-27 RX ADMIN — INSULIN ASPART 2 UNITS: 100 INJECTION, SOLUTION INTRAVENOUS; SUBCUTANEOUS at 22:17

## 2022-09-27 RX ADMIN — ACETAMINOPHEN 650 MG: 325 TABLET, FILM COATED ORAL at 08:58

## 2022-09-27 RX ADMIN — PIPERACILLIN AND TAZOBACTAM 3.38 G: 3; .375 INJECTION, POWDER, LYOPHILIZED, FOR SOLUTION INTRAVENOUS at 16:40

## 2022-09-27 RX ADMIN — PIPERACILLIN AND TAZOBACTAM 3.38 G: 3; .375 INJECTION, POWDER, LYOPHILIZED, FOR SOLUTION INTRAVENOUS at 04:47

## 2022-09-27 RX ADMIN — PIPERACILLIN AND TAZOBACTAM 3.38 G: 3; .375 INJECTION, POWDER, LYOPHILIZED, FOR SOLUTION INTRAVENOUS at 08:38

## 2022-09-27 RX ADMIN — AZITHROMYCIN MONOHYDRATE 250 MG: 250 TABLET ORAL at 08:42

## 2022-09-27 ASSESSMENT — ACTIVITIES OF DAILY LIVING (ADL)
ADLS_ACUITY_SCORE: 35

## 2022-09-27 NOTE — PHARMACY-ADMISSION MEDICATION HISTORY
Pharmacy Note - Admission Medication History    Pertinent Provider Information:     Per clinic records and fill history, it appears the patient is intermittently non-compliant with taking his medications.     ______________________________________________________________________    Prior To Admission (PTA) med list completed and updated in EMR.       PTA Med List   Medication Sig Last Dose     atorvastatin (LIPITOR) 40 MG tablet TAKE 1 TABLET (40 MG TOTAL) BY MOUTH DAILY FOR CHOLESTEROL Past Week at Unknown time     JARDIANCE 25 MG TABS tablet TAKE 1 TABLET (25 MG TOTAL) BY MOUTH DAILY FOR DIABETES Past Week at Unknown time     metFORMIN (GLUCOPHAGE XR) 500 MG 24 hr tablet TAKE 2 TABLETS (1,000 MG TOTAL) BY MOUTH DAILY WITH BREAKFAST FOR DIABETES Past Week at Unknown time     omeprazole (PRILOSEC) 20 MG DR capsule TAKE 1 CAPSULE (20 MG TOTAL) BY MOUTH 2 (TWO) TIMES A DAY BEFORE MEALS Past Week at Unknown time     ONGLYZA 5 MG TABS tablet TAKE 1 TABLET (5 MG TOTAL) BY MOUTH DAILY. Past Week at Unknown time       Information source(s): Patient, Clinic records and [facility]  Method of interview communication: phone  Via     Summary of Changes to PTA Med List  New: none  Discontinued: none  Changed: none    Patient was asked about OTC/herbal products specifically.  PTA med list reflects this.    In the past week, patient estimated taking medication this percent of the time:  less than 50% due to running out and other.    Allergies were reviewed, assessed, and updated with the patient.      Patient did not bring any medications to the hospital and can't retrieve from home. No multi-dose medications are available for use during hospital stay.     The information provided in this note is only as accurate as the sources available at the time of the update(s).    Thank you for the opportunity to participate in the care of this patient.    Felix Black [facility]  9/27/2022 12:27 PM

## 2022-09-27 NOTE — PLAN OF CARE
Goal Outcome Evaluation:        Problem: Plan of Care - These are the overarching goals to be used throughout the patient stay.    Goal: Plan of Care Review/Shift Note  Description: The Plan of Care Review/Shift note should be completed every shift.  The Outcome Evaluation is a brief statement about your assessment that the patient is improving, declining, or no change.  This information will be displayed automatically on your shift note.  Outcome: Ongoing, Progressing     The patient was alert and oriented x4. Pain was tolerable throughout the shift in back. The patient declined pain interventions. The patients family provided food for the patients dinner. The patient is able to move independently in bed. The patients blood glucoses remained in range. Insulin given per orders. Vitals remained within parameters.
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
## 2022-09-27 NOTE — ED TRIAGE NOTES
Patient reports SOB, left sided abdominal pain and left sided back pain that started two days ago. Denies N/V/D.      Triage Assessment     Row Name 09/26/22 1957       Respiratory WDL    Respiratory WDL X   SOB       Cardiac WDL    Cardiac WDL WDL       Cognitive/Neuro/Behavioral WDL    Cognitive/Neuro/Behavioral WDL WDL
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
## 2022-09-28 LAB
ALBUMIN SERPL BCG-MCNC: 3.4 G/DL (ref 3.5–5.2)
ALP SERPL-CCNC: 64 U/L (ref 40–129)
ALT SERPL W P-5'-P-CCNC: 28 U/L (ref 10–50)
ANION GAP SERPL CALCULATED.3IONS-SCNC: 9 MMOL/L (ref 7–15)
AST SERPL W P-5'-P-CCNC: 31 U/L (ref 10–50)
BILIRUB SERPL-MCNC: 0.5 MG/DL
BUN SERPL-MCNC: 7.4 MG/DL (ref 6–20)
CALCIUM SERPL-MCNC: 8.6 MG/DL (ref 8.6–10)
CHLORIDE SERPL-SCNC: 104 MMOL/L (ref 98–107)
CREAT SERPL-MCNC: 0.56 MG/DL (ref 0.67–1.17)
DEPRECATED HCO3 PLAS-SCNC: 23 MMOL/L (ref 22–29)
ERYTHROCYTE [DISTWIDTH] IN BLOOD BY AUTOMATED COUNT: 11.2 % (ref 10–15)
GAMMA INTERFERON BACKGROUND BLD IA-ACNC: 0.3 IU/ML
GFR SERPL CREATININE-BSD FRML MDRD: >90 ML/MIN/1.73M2
GLUCOSE BLDC GLUCOMTR-MCNC: 174 MG/DL (ref 70–99)
GLUCOSE BLDC GLUCOMTR-MCNC: 261 MG/DL (ref 70–99)
GLUCOSE BLDC GLUCOMTR-MCNC: 337 MG/DL (ref 70–99)
GLUCOSE BLDC GLUCOMTR-MCNC: 369 MG/DL (ref 70–99)
GLUCOSE SERPL-MCNC: 194 MG/DL (ref 70–99)
HCT VFR BLD AUTO: 41.3 % (ref 40–53)
HGB BLD-MCNC: 14.5 G/DL (ref 13.3–17.7)
M TB IFN-G BLD-IMP: NEGATIVE
M TB IFN-G CD4+ BCKGRND COR BLD-ACNC: 9.7 IU/ML
MCH RBC QN AUTO: 31.3 PG (ref 26.5–33)
MCHC RBC AUTO-ENTMCNC: 35.1 G/DL (ref 31.5–36.5)
MCV RBC AUTO: 89 FL (ref 78–100)
MITOGEN IGNF BCKGRD COR BLD-ACNC: 0 IU/ML
MITOGEN IGNF BCKGRD COR BLD-ACNC: 0.03 IU/ML
PLATELET # BLD AUTO: 193 10E3/UL (ref 150–450)
POTASSIUM SERPL-SCNC: 3.7 MMOL/L (ref 3.4–5.3)
PROT SERPL-MCNC: 6.5 G/DL (ref 6.4–8.3)
QUANTIFERON MITOGEN: 10 IU/ML
QUANTIFERON NIL TUBE: 0.3 IU/ML
QUANTIFERON TB1 TUBE: 0.3 IU/ML
QUANTIFERON TB2 TUBE: 0.33
RBC # BLD AUTO: 4.63 10E6/UL (ref 4.4–5.9)
SODIUM SERPL-SCNC: 136 MMOL/L (ref 136–145)
WBC # BLD AUTO: 7.9 10E3/UL (ref 4–11)

## 2022-09-28 PROCEDURE — 87077 CULTURE AEROBIC IDENTIFY: CPT | Performed by: INTERNAL MEDICINE

## 2022-09-28 PROCEDURE — 99254 IP/OBS CNSLTJ NEW/EST MOD 60: CPT | Performed by: INTERNAL MEDICINE

## 2022-09-28 PROCEDURE — 87206 SMEAR FLUORESCENT/ACID STAI: CPT | Performed by: INTERNAL MEDICINE

## 2022-09-28 PROCEDURE — 87116 MYCOBACTERIA CULTURE: CPT | Performed by: INTERNAL MEDICINE

## 2022-09-28 PROCEDURE — 99232 SBSQ HOSP IP/OBS MODERATE 35: CPT | Performed by: HOSPITALIST

## 2022-09-28 PROCEDURE — 120N000001 HC R&B MED SURG/OB

## 2022-09-28 PROCEDURE — 258N000003 HC RX IP 258 OP 636: Performed by: INTERNAL MEDICINE

## 2022-09-28 PROCEDURE — 99232 SBSQ HOSP IP/OBS MODERATE 35: CPT | Performed by: INTERNAL MEDICINE

## 2022-09-28 PROCEDURE — 87205 SMEAR GRAM STAIN: CPT | Performed by: INTERNAL MEDICINE

## 2022-09-28 PROCEDURE — 250N000013 HC RX MED GY IP 250 OP 250 PS 637: Performed by: STUDENT IN AN ORGANIZED HEALTH CARE EDUCATION/TRAINING PROGRAM

## 2022-09-28 PROCEDURE — 87556 M.TUBERCULO DNA AMP PROBE: CPT | Performed by: INTERNAL MEDICINE

## 2022-09-28 PROCEDURE — 250N000011 HC RX IP 250 OP 636: Performed by: INTERNAL MEDICINE

## 2022-09-28 PROCEDURE — 36415 COLL VENOUS BLD VENIPUNCTURE: CPT | Performed by: INTERNAL MEDICINE

## 2022-09-28 PROCEDURE — 84132 ASSAY OF SERUM POTASSIUM: CPT | Performed by: INTERNAL MEDICINE

## 2022-09-28 PROCEDURE — 85027 COMPLETE CBC AUTOMATED: CPT | Performed by: INTERNAL MEDICINE

## 2022-09-28 RX ORDER — SODIUM CHLORIDE 9 MG/ML
INJECTION, SOLUTION INTRAVENOUS CONTINUOUS
Status: DISCONTINUED | OUTPATIENT
Start: 2022-09-28 | End: 2022-10-01

## 2022-09-28 RX ORDER — LIDOCAINE 40 MG/G
CREAM TOPICAL
Status: DISCONTINUED | OUTPATIENT
Start: 2022-09-28 | End: 2022-10-02 | Stop reason: HOSPADM

## 2022-09-28 RX ADMIN — INSULIN ASPART 5 UNITS: 100 INJECTION, SOLUTION INTRAVENOUS; SUBCUTANEOUS at 13:31

## 2022-09-28 RX ADMIN — PANTOPRAZOLE SODIUM 40 MG: 20 TABLET, DELAYED RELEASE ORAL at 06:43

## 2022-09-28 RX ADMIN — INSULIN ASPART 1 UNITS: 100 INJECTION, SOLUTION INTRAVENOUS; SUBCUTANEOUS at 08:18

## 2022-09-28 RX ADMIN — PIPERACILLIN AND TAZOBACTAM 3.38 G: 3; .375 INJECTION, POWDER, LYOPHILIZED, FOR SOLUTION INTRAVENOUS at 01:13

## 2022-09-28 RX ADMIN — ATORVASTATIN CALCIUM 40 MG: 40 TABLET, FILM COATED ORAL at 20:25

## 2022-09-28 RX ADMIN — INSULIN ASPART 3 UNITS: 100 INJECTION, SOLUTION INTRAVENOUS; SUBCUTANEOUS at 22:17

## 2022-09-28 RX ADMIN — SODIUM CHLORIDE: 9 INJECTION, SOLUTION INTRAVENOUS at 20:25

## 2022-09-28 RX ADMIN — INSULIN ASPART 3 UNITS: 100 INJECTION, SOLUTION INTRAVENOUS; SUBCUTANEOUS at 17:16

## 2022-09-28 RX ADMIN — PIPERACILLIN AND TAZOBACTAM 3.38 G: 3; .375 INJECTION, POWDER, LYOPHILIZED, FOR SOLUTION INTRAVENOUS at 16:58

## 2022-09-28 RX ADMIN — PIPERACILLIN AND TAZOBACTAM 3.38 G: 3; .375 INJECTION, POWDER, LYOPHILIZED, FOR SOLUTION INTRAVENOUS at 08:18

## 2022-09-28 ASSESSMENT — ACTIVITIES OF DAILY LIVING (ADL)
ADLS_ACUITY_SCORE: 35

## 2022-09-28 NOTE — PROGRESS NOTES
"INFECTIOUS DISEASE FOLLOW UP NOTE      ASSESSMENT:  1. Pulmonary nodules, one in left lower lobe is cavitary. TB possible despite previous treatment for latent TB (reduces risk of active TB 60-70%). Could also be bacterial abscess (poor dentition), suspect this most in that temp and WBC are improved on antibiotic. Without chronic symptoms such as weight loss, night sweats, etc.   2. Treated for latent TB  he recalls with 6 months of medication, presumably INH.  3. Hep B. Not treated that he is aware of.   4. Cirrhosis  5. DM.     PLAN:  Sputum x3 for AFB, one more needed              Check TB PCR on 2 of these  Airborne precautions   Would keep in ER/hospital until we have sputum results  Sputum for bacteria if adequate sample  Zosyn for possible abscess  Check histo, blasto  Likely bronchoscopy tomorrow with Dr. Edith Davila MD  [facility]  [phone] clinic  [employer] paging    ______________________________________________________________________    SUBJECTIVE / INTERVAL HISTORY: feels better, temp better. Discussed results. Decreased pain at back.     ROS: All other systems negative except as listed above.        OBJECTIVE:  /68   Pulse 77   Temp 98  F (36.7  C)   Resp 18   Ht 1.626 m (5' 4\")   Wt 81.6 kg (180 lb)   SpO2 96%   BMI 30.90 kg/m         Vital Signs  Temp: 98  F (36.7  C)  Temp src: Oral  Resp: 18  Pulse: 77  Pulse Rate Source: Monitor  BP: 113/68  BP Location: Left arm    Temp (24hrs), Av.3  F (36.8  C), Min:98  F (36.7  C), Max:99  F (37.2  C)      GEN: No acute distress.    Poor dentition  RESPIRATORY:  Normal breathing pattern.   CARDIOVASCULAR:  Regular rate and rhythm.   ABDOMEN:  Soft, normal bowel sounds, non-tender  EXTREMITIES: No edema.  SKIN/HAIR/NAILS:  No rashes  IV: peripheral        Antibiotics:  pip/tazo 9/26-    Pertinent labs:    Recent Labs   Lab 22  0731 22  8013   WBC 7.9 12.2*   HGB 14.5 18.0*   HCT 41.3 " 50.6    244        Recent Labs   Lab 09/28/22  0731 09/26/22  2103    134*   CO2 23 26   BUN 7.4 8.5        Lab Results   Component Value Date    CRP 75.70 (H) 09/27/2022    CRP 75.60 (H) 09/26/2022    CRP 0.6 02/02/2015         Lab Results   Component Value Date    ALT 28 09/28/2022    AST 31 09/28/2022     (H) 05/10/2021    ALKPHOS 64 09/28/2022         MICROBIOLOGY DATA:  9/26 blood negative to date  Sputum mixed callie on gram stain  QFG negative    RADIOLOGY:  CT Abdomen Pelvis w Contrast    Result Date: 9/26/2022  EXAM: CT ABDOMEN PELVIS W CONTRAST LOCATION: [facility] DATE/TIME: 9/26/2022 10:55 PM INDICATION: abdominal pain COMPARISON: 02/16/2018 TECHNIQUE: CT scan of the abdomen and pelvis was performed following injection of IV contrast. Multiplanar reformats were obtained. Dose reduction techniques were used. CONTRAST: 100ml isovue 370 FINDINGS: LOWER CHEST: 2 x 2.8 cm region of consolidation with central air-fluid collection 1.5 x 1.7 cm and the left lower lobe. Left pleural thickening versus trace effusion. HEPATOBILIARY: The liver is nodular in contour suggesting cirrhosis. PANCREAS: Normal. SPLEEN: Normal. ADRENAL GLANDS: Normal. KIDNEYS/BLADDER: No hydronephrosis. Underdistention bladder. Wall thickening not excluded. BOWEL: Normal caliber. Metallic density in the right lower quadrant, region of the appendix. LYMPH NODES: Normal. VASCULATURE: Unremarkable. PELVIC ORGANS: Normal. MUSCULOSKELETAL: Mild degenerative change osseous structures.     IMPRESSION: 1.  Consolidation left lower lobe with small air-fluid collection could be infection with pulmonary abscess. Follow-up to confirm resolution necessary to exclude necrotic lesion. 2.  Cirrhosis. 3.  Underdistention the bladder. Wall thickening/cystitis not excluded.    Chest CT w/o contrast    Result Date: 9/27/2022  EXAM: CT CHEST W/O CONTRAST LOCATION: [facility] DATE/TIME:  9/27/2022 1:15 AM INDICATION: Cavitary left lower lobe mass on same day CT abdomen/pelvis. COMPARISON: Same day CT abdomen/pelvis. TECHNIQUE: CT chest without IV contrast. Multiplanar reformats were obtained. Dose reduction techniques were used. CONTRAST: None. FINDINGS: Absence of intravenous contrast limits the sensitivity of this examination for detection of infectious/inflammatory change, post traumatic abnormalities, vascular abnormalities, and visceral lesions. LUNGS AND PLEURA: Mild nodular debris within the proximal trachea. Thick-walled, cavitary consolidation within the peripheral left lower lobe, axial image 47, which measures 2.0 x 3.1 cm. There is mild loss opacity, which extends centrally to the left infrahilar region. There is an additional nodule within the inferior aspect of the right upper lobe, axial image 121, measuring 10 x 17 mm, also with mild surrounding groundglass opacity. This nodule is noncavitary. A larger nodule in the right upper lobe, at the apex (image 37), measures 16 x 20 mm. No pneumothorax. Trace left pleural fluid versus thickening.  MEDIASTINUM/AXILLAE: Mild left hilar lymphadenopathy. No mediastinal or right hilar lymphadenopathy. Small amount of fluid within the nondilated distal esophagus.  No axillary lymphadenopathy. Mild gynecomastia. No thoracic aortic aneurysm. Mild atherosclerotic calcification. Heart is normal in size. No pericardial effusion. CORONARY ARTERY CALCIFICATION: None. UPPER ABDOMEN: Cirrhosis. MUSCULOSKELETAL: No suspicious abnormality. OTHER: No additionally suspicious abnormality.     IMPRESSION: 1.  Multifocal pulmonary nodules, as detailed above. Largest is present within the periphery of the left lower lobe and is cavitary. An infectious/inflammatory etiology is favored. Reactivated tuberculosis could be considered, given patient's history of latent tuberculosis. Pulmonology consultation is recommended. Clinical and imaging follow-up to resolution is  recommended, so as to exclude malignancy. 2.  Mild left hilar lymphadenopathy, likely reactive. Attention on follow-up. 3.  Cirrhosis.

## 2022-09-28 NOTE — ED NOTES
"[facility] ED Handoff Report    ED Chief Complaint: shortness of breath.    ED Diagnosis:  (J18.9) Pneumonia of left lower lobe due to infectious organism  Comment: possible small abscess  Plan:     (J85.1) Abscess of lower lobe of left lung with pneumonia (H)  Comment:   Plan:        PMH:    Past Medical History:   Diagnosis Date     Alcohol use disorder, moderate, dependence (H) 3/6/2019     Hepatitis B      TB (tuberculosis)      Type 2 diabetes mellitus without complication, without long-term current use of insulin (H) 11/30/2016        Code Status:  Full Code     Falls Risk: No Band: Not applicable    Current Living Situation/Residence: lives with a significant other     Elimination Status: Continent: Yes     Activity Level: Independent    Patients Preferred Language:  Other: Jenifer.     Needed: Yes    Vital Signs:  /68   Pulse 77   Temp 98  F (36.7  C)   Resp 18   Ht 1.626 m (5' 4\")   Wt 81.6 kg (180 lb)   SpO2 96%   BMI 30.90 kg/m       Pain Score: 1/10    Is the Patient Confused:  No    Last Food or Drink: 09/28/22 at 1700    Focused Assessment:  \"Patient reports SOB, left sided abdominal pain and left sided back pain that started two days ago. Denies N/V/D.\"    Pt being treated for Pneumonia in L lower lobe and abscess. Zosyn Q8. Hx of diabetes. TID and bedtime blood glucose checks. Pt has been needing insulin. Carb count ordered for pt as well. Hx of latent TB pt is on airborne precautions.     Plan is NPO at midnight for bronchoscopy tomorrow by MD around 10:00AM.    Pt still needing sputum for lab. Pt aware of this.     Tests Performed: Done: Labs and Imaging    Family Dynamics/Concerns: No    Family Updated On Visitor Policy: No    Plan of Care Communicated to Family: No    Belongings Checklist Done and Signed by Patient: No    Medications sent with patient: Novolog pen.     Covid: symptomatic, negative    Additional Information:     RN: Mary Garrido RN    9/28/2022 5:46 PM "

## 2022-09-28 NOTE — PROGRESS NOTES
Hospitalist Progress Note        CODE STATUS:  Full Code    Assessment/Plan:  Reinaldo Real is a 37 year old male with PMH of DM-II, hyperlipidemia, HepB infection, latent TB who presented to our ED for evaluation of left sided rib cage pain.      Cavitary pneumonia  Possible lung abscess  Multiple pulmonary nodules  History of latent tuberculosis, 2013  - CT abd/pelvis showed a consolidation in the left lower lobe with small air-fluid collection.   - CT chest with multifocal pulmonary nodules, largest in the periphery of left lower lung cavitary  - Symptomsx 3 days. Lung abscess in this short duration is uncommon unless he had pre-existing cavitary lung disease.  No associated symptoms of weight loss, night sweats, fevers and chills.  - Sputum for AFBs ordered x3, follow up results  - Sed rate-28, CRP-76, QuantiFERON gold-pending  - Infectious disease consulted & following  - Pulmonology consulted  - Given IV rocephin and zithromax in ED. Changed to IV zosyn for anaerobic coverage. Will continue     Fever/leucocysis  SIRS  - Due to above     DM-II  - Hemoglobin A1c 8.2.    - Sliding scale insulin while here  - Hold PTA meds     Hyperlipidemia:  - Cont statin     Mild hyponatremia  - Resolved w/ IVF    Hepatitis B infection, unclear treatment status  Cirrhosis  - Per CT liver is nodular in contour suggesting cirrhosis.  - ALT/AST/ALP within normal limits  - Will need outpatient follow-up with GI/hepatology    DVT Prophylaxis: SCDs    Disposition/Barrier to discharge: Pending AFB smears/cultures      Subjective:  Interval History:   Patient seen and examined.   Continues to have intermittent pleuritic left sided pain.     Review of Systems:   As mentioned in subjective.    Patient Active Problem List   Diagnosis     TB lung, latent     Hepatitis B infection     Immune to hepatitis A     Perianal abscess     External hemorrhoids     Loose stools     Left-sided low back pain without sciatica     Screening for depression      Type 2 diabetes mellitus without complication, without long-term current use of insulin (H)     Hepatic cirrhosis (H)     Metabolic syndrome     Alcohol use disorder, moderate, dependence (H)     Helicobacter pylori infection     Pneumonia of left lower lobe due to infectious organism     Abscess of lower lobe of left lung with pneumonia (H)       Scheduled Meds:    atorvastatin  40 mg Oral QPM     insulin aspart  1-7 Units Subcutaneous TID AC     insulin aspart  1-5 Units Subcutaneous At Bedtime     pantoprazole  40 mg Oral QAM AC     piperacillin-tazobactam  3.375 g Intravenous Q8H     sodium chloride (PF)  3 mL Intracatheter Q8H     Continuous Infusions:  PRN Meds:.acetaminophen **OR** acetaminophen, glucose **OR** dextrose **OR** glucagon, lidocaine 4%, lidocaine (buffered or not buffered), melatonin, ondansetron **OR** ondansetron, oxyCODONE, sodium chloride (PF)    Objective:  Vital signs in last 24 hours:  Temp:  [98  F (36.7  C)-99  F (37.2  C)] 98  F (36.7  C)  Pulse:  [71-85] 71  Resp:  [16-20] 20  BP: ()/(58-84) 101/62  SpO2:  [98 %-99 %] 99 %        Intake/Output Summary (Last 24 hours) at 9/28/2022 0715  Last data filed at 9/28/2022 0100  Gross per 24 hour   Intake 1120 ml   Output 2050 ml   Net -930 ml       Physical Exam:  General: In NAD.  HEENT: NCAT & EOMI  CV: Normal S1S2, regular rhythm, normal rate  Lungs: CTAB  Abdomen: Soft, NT, ND, +BS  Extremities: No LE edema b/l  Neuro: AAOx3  Psych: Normal Affect.     Lab Results:    Recent Results (from the past 24 hour(s))   Extra Red Top Tube    Collection Time: 09/27/22  7:19 AM   Result Value Ref Range    Hold Specimen JIC    Extra Purple Top Tube    Collection Time: 09/27/22  7:19 AM   Result Value Ref Range    Hold Specimen JIC    CRP inflammation    Collection Time: 09/27/22  7:19 AM   Result Value Ref Range    CRP Inflammation 75.70 (H) <5.00 mg/L   Erythrocyte sedimentation rate auto    Collection Time: 09/27/22  7:19 AM   Result Value  Ref Range    Erythrocyte Sedimentation Rate 28 (H) 0 - 15 mm/hr   Glucose by meter    Collection Time: 09/27/22  9:01 AM   Result Value Ref Range    GLUCOSE BY METER POCT 191 (H) 70 - 99 mg/dL   Glucose by meter    Collection Time: 09/27/22  5:05 PM   Result Value Ref Range    GLUCOSE BY METER POCT 240 (H) 70 - 99 mg/dL   Glucose by meter    Collection Time: 09/27/22 10:15 PM   Result Value Ref Range    GLUCOSE BY METER POCT 274 (H) 70 - 99 mg/dL       Serum Glucose range:   Recent Labs   Lab 09/27/22  2215 09/27/22  1705 09/27/22  0901 09/26/22 2103   * 240* 191* 195*     ABG: No lab results found in last 7 days.  CBC:   Recent Labs   Lab 09/26/22 2103   WBC 12.2*   HGB 18.0*   HCT 50.6   MCV 89      NEUTROPHIL 73   LYMPH 16   MONOCYTE 9   EOSINOPHIL 1     Chemistry:   Recent Labs   Lab 09/26/22 2103   *   POTASSIUM 4.2   CHLORIDE 95*   CO2 26   BUN 8.5   CR 0.78   GFRESTIMATED >90   RACHEL 9.9   MAG 2.2   PROTTOTAL 8.6*   ALBUMIN 4.6   AST 38   ALT 46   ALKPHOS 112   BILITOTAL 1.0     Coags:  Recent Labs   Lab 09/26/22 2103   INR 1.04     Cardiac Markers:  No results for input(s): CKTOTAL, TROPONINI in the last 168 hours.         09/28/2022   Grazyna Bush MD  Hospitalist  Pager: [phone]

## 2022-09-28 NOTE — CONSULTS
PULMONARY / CRITICAL CARE CONSULT NOTE    Date / Time of Admission:  9/27/2022  1:04 AM    Assessment:     Reinaldo Real is a 37 year old male with history of DM-II, hyperlipidemia, HepB infection, latent TB who presented to our ED on 9/26 for evaluation of left sided rib cage for three days, reports dry cough, fever, no hemoptysis. No night sweats, weight loss.   Has been smoking since age 18 smokes 1 cigarette/day  Chest CT shows bilateral lung nodules, cavitary lesion in LLL.   Patient reports being treated for latent TB on 2013. (+) exposure to person with pulmonary TB on 2019. 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   Pulmonary consulted.     1. Bilateral pulmonary nodules with cavitary LLL nodular lesion  Patient was previously treated for latent TB. Has been exposed to person with pulmonary TB on 2019. 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   Denies systemic symptoms. No hemoptysis.   Admission TB quantiferon is negative.   AFB in sputum were ordered, and results are pending.   Started on broad Abx for pneumonia and lung abscess formation per ID recommendation.   Plan for diagnostic bronchoscopy. Get antifungal titers.   2. S/p treatment of latent TB    Advance Directives: Full code    Plan:   1. NPO after midnight  2. Diagnotic bronchoscopy in AM  3. Get antifungal titers  4. Follow up blood cultures and AFB in sputum   5. Continue antibiotics per ID recommendation , currently on zosyn  6. DVT prophylaxis SCDs.     Please contact me if you have any questions.      Rey Keller  Pulmonary / Critical Care  09/28/2022  1:49 PM      Reason for consult : bilateral lung nodules, cavitary LLL nodule    HPI:  Reinaldo Real is a 37 year old male with history of DM-II, hyperlipidemia, HepB infection, latent TB who presented to our ED on 9/26 for evaluation of left sided rib cage for three days, reports dry cough, fever, no hemoptysis. No night sweats, weight loss.   Has been smoking since age 18 smokes 1  cigarette/day  Chest CT shows bilateral lung nodules, cavitary lesion in LLL.   Patient reports being treated for latent TB on 2013. (+) exposure to person with pulmonary TB on 2019. 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   Pulmonary consulted.     Past Medical History:   Diagnosis Date     Alcohol use disorder, moderate, dependence (H) 3/6/2019     Hepatitis B      TB (tuberculosis)      Type 2 diabetes mellitus without complication, without long-term current use of insulin (H) 11/30/2016     Allergies: Nkda [no known drug allergies]     MEDS:  Current Facility-Administered Medications   Medication     acetaminophen (TYLENOL) tablet 650 mg    Or     acetaminophen (TYLENOL) Suppository 650 mg     atorvastatin (LIPITOR) tablet 40 mg     glucose gel 15-30 g    Or     dextrose 50 % injection 25-50 mL    Or     glucagon injection 1 mg     insulin aspart (NovoLOG) injection (RAPID ACTING)     insulin aspart (NovoLOG) injection (RAPID ACTING)     lidocaine (LMX4) cream     lidocaine 1 % 0.1-1 mL     melatonin tablet 1 mg     ondansetron (ZOFRAN ODT) ODT tab 4 mg    Or     ondansetron (ZOFRAN) injection 4 mg     oxyCODONE (ROXICODONE) tablet 5 mg     pantoprazole (PROTONIX) EC tablet 40 mg     piperacillin-tazobactam (ZOSYN) 3.375 g vial to attach to  mL bag     sodium chloride (PF) 0.9% PF flush 3 mL     sodium chloride (PF) 0.9% PF flush 3 mL     Current Outpatient Medications   Medication     atorvastatin (LIPITOR) 40 MG tablet     JARDIANCE 25 MG TABS tablet     metFORMIN (GLUCOPHAGE XR) 500 MG 24 hr tablet     omeprazole (PRILOSEC) 20 MG DR capsule     ONGLYZA 5 MG TABS tablet     blood glucose (NO BRAND SPECIFIED) lancing device     blood glucose monitoring (ACCU-CHEK MULTICLIX) lancets     blood glucose monitoring (NO BRAND SPECIFIED) meter device kit     blood glucose monitoring (NO BRAND SPECIFIED) test strip     Social History     Socioeconomic History     Marital status:      Spouse name: Not on file      "Number of children: Not on file     Years of education: Not on file     Highest education level: Not on file   Occupational History     Not on file   Tobacco Use     Smoking status: Current Every Day Smoker     Packs/day: 0.20     Types: Cigarettes, Cigarettes     Last attempt to quit: 2013     Years since quittin.8     Smokeless tobacco: Current User     Types: Chew, Chew     Tobacco comment: smokes 0-1/day--Chew betel nut; pt states he started smoking at 12 yo   Substance and Sexual Activity     Alcohol use: No     Comment: Alcoholic Drinks/day: pt states he haven't had a drink since January     Drug use: No     Sexual activity: Not on file   Other Topics Concern     Not on file   Social History Narrative    The patient works as a PCA at [employer].     Social Determinants of Health     Financial Resource Strain: Not on file   Food Insecurity: Not on file   Transportation Needs: Not on file   Physical Activity: Not on file   Stress: Not on file   Social Connections: Not on file   Intimate Partner Violence: Not on file   Housing Stability: Not on file     Family History   Problem Relation Age of Onset     Coronary Artery Disease Mother      Hypertension Mother      Diabetes No family hx of      ROS  - Twelve point review of systems were discussed with patient, positive findings in HPI.     Objective:   VITALS:  /68   Pulse 77   Temp 98  F (36.7  C)   Resp 18   Ht 1.626 m (5' 4\")   Wt 81.6 kg (180 lb)   SpO2 96%   BMI 30.90 kg/m    VENT:  Resp: 18    EXAM:   Gen: awake, alert, no distress  HEENT: pink conjunctiva, moist mucosa, Mallampati II/IV  Neck: no thyromegaly, masses or JVD  Lungs: clear  CV: regular, no murmurs or gallops appreciated  Abdomen: soft, NT, BS wnl  Ext: no edema  Neuro: CN II-XII intact, non focal       Data Review:  Recent Labs   Lab 22  1305 22  0816 22  0731 22  2215 22  1705 22  0901   * 174* 194* 274* 240* 191*      " 09/28/22 07:31   Sodium 136   Potassium 3.7   Chloride 104   Carbon Dioxide (CO2) 23   Urea Nitrogen 7.4   Creatinine 0.56 (L)   GFR Estimate >90   Calcium 8.6   Anion Gap 9   Albumin 3.4 (L)   Protein Total 6.5   Alkaline Phosphatase 64   ALT 28   AST 31   Bilirubin Total 0.5   Glucose 194 (H)   WBC 7.9   Hemoglobin 14.5   Hematocrit 41.3   Platelet Count 193   RBC Count 4.63   MCV 89   MCH 31.3   MCHC 35.1   RDW 11.2     Quantiferon-TB Gold Plus Negative Negative      CT CHEST W/O CONTRAST  LOCATION: [facility]  DATE/TIME: 9/27/2022 1:15 AM  INDICATION: Cavitary left lower lobe mass on same day CT abdomen/pelvis.  COMPARISON: Same day CT abdomen/pelvis.  TECHNIQUE: CT chest without IV contrast. Multiplanar reformats were obtained. Dose reduction techniques were used.  CONTRAST: None.  FINDINGS: Absence of intravenous contrast limits the sensitivity of this examination for detection of infectious/inflammatory change, post traumatic abnormalities, vascular abnormalities, and visceral lesions.  LUNGS AND PLEURA: Mild nodular debris within the proximal trachea. Thick-walled, cavitary consolidation within the peripheral left lower lobe, axial image 47, which measures 2.0 x 3.1 cm. There is mild loss opacity, which extends centrally to the left infrahilar region.   There is an additional nodule within the inferior aspect of the right upper lobe, axial image 121, measuring 10 x 17 mm, also with mild surrounding groundglass opacity. This nodule is noncavitary. A larger nodule in the right upper lobe, at the apex (image 37), measures 16 x 20 mm.  No pneumothorax.  Trace left pleural fluid versus thickening.  MEDIASTINUM/AXILLAE: Mild left hilar lymphadenopathy. No mediastinal or right hilar lymphadenopathy.  Small amount of fluid within the nondilated distal esophagus.    No axillary lymphadenopathy.  Mild gynecomastia.  No thoracic aortic aneurysm. Mild atherosclerotic calcification.  Heart is  normal in size. No pericardial effusion.  CORONARY ARTERY CALCIFICATION: None.  UPPER ABDOMEN: Cirrhosis.  MUSCULOSKELETAL: No suspicious abnormality.  OTHER: No additionally suspicious abnormality.      IMPRESSION:  1.  Multifocal pulmonary nodules, as detailed above. Largest is present within the periphery of the left lower lobe and is cavitary. An infectious/inflammatory etiology is favored. Reactivated tuberculosis could be considered, given patient's history of latent tuberculosis. Pulmonology consultation is recommended. Clinical and imaging follow-up to resolution is recommended, so as to exclude malignancy.  2.  Mild left hilar lymphadenopathy, likely reactive. Attention on follow-up.  3.  Cirrhosis.    By:  Rey Keller MD, 09/28/2022  1:49 PM    Primary Care Physician:  Cassy Berrios

## 2022-09-28 NOTE — PLAN OF CARE
Goal Outcome Evaluation:  Complains of 4/10 pain in the chest, burning in nature, refused medication or other interventions. Maintained airborne precautions. Voided independently using the urinal with no reports of pain.

## 2022-09-29 ENCOUNTER — APPOINTMENT (OUTPATIENT)
Dept: CARDIOLOGY | Facility: HOSPITAL | Age: 37
End: 2022-09-29
Attending: INTERNAL MEDICINE
Payer: COMMERCIAL

## 2022-09-29 LAB
% LINING CELLS, BODY FLUID: 7 %
ANION GAP SERPL CALCULATED.3IONS-SCNC: 11 MMOL/L (ref 7–15)
ANNOTATION COMMENT IMP: NOT DETECTED
APPEARANCE FLD: ABNORMAL
BUN SERPL-MCNC: 10.8 MG/DL (ref 6–20)
CALCIUM SERPL-MCNC: 8.6 MG/DL (ref 8.6–10)
CELL COUNT BODY FLUID SOURCE: ABNORMAL
CHLORIDE SERPL-SCNC: 103 MMOL/L (ref 98–107)
COLOR FLD: ABNORMAL
CREAT SERPL-MCNC: 0.55 MG/DL (ref 0.67–1.17)
DEPRECATED HCO3 PLAS-SCNC: 23 MMOL/L (ref 22–29)
DEPRECATED M TB RPOB XXX QL NAA+PROBE: NORMAL
GFR SERPL CREATININE-BSD FRML MDRD: >90 ML/MIN/1.73M2
GLUCOSE BLDC GLUCOMTR-MCNC: 168 MG/DL (ref 70–99)
GLUCOSE BLDC GLUCOMTR-MCNC: 213 MG/DL (ref 70–99)
GLUCOSE BLDC GLUCOMTR-MCNC: 253 MG/DL (ref 70–99)
GLUCOSE BLDC GLUCOMTR-MCNC: 275 MG/DL (ref 70–99)
GLUCOSE BLDC GLUCOMTR-MCNC: 307 MG/DL (ref 70–99)
GLUCOSE SERPL-MCNC: 203 MG/DL (ref 70–99)
GRAM STAIN RESULT: NORMAL
HOLD SPECIMEN: NORMAL
KOH PREPARATION: NORMAL
LYMPHOCYTES NFR FLD MANUAL: 7 %
M TB DNA SPEC QL NAA+PROBE: NOT DETECTED
MONOS+MACROS NFR FLD MANUAL: 83 %
NEUTS BAND NFR FLD MANUAL: 3 %
POTASSIUM SERPL-SCNC: 3.7 MMOL/L (ref 3.4–5.3)
RBC # FLD: 1484 /UL
SODIUM SERPL-SCNC: 137 MMOL/L (ref 136–145)
WBC # FLD AUTO: 205 /UL

## 2022-09-29 PROCEDURE — 88305 TISSUE EXAM BY PATHOLOGIST: CPT | Mod: 26 | Performed by: PATHOLOGY

## 2022-09-29 PROCEDURE — 31624 DX BRONCHOSCOPE/LAVAGE: CPT | Performed by: INTERNAL MEDICINE

## 2022-09-29 PROCEDURE — 87070 CULTURE OTHR SPECIMN AEROBIC: CPT | Performed by: INTERNAL MEDICINE

## 2022-09-29 PROCEDURE — 31624 DX BRONCHOSCOPE/LAVAGE: CPT

## 2022-09-29 PROCEDURE — 250N000013 HC RX MED GY IP 250 OP 250 PS 637: Performed by: HOSPITALIST

## 2022-09-29 PROCEDURE — 999N000157 HC STATISTIC RCP TIME EA 10 MIN

## 2022-09-29 PROCEDURE — 250N000011 HC RX IP 250 OP 636: Performed by: INTERNAL MEDICINE

## 2022-09-29 PROCEDURE — 87385 HISTOPLASMA CAPSUL AG IA: CPT | Performed by: INTERNAL MEDICINE

## 2022-09-29 PROCEDURE — 99207 PR NO BILLABLE SERVICE THIS VISIT: CPT | Performed by: INTERNAL MEDICINE

## 2022-09-29 PROCEDURE — 120N000001 HC R&B MED SURG/OB

## 2022-09-29 PROCEDURE — 250N000013 HC RX MED GY IP 250 OP 250 PS 637: Performed by: STUDENT IN AN ORGANIZED HEALTH CARE EDUCATION/TRAINING PROGRAM

## 2022-09-29 PROCEDURE — 87102 FUNGUS ISOLATION CULTURE: CPT | Performed by: INTERNAL MEDICINE

## 2022-09-29 PROCEDURE — 250N000012 HC RX MED GY IP 250 OP 636 PS 637: Performed by: HOSPITALIST

## 2022-09-29 PROCEDURE — 36415 COLL VENOUS BLD VENIPUNCTURE: CPT | Performed by: HOSPITALIST

## 2022-09-29 PROCEDURE — 87081 CULTURE SCREEN ONLY: CPT | Performed by: INTERNAL MEDICINE

## 2022-09-29 PROCEDURE — 99233 SBSQ HOSP IP/OBS HIGH 50: CPT | Mod: 25 | Performed by: INTERNAL MEDICINE

## 2022-09-29 PROCEDURE — 88108 CYTOPATH CONCENTRATE TECH: CPT | Mod: 26 | Performed by: PATHOLOGY

## 2022-09-29 PROCEDURE — 87210 SMEAR WET MOUNT SALINE/INK: CPT | Performed by: INTERNAL MEDICINE

## 2022-09-29 PROCEDURE — 250N000013 HC RX MED GY IP 250 OP 250 PS 637: Performed by: INTERNAL MEDICINE

## 2022-09-29 PROCEDURE — 99207 PR NO CHARGE LOS: CPT | Performed by: INTERNAL MEDICINE

## 2022-09-29 PROCEDURE — 99232 SBSQ HOSP IP/OBS MODERATE 35: CPT | Performed by: HOSPITALIST

## 2022-09-29 PROCEDURE — 87556 M.TUBERCULO DNA AMP PROBE: CPT | Performed by: INTERNAL MEDICINE

## 2022-09-29 PROCEDURE — 87798 DETECT AGENT NOS DNA AMP: CPT | Performed by: INTERNAL MEDICINE

## 2022-09-29 PROCEDURE — 0B9J8ZX DRAINAGE OF LEFT LOWER LUNG LOBE, VIA NATURAL OR ARTIFICIAL OPENING ENDOSCOPIC, DIAGNOSTIC: ICD-10-PCS | Performed by: INTERNAL MEDICINE

## 2022-09-29 PROCEDURE — 88305 TISSUE EXAM BY PATHOLOGIST: CPT | Mod: TC | Performed by: INTERNAL MEDICINE

## 2022-09-29 PROCEDURE — 87206 SMEAR FLUORESCENT/ACID STAI: CPT | Performed by: INTERNAL MEDICINE

## 2022-09-29 PROCEDURE — 0B9C8ZX DRAINAGE OF RIGHT UPPER LUNG LOBE, VIA NATURAL OR ARTIFICIAL OPENING ENDOSCOPIC, DIAGNOSTIC: ICD-10-PCS | Performed by: INTERNAL MEDICINE

## 2022-09-29 PROCEDURE — 250N000009 HC RX 250: Performed by: INTERNAL MEDICINE

## 2022-09-29 PROCEDURE — 80048 BASIC METABOLIC PNL TOTAL CA: CPT | Performed by: HOSPITALIST

## 2022-09-29 PROCEDURE — 87205 SMEAR GRAM STAIN: CPT | Performed by: INTERNAL MEDICINE

## 2022-09-29 PROCEDURE — 87116 MYCOBACTERIA CULTURE: CPT | Performed by: INTERNAL MEDICINE

## 2022-09-29 PROCEDURE — 87305 ASPERGILLUS AG IA: CPT | Performed by: INTERNAL MEDICINE

## 2022-09-29 PROCEDURE — 89051 BODY FLUID CELL COUNT: CPT | Performed by: INTERNAL MEDICINE

## 2022-09-29 RX ORDER — FLUMAZENIL 0.1 MG/ML
0.2 INJECTION, SOLUTION INTRAVENOUS
Status: ACTIVE | OUTPATIENT
Start: 2022-09-29 | End: 2022-09-29

## 2022-09-29 RX ORDER — ONDANSETRON 4 MG/1
4 TABLET, ORALLY DISINTEGRATING ORAL EVERY 6 HOURS PRN
Status: DISCONTINUED | OUTPATIENT
Start: 2022-09-29 | End: 2022-10-02 | Stop reason: HOSPADM

## 2022-09-29 RX ORDER — NALOXONE HYDROCHLORIDE 0.4 MG/ML
0.4 INJECTION, SOLUTION INTRAMUSCULAR; INTRAVENOUS; SUBCUTANEOUS
Status: DISCONTINUED | OUTPATIENT
Start: 2022-09-29 | End: 2022-10-02 | Stop reason: HOSPADM

## 2022-09-29 RX ORDER — ONDANSETRON 2 MG/ML
4 INJECTION INTRAMUSCULAR; INTRAVENOUS EVERY 6 HOURS PRN
Status: DISCONTINUED | OUTPATIENT
Start: 2022-09-29 | End: 2022-10-02 | Stop reason: HOSPADM

## 2022-09-29 RX ORDER — DIPHENHYDRAMINE HYDROCHLORIDE 50 MG/ML
INJECTION INTRAMUSCULAR; INTRAVENOUS
Status: COMPLETED | OUTPATIENT
Start: 2022-09-29 | End: 2022-09-29

## 2022-09-29 RX ORDER — NALOXONE HYDROCHLORIDE 0.4 MG/ML
0.2 INJECTION, SOLUTION INTRAMUSCULAR; INTRAVENOUS; SUBCUTANEOUS
Status: DISCONTINUED | OUTPATIENT
Start: 2022-09-29 | End: 2022-10-02 | Stop reason: HOSPADM

## 2022-09-29 RX ORDER — FENTANYL CITRATE 50 UG/ML
INJECTION, SOLUTION INTRAMUSCULAR; INTRAVENOUS
Status: COMPLETED | OUTPATIENT
Start: 2022-09-29 | End: 2022-09-29

## 2022-09-29 RX ORDER — LIDOCAINE HYDROCHLORIDE 40 MG/ML
INJECTION, SOLUTION RETROBULBAR
Status: COMPLETED | OUTPATIENT
Start: 2022-09-29 | End: 2022-09-29

## 2022-09-29 RX ADMIN — INSULIN ASPART 1 UNITS: 100 INJECTION, SOLUTION INTRAVENOUS; SUBCUTANEOUS at 13:50

## 2022-09-29 RX ADMIN — PIPERACILLIN AND TAZOBACTAM 3.38 G: 3; .375 INJECTION, POWDER, LYOPHILIZED, FOR SOLUTION INTRAVENOUS at 00:05

## 2022-09-29 RX ADMIN — LIDOCAINE HYDROCHLORIDE 4 ML: 10 INJECTION, SOLUTION EPIDURAL; INFILTRATION; INTRACAUDAL; PERINEURAL at 10:54

## 2022-09-29 RX ADMIN — MIDAZOLAM 1 MG: 1 INJECTION INTRAMUSCULAR; INTRAVENOUS at 10:56

## 2022-09-29 RX ADMIN — FENTANYL CITRATE 25 MCG: 50 INJECTION, SOLUTION INTRAMUSCULAR; INTRAVENOUS at 10:52

## 2022-09-29 RX ADMIN — INSULIN ASPART 3 UNITS: 100 INJECTION, SOLUTION INTRAVENOUS; SUBCUTANEOUS at 20:46

## 2022-09-29 RX ADMIN — ATORVASTATIN CALCIUM 40 MG: 40 TABLET, FILM COATED ORAL at 20:45

## 2022-09-29 RX ADMIN — DIPHENHYDRAMINE HYDROCHLORIDE 50 MG: 50 INJECTION, SOLUTION INTRAMUSCULAR; INTRAVENOUS at 10:51

## 2022-09-29 RX ADMIN — FENTANYL CITRATE 50 MCG: 50 INJECTION, SOLUTION INTRAMUSCULAR; INTRAVENOUS at 10:45

## 2022-09-29 RX ADMIN — PIPERACILLIN AND TAZOBACTAM 3.38 G: 3; .375 INJECTION, POWDER, LYOPHILIZED, FOR SOLUTION INTRAVENOUS at 08:12

## 2022-09-29 RX ADMIN — PIPERACILLIN AND TAZOBACTAM 3.38 G: 3; .375 INJECTION, POWDER, LYOPHILIZED, FOR SOLUTION INTRAVENOUS at 17:49

## 2022-09-29 RX ADMIN — LIDOCAINE HYDROCHLORIDE 8 ML: 40 INJECTION, SOLUTION RETROBULBAR; TOPICAL at 10:53

## 2022-09-29 RX ADMIN — BENZOCAINE 2 SPRAY: 220 SPRAY, METERED PERIODONTAL at 10:41

## 2022-09-29 RX ADMIN — INSULIN ASPART 3 UNITS: 100 INJECTION, SOLUTION INTRAVENOUS; SUBCUTANEOUS at 17:49

## 2022-09-29 RX ADMIN — PANTOPRAZOLE SODIUM 40 MG: 20 TABLET, DELAYED RELEASE ORAL at 13:49

## 2022-09-29 RX ADMIN — INSULIN GLARGINE 8 UNITS: 100 INJECTION, SOLUTION SUBCUTANEOUS at 20:46

## 2022-09-29 RX ADMIN — ACETAMINOPHEN 650 MG: 325 TABLET, FILM COATED ORAL at 16:54

## 2022-09-29 RX ADMIN — LIDOCAINE HYDROCHLORIDE 15 ML: 40 INJECTION, SOLUTION RETROBULBAR; TOPICAL at 10:36

## 2022-09-29 RX ADMIN — EMPAGLIFLOZIN 25 MG: 25 TABLET, FILM COATED ORAL at 13:50

## 2022-09-29 RX ADMIN — MIDAZOLAM 1 MG: 1 INJECTION INTRAMUSCULAR; INTRAVENOUS at 10:51

## 2022-09-29 RX ADMIN — INSULIN ASPART 2 UNITS: 100 INJECTION, SOLUTION INTRAVENOUS; SUBCUTANEOUS at 08:12

## 2022-09-29 RX ADMIN — MIDAZOLAM 2 MG: 1 INJECTION INTRAMUSCULAR; INTRAVENOUS at 10:44

## 2022-09-29 ASSESSMENT — ACTIVITIES OF DAILY LIVING (ADL)
ADLS_ACUITY_SCORE: 35

## 2022-09-29 NOTE — PROCEDURES
FIBEROPTIC BRONCHOSCOPY PROCEDURE NOTE     Date of Procedure: 09/29/2022  Performing Physician: Rey Keller MD  Pre-Procedure Diagnosis:     1. Lung nodules and cavitary lung lesion LLL  Post-Procedure Diagnosis:    1. Normal vocal cord structure and function  2. Mild erythematous airway mucosa  3. No endobronchial lesions.     Procedure:  Diagnostic Flexible Fiberoptic Bronchoscopy   Indications:  Reinaldo Real is a 37 year old male with history of DM, treated for latent TB.   Chest CT shows bilateral lung nodules, cavitary lesion in LLL.   (+) exposure to person with pulmonary TB on 2019. 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   A diagnostic bronchoscopy is indicated.     Preop evaluation:  Procedure:  Intravenous Sedation.    Expected level:  Moderate sedation  ASA Class: 2/6  Mallampati:  III.  Anesthesia:  3 mg Versed IV,  75 mcg fentanyl, 50 mg benadryl , 8  ml of 4% Xylocaine, 6  ml of 1% Xylocaine.  Specimen:    Cultures were sent for bacterial, fungal, mycobacteria, nocardia/actinomyces, aspergillus antigen. Also cytology was sent.   1. BAL lateral segment of LLL  2. BAL anterior and apical segment of RUL      Estimated Blood Loss:  0 ml  Complications:  No complications    Findings:  Vocal Cords  Normal    Trachea  Normal    Lorna  Normal    Right Bronchial Tree  ,  erythematous mucosa, no significant bronchial secretions, no endobronchial lesions.  Left Bronchial Tree  , erythematous mucosa, no significant bronchial secretions, no endobronchial lesions.    Procedure Details:   The patient was seen and the risks, benefits, complications, treatment options, and expected outcomes were discussed with PATIENT, , WIFE, MEDICAL POWER OF , SON and DAUGHTER   . The risks and potential complications of their problem and proposed treatment include but are not limited to infection, bleeding, pain, adverse drug reaction, pulmonary aspiration, the need for additional procedures, failure to diagnose  a condition, creating a complication requiring transfusion or operation, and complication secondary to the anesthetic.  The patient/alternate (see above) concurred with the proposed plan, giving informed consent.  The patient was identified as Reinaldo Real with Date of Birth 1985 and the procedure verified as Diagnostic Flexible Fiberoptic Bronchoscopy .  A Time Out was held and the above information confirmed.      After application of topical nasopharyngeal anesthesia, the patient was placed in the supine position and the bronchoscope was passed through the mouth .  The vocal cords were visualized . The cord findings are noted above.  The scope was then passed into the trachea  Careful inspection of the tracheal lumen was accomplished. The scope was sequentially passed into the left main and then left upper and lower bronchi and segmental bronchi.   Findings and specimen details recorded above.  The scope was then withdrawn and advanced into the right main bronchus and then into the RUL, RML, and RLL bronchi and segmental bronchi.   Findings and specimen details recorded above.     Additional Procedures: BAL LLL and RUL     The patient tolerated the procedure well.      Rey Keller MD, 09/29/2022 4:43 PM      Referring Physician: * No referring provider recorded for this case *  Attending Physician: Emily Carr*  Primary Care Physician: Cassy Berrios

## 2022-09-29 NOTE — PROGRESS NOTES
PULMONARY / CRITICAL CARE FOLLOW UP NOTE    Date / Time of Admission:  9/27/2022  1:04 AM    Assessment:     Reinaldo Real is a 37 year old male with history of DM-II, hyperlipidemia, HepB infection, latent TB who presented to our ED on 9/26 for evaluation of left sided rib cage for three days, reports dry cough, fever, no hemoptysis. No night sweats, weight loss.   Has been smoking since age 18 smokes 1 cigarette/day  Chest CT shows bilateral lung nodules, cavitary lesion in LLL.   Patient reports being treated for latent TB on 2013. (+) exposure to person with pulmonary TB on 2019. 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     1. Bilateral pulmonary nodules, cavitary LLL nodular lesion  Patient was previously treated for latent TB. Has been exposed to person with pulmonary TB on 2019. 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   Denies systemic symptoms. No hemoptysis.   Blood cultures are negative.   Admission TB quantiferon is negative.   AFB in sputum were ordered, and results are pending. Antifungal titers pending.  Sputum Cx growing klebsiella, cavitary lesion likely lung abscess formation, on Abx per ID recommendation.  S/p diagnostic bronchoscopy, mild erythematous mucosa, no endobronchial lesions. BAL lateral segment of LLL (cavitary lung nodular lesion) and BAL apical and anterior segment of RUL (lung nodules). Samples were sent for cultures and cytology.   2. Klebsiella pneumonia with lung abscess formation   Continue Abx per ID  3. S/p treatment latent TB    Advance Directives: Full code    Plan:   1. Monitor blood cultures  2. Follow up AFB in sputum  3. Follow up antifungal titers  4. Follow up BAL cultures and cytology    5. Continue antibiotics per ID recommendation , currently on zosyn  6. DVT prophylaxis SCDs, add lovenox SQ    Please contact me if you have any questions.      Rey Keller  Pulmonary / Critical Care  09/29/2022  11:45    Subjective   HPI:    Reinaldo Real is a 37 year old male with  history of DM-II, hyperlipidemia, HepB infection, latent TB who presented to our ED on 9/26 for evaluation of left sided rib cage for three days, reports dry cough, fever, no hemoptysis. No night sweats, weight loss.   Has been smoking since age 18 smokes 1 cigarette/day  Chest CT shows bilateral lung nodules, cavitary lesion in LLL.   Patient reports being treated for latent TB on 2013. (+) exposure to person with pulmonary TB on 2019. 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   Started on zosyn. AFB in sputum was ordered. Pulmonary service consulted.    Events overnight  - Doing well  - Denies cough. Afebrile.  - Hemodynamically stable  - Sputum Cx growing klebsiella    Allergies: Nkda [no known drug allergies]     MEDS:  Current Facility-Administered Medications   Medication     acetaminophen (TYLENOL) tablet 650 mg    Or     acetaminophen (TYLENOL) Suppository 650 mg     atorvastatin (LIPITOR) tablet 40 mg     glucose gel 15-30 g    Or     dextrose 50 % injection 25-50 mL    Or     glucagon injection 1 mg     empagliflozin (JARDIANCE) tablet 25 mg     flumazenil (ROMAZICON) injection 0.2 mg     flumazenil (ROMAZICON) injection 0.2 mg     insulin aspart (NovoLOG) injection (RAPID ACTING)     insulin aspart (NovoLOG) injection (RAPID ACTING)     insulin glargine (LANTUS PEN) injection 8 Units     lidocaine (LMX4) cream     lidocaine (LMX4) cream     lidocaine 1 % 0.1-1 mL     lidocaine 1 % 0.1-1 mL     melatonin tablet 1 mg     naloxone (NARCAN) injection 0.2 mg     naloxone (NARCAN) injection 0.2 mg     naloxone (NARCAN) injection 0.4 mg     naloxone (NARCAN) injection 0.4 mg     ondansetron (ZOFRAN ODT) ODT tab 4 mg    Or     ondansetron (ZOFRAN) injection 4 mg     ondansetron (ZOFRAN ODT) ODT tab 4 mg    Or     ondansetron (ZOFRAN) injection 4 mg     ondansetron (ZOFRAN ODT) ODT tab 4 mg    Or     ondansetron (ZOFRAN) injection 4 mg     oxyCODONE (ROXICODONE) tablet 5 mg     pantoprazole (PROTONIX) EC tablet 40 mg      "piperacillin-tazobactam (ZOSYN) 3.375 g vial to attach to  mL bag     sodium chloride (PF) 0.9% PF flush 3 mL     sodium chloride (PF) 0.9% PF flush 3 mL     sodium chloride (PF) 0.9% PF flush 3 mL     sodium chloride (PF) 0.9% PF flush 3 mL     sodium chloride 0.9% infusion       Objective:   VITALS:  /56 (BP Location: Left arm)   Pulse 66   Temp 98.8  F (37.1  C) (Oral)   Resp 21   Ht 1.626 m (5' 4\")   Wt 81.6 kg (180 lb)   SpO2 95%   BMI 30.90 kg/m    VENT:  Resp: 21    EXAM:   Gen: awake, alert, no distress  HEENT: pink conjunctiva, moist mucosa, Mallampati II/IV  Neck: no thyromegaly, masses or JVD  Lungs: clear  CV: regular, no murmurs or gallops appreciated  Abdomen: soft, NT, BS wnl  Ext: no edema  Neuro: CN II-XII intact, non focal       Data Review:  Recent Labs   Lab 09/29/22  1348 09/29/22  0741 09/29/22  0520 09/29/22  0222 09/28/22  2116 09/28/22  1704   * 213* 203* 253* 337* 261*      09/26/22 21:03 09/28/22 07:31   WBC 12.2 (H) 7.9   Hemoglobin 18.0 (H) 14.5   Hematocrit 50.6 41.3   Platelet Count 244 193   RBC Count 5.71 4.63   MCV 89 89   MCH 31.5 31.3   MCHC 35.6 35.1   RDW 11.4 11.2     Quantiferon-TB Gold Plus Negative Negative      Culture Culture in progress       3+ Normal callie       1+ Klebsiella pneumoniae Abnormal     This isolate of Klebsiella pneumoniae demonstrates a Hypermucoviscous phenotype (hmKp), Hypermucoviscous Klebsiella pneumoniae (hmKp) may reflect a hypervirulent strain.            Gram Stain Result  <10 Squamous epithelial cells/low power field      >25 PMNs/low power field      3+ Mixed callie                  CT CHEST W/O CONTRAST  LOCATION: [facility]  DATE/TIME: 9/27/2022 1:15 AM  INDICATION: Cavitary left lower lobe mass on same day CT abdomen/pelvis.  COMPARISON: Same day CT abdomen/pelvis.  TECHNIQUE: CT chest without IV contrast. Multiplanar reformats were obtained. Dose reduction techniques were used.  CONTRAST: " None.  FINDINGS: Absence of intravenous contrast limits the sensitivity of this examination for detection of infectious/inflammatory change, post traumatic abnormalities, vascular abnormalities, and visceral lesions.  LUNGS AND PLEURA: Mild nodular debris within the proximal trachea. Thick-walled, cavitary consolidation within the peripheral left lower lobe, axial image 47, which measures 2.0 x 3.1 cm. There is mild loss opacity, which extends centrally to the left infrahilar region.   There is an additional nodule within the inferior aspect of the right upper lobe, axial image 121, measuring 10 x 17 mm, also with mild surrounding groundglass opacity. This nodule is noncavitary. A larger nodule in the right upper lobe, at the apex (image 37), measures 16 x 20 mm.  No pneumothorax.  Trace left pleural fluid versus thickening.  MEDIASTINUM/AXILLAE: Mild left hilar lymphadenopathy. No mediastinal or right hilar lymphadenopathy.  Small amount of fluid within the nondilated distal esophagus.    No axillary lymphadenopathy.  Mild gynecomastia.  No thoracic aortic aneurysm. Mild atherosclerotic calcification.  Heart is normal in size. No pericardial effusion.  CORONARY ARTERY CALCIFICATION: None.  UPPER ABDOMEN: Cirrhosis.  MUSCULOSKELETAL: No suspicious abnormality.  OTHER: No additionally suspicious abnormality.      IMPRESSION:  1.  Multifocal pulmonary nodules, as detailed above. Largest is present within the periphery of the left lower lobe and is cavitary. An infectious/inflammatory etiology is favored. Reactivated tuberculosis could be considered, given patient's history of latent tuberculosis. Pulmonology consultation is recommended. Clinical and imaging follow-up to resolution is recommended, so as to exclude malignancy.  2.  Mild left hilar lymphadenopathy, likely reactive. Attention on follow-up.  3.  Cirrhosis.    By:  Rey Keller MD, 09/29/2022  11:45 AM  Primary Care Physician:  Cassy Berrios

## 2022-09-29 NOTE — PLAN OF CARE
Pt is alert and oriented x4, able to communicate needs, ambulates independently and has been steady. Pt has been having pain on left side with deep breaths, PRN tylenol given and was effective at reducing pain. Pt had bronchoscopy done this morning, previous shift stated he was very drowsy/lethargic when arriving back to unit, this evening Pt is still drowsy but alert to voice and cooperates with cares. IV in left arm removed today due to pain when flushing and leaking at the insertion site.

## 2022-09-29 NOTE — PLAN OF CARE
Pt is alert and oriented x4, able to communicate needs, is kristi speaking but speaks a good amount of english as well, ambulates with supervision otherwise independent and steady. Pt denies having pain besides some tenderness at IV sites. IV sites are slightly pink with some firmness to touch, skin is blanchable, both IVs flush well. Pt has bronchoscopy scheduled tomorrow morning at 1000 and will be NPO at midnight.

## 2022-09-29 NOTE — PROGRESS NOTES
Hospitalist Progress Note        CODE STATUS:  Full Code    Assessment/Plan:  Reinaldo Real is a 37 year old male with PMH of DM-II, hyperlipidemia, HepB infection, latent TB who presented to our ED for evaluation of left sided rib cage pain.      Cavitary pneumonia  Possible lung abscess  Multiple pulmonary nodules  History of latent tuberculosis, 2013  - CT abd/pelvis showed a consolidation in the left lower lobe with small air-fluid collection.   - CT chest with multifocal pulmonary nodules, largest in the periphery of left lower lung cavitary  - Symptomsx 3 days. Lung abscess in this short duration is uncommon unless he had pre-existing cavitary lung disease.  No associated symptoms of weight loss, night sweats, fevers and chills.  - Sputum for AFBs x3 ordered,  - Infectious disease consulted & following  - Pulmonology consulted: Plan for bronchoscopy this morning   - Given IV rocephin and zithromax in ED. Changed to IV zosyn for anaerobic coverage. Will continue     Fever/leucocysis  SIRS  - Due to above     DM-II  - Hemoglobin A1c 8.2%  - Start Lantus 8 units at bedtime. Resume Jardiance. Continue holding metformin while inpatient.   - Continue Sliding scale insulin      Hyperlipidemia:  - Cont statin     Mild hyponatremia  - Resolved w/ IVF    Hepatitis B infection, unclear treatment status  Cirrhosis  - Per CT liver is nodular in contour suggesting cirrhosis.  - ALT/AST/ALP within normal limits  - Will need outpatient follow-up with GI/hepatology    DVT Prophylaxis: SCDs    Disposition/Barrier to discharge: To be determined      Subjective:  Interval History:   Patient seen and examined.   Denies any issues at this time.    Review of Systems:   As mentioned in subjective.    Patient Active Problem List   Diagnosis     TB lung, latent     Hepatitis B infection     Immune to hepatitis A     Perianal abscess     External hemorrhoids     Loose stools     Left-sided low back pain without sciatica     Screening for  depression     Type 2 diabetes mellitus without complication, without long-term current use of insulin (H)     Hepatic cirrhosis (H)     Metabolic syndrome     Alcohol use disorder, moderate, dependence (H)     Helicobacter pylori infection     Pneumonia of left lower lobe due to infectious organism     Abscess of lower lobe of left lung with pneumonia (H)       Scheduled Meds:    atorvastatin  40 mg Oral QPM     empagliflozin  25 mg Oral Daily     insulin aspart  1-7 Units Subcutaneous TID AC     insulin aspart  1-5 Units Subcutaneous At Bedtime     insulin glargine  8 Units Subcutaneous At Bedtime     pantoprazole  40 mg Oral QAM AC     piperacillin-tazobactam  3.375 g Intravenous Q8H     sodium chloride (PF)  3 mL Intracatheter Q8H     sodium chloride (PF)  3 mL Intracatheter Q8H     Continuous Infusions:    sodium chloride 30 mL/hr at 09/28/22 2025     PRN Meds:.acetaminophen **OR** acetaminophen, glucose **OR** dextrose **OR** glucagon, lidocaine 4%, lidocaine 4%, lidocaine (buffered or not buffered), lidocaine (buffered or not buffered), melatonin, ondansetron **OR** ondansetron, oxyCODONE, sodium chloride (PF), sodium chloride (PF)    Objective:  Vital signs in last 24 hours:  Temp:  [97.8  F (36.6  C)-98.1  F (36.7  C)] 98  F (36.7  C)  Pulse:  [61-77] 61  Resp:  [16-18] 16  BP: ()/(53-79) 102/72  SpO2:  [95 %-99 %] 97 %        Intake/Output Summary (Last 24 hours) at 9/28/2022 0715  Last data filed at 9/28/2022 0100  Gross per 24 hour   Intake 1120 ml   Output 2050 ml   Net -930 ml       Physical Exam:  General: In NAD.  HEENT: NCAT & EOMI  CV: Normal S1S2, regular rhythm, normal rate  Lungs: CTAB  Abdomen: Soft, NT, ND, +BS  Extremities: No LE edema b/l  Neuro: AAOx3  Psych: Normal Affect.     Lab Results:    Recent Results (from the past 24 hour(s))   Glucose by meter    Collection Time: 09/28/22  8:16 AM   Result Value Ref Range    GLUCOSE BY METER POCT 174 (H) 70 - 99 mg/dL   Glucose by meter     Collection Time: 09/28/22  1:05 PM   Result Value Ref Range    GLUCOSE BY METER POCT 369 (H) 70 - 99 mg/dL   Glucose by meter    Collection Time: 09/28/22  5:04 PM   Result Value Ref Range    GLUCOSE BY METER POCT 261 (H) 70 - 99 mg/dL   Glucose by meter    Collection Time: 09/28/22  9:16 PM   Result Value Ref Range    GLUCOSE BY METER POCT 337 (H) 70 - 99 mg/dL   Glucose by meter    Collection Time: 09/29/22  2:22 AM   Result Value Ref Range    GLUCOSE BY METER POCT 253 (H) 70 - 99 mg/dL   Basic metabolic panel    Collection Time: 09/29/22  5:20 AM   Result Value Ref Range    Sodium 137 136 - 145 mmol/L    Potassium 3.7 3.4 - 5.3 mmol/L    Chloride 103 98 - 107 mmol/L    Carbon Dioxide (CO2) 23 22 - 29 mmol/L    Anion Gap 11 7 - 15 mmol/L    Urea Nitrogen 10.8 6.0 - 20.0 mg/dL    Creatinine 0.55 (L) 0.67 - 1.17 mg/dL    Calcium 8.6 8.6 - 10.0 mg/dL    Glucose 203 (H) 70 - 99 mg/dL    GFR Estimate >90 >60 mL/min/1.73m2       Serum Glucose range:   Recent Labs   Lab 09/29/22  0520 09/29/22 0222 09/28/22 2116 09/28/22  1704   * 253* 337* 261*     ABG: No lab results found in last 7 days.  CBC:   Recent Labs   Lab 09/28/22 0731 09/26/22  2103   WBC 7.9 12.2*   HGB 14.5 18.0*   HCT 41.3 50.6   MCV 89 89    244   NEUTROPHIL  --  73   LYMPH  --  16   MONOCYTE  --  9   EOSINOPHIL  --  1     Chemistry:   Recent Labs   Lab 09/29/22  0520 09/28/22  0731 09/26/22  2103    136 134*   POTASSIUM 3.7 3.7 4.2   CHLORIDE 103 104 95*   CO2 23 23 26   BUN 10.8 7.4 8.5   CR 0.55* 0.56* 0.78   GFRESTIMATED >90 >90 >90   RACHEL 8.6 8.6 9.9   MAG  --   --  2.2   PROTTOTAL  --  6.5 8.6*   ALBUMIN  --  3.4* 4.6   AST  --  31 38   ALT  --  28 46   ALKPHOS  --  64 112   BILITOTAL  --  0.5 1.0     Coags:  Recent Labs   Lab 09/26/22 2103   INR 1.04     Cardiac Markers:  No results for input(s): CKTOTAL, TROPONINI in the last 168 hours.         09/29/2022   Grazyna Bush MD  Hospitalist  Pager: [phone]

## 2022-09-29 NOTE — SEDATION DOCUMENTATION
used: Patient is sleepy but easily arousable. He is alert and oriented x3, denies any pain. Assessment is WDL. Report given to Yuliya DODSON. Pt brought back to room.

## 2022-09-29 NOTE — PROGRESS NOTES
Initial VS  SpO2 100% on RA  RR 18  /92  HR 86  EtCO2 34      Bronchoscopy done this AM  At 1045 4cc of 4% lido neb given    2cc 4% lido given X4, above cords  2cc 1% lido given X2, below cords    Assisted Dr. Sharma withbronchoscope procedure was done successfully without any complication. BAL was done from LLL and RUL, sample sent. Patient on RA pre-procedure; Patient left the procedure room on RA.     Post-procedure VS  SpO2 97% on RA  HR 90    RR 20  /65  Co2 29

## 2022-09-29 NOTE — PLAN OF CARE
Goal Outcome Evaluation:      No s/s of infection. VSS, afebrile. Pt denied SOB reports productive cough, sats mid-upper 90's on RA. LS following bronch were clear with wheezes in LLL. Pt was groggy following bronch and was able to sleep. At 1pm pt was requesting to drink, pt allowed to have cold liquids, warm oral intake at 5pm. Pt has no c/o swallowing. Pt speaks good english, no  used this shift.  Problem: Infection (Pneumonia)  Goal: Resolution of Infection Signs and Symptoms  Outcome: Ongoing, Progressing  Intervention: Prevent Infection Progression  Recent Flowsheet Documentation  Taken 9/29/2022 0825 by Lisbet Pineda RN  Isolation Precautions: airborne precautions maintained     Problem: Respiratory Compromise (Pneumonia)  Goal: Effective Oxygenation and Ventilation  Outcome: Ongoing, Progressing  Intervention: Promote Airway Secretion Clearance  Recent Flowsheet Documentation  Taken 9/29/2022 0825 by Lisbet Pineda RN  Cough And Deep Breathing: done independently per patient     Problem: Plan of Care - These are the overarching goals to be used throughout the patient stay.    Goal: Absence of Hospital-Acquired Illness or Injury  Intervention: Identify and Manage Fall Risk  Recent Flowsheet Documentation  Taken 9/29/2022 0825 by Lisbet Pineda RN  Safety Promotion/Fall Prevention:   patient and family education   nonskid shoes/slippers when out of bed  Intervention: Prevent Skin Injury  Recent Flowsheet Documentation  Taken 9/29/2022 0825 by Lisbet Pineda RN  Body Position: position changed independently  Intervention: Prevent and Manage VTE (Venous Thromboembolism) Risk  Recent Flowsheet Documentation  Taken 9/29/2022 0825 by Lisbet Pineda RN  Activity Management: up ad kalin  Goal: Optimal Comfort and Wellbeing  Intervention: Monitor Pain and Promote Comfort  Recent Flowsheet Documentation  Taken 9/29/2022 0824 by Lisbet Pineda RN  Pain Management Interventions: medication offered  but refused

## 2022-09-29 NOTE — PLAN OF CARE
Problem: Plan of Care - These are the overarching goals to be used throughout the patient stay.    Goal: Plan of Care Review/Shift Note  Description: The Plan of Care Review/Shift note should be completed every shift.  The Outcome Evaluation is a brief statement about your assessment that the patient is improving, declining, or no change.  This information will be displayed automatically on your shift note.  Outcome: Ongoing, Progressing   Goal Outcome Evaluation:      Pt a&o x4. Denies having pain but stated there's pain at left side of his chest when he cough. Remains on IV fluids of NS at 30ml/hr. Reminded pt NPO after midnight for bronchoscopy. IV Zosyn given as prescribed. Slept between cares.

## 2022-09-29 NOTE — PROGRESS NOTES
ID chart check    Bronchoscopy today. Awaiting results. One sputum smear negative for AFB. Continuing to follow. On pip/tazo.    Hans Davila MD

## 2022-09-30 LAB
ANION GAP SERPL CALCULATED.3IONS-SCNC: 13 MMOL/L (ref 7–15)
BACTERIA SPT CULT: ABNORMAL
BACTERIA SPT CULT: ABNORMAL
BUN SERPL-MCNC: 9.9 MG/DL (ref 6–20)
CALCIUM SERPL-MCNC: 8.7 MG/DL (ref 8.6–10)
CHLORIDE SERPL-SCNC: 102 MMOL/L (ref 98–107)
CREAT SERPL-MCNC: 0.66 MG/DL (ref 0.67–1.17)
DEPRECATED HCO3 PLAS-SCNC: 21 MMOL/L (ref 22–29)
GFR SERPL CREATININE-BSD FRML MDRD: >90 ML/MIN/1.73M2
GLUCOSE BLDC GLUCOMTR-MCNC: 117 MG/DL (ref 70–99)
GLUCOSE BLDC GLUCOMTR-MCNC: 138 MG/DL (ref 70–99)
GLUCOSE BLDC GLUCOMTR-MCNC: 186 MG/DL (ref 70–99)
GLUCOSE BLDC GLUCOMTR-MCNC: 267 MG/DL (ref 70–99)
GLUCOSE BLDC GLUCOMTR-MCNC: 375 MG/DL (ref 70–99)
GLUCOSE SERPL-MCNC: 222 MG/DL (ref 70–99)
GRAM STAIN RESULT: ABNORMAL
HOLD SPECIMEN: NORMAL
POTASSIUM SERPL-SCNC: 3.6 MMOL/L (ref 3.4–5.3)
SODIUM SERPL-SCNC: 136 MMOL/L (ref 136–145)

## 2022-09-30 PROCEDURE — 99232 SBSQ HOSP IP/OBS MODERATE 35: CPT | Performed by: INTERNAL MEDICINE

## 2022-09-30 PROCEDURE — 120N000001 HC R&B MED SURG/OB

## 2022-09-30 PROCEDURE — 99232 SBSQ HOSP IP/OBS MODERATE 35: CPT | Performed by: HOSPITALIST

## 2022-09-30 PROCEDURE — 36415 COLL VENOUS BLD VENIPUNCTURE: CPT | Performed by: HOSPITALIST

## 2022-09-30 PROCEDURE — 250N000013 HC RX MED GY IP 250 OP 250 PS 637: Performed by: INTERNAL MEDICINE

## 2022-09-30 PROCEDURE — 250N000011 HC RX IP 250 OP 636: Performed by: INTERNAL MEDICINE

## 2022-09-30 PROCEDURE — 250N000013 HC RX MED GY IP 250 OP 250 PS 637: Performed by: STUDENT IN AN ORGANIZED HEALTH CARE EDUCATION/TRAINING PROGRAM

## 2022-09-30 PROCEDURE — 250N000013 HC RX MED GY IP 250 OP 250 PS 637: Performed by: HOSPITALIST

## 2022-09-30 PROCEDURE — 82310 ASSAY OF CALCIUM: CPT | Performed by: HOSPITALIST

## 2022-09-30 RX ORDER — KETOROLAC TROMETHAMINE 30 MG/ML
30 INJECTION, SOLUTION INTRAMUSCULAR; INTRAVENOUS EVERY 6 HOURS PRN
Status: DISCONTINUED | OUTPATIENT
Start: 2022-09-30 | End: 2022-10-02 | Stop reason: HOSPADM

## 2022-09-30 RX ADMIN — PIPERACILLIN AND TAZOBACTAM 3.38 G: 3; .375 INJECTION, POWDER, LYOPHILIZED, FOR SOLUTION INTRAVENOUS at 09:01

## 2022-09-30 RX ADMIN — ACETAMINOPHEN 650 MG: 325 TABLET, FILM COATED ORAL at 00:11

## 2022-09-30 RX ADMIN — ATORVASTATIN CALCIUM 40 MG: 40 TABLET, FILM COATED ORAL at 21:14

## 2022-09-30 RX ADMIN — PANTOPRAZOLE SODIUM 40 MG: 20 TABLET, DELAYED RELEASE ORAL at 06:51

## 2022-09-30 RX ADMIN — PIPERACILLIN AND TAZOBACTAM 3.38 G: 3; .375 INJECTION, POWDER, LYOPHILIZED, FOR SOLUTION INTRAVENOUS at 01:45

## 2022-09-30 RX ADMIN — EMPAGLIFLOZIN 25 MG: 25 TABLET, FILM COATED ORAL at 09:01

## 2022-09-30 RX ADMIN — INSULIN ASPART 5 UNITS: 100 INJECTION, SOLUTION INTRAVENOUS; SUBCUTANEOUS at 17:11

## 2022-09-30 RX ADMIN — PIPERACILLIN AND TAZOBACTAM 3.38 G: 3; .375 INJECTION, POWDER, LYOPHILIZED, FOR SOLUTION INTRAVENOUS at 18:39

## 2022-09-30 RX ADMIN — OXYCODONE HYDROCHLORIDE 5 MG: 5 TABLET ORAL at 09:11

## 2022-09-30 RX ADMIN — INSULIN ASPART 2 UNITS: 100 INJECTION, SOLUTION INTRAVENOUS; SUBCUTANEOUS at 21:15

## 2022-09-30 ASSESSMENT — ACTIVITIES OF DAILY LIVING (ADL)
ADLS_ACUITY_SCORE: 37
ADLS_ACUITY_SCORE: 35
ADLS_ACUITY_SCORE: 37
ADLS_ACUITY_SCORE: 35
ADLS_ACUITY_SCORE: 37
ADLS_ACUITY_SCORE: 35
ADLS_ACUITY_SCORE: 35
ADLS_ACUITY_SCORE: 37

## 2022-09-30 NOTE — PLAN OF CARE
Problem: Plan of Care - These are the overarching goals to be used throughout the patient stay.    Goal: Plan of Care Review/Shift Note  Description: The Plan of Care Review/Shift note should be completed every shift.  The Outcome Evaluation is a brief statement about your assessment that the patient is improving, declining, or no change.  This information will be displayed automatically on your shift note.  Outcome: Ongoing, Progressing     Problem: Respiratory Compromise (Pneumonia)  Goal: Effective Oxygenation and Ventilation  Outcome: Ongoing, Progressing  Intervention: Promote Airway Secretion Clearance  Recent Flowsheet Documentation  Taken 9/30/2022 0030 by Addis Gibson RN  Cough And Deep Breathing: done independently per patient  Intervention: Optimize Oxygenation and Ventilation  Recent Flowsheet Documentation  Taken 9/30/2022 0030 by Addis Gibson RN  Head of Bed (HOB) Positioning: HOB at 20-30 degrees   Goal Outcome Evaluation:      Pt is sleepy but easily arousable. VSS, afebrile. Pt having pain on left side. PRN tylenol given.

## 2022-09-30 NOTE — PROGRESS NOTES
"INFECTIOUS DISEASE FOLLOW UP NOTE      ASSESSMENT:  1. Pulmonary nodules, one in left lower lobe is cavitary. Sputum culture with Klebsiella pneumoniae hypermucoviscous phenotype -- known cause of abscesses; this is likely the pathogen. TB possible despite previous treatment for latent TB (reduces risk of active TB 60-70%), but we have one negative smear for AFB, one negative TB PCR (2nd ordered on same sample?). Bacterial abscess may be related to poor dentition. He is improved in that temp and WBC are now normal on antibiotic. Without chronic symptoms such as weight loss, night sweats, etc. Bronchoscopy  -- erythematous airways. Results pending.   2. Treated for latent TB  he recalls with 6 months of medication, presumably INH.  3. Hep B. Not treated that he is aware of.   4. Cirrhosis  5. DM.     PLAN:  Airborne precautions for now  Would keep in ER/hospital until we have sputum results  If Klebsiella is S to quinolones, likely plan oral levofloxacin for 4+ weeks with imaging follow up  Zosyn for possible abscess for now  Follow up sputum and BAL results  Potentially home over weekend if we get helpful micro results.    Hans Davila MD  [facility]  [phone] clinic  [facility] [place] paging    ______________________________________________________________________    SUBJECTIVE / INTERVAL HISTORY: feels better. Still some pain with deep breath left side of back. No cough x 2 days.    ROS: All other systems negative except as listed above.        OBJECTIVE:  /74 (BP Location: Left arm)   Pulse 62   Temp 98  F (36.7  C) (Oral)   Resp 16   Ht 1.626 m (5' 4\")   Wt 81.6 kg (180 lb)   SpO2 98%   BMI 30.90 kg/m         Vital Signs  Temp: 98  F (36.7  C)  Temp src: Oral  Resp: 18  Pulse: 77  Pulse Rate Source: Monitor  BP: 113/68  BP Location: Left arm    Temp (24hrs), Av.3  F (36.8  C), Min:98  F (36.7  C), Max:99  F (37.2  C)      GEN: No acute distress.    Poor " dentition  RESPIRATORY:  Normal breathing pattern.   CARDIOVASCULAR:  Regular rate and rhythm.   ABDOMEN:  Soft, normal bowel sounds, non-tender  EXTREMITIES: No edema.  SKIN/HAIR/NAILS:  No rashes  IV: peripheral        Antibiotics:  pip/tazo 9/26-    Pertinent labs:    Recent Labs   Lab 09/28/22  0731 09/26/22  2103   WBC 7.9 12.2*   HGB 14.5 18.0*   HCT 41.3 50.6    244        Recent Labs   Lab 09/30/22  0552 09/29/22  0520 09/28/22  0731    137 136   CO2 21* 23 23   BUN 9.9 10.8 7.4        Lab Results   Component Value Date    CRP 75.70 (H) 09/27/2022    CRP 75.60 (H) 09/26/2022    CRP 0.6 02/02/2015         Lab Results   Component Value Date    ALT 28 09/28/2022    AST 31 09/28/2022     (H) 05/10/2021    ALKPHOS 64 09/28/2022         MICROBIOLOGY DATA:  9/26 blood negative to date  Sputum mixed callie on gram stain; culture klebsiella pneumoniae, hypermucoviscous phenotype  Sputum TB PCR negative x1, smear negative x1  QFG negative    RADIOLOGY:  CT Abdomen Pelvis w Contrast    Result Date: 9/26/2022  EXAM: CT ABDOMEN PELVIS W CONTRAST LOCATION: [facility] DATE/TIME: 9/26/2022 10:55 PM INDICATION: abdominal pain COMPARISON: 02/16/2018 TECHNIQUE: CT scan of the abdomen and pelvis was performed following injection of IV contrast. Multiplanar reformats were obtained. Dose reduction techniques were used. CONTRAST: 100ml isovue 370 FINDINGS: LOWER CHEST: 2 x 2.8 cm region of consolidation with central air-fluid collection 1.5 x 1.7 cm and the left lower lobe. Left pleural thickening versus trace effusion. HEPATOBILIARY: The liver is nodular in contour suggesting cirrhosis. PANCREAS: Normal. SPLEEN: Normal. ADRENAL GLANDS: Normal. KIDNEYS/BLADDER: No hydronephrosis. Underdistention bladder. Wall thickening not excluded. BOWEL: Normal caliber. Metallic density in the right lower quadrant, region of the appendix. LYMPH NODES: Normal. VASCULATURE: Unremarkable. PELVIC ORGANS:  Normal. MUSCULOSKELETAL: Mild degenerative change osseous structures.     IMPRESSION: 1.  Consolidation left lower lobe with small air-fluid collection could be infection with pulmonary abscess. Follow-up to confirm resolution necessary to exclude necrotic lesion. 2.  Cirrhosis. 3.  Underdistention the bladder. Wall thickening/cystitis not excluded.    Chest CT w/o contrast    Result Date: 9/27/2022  EXAM: CT CHEST W/O CONTRAST LOCATION: [facility] DATE/TIME: 9/27/2022 1:15 AM INDICATION: Cavitary left lower lobe mass on same day CT abdomen/pelvis. COMPARISON: Same day CT abdomen/pelvis. TECHNIQUE: CT chest without IV contrast. Multiplanar reformats were obtained. Dose reduction techniques were used. CONTRAST: None. FINDINGS: Absence of intravenous contrast limits the sensitivity of this examination for detection of infectious/inflammatory change, post traumatic abnormalities, vascular abnormalities, and visceral lesions. LUNGS AND PLEURA: Mild nodular debris within the proximal trachea. Thick-walled, cavitary consolidation within the peripheral left lower lobe, axial image 47, which measures 2.0 x 3.1 cm. There is mild loss opacity, which extends centrally to the left infrahilar region. There is an additional nodule within the inferior aspect of the right upper lobe, axial image 121, measuring 10 x 17 mm, also with mild surrounding groundglass opacity. This nodule is noncavitary. A larger nodule in the right upper lobe, at the apex (image 37), measures 16 x 20 mm. No pneumothorax. Trace left pleural fluid versus thickening.  MEDIASTINUM/AXILLAE: Mild left hilar lymphadenopathy. No mediastinal or right hilar lymphadenopathy. Small amount of fluid within the nondilated distal esophagus.  No axillary lymphadenopathy. Mild gynecomastia. No thoracic aortic aneurysm. Mild atherosclerotic calcification. Heart is normal in size. No pericardial effusion. CORONARY ARTERY CALCIFICATION: None. UPPER  ABDOMEN: Cirrhosis. MUSCULOSKELETAL: No suspicious abnormality. OTHER: No additionally suspicious abnormality.     IMPRESSION: 1.  Multifocal pulmonary nodules, as detailed above. Largest is present within the periphery of the left lower lobe and is cavitary. An infectious/inflammatory etiology is favored. Reactivated tuberculosis could be considered, given patient's history of latent tuberculosis. Pulmonology consultation is recommended. Clinical and imaging follow-up to resolution is recommended, so as to exclude malignancy. 2.  Mild left hilar lymphadenopathy, likely reactive. Attention on follow-up. 3.  Cirrhosis.

## 2022-09-30 NOTE — PROGRESS NOTES
Hospitalist Progress Note        CODE STATUS:  Full Code    Assessment/Plan:  Reinaldo Real is a 37 year old male with PMH of DM-II, hyperlipidemia, HepB infection, latent TB who presented to our ED for evaluation of left sided rib cage pain.      Cavitary pneumonia  Possible lung abscess  Multiple pulmonary nodules  History of latent tuberculosis, 2013  - CT abd/pelvis showed a consolidation in the left lower lobe with small air-fluid collection.   - CT chest with multifocal pulmonary nodules, largest in the periphery of left lower lung cavitary  - Symptomsx 3 days. Lung abscess in this short duration is uncommon unless he had pre-existing cavitary lung disease.  No associated symptoms of weight loss, night sweats, fevers and chills.  - Sputum for AFBs x3 ordered,  - Infectious disease consulted & following  - Pulmonology consulted: s/p bronchoscopy on 9/29  - Given IV rocephin and zithromax in ED. Changed to IV zosyn for anaerobic coverage. Will continue     Fever/leucocysis  SIRS  - Due to above     DM-II  - Hemoglobin A1c 8.2%  - Started on this admission, increase to 20 units at bedtime. Resumed home Jardiance. Continue holding metformin while inpatient.   - Continue Sliding scale insulin      Hyperlipidemia:  - Cont statin     Mild hyponatremia  - Resolved w/ IVF    Hepatitis B infection, unclear treatment status  Cirrhosis  - Per CT liver is nodular in contour suggesting cirrhosis.  - ALT/AST/ALP within normal limits  - Will need outpatient follow-up with GI/hepatology    DVT Prophylaxis: SCDs    Disposition/Barrier to discharge: Discharge home pending culture results      Subjective:  Interval History:   Patient seen and examined.   Denies any issues at this time.  Had a little soreness of his throat yesterday but not anymore.    Review of Systems:   As mentioned in subjective.    Patient Active Problem List   Diagnosis     TB lung, latent     Hepatitis B infection     Immune to hepatitis A     Perianal abscess      External hemorrhoids     Loose stools     Left-sided low back pain without sciatica     Screening for depression     Type 2 diabetes mellitus without complication, without long-term current use of insulin (H)     Hepatic cirrhosis (H)     Metabolic syndrome     Alcohol use disorder, moderate, dependence (H)     Helicobacter pylori infection     Pneumonia of left lower lobe due to infectious organism     Abscess of lower lobe of left lung with pneumonia (H)       Scheduled Meds:    atorvastatin  40 mg Oral QPM     empagliflozin  25 mg Oral Daily     insulin aspart  1-7 Units Subcutaneous TID AC     insulin aspart  1-5 Units Subcutaneous At Bedtime     insulin glargine  20 Units Subcutaneous At Bedtime     pantoprazole  40 mg Oral QAM AC     piperacillin-tazobactam  3.375 g Intravenous Q8H     sodium chloride (PF)  3 mL Intracatheter Q8H     Continuous Infusions:    sodium chloride 30 mL/hr at 09/28/22 2025     PRN Meds:.acetaminophen **OR** acetaminophen, glucose **OR** dextrose **OR** glucagon, lidocaine 4%, lidocaine 4%, lidocaine (buffered or not buffered), lidocaine (buffered or not buffered), melatonin, naloxone, naloxone, naloxone, naloxone, ondansetron **OR** ondansetron, ondansetron **OR** ondansetron, ondansetron **OR** ondansetron, oxyCODONE, sodium chloride (PF), sodium chloride (PF)    Objective:  Vital signs in last 24 hours:  Temp:  [98  F (36.7  C)-98.8  F (37.1  C)] 98.2  F (36.8  C)  Pulse:  [] 64  Resp:  [14-29] 16  BP: (100-175)/() 100/59  SpO2:  [95 %-100 %] 96 %        Intake/Output Summary (Last 24 hours) at 9/28/2022 0715  Last data filed at 9/28/2022 0100  Gross per 24 hour   Intake 1120 ml   Output 2050 ml   Net -930 ml       Physical Exam:  General: In NAD.  HEENT: NCAT & EOMI  CV: Normal S1S2, regular rhythm, normal rate  Lungs: CTAB  Abdomen: Soft, NT, ND, +BS  Extremities: No LE edema b/l  Neuro: AAOx3  Psych: Normal Affect.     Lab Results:    Recent Results (from the  past 24 hour(s))   Glucose by meter    Collection Time: 09/29/22  7:41 AM   Result Value Ref Range    GLUCOSE BY METER POCT 213 (H) 70 - 99 mg/dL   Gram stain - BAL Site 1    Collection Time: 09/29/22 11:15 AM    Specimen: Lung, Lower Lobe, Left; Bronchial Alveolar Lavage   Result Value Ref Range    Gram Stain Result <25 PMNs/low power field     Gram Stain Result No organisms seen    Koh prep - BAL Site 1    Collection Time: 09/29/22 11:15 AM    Specimen: Lung, Lower Lobe, Left; Bronchial Alveolar Lavage   Result Value Ref Range    KOH Preparation No fungal elements seen     KOH Preparation Reference Range: No fungal elements seen.    Gram stain - BAL Site 2    Collection Time: 09/29/22 11:15 AM    Specimen: Lung, Lower Lobe, Right; Bronchial Alveolar Lavage   Result Value Ref Range    Gram Stain Result <25 PMNs/low power field     Gram Stain Result No organisms seen    Koh prep - BAL Site 2    Collection Time: 09/29/22 11:15 AM    Specimen: Lung, Lower Lobe, Right; Bronchial Alveolar Lavage   Result Value Ref Range    KOH Preparation No fungal elements seen     KOH Preparation Reference Range: No fungal elements seen.    Cell Count Body Fluid    Collection Time: 09/29/22 11:15 AM   Result Value Ref Range    Color Pink (A) Colorless, Yellow    Clarity Turbid (A) Clear    Total Nucleated Cells 205 /uL    RBC Count 1,484 /uL    Cell Count Fluid Source Lung, Lower Lobe, Left    Differential Body Fluid    Collection Time: 09/29/22 11:15 AM   Result Value Ref Range    % Neutrophils 3 %    % Lymphocytes 7 %    % Monocyte/Macrophages 83 %    % Lining Cells 7 %   Glucose by meter    Collection Time: 09/29/22  1:48 PM   Result Value Ref Range    GLUCOSE BY METER POCT 168 (H) 70 - 99 mg/dL   Glucose by meter    Collection Time: 09/29/22  4:56 PM   Result Value Ref Range    GLUCOSE BY METER POCT 275 (H) 70 - 99 mg/dL   Glucose by meter    Collection Time: 09/29/22  8:37 PM   Result Value Ref Range    GLUCOSE BY METER POCT 307  (H) 70 - 99 mg/dL   Glucose by meter    Collection Time: 09/30/22  1:44 AM   Result Value Ref Range    GLUCOSE BY METER POCT 186 (H) 70 - 99 mg/dL   Basic metabolic panel    Collection Time: 09/30/22  5:52 AM   Result Value Ref Range    Sodium 136 136 - 145 mmol/L    Potassium 3.6 3.4 - 5.3 mmol/L    Chloride 102 98 - 107 mmol/L    Carbon Dioxide (CO2) 21 (L) 22 - 29 mmol/L    Anion Gap 13 7 - 15 mmol/L    Urea Nitrogen 9.9 6.0 - 20.0 mg/dL    Creatinine 0.66 (L) 0.67 - 1.17 mg/dL    Calcium 8.7 8.6 - 10.0 mg/dL    Glucose 222 (H) 70 - 99 mg/dL    GFR Estimate >90 >60 mL/min/1.73m2   Extra Purple Top Tube    Collection Time: 09/30/22  5:52 AM   Result Value Ref Range    Hold Specimen [facility]        Serum Glucose range:   Recent Labs   Lab 09/30/22  0552 09/30/22  0144 09/29/22 2037 09/29/22  1656   * 186* 307* 275*     ABG: No lab results found in last 7 days.  CBC:   Recent Labs   Lab 09/28/22  0731 09/26/22  2103   WBC 7.9 12.2*   HGB 14.5 18.0*   HCT 41.3 50.6   MCV 89 89    244   NEUTROPHIL  --  73   LYMPH  --  16   MONOCYTE  --  9   EOSINOPHIL  --  1     Chemistry:   Recent Labs   Lab 09/30/22  0552 09/29/22  0520 09/28/22  0731 09/26/22  2103    137 136 134*   POTASSIUM 3.6 3.7 3.7 4.2   CHLORIDE 102 103 104 95*   CO2 21* 23 23 26   BUN 9.9 10.8 7.4 8.5   CR 0.66* 0.55* 0.56* 0.78   GFRESTIMATED >90 >90 >90 >90   RACHEL 8.7 8.6 8.6 9.9   MAG  --   --   --  2.2   PROTTOTAL  --   --  6.5 8.6*   ALBUMIN  --   --  3.4* 4.6   AST  --   --  31 38   ALT  --   --  28 46   ALKPHOS  --   --  64 112   BILITOTAL  --   --  0.5 1.0     Coags:  Recent Labs   Lab 09/26/22 2103   INR 1.04     Cardiac Markers:  No results for input(s): CKTOTAL, TROPONINI in the last 168 hours.         09/30/2022   Grazyna Bush MD  Hospitalist  Pager: [phone]

## 2022-09-30 NOTE — PROGRESS NOTES
"Pulmonary Progress Note  9/30/2022    Admit Date: 9/27/2022  CODE: Full Code    Reason for Consult: pulmonary nodules, cavitary LLL nodular lesion    Assessment/Plan:     37 year old male with history of DM-II, hyperlipidemia, HepB infection, latent TB who presented to our ED on 9/26 for evaluation of left sided rib cage for three days, reports dry cough, fever, no hemoptysis. No night sweats, weight loss. Has been smoking since age 18 smokes 1 cigarette/day. Chest CT shows bilateral lung nodules, cavitary lesion in LLL. Patient reports being treated for latent TB on 2013. (+) exposure to person with pulmonary TB on 2019. 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     S/p diagnostic bronchoscopy 9/29, mild erythematous mucosa, no endobronchial lesions. BAL lateral segment of LLL (cavitary lung nodular lesion) and BAL apical and anterior segment of RUL (lung nodules). Samples were sent for cultures and cytology.     Sputum cx with hypermucoviscous Klebsiella pneumonia with lung abscess formation.      Recommendations:    Await AFB cx/TB rule out    On zosyn for klebsiella. ID following     Majority of bronch tests are in process at this point      We will follow peripherally through with weekend. Please call if anything changes clinically before then.       Ivy Cai MD  Pulmonary and Critical Care Medicine  [facility]  Office: [phone]    Subjective/Interim Events:     Underwent bronchoscopy yesterday  Room air  Procal 0.09  Seen with Jenifer telephone , would like to go home soon.    Medications:     Reviewed    Exam/Data:   Vitals  /74 (BP Location: Left arm)   Pulse 62   Temp 98  F (36.7  C) (Oral)   Resp 16   Ht 1.626 m (5' 4\")   Wt 81.6 kg (180 lb)   SpO2 98%   BMI 30.90 kg/m       I/O last 3 completed shifts:  In: 1040 [P.O.:840; I.V.:200]  Out: -   Weight change:   Resp: 16    EXAM:  Physical Exam  Gen: alert, oriented, no distress  HEENT: NT, no MORGAN  CV: RRR, no m/g/r  Resp: " CTAB  Abd: soft, nontender, BS+  Skin: no rashes or lesions  Ext: no edema  Neuro: PERRL, nonfocal exam    LABS:  Reviewed in detail     Bronch:  Neg KOH and gram stain. Mono/macrophage predominant  Remainder in process     sputum cx: klebsiella    IMAGIN22 CT Chest:  LUNGS AND PLEURA: Mild nodular debris within the proximal trachea. Thick-walled, cavitary consolidation within the peripheral left lower lobe, axial image 47, which measures 2.0 x 3.1 cm. There is mild loss opacity, which extends centrally to the left   infrahilar region.      There is an additional nodule within the inferior aspect of the right upper lobe, axial image 121, measuring 10 x 17 mm, also with mild surrounding groundglass opacity. This nodule is noncavitary. A larger nodule in the right upper lobe, at the apex   (image 37), measures 16 x 20 mm.     No pneumothorax.     Trace left pleural fluid versus thickening.     MEDIASTINUM/AXILLAE: Mild left hilar lymphadenopathy. No mediastinal or right hilar lymphadenopathy.     Small amount of fluid within the nondilated distal esophagus.       No axillary lymphadenopathy.     Mild gynecomastia.     No thoracic aortic aneurysm. Mild atherosclerotic calcification.     Heart is normal in size. No pericardial effusion.     CORONARY ARTERY CALCIFICATION: None.     UPPER ABDOMEN: Cirrhosis.     MUSCULOSKELETAL: No suspicious abnormality.     OTHER: No additionally suspicious abnormality.                                                            IMPRESSION:  1.  Multifocal pulmonary nodules, as detailed above. Largest is present within the periphery of the left lower lobe and is cavitary. An infectious/inflammatory etiology is favored. Reactivated tuberculosis could be considered, given patient's history of   latent tuberculosis. Pulmonology consultation is recommended. Clinical and imaging follow-up to resolution is recommended, so as to exclude malignancy.  2.  Mild left hilar  lymphadenopathy, likely reactive. Attention on follow-up.  3.  Cirrhosis.

## 2022-09-30 NOTE — PLAN OF CARE
Problem: Plan of Care - These are the overarching goals to be used throughout the patient stay.    Goal: Plan of Care Review/Shift Note  Description: The Plan of Care Review/Shift note should be completed every shift.  The Outcome Evaluation is a brief statement about your assessment that the patient is improving, declining, or no change.  This information will be displayed automatically on your shift note.  Outcome: Ongoing, Progressing     Problem: Plan of Care - These are the overarching goals to be used throughout the patient stay.    Goal: Absence of Hospital-Acquired Illness or Injury  Intervention: Identify and Manage Fall Risk  Recent Flowsheet Documentation  Taken 9/30/2022 0800 by Jacky Matt, RN  Safety Promotion/Fall Prevention:   assistive device/personal items within reach   activity supervised     Problem: Plan of Care - These are the overarching goals to be used throughout the patient stay.    Goal: Absence of Hospital-Acquired Illness or Injury  Intervention: Prevent Skin Injury  Recent Flowsheet Documentation  Taken 9/30/2022 0800 by Jacky Matt, RN  Body Position: position changed independently     Problem: Respiratory Compromise (Pneumonia)  Goal: Effective Oxygenation and Ventilation  Intervention: Optimize Oxygenation and Ventilation  Recent Flowsheet Documentation  Taken 9/30/2022 0800 by Jacky Matt, RN  Head of Bed (HOB) Positioning: HOB at 20-30 degrees   Goal Outcome Evaluation:      Patient received PRN Oxycodone 5 mg for left rib pain which was effective, Flu vaccine scheduled for tomorrow, continue IV ABX's.

## 2022-10-01 LAB
ANNOTATION COMMENT IMP: NOT DETECTED
BACTERIA BRONCH: NORMAL
BACTERIA BRONCH: NORMAL
DEPRECATED M TB RPOB XXX QL NAA+PROBE: NORMAL
GLUCOSE BLDC GLUCOMTR-MCNC: 122 MG/DL (ref 70–99)
GLUCOSE BLDC GLUCOMTR-MCNC: 171 MG/DL (ref 70–99)
GLUCOSE BLDC GLUCOMTR-MCNC: 176 MG/DL (ref 70–99)
GLUCOSE BLDC GLUCOMTR-MCNC: 244 MG/DL (ref 70–99)
GLUCOSE BLDC GLUCOMTR-MCNC: 98 MG/DL (ref 70–99)
M TB DNA SPEC QL NAA+PROBE: NOT DETECTED

## 2022-10-01 PROCEDURE — 250N000013 HC RX MED GY IP 250 OP 250 PS 637: Performed by: INTERNAL MEDICINE

## 2022-10-01 PROCEDURE — 250N000013 HC RX MED GY IP 250 OP 250 PS 637: Performed by: HOSPITALIST

## 2022-10-01 PROCEDURE — 250N000011 HC RX IP 250 OP 636: Performed by: INTERNAL MEDICINE

## 2022-10-01 PROCEDURE — 99232 SBSQ HOSP IP/OBS MODERATE 35: CPT | Performed by: INTERNAL MEDICINE

## 2022-10-01 PROCEDURE — 250N000013 HC RX MED GY IP 250 OP 250 PS 637: Performed by: STUDENT IN AN ORGANIZED HEALTH CARE EDUCATION/TRAINING PROGRAM

## 2022-10-01 PROCEDURE — 120N000001 HC R&B MED SURG/OB

## 2022-10-01 RX ORDER — LEVOFLOXACIN 750 MG/1
750 TABLET, FILM COATED ORAL DAILY
Status: DISCONTINUED | OUTPATIENT
Start: 2022-10-01 | End: 2022-10-02 | Stop reason: HOSPADM

## 2022-10-01 RX ADMIN — INSULIN ASPART 1 UNITS: 100 INJECTION, SOLUTION INTRAVENOUS; SUBCUTANEOUS at 15:59

## 2022-10-01 RX ADMIN — OXYCODONE HYDROCHLORIDE 5 MG: 5 TABLET ORAL at 23:12

## 2022-10-01 RX ADMIN — PIPERACILLIN AND TAZOBACTAM 3.38 G: 3; .375 INJECTION, POWDER, LYOPHILIZED, FOR SOLUTION INTRAVENOUS at 01:27

## 2022-10-01 RX ADMIN — OXYCODONE HYDROCHLORIDE 5 MG: 5 TABLET ORAL at 12:39

## 2022-10-01 RX ADMIN — ACETAMINOPHEN 650 MG: 325 TABLET, FILM COATED ORAL at 09:53

## 2022-10-01 RX ADMIN — LEVOFLOXACIN 750 MG: 750 TABLET, FILM COATED ORAL at 14:10

## 2022-10-01 RX ADMIN — EMPAGLIFLOZIN 25 MG: 25 TABLET, FILM COATED ORAL at 09:53

## 2022-10-01 RX ADMIN — INSULIN ASPART 3 UNITS: 100 INJECTION, SOLUTION INTRAVENOUS; SUBCUTANEOUS at 12:39

## 2022-10-01 RX ADMIN — OXYCODONE HYDROCHLORIDE 5 MG: 5 TABLET ORAL at 06:24

## 2022-10-01 RX ADMIN — ACETAMINOPHEN 650 MG: 325 TABLET, FILM COATED ORAL at 21:58

## 2022-10-01 RX ADMIN — PIPERACILLIN AND TAZOBACTAM 3.38 G: 3; .375 INJECTION, POWDER, LYOPHILIZED, FOR SOLUTION INTRAVENOUS at 10:00

## 2022-10-01 RX ADMIN — ATORVASTATIN CALCIUM 40 MG: 40 TABLET, FILM COATED ORAL at 21:48

## 2022-10-01 RX ADMIN — PANTOPRAZOLE SODIUM 40 MG: 20 TABLET, DELAYED RELEASE ORAL at 06:19

## 2022-10-01 RX ADMIN — ACETAMINOPHEN 650 MG: 325 TABLET, FILM COATED ORAL at 01:27

## 2022-10-01 RX ADMIN — OXYCODONE HYDROCHLORIDE 5 MG: 5 TABLET ORAL at 18:34

## 2022-10-01 ASSESSMENT — ACTIVITIES OF DAILY LIVING (ADL)
ADLS_ACUITY_SCORE: 37
ADLS_ACUITY_SCORE: 35
ADLS_ACUITY_SCORE: 37
ADLS_ACUITY_SCORE: 35
ADLS_ACUITY_SCORE: 37

## 2022-10-01 NOTE — PROGRESS NOTES
Progress Note    Assessment/Plan  Reinaldo Real is a 37 year old male with PMH of DM-II, hyperlipidemia, HepB infection, latent TB who presented to our ED for evaluation of left sided rib cage pain.      Cavitary pneumonia  Possible lung abscess  Multiple pulmonary nodules  History of latent tuberculosis, 2013  - CT abd/pelvis showed a consolidation in the left lower lobe with small air-fluid collection.   - CT chest with multifocal pulmonary nodules, largest in the periphery of left lower lung cavitary  - Symptomsx 3 days. Lung abscess in this short duration is uncommon unless he had pre-existing cavitary lung disease.  No associated symptoms of weight loss, night sweats, fevers and chills.  - Sputum for AFBs x3 ordered--TB smear is negative.  ID following.  - Pulmonology consulted: s/p bronchoscopy on 9/29  - Given IV rocephin and zithromax in ED. Changed to IV zosyn for anaerobic coverage.   -- ID started levofloxacin daily on 10/1 for 4 weeks.  Monitor for hypo and hyperglycemia while on levofloxacin  --Does not need isolation as per ID     Fever/leucocysis  SIRS  -Improving     DM-II  - Hemoglobin A1c 8.2%  -Suboptimal control.  Hold oral hypoglycemic agents including Jardiance at in the hospital  -- Order NovoLog with meals ratio 1 is to 10    Hyperlipidemia:  - Cont statin     Mild hyponatremia  - Resolved w/ IVF     Hepatitis B infection, unclear treatment status  Cirrhosis  - Per CT liver is nodular in contour suggesting cirrhosis.  - ALT/AST/ALP within normal limits  --Have been is not treated  - Will need outpatient follow-up with GI/hepatology     DVT Prophylaxis: SCDs     Disposition/Barrier to discharge: Discharge home pending culture results      Barriers to discharge:    Anticipated discharge date:        Subjective  Patient new to me.  Chart reviewed continues to have left-sided chest pain and abdominal pain.  This is not new.  No fever overnight.  Blood sugars are uncontrolled.    Objective    BP  "104/62 (BP Location: Left arm)   Pulse 79   Temp 98.6  F (37  C) (Oral)   Resp 19   Ht 1.626 m (5' 4\")   Wt 81.6 kg (180 lb)   SpO2 96%   BMI 30.90 kg/m    Weight:   Wt Readings from Last 5 Encounters:   09/26/22 81.6 kg (180 lb)   05/10/21 75.9 kg (167 lb 4 oz)   03/08/21 76.1 kg (167 lb 12 oz)   02/08/21 75.5 kg (166 lb 8 oz)   03/16/20 77.2 kg (170 lb 4 oz)       I/O last 3 completed shifts:  In: 1583 [P.O.:360; I.V.:1223]  Out: -   I/O this shift:  In: 3 [I.V.:3]  Out: -           Physical Exam  Alert, oriented*3  Makes poor eye contact.  Body mass index is 30.9 kg/m .    Moist membranes  Neck supple  CVS: S1 S2-N, no murmurs, gallops, rubs  Resp: Inspiratory crackles at the bases   abd: soft, No t/g/r  Neuro: no involuntary movements such as tremors  Vasc: no leg edema     Pertinent Labs  ----------------------  Recent Labs   Lab 10/01/22  1151 10/01/22  0810 10/01/22  0222 09/30/22  0744 09/30/22  0552 09/29/22  0741 09/29/22  0520 09/28/22  0816 09/28/22  0731 09/27/22  0901 09/26/22  2103   NA  --   --   --   --  136  --  137  --  136  --  134*   POTASSIUM  --   --   --   --  3.6  --  3.7  --  3.7  --  4.2   CO2  --   --   --   --  21*  --  23  --  23  --  26   BUN  --   --   --   --  9.9  --  10.8  --  7.4  --  8.5   CR  --   --   --   --  0.66*  --  0.55*  --  0.56*  --  0.78   MAG  --   --   --   --   --   --   --   --   --   --  2.2   * 122* 171*   < > 222*   < > 203*   < > 194*   < > 195*   ALBUMIN  --   --   --   --   --   --   --   --  3.4*  --  4.6   BILITOTAL  --   --   --   --   --   --   --   --  0.5  --  1.0   ALKPHOS  --   --   --   --   --   --   --   --  64  --  112   ALT  --   --   --   --   --   --   --   --  28  --  46   AST  --   --   --   --   --   --   --   --  31  --  38    < > = values in this interval not displayed.     Recent Labs   Lab 09/28/22 0731 09/26/22 2103   WBC 7.9 12.2*   HGB 14.5 18.0*   HCT 41.3 50.6    244     Recent Labs   Lab 09/26/22 2103   INR " 1.04     No flowsheet data found.      Pertinent Radiology   Radiology Results: Personally reviewed impression/s  No results found.  EKG Results: not reviewed.

## 2022-10-01 NOTE — PROGRESS NOTES
"INFECTIOUS DISEASE FOLLOW UP NOTE      ASSESSMENT:  1. Pulmonary nodules, one in left lower lobe is cavitary. Sputum culture with Klebsiella pneumoniae hypermucoviscous phenotype -- known cause of abscesses; this is likely the pathogen causing abscess and other pulmonary lesions. TB possible despite previous treatment for latent TB (reduces risk of active TB 60-70%), but we have one negative smear for AFB, negative TB PCR (x2 on same sample), and he is improved in that temp and WBC are now normal on antibiotic, so TB is not too likely here. Bacterial abscess may be related to poor dentition.  Without chronic symptoms such as weight loss, night sweats, etc. Bronchoscopy 9/29 -- erythematous airways. Results so far negative for TB on smear.   2. Treated for latent TB 2013 he recalls with 6 months of medication, presumably INH.  3. Hep B. Not treated that he is aware of.   4. Cirrhosis  5. DM.     PLAN:  Levofloxacin 750mg daily, prescribe for 4 weeks with imaging follow up prior to 4 week   Levofloxacin can interact with insulin -- monitor for hyper- or hypo-glycemia  Follow up sputum and BAL results  Ok with going home over weekend, would be nice to see prelim bacterial culture from BAL  Does not need to isolate after discharge as TB seems unlikely.  I will have my clinic contact him about follow up with me    Hans Davila MD  [facility]  [phone] clinic  [facility] paging    ______________________________________________________________________    SUBJECTIVE / INTERVAL HISTORY: still pain with deep breath. Notes pain left side of chest since bronch. Temp ok. Overall improved.     ROS: All other systems negative except as listed above.        OBJECTIVE:  /62 (BP Location: Left arm)   Pulse 79   Temp 98.6  F (37  C) (Oral)   Resp 19   Ht 1.626 m (5' 4\")   Wt 81.6 kg (180 lb)   SpO2 96%   BMI 30.90 kg/m         Vital Signs  Temp: 98  F (36.7  C)  Temp src: Oral  Resp: " 18  Pulse: 77  Pulse Rate Source: Monitor  BP: 113/68  BP Location: Left arm    Temp (24hrs), Av.3  F (36.8  C), Min:98  F (36.7  C), Max:99  F (37.2  C)      GEN: No acute distress.    Poor dentition  RESPIRATORY:  Normal breathing pattern.   CARDIOVASCULAR:  Regular rate and rhythm.   ABDOMEN:  Soft, normal bowel sounds, non-tender  EXTREMITIES: No edema.  SKIN/HAIR/NAILS:  No rashes  IV: peripheral        Antibiotics:  pip/tazo -    Pertinent labs:    Recent Labs   Lab 22  0731 22  2103   WBC 7.9 12.2*   HGB 14.5 18.0*   HCT 41.3 50.6    244        Recent Labs   Lab 22  0552 22  0520 22  0731    137 136   CO2 21* 23 23   BUN 9.9 10.8 7.4        Lab Results   Component Value Date    CRP 75.70 (H) 2022    CRP 75.60 (H) 2022    CRP 0.6 2015         Lab Results   Component Value Date    ALT 28 2022    AST 31 2022     (H) 05/10/2021    ALKPHOS 64 2022         MICROBIOLOGY DATA:   blood negative to date  Sputum mixed callie on gram stain; culture klebsiella pneumoniae, hypermucoviscous phenotype, R to amp but S to all else  Sputum TB PCR negative x2 (on same sample), smear negative x1  QFG negative  BAL -- bacterial culture pending  AFB smear negative x2    RADIOLOGY:  CT Abdomen Pelvis w Contrast    Result Date: 2022  EXAM: CT ABDOMEN PELVIS W CONTRAST LOCATION: [facility] DATE/TIME: 2022 10:55 PM INDICATION: abdominal pain COMPARISON: 2018 TECHNIQUE: CT scan of the abdomen and pelvis was performed following injection of IV contrast. Multiplanar reformats were obtained. Dose reduction techniques were used. CONTRAST: 100ml isovue 370 FINDINGS: LOWER CHEST: 2 x 2.8 cm region of consolidation with central air-fluid collection 1.5 x 1.7 cm and the left lower lobe. Left pleural thickening versus trace effusion. HEPATOBILIARY: The liver is nodular in contour suggesting cirrhosis.  PANCREAS: Normal. SPLEEN: Normal. ADRENAL GLANDS: Normal. KIDNEYS/BLADDER: No hydronephrosis. Underdistention bladder. Wall thickening not excluded. BOWEL: Normal caliber. Metallic density in the right lower quadrant, region of the appendix. LYMPH NODES: Normal. VASCULATURE: Unremarkable. PELVIC ORGANS: Normal. MUSCULOSKELETAL: Mild degenerative change osseous structures.     IMPRESSION: 1.  Consolidation left lower lobe with small air-fluid collection could be infection with pulmonary abscess. Follow-up to confirm resolution necessary to exclude necrotic lesion. 2.  Cirrhosis. 3.  Underdistention the bladder. Wall thickening/cystitis not excluded.    Chest CT w/o contrast    Result Date: 9/27/2022  EXAM: CT CHEST W/O CONTRAST LOCATION: [facility] DATE/TIME: 9/27/2022 1:15 AM INDICATION: Cavitary left lower lobe mass on same day CT abdomen/pelvis. COMPARISON: Same day CT abdomen/pelvis. TECHNIQUE: CT chest without IV contrast. Multiplanar reformats were obtained. Dose reduction techniques were used. CONTRAST: None. FINDINGS: Absence of intravenous contrast limits the sensitivity of this examination for detection of infectious/inflammatory change, post traumatic abnormalities, vascular abnormalities, and visceral lesions. LUNGS AND PLEURA: Mild nodular debris within the proximal trachea. Thick-walled, cavitary consolidation within the peripheral left lower lobe, axial image 47, which measures 2.0 x 3.1 cm. There is mild loss opacity, which extends centrally to the left infrahilar region. There is an additional nodule within the inferior aspect of the right upper lobe, axial image 121, measuring 10 x 17 mm, also with mild surrounding groundglass opacity. This nodule is noncavitary. A larger nodule in the right upper lobe, at the apex (image 37), measures 16 x 20 mm. No pneumothorax. Trace left pleural fluid versus thickening.  MEDIASTINUM/AXILLAE: Mild left hilar lymphadenopathy. No  mediastinal or right hilar lymphadenopathy. Small amount of fluid within the nondilated distal esophagus.  No axillary lymphadenopathy. Mild gynecomastia. No thoracic aortic aneurysm. Mild atherosclerotic calcification. Heart is normal in size. No pericardial effusion. CORONARY ARTERY CALCIFICATION: None. UPPER ABDOMEN: Cirrhosis. MUSCULOSKELETAL: No suspicious abnormality. OTHER: No additionally suspicious abnormality.     IMPRESSION: 1.  Multifocal pulmonary nodules, as detailed above. Largest is present within the periphery of the left lower lobe and is cavitary. An infectious/inflammatory etiology is favored. Reactivated tuberculosis could be considered, given patient's history of latent tuberculosis. Pulmonology consultation is recommended. Clinical and imaging follow-up to resolution is recommended, so as to exclude malignancy. 2.  Mild left hilar lymphadenopathy, likely reactive. Attention on follow-up. 3.  Cirrhosis.

## 2022-10-01 NOTE — PLAN OF CARE
Problem: Plan of Care - These are the overarching goals to be used throughout the patient stay.    Goal: Absence of Hospital-Acquired Illness or Injury  Outcome: Ongoing, Progressing  Intervention: Identify and Manage Fall Risk  Recent Flowsheet Documentation  Taken 9/30/2022 5894 by Jacky Mcdermott RN  Safety Promotion/Fall Prevention:    clutter free environment maintained    lighting adjusted    nonskid shoes/slippers when out of bed    safety round/check completed     Problem: Plan of Care - These are the overarching goals to be used throughout the patient stay.    Goal: Optimal Comfort and Wellbeing  Outcome: Ongoing, Progressing     Problem: Diabetes Comorbidity  Goal: Blood Glucose Level Within Targeted Range  Outcome: Ongoing, Progressing     Problem: Fluid Imbalance (Pneumonia)  Goal: Fluid Balance  Outcome: Ongoing, Progressing     Problem: Infection (Pneumonia)  Goal: Resolution of Infection Signs and Symptoms  Outcome: Ongoing, Progressing     Problem: Respiratory Compromise (Pneumonia)  Goal: Effective Oxygenation and Ventilation  Outcome: Ongoing, Progressing     Pt alert and oriented. Pt able to answer in English to basic questions. Denies pain at this time, but states having back and abdominal pain when coughing. No coughing observed during interactions. New dressing placed to PIV; IV Zosyn and IV NS infusing at this time. Pre-prandial BG of 375 and HS BG of 267; sliding scale insulin administered. Pt independent w/ transfers and cares. Pt in negative pressure room for rule out of TB. Orthostatic BP performed by JAYLON this shift.

## 2022-10-01 NOTE — PLAN OF CARE
Problem: Diabetes Comorbidity  Goal: Blood Glucose Level Within Targeted Range  Outcome: Ongoing, Progressing     Problem: Respiratory Compromise (Pneumonia)  Goal: Effective Oxygenation and Ventilation  Outcome: Ongoing, Progressing     Problem: Pain Acute  Goal: Acceptable Pain Control and Functional Ability  Outcome: Ongoing, Progressing  Intervention: Prevent or Manage Pain  Recent Flowsheet Documentation  Taken 10/1/2022 0143 by Nubia Turner RN  Medication Review/Management: medications reviewed   Goal Outcome Evaluation:        Pt alert and oriented x 4. Pt c/o left  chest area pain, PRN tylenol @0127 and oxycodone @0624 given. BG at 2 am was 171. Pt denied having SOB. Will continue to monitor.

## 2022-10-01 NOTE — PLAN OF CARE
Patient has been alert and oriented. He has had lower left chest, upper left abdominal pain, more with coughing or taking deep breaths. PRN tylenol given x 1 and PRN oxycodone given x 1 and helpful.  IV antibiotics discontinued and switched to oral. Now on Levaquin daily.  Will pass onto monitor. Claritza Bergman RN   Problem: Plan of Care - These are the overarching goals to be used throughout the patient stay.    Goal: Absence of Hospital-Acquired Illness or Injury  Outcome: Ongoing, Progressing  Intervention: Identify and Manage Fall Risk  Recent Flowsheet Documentation  Taken 10/1/2022 1000 by Claritza Bergman RN  Safety Promotion/Fall Prevention:   assistive device/personal items within reach   lighting adjusted   nonskid shoes/slippers when out of bed  Intervention: Prevent and Manage VTE (Venous Thromboembolism) Risk  Recent Flowsheet Documentation  Taken 10/1/2022 1000 by Claritza Bergman RN  Activity Management: up ad kalin  Goal: Optimal Comfort and Wellbeing  Outcome: Ongoing, Progressing  Intervention: Monitor Pain and Promote Comfort  Recent Flowsheet Documentation  Taken 10/1/2022 1239 by Claritza Bergman RN  Pain Management Interventions: medication (see MAR)  Taken 10/1/2022 0953 by Claritza Bergman RN  Pain Management Interventions: medication (see MAR)     Problem: Diabetes Comorbidity  Goal: Blood Glucose Level Within Targeted Range  Outcome: Ongoing, Progressing     Problem: Fluid Imbalance (Pneumonia)  Goal: Fluid Balance  Outcome: Ongoing, Progressing     Problem: Infection (Pneumonia)  Goal: Resolution of Infection Signs and Symptoms  Outcome: Ongoing, Progressing  Intervention: Prevent Infection Progression  Recent Flowsheet Documentation  Taken 10/1/2022 1000 by Claritza Bergman RN  Isolation Precautions: airborne precautions maintained   Goal Outcome Evaluation:

## 2022-10-02 VITALS
HEART RATE: 76 BPM | DIASTOLIC BLOOD PRESSURE: 60 MMHG | WEIGHT: 180 LBS | TEMPERATURE: 98.6 F | OXYGEN SATURATION: 95 % | SYSTOLIC BLOOD PRESSURE: 100 MMHG | HEIGHT: 64 IN | RESPIRATION RATE: 20 BRPM | BODY MASS INDEX: 30.73 KG/M2

## 2022-10-02 LAB
BACTERIA BLD CULT: NO GROWTH
GLUCOSE BLDC GLUCOMTR-MCNC: 120 MG/DL (ref 70–99)
GLUCOSE BLDC GLUCOMTR-MCNC: 133 MG/DL (ref 70–99)

## 2022-10-02 PROCEDURE — 250N000013 HC RX MED GY IP 250 OP 250 PS 637: Performed by: INTERNAL MEDICINE

## 2022-10-02 PROCEDURE — 250N000013 HC RX MED GY IP 250 OP 250 PS 637: Performed by: STUDENT IN AN ORGANIZED HEALTH CARE EDUCATION/TRAINING PROGRAM

## 2022-10-02 PROCEDURE — G0008 ADMIN INFLUENZA VIRUS VAC: HCPCS | Performed by: STUDENT IN AN ORGANIZED HEALTH CARE EDUCATION/TRAINING PROGRAM

## 2022-10-02 PROCEDURE — 99239 HOSP IP/OBS DSCHRG MGMT >30: CPT | Performed by: INTERNAL MEDICINE

## 2022-10-02 PROCEDURE — 90686 IIV4 VACC NO PRSV 0.5 ML IM: CPT | Performed by: STUDENT IN AN ORGANIZED HEALTH CARE EDUCATION/TRAINING PROGRAM

## 2022-10-02 PROCEDURE — 99207 PR NO CHARGE LOS: CPT | Performed by: INTERNAL MEDICINE

## 2022-10-02 PROCEDURE — 250N000011 HC RX IP 250 OP 636: Performed by: HOSPITALIST

## 2022-10-02 PROCEDURE — 250N000011 HC RX IP 250 OP 636: Performed by: STUDENT IN AN ORGANIZED HEALTH CARE EDUCATION/TRAINING PROGRAM

## 2022-10-02 RX ORDER — OXYCODONE HYDROCHLORIDE 5 MG/1
5 TABLET ORAL EVERY 4 HOURS PRN
Qty: 12 TABLET | Refills: 0 | Status: SHIPPED | OUTPATIENT
Start: 2022-10-02 | End: 2022-10-17

## 2022-10-02 RX ORDER — LEVOFLOXACIN 750 MG/1
750 TABLET, FILM COATED ORAL DAILY
Qty: 27 TABLET | Refills: 0 | Status: SHIPPED | OUTPATIENT
Start: 2022-10-01 | End: 2022-10-27

## 2022-10-02 RX ORDER — ACETAMINOPHEN 325 MG/1
650 TABLET ORAL EVERY 6 HOURS PRN
COMMUNITY
Start: 2022-10-02 | End: 2023-03-13

## 2022-10-02 RX ADMIN — PANTOPRAZOLE SODIUM 40 MG: 20 TABLET, DELAYED RELEASE ORAL at 06:30

## 2022-10-02 RX ADMIN — OXYCODONE HYDROCHLORIDE 5 MG: 5 TABLET ORAL at 06:29

## 2022-10-02 RX ADMIN — ACETAMINOPHEN 650 MG: 325 TABLET, FILM COATED ORAL at 09:05

## 2022-10-02 RX ADMIN — INFLUENZA A VIRUS A/VICTORIA/2570/2019 IVR-215 (H1N1) ANTIGEN (FORMALDEHYDE INACTIVATED), INFLUENZA A VIRUS A/DARWIN/9/2021 SAN-010 (H3N2) ANTIGEN (FORMALDEHYDE INACTIVATED), INFLUENZA B VIRUS B/PHUKET/3073/2013 ANTIGEN (FORMALDEHYDE INACTIVATED), AND INFLUENZA B VIRUS B/MICHIGAN/01/2021 ANTIGEN (FORMALDEHYDE INACTIVATED) 0.5 ML: 15; 15; 15; 15 INJECTION, SUSPENSION INTRAMUSCULAR at 10:51

## 2022-10-02 RX ADMIN — LEVOFLOXACIN 750 MG: 750 TABLET, FILM COATED ORAL at 13:08

## 2022-10-02 RX ADMIN — KETOROLAC TROMETHAMINE 30 MG: 30 INJECTION, SOLUTION INTRAMUSCULAR at 02:59

## 2022-10-02 ASSESSMENT — ACTIVITIES OF DAILY LIVING (ADL)
ADLS_ACUITY_SCORE: 35

## 2022-10-02 NOTE — DISCHARGE SUMMARY
[facility] MEDICINE  DISCHARGE SUMMARY     Primary Care Physician: Cassy Berrios  Admission Date: 9/27/2022   Discharge Provider: Phong Coker MD Discharge Date: 10/2/2022   Diet: Diabetic   Code Status: Full Code   Activity: As given below        Condition at Discharge: Stable     REASON FOR PRESENTATION(See Admission Note for Details)   Left-sided rib can page    PRINCIPAL & ACTIVE DISCHARGE DIAGNOSES   Lung abscess with multiple pulmonary nodules  Diabetes mellitus.   Hepatitis B infection with cirrhosis  Mild hyponatremia  Hyperlipidemia on statin  SIGNIFICANT FINDINGS (Imaging, labs):   Chest CT w/o contrast    Result Date: 9/27/2022  EXAM: CT CHEST W/O CONTRAST LOCATION: [facility] DATE/TIME: 9/27/2022 1:15 AM INDICATION: Cavitary left lower lobe mass on same day CT abdomen/pelvis. COMPARISON: Same day CT abdomen/pelvis. TECHNIQUE: CT chest without IV contrast. Multiplanar reformats were obtained. Dose reduction techniques were used. CONTRAST: None. FINDINGS: Absence of intravenous contrast limits the sensitivity of this examination for detection of infectious/inflammatory change, post traumatic abnormalities, vascular abnormalities, and visceral lesions. LUNGS AND PLEURA: Mild nodular debris within the proximal trachea. Thick-walled, cavitary consolidation within the peripheral left lower lobe, axial image 47, which measures 2.0 x 3.1 cm. There is mild loss opacity, which extends centrally to the left infrahilar region. There is an additional nodule within the inferior aspect of the right upper lobe, axial image 121, measuring 10 x 17 mm, also with mild surrounding groundglass opacity. This nodule is noncavitary. A larger nodule in the right upper lobe, at the apex (image 37), measures 16 x 20 mm. No pneumothorax. Trace left pleural fluid versus thickening.  MEDIASTINUM/AXILLAE: Mild left hilar lymphadenopathy. No mediastinal or right hilar  lymphadenopathy. Small amount of fluid within the nondilated distal esophagus.  No axillary lymphadenopathy. Mild gynecomastia. No thoracic aortic aneurysm. Mild atherosclerotic calcification. Heart is normal in size. No pericardial effusion. CORONARY ARTERY CALCIFICATION: None. UPPER ABDOMEN: Cirrhosis. MUSCULOSKELETAL: No suspicious abnormality. OTHER: No additionally suspicious abnormality.     IMPRESSION: 1.  Multifocal pulmonary nodules, as detailed above. Largest is present within the periphery of the left lower lobe and is cavitary. An infectious/inflammatory etiology is favored. Reactivated tuberculosis could be considered, given patient's history of latent tuberculosis. Pulmonology consultation is recommended. Clinical and imaging follow-up to resolution is recommended, so as to exclude malignancy. 2.  Mild left hilar lymphadenopathy, likely reactive. Attention on follow-up. 3.  Cirrhosis.       PENDING LABS         PROCEDURES ( this hospitalization only)          RECOMMENDATIONS TO OUTPATIENT PROVIDER FOR F/U VISIT     *As given below**    DISPOSITION     Home    SUMMARY OF HOSPITAL COURSE:      Reinaldo Real is a 37 year old male with PMH of DM-II, hyperlipidemia, HepB infection, latent TB who presented to our ED for evaluation of left sided rib cage pain.      Cavitary pneumonia  Possible lung abscess  Multiple pulmonary nodules  History of latent tuberculosis, 2013  - CT abd/pelvis showed a consolidation in the left lower lobe with small air-fluid collection.   - CT chest with multifocal pulmonary nodules, largest in the periphery of left lower lung cavitary  - Symptomsx 3 days. Lung abscess in this short duration is uncommon unless he had pre-existing cavitary lung disease.  No associated symptoms of weight loss, night sweats, fevers and chills.  - Sputum for AFBs x3 ordered--TB smear is negative.  ID following.  - Pulmonology consulted: s/p bronchoscopy on 9/29  - Given IV rocephin and zithromax in  ED. Changed to IV zosyn for anaerobic coverage.   -- ID started levofloxacin daily on 10/1 for 4 weeks.  Monitor for hypo and hyperglycemia while on levofloxacin  --Does not need isolation as per ID     Fever/leucocysis  SIRS  -Improving     DM-II  - Hemoglobin A1c 8.2%  -Suboptimal control.  Hold oral hypoglycemic agents including Jardiance at in the hospital  -- Order NovoLog with meals ratio 1 is to 10     Hyperlipidemia:  - Cont statin     Mild hyponatremia  - Resolved w/ IVF     Hepatitis B infection, unclear treatment status  Cirrhosis  - Per CT liver is nodular in contour suggesting cirrhosis.  - ALT/AST/ALP within normal limits  --Have been is not treated  - Will need outpatient follow-up with GI/hepatology   Reinaldo Real is a 37 year old male with PMH of DM-II, hyperlipidemia, HepB infection, latent TB who presented to our ED for evaluation of left sided rib cage pain.      Cavitary pneumonia  Possible lung abscess  Multiple pulmonary nodules  History of latent tuberculosis, 2013  - CT abd/pelvis showed a consolidation in the left lower lobe with small air-fluid collection.   - CT chest with multifocal pulmonary nodules, largest in the periphery of left lower lung cavitary  - Symptomsx 3 days. Lung abscess in this short duration is uncommon unless he had pre-existing cavitary lung disease.  No associated symptoms of weight loss, night sweats, fevers and chills.  - Sputum for AFBs x3 ordered--TB smear is negative.  ID following.  - Pulmonology consulted: s/p bronchoscopy on 9/29  - Given IV rocephin and zithromax in ED. Changed to IV zosyn for anaerobic coverage.   -- ID started levofloxacin daily on 10/1 for 4 weeks.  Monitor for hypo and hyperglycemia while on levofloxacin  --Does not need isolation as per ID.  Follow-up with Dr. Davila as recommended  Repeat CT chest in 4 weeks as per ID     Fever/leucocysis  SIRS  -Improving     DM-II  - Hemoglobin A1c 8.2%  -Suboptimal control.  Hold oral hypoglycemic  agents including Jardiance at in the hospital  -- Order NovoLog with meals ratio 1 is to 10     Hyperlipidemia:  - Cont statin     Mild hyponatremia  - Resolved w/ IVF     Hepatitis B infection, unclear treatment status  Cirrhosis  - Per CT liver is nodular in contour suggesting cirrhosis.  - ALT/AST/ALP within normal limits  --Have been is not treated  - Will need outpatient follow-up with GI/hepatology       Discharge Medications with Med changes:        Review of your medicines      START taking      Dose / Directions   acetaminophen 325 MG tablet  Commonly known as: TYLENOL      Dose: 650 mg  Take 2 tablets (650 mg) by mouth every 6 hours as needed for mild pain or other (and adjunct with moderate or severe pain or per patient request)  Refills: 0     levofloxacin 750 MG tablet  Commonly known as: LEVAQUIN  Indication: Abscess      Dose: 750 mg  Take 1 tablet (750 mg) by mouth daily for 27 days  Quantity: 27 tablet  Refills: 0     oxyCODONE 5 MG tablet  Commonly known as: ROXICODONE      Dose: 5 mg  Take 1 tablet (5 mg) by mouth every 4 hours as needed for moderate to severe pain  Quantity: 12 tablet  Refills: 0        CONTINUE these medicines which have NOT CHANGED      Dose / Directions   atorvastatin 40 MG tablet  Commonly known as: LIPITOR  Used for: Encounter for medication refill      TAKE 1 TABLET (40 MG TOTAL) BY MOUTH DAILY FOR CHOLESTEROL  Quantity: 90 tablet  Refills: 3     blood glucose lancing device  Commonly known as: NO BRAND SPECIFIED  Used for: Type 2 diabetes mellitus without complication, without long-term current use of insulin (H)      Use to test blood sugars 2 times weekly or as directed.  Quantity: 1 each  Refills: 0     blood glucose monitoring lancets  Used for: Type 2 diabetes mellitus without complication, without long-term current use of insulin (H)      Use to test blood sugar 2 times daily or as directed.  Quantity: 100 each  Refills: 0     blood glucose monitoring meter device  kit  Commonly known as: NO BRAND SPECIFIED  Used for: Type 2 diabetes mellitus without complication, without long-term current use of insulin (H)      Use to test blood sugar 2 times weekly or as directed.  Quantity: 1 kit  Refills: 0     blood glucose test strip  Commonly known as: NO BRAND SPECIFIED  Used for: Type 2 diabetes mellitus without complication, without long-term current use of insulin (H)      Use to test blood sugars 2 times weekly or as directed  Quantity: 100 strip  Refills: 0     Jardiance 25 MG Tabs tablet  Used for: Encounter for medication refill  Generic drug: empagliflozin      TAKE 1 TABLET (25 MG TOTAL) BY MOUTH DAILY FOR DIABETES  Quantity: 90 tablet  Refills: 1     metFORMIN 500 MG 24 hr tablet  Commonly known as: GLUCOPHAGE XR  Used for: Encounter for medication refill      TAKE 2 TABLETS (1,000 MG TOTAL) BY MOUTH DAILY WITH BREAKFAST FOR DIABETES  Quantity: 120 tablet  Refills: 11     omeprazole 20 MG DR capsule  Commonly known as: priLOSEC  Used for: Encounter for medication refill      TAKE 1 CAPSULE (20 MG TOTAL) BY MOUTH 2 (TWO) TIMES A DAY BEFORE MEALS  Quantity: 180 capsule  Refills: 3     Onglyza 5 MG Tabs tablet  Used for: Encounter for medication refill  Generic drug: saxagliptin      TAKE 1 TABLET (5 MG TOTAL) BY MOUTH DAILY.  Quantity: 90 tablet  Refills: 1           Where to get your medicines      These medications were sent to [facility] - [place], [place] - [address] [place], [place] [address]  [address], [place] [address]    Phone: [phone]     levofloxacin 750 MG tablet    oxyCODONE 5 MG tablet     Some of these will need a paper prescription and others can be bought over the counter. Ask your nurse if you have questions.    You don't need a prescription for these medications    acetaminophen 325 MG tablet             Rationale for medication changes:      Levofloxacin for 4 weeks as per ID  Tylenol and oxycodone for pain control        Consults    Infectious disease, pulmonary      Immunizations given this encounter     Immunization History   Administered Date(s) Administered     HepB 01/14/2013, 03/21/2013, 11/27/2013     Influenza (IIV3) PF 10/23/2013     Influenza Vaccine IM > 6 months Valent IIV4 (Alfuria,Fluzone) 10/13/2017, 10/02/2022     Influenza Vaccine, 6+MO IM (QUADRIVALENT W/PRESERVATIVES) 11/21/2014, 10/27/2015, 11/02/2016     MMR 01/14/2003, 03/21/2013     TD (ADULT, 7+) 01/14/2013     TDAP Vaccine (Boostrix) 11/27/2013     Tdap (Adacel,Boostrix) 05/21/2013     Varicella Pt Report Hx of Varicella/Chicken Pox 12/06/2013          Anticoagulation Information      Recent INR results:   Recent Labs   Lab Test 09/26/22  2103   INR 1.04     Warfarin doses (if applicable) or name of other anticoagulant: Not applicable      Discharge Orders     Discharge Procedure Orders   CT Chest w/o Contrast   Standing Status: Future Standing Exp. Date: 10/01/23     Order Specific Question Answer Comments   Clinical Info for the Interpreting Provider follow up abscess, pulmonary nodules on treatment for bacteria    Priority Routine      Adult GI  Referral - Consult Only   Standing Status: Future   Referral Priority: Routine: Next available opening Referral Type: Consultation   Requested Specialty: Gastroenterology   Number of Visits Requested: 1     Follow-up and recommended labs and tests    Order Comments: Follow up with primary care provider, Cassy Berrios, within 7 days for hospital follow- up.  The following labs/tests are recommended: CBC, CMP.     Activity   Order Comments: Your activity upon discharge: activity as tolerated     Order Specific Question Answer Comments   Is discharge order? Yes      Diet   Order Comments: Follow this diet upon discharge: Orders Placed This Encounter      Moderate Consistent Carb (60 g CHO per Meal) Diet     Order Specific Question Answer Comments   Is discharge order? Yes      Examination     Vital Signs in last 24  "hours:   Vital signs:  Temp: 98.6  F (37  C) Temp src: Oral BP: 100/60 Pulse: 76   Resp: 20 SpO2: 95 % O2 Device: None (Room air)   Height: 162.6 cm (5' 4\") Weight: 81.6 kg (180 lb)  Estimated body mass index is 30.9 kg/m  as calculated from the following:    Height as of this encounter: 1.626 m (5' 4\").    Weight as of this encounter: 81.6 kg (180 lb).        General appearance: alert, appears stated age and cooperative       Please see EMR for more detailed significant labs, imaging, consultant notes etc.  Total time spent on discharge: 35 minutes    Phong Coker MD   [facility] Service: Ph:[phone]    CC:Cassy Berrios    "

## 2022-10-02 NOTE — PLAN OF CARE
Problem: Diabetes Comorbidity  Goal: Blood Glucose Level Within Targeted Range  Outcome: Ongoing, Progressing     Problem: Pain Acute  Goal: Acceptable Pain Control and Functional Ability  Outcome: Ongoing, Progressing  Intervention: Prevent or Manage Pain  Recent Flowsheet Documentation  Taken 10/2/2022 0112 by Nubia Turner RN  Medication Review/Management: medications reviewed   Goal Outcome Evaluation:           PRN Toradol and oxycodone given for chest area pain and was effective. Pt alert and oriented and independent in his room. BG @ 2 kt=190

## 2022-10-02 NOTE — PROVIDER NOTIFICATION
House officer, Dr. Pham, notified of pt's BG of 98 and Lantus order of 20 units. Pt did not eat much for dinner (about 1/2 cup of rice) and was started on levofloxacin today. New order for Lantus 12 units was placed by provider.

## 2022-10-02 NOTE — PLAN OF CARE
Problem: Plan of Care - These are the overarching goals to be used throughout the patient stay.    Goal: Absence of Hospital-Acquired Illness or Injury  Outcome: Ongoing, Progressing  Intervention: Identify and Manage Fall Risk  Recent Flowsheet Documentation  Taken 10/1/2022 1603 by Jacky Mcdermott RN  Safety Promotion/Fall Prevention:    clutter free environment maintained    nonskid shoes/slippers when out of bed    patient and family education    safety round/check completed     Problem: Plan of Care - These are the overarching goals to be used throughout the patient stay.    Goal: Optimal Comfort and Wellbeing  Outcome: Ongoing, Progressing  Intervention: Monitor Pain and Promote Comfort  Recent Flowsheet Documentation  Taken 10/1/2022 1603 by Jacky Mcdermott RN  Pain Management Interventions: medication offered but refused     Problem: Diabetes Comorbidity  Goal: Blood Glucose Level Within Targeted Range  Outcome: Ongoing, Progressing     Problem: Fluid Imbalance (Pneumonia)  Goal: Fluid Balance  Outcome: Ongoing, Progressing     Problem: Infection (Pneumonia)  Goal: Resolution of Infection Signs and Symptoms  Outcome: Ongoing, Progressing  Intervention: Prevent Infection Progression  Recent Flowsheet Documentation  Taken 10/1/2022 1603 by Jacky Mcdermott RN  Isolation Precautions: airborne precautions maintained     Problem: Respiratory Compromise (Pneumonia)  Goal: Effective Oxygenation and Ventilation  Outcome: Ongoing, Progressing     Problem: Pain Acute  Goal: Acceptable Pain Control and Functional Ability  Outcome: Ongoing, Progressing  Intervention: Develop Pain Management Plan  Recent Flowsheet Documentation  Taken 10/1/2022 1603 by Jacky Mcdermott RN  Pain Management Interventions: medication offered but refused  Intervention: Prevent or Manage Pain  Recent Flowsheet Documentation  Taken 10/1/2022 1603 by Jacky Mcdermott RN  Medication Review/Management: medications reviewed     Pt alert and oriented. Pt  able to understand and answer in English to basic questions. Pt c/o upper abdomen/lower chest pain and was given PRN oxycodone and PRN acetaminophen. Pre-prandial BG of 176; HS BG of 98. Sliding scale and carb count insulin given for dinner. House officer notified about HS BG and current Lantus dose. Pt independent w/ transfers and cares.

## 2022-10-02 NOTE — PROGRESS NOTES
ID chart check    TB not likely. Can be out of precautions. Plan as of note 10/1. Ok with discharge.     Hans Davila MD

## 2022-10-02 NOTE — PROGRESS NOTES
Pulmonary Follow Up Note:    Bronch results reviewed, and negative thus far, including AFBs. Taken off airborne precautions by ID. Plan in place to treat hypermucoviscous klebsiella pneumonia with ID follow up. On room air.     No further pulmonary input to care. We will sign off at this time, please call back if any new questions arise.       Ivy Cai MD  Pulmonary and Critical Care Medicine  [facility]  Office: [phone]

## 2022-10-02 NOTE — PLAN OF CARE
"Patient alert and oriented. Feels better overall no fevers in 24 hours. Influenza vaccine given in left arm before discharge. Okay to discharge to home. Went over discharge instructions with patient using [employer] interpretor. Escorted to front entrance.   Claritza Bergman RN    Problem: Plan of Care - These are the overarching goals to be used throughout the patient stay.    Goal: Plan of Care Review/Shift Note  Description: The Plan of Care Review/Shift note should be completed every shift.  The Outcome Evaluation is a brief statement about your assessment that the patient is improving, declining, or no change.  This information will be displayed automatically on your shift note.  Outcome: Adequate for Care Transition  Goal: Patient-Specific Goal (Individualized)  Description: You can add care plan individualizations to a care plan. Examples of Individualization might be:  \"Parent requests to be called daily at 9am for status\", \"I have a hard time hearing out of my right ear\", or \"Do not touch me to wake me up as it startles me\".  Outcome: Adequate for Care Transition  Goal: Absence of Hospital-Acquired Illness or Injury  Outcome: Adequate for Care Transition  Intervention: Identify and Manage Fall Risk  Recent Flowsheet Documentation  Taken 10/2/2022 0930 by Claritza Bergman, RN  Safety Promotion/Fall Prevention:   assistive device/personal items within reach   lighting adjusted   nonskid shoes/slippers when out of bed  Intervention: Prevent and Manage VTE (Venous Thromboembolism) Risk  Recent Flowsheet Documentation  Taken 10/2/2022 0930 by Claritza Bergman, RN  Activity Management: up ad kalin  Goal: Optimal Comfort and Wellbeing  Outcome: Adequate for Care Transition  Goal: Readiness for Transition of Care  Outcome: Adequate for Care Transition   Goal Outcome Evaluation:                      "

## 2022-10-03 LAB
H CAPSUL AG SER IA-ACNC: NOT DETECTED
H CAPSUL AG SER QL IA: NOT DETECTED
PATH REPORT.COMMENTS IMP SPEC: NORMAL
PATH REPORT.COMMENTS IMP SPEC: NORMAL
PATH REPORT.FINAL DX SPEC: NORMAL
PATH REPORT.FINAL DX SPEC: NORMAL
PATH REPORT.GROSS SPEC: NORMAL
PATH REPORT.GROSS SPEC: NORMAL
PATH REPORT.MICROSCOPIC SPEC OTHER STN: NORMAL
PATH REPORT.MICROSCOPIC SPEC OTHER STN: NORMAL
PATH REPORT.RELEVANT HX SPEC: NORMAL
PATH REPORT.RELEVANT HX SPEC: NORMAL

## 2022-10-04 LAB
ANNOTATION COMMENT IMP: NOT DETECTED
DEPRECATED M TB RPOB XXX QL NAA+PROBE: NORMAL
GALACTOMANNAN AG BAL QL: NEGATIVE
GALACTOMANNAN AG SPEC IA-ACNC: 0.15
M TB DNA SPEC QL NAA+PROBE: NOT DETECTED
SCANNED LAB RESULT: NORMAL

## 2022-10-06 LAB
ASPERGILLUS AB SER QL ID: NOT DETECTED
B DERMAT AB SER QL ID: NOT DETECTED

## 2022-10-13 LAB
BACTERIA BRONCH: NORMAL
BACTERIA BRONCH: NORMAL

## 2022-10-17 ENCOUNTER — OFFICE VISIT (OUTPATIENT)
Dept: FAMILY MEDICINE | Facility: CLINIC | Age: 37
End: 2022-10-17
Payer: COMMERCIAL

## 2022-10-17 VITALS
SYSTOLIC BLOOD PRESSURE: 108 MMHG | OXYGEN SATURATION: 97 % | WEIGHT: 150 LBS | HEART RATE: 83 BPM | DIASTOLIC BLOOD PRESSURE: 70 MMHG | TEMPERATURE: 98.4 F | BODY MASS INDEX: 25.75 KG/M2

## 2022-10-17 DIAGNOSIS — M54.50 CHRONIC LOW BACK PAIN WITHOUT SCIATICA, UNSPECIFIED BACK PAIN LATERALITY: ICD-10-CM

## 2022-10-17 DIAGNOSIS — Z76.0 ENCOUNTER FOR MEDICATION REFILL: ICD-10-CM

## 2022-10-17 DIAGNOSIS — G89.29 CHRONIC LOW BACK PAIN WITHOUT SCIATICA, UNSPECIFIED BACK PAIN LATERALITY: ICD-10-CM

## 2022-10-17 DIAGNOSIS — J85.1 ABSCESS OF LOWER LOBE OF LEFT LUNG WITH PNEUMONIA (H): Primary | ICD-10-CM

## 2022-10-17 DIAGNOSIS — E11.9 TYPE 2 DIABETES MELLITUS WITHOUT COMPLICATION, WITHOUT LONG-TERM CURRENT USE OF INSULIN (H): ICD-10-CM

## 2022-10-17 DIAGNOSIS — B18.1 HEPATITIS B, CHRONIC (H): ICD-10-CM

## 2022-10-17 DIAGNOSIS — Z13.220 SCREENING FOR HYPERLIPIDEMIA: ICD-10-CM

## 2022-10-17 LAB
BASOPHILS # BLD AUTO: 0 10E3/UL (ref 0–0.2)
BASOPHILS NFR BLD AUTO: 0 %
EOSINOPHIL # BLD AUTO: 0.3 10E3/UL (ref 0–0.7)
EOSINOPHIL NFR BLD AUTO: 3 %
ERYTHROCYTE [DISTWIDTH] IN BLOOD BY AUTOMATED COUNT: 11.8 % (ref 10–15)
HCT VFR BLD AUTO: 46.9 % (ref 40–53)
HGB BLD-MCNC: 15.8 G/DL (ref 13.3–17.7)
IMM GRANULOCYTES # BLD: 0 10E3/UL
IMM GRANULOCYTES NFR BLD: 0 %
LYMPHOCYTES # BLD AUTO: 2.2 10E3/UL (ref 0.8–5.3)
LYMPHOCYTES NFR BLD AUTO: 27 %
MCH RBC QN AUTO: 28.6 PG (ref 26.5–33)
MCHC RBC AUTO-ENTMCNC: 33.7 G/DL (ref 31.5–36.5)
MCV RBC AUTO: 85 FL (ref 78–100)
MONOCYTES # BLD AUTO: 0.6 10E3/UL (ref 0–1.3)
MONOCYTES NFR BLD AUTO: 7 %
NEUTROPHILS # BLD AUTO: 5.1 10E3/UL (ref 1.6–8.3)
NEUTROPHILS NFR BLD AUTO: 63 %
PLATELET # BLD AUTO: 425 10E3/UL (ref 150–450)
RBC # BLD AUTO: 5.53 10E6/UL (ref 4.4–5.9)
WBC # BLD AUTO: 8.1 10E3/UL (ref 4–11)

## 2022-10-17 PROCEDURE — 80048 BASIC METABOLIC PNL TOTAL CA: CPT | Performed by: FAMILY MEDICINE

## 2022-10-17 PROCEDURE — 36415 COLL VENOUS BLD VENIPUNCTURE: CPT | Performed by: FAMILY MEDICINE

## 2022-10-17 PROCEDURE — 82043 UR ALBUMIN QUANTITATIVE: CPT | Performed by: FAMILY MEDICINE

## 2022-10-17 PROCEDURE — 85025 COMPLETE CBC W/AUTO DIFF WBC: CPT | Performed by: FAMILY MEDICINE

## 2022-10-17 PROCEDURE — 99214 OFFICE O/P EST MOD 30 MIN: CPT | Performed by: FAMILY MEDICINE

## 2022-10-17 PROCEDURE — 80061 LIPID PANEL: CPT | Performed by: FAMILY MEDICINE

## 2022-10-17 RX ORDER — SAXAGLIPTIN 5 MG/1
5 TABLET, FILM COATED ORAL DAILY
Qty: 90 TABLET | Refills: 0 | Status: SHIPPED | OUTPATIENT
Start: 2022-10-17 | End: 2022-12-15

## 2022-10-17 RX ORDER — GABAPENTIN 100 MG/1
100 CAPSULE ORAL 3 TIMES DAILY
Qty: 90 CAPSULE | Refills: 0 | Status: SHIPPED | OUTPATIENT
Start: 2022-10-17 | End: 2023-03-13

## 2022-10-17 RX ORDER — OXYCODONE HYDROCHLORIDE 5 MG/1
5 TABLET ORAL EVERY 4 HOURS PRN
Qty: 12 TABLET | Refills: 0 | Status: SHIPPED | OUTPATIENT
Start: 2022-10-17 | End: 2023-02-06

## 2022-10-17 NOTE — PROGRESS NOTES
"  Assessment & Plan     Type 2 diabetes mellitus without complication, without long-term current use of insulin (H)  Long overdue for DM follow up, had not been seen since June 2021. A1c 8.2. At risk for hypo/hyperglycemia with Levaquin (planned for 4 weeks). Continue current medication regimen and schedule w MTM.   - Med Therapy Management Referral  - Lipid panel reflex to direct LDL Non-fasting  - Albumin Random Urine Quantitative with Creat Ratio  - saxagliptin (ONGLYZA) 5 MG TABS tablet  Dispense: 90 tablet; Refill: 0    Hepatitis B, chronic (H)  Scheduled w hepatology, appointment details given today    Abscess of lower lobe of left lung with pneumonia (H)  Requests refill of oxycodone, still having rib/chest pain. PMDP reviewed. Risks/precautions reviewed. Rx sent for 12 pills. Reviewed upcoming appointments CT scan and ID. Follow up sooner if fevers or worsening.   - Basic metabolic panel  (Ca, Cl, CO2, Creat, Gluc, K, Na, BUN)  - CBC with platelets and differential  - oxyCODONE (ROXICODONE) 5 MG tablet  Dispense: 12 tablet; Refill: 0    Chronic low back pain without sciatica  Requests restarting gabapentin, which he was on in the past. Rx sent.   - gabapentin (NEURONTIN) 100 MG capsule  Dispense: 90 capsule; Refill: 0               Nicotine/Tobacco Cessation:  He reports that he has been smoking cigarettes and cigarettes. He has been smoking an average of .2 packs per day. His smokeless tobacco use includes chew and chew.        BMI:   Estimated body mass index is 25.75 kg/m  as calculated from the following:    Height as of 9/26/22: 1.626 m (5' 4\").    Weight as of this encounter: 68 kg (150 lb).   Weight management plan: not discussed today due to time constraints    No follow-ups on file.    Claritza Alcala MD  [facility] Najera is a 37 year old, presenting for the following health issues:  Hospital F/U (09/27- 10/02) and Medication Refill (Cholesterol and pain " Rx)      HPI   Cavitary pneumonia - [facility] 9/27-10/2 - presented w left sided rib pain, found to have multiple pulmonary nodules and LLL abscess, fever, SIRS.  Sputum x 3 for AFB negative. Followed inpatient by pulmonology and ID. Bronchoscopy 9/29. Started on IV abx, transitioned to levaquin 10/1 for 4 weeks.     DM2 - metformin, jardiance and onglyza. A1c 8.2% 9/26.     Mild hyponatremia - resolved w IVF after admission    Chronic hep B with cirrhosis - outpatient hepatology referral was placed from hospital      Since discharge--  Still somewhat poor appetite. Still having left sided chest pain but improved compared to when he was admitted. No fevers or chills. Requests oxycodone refill - was given 12 tablets at discharge two weeks ago, out.     patient reports -200. Reports med compliance with all meds. Requests gabapentin, which he was on in the past for low back pain, and would like to restart (stopped over pandemic per patient, was not coming in regularly).       Review of Systems         Objective    /70 (BP Location: Right arm, Cuff Size: Adult Regular)   Pulse 83   Temp 98.4  F (36.9  C) (Oral)   Wt 68 kg (150 lb)   SpO2 97%   BMI 25.75 kg/m    Body mass index is 25.75 kg/m .  Physical Exam   GENERAL: healthy, alert and no distress  EYES: Eyes grossly normal to inspection, PERRL and conjunctivae and sclerae normal  HENT: mouth without ulcers or lesions  NECK: no adenopathy, no asymmetry, masses, or scars and thyroid normal to palpation  RESP: Breathing comfortably. Decreased breath sounds left mid and lower lung  CV: regular rate and rhythm, normal S1 S2, no S3 or S4, no murmur, click or rub, no peripheral edema and peripheral pulses strong  MS: no gross musculoskeletal defects noted, no edema      Hospital Follow-up Visit:    Hospital/Nursing Home/IP Rehab Facility: [facility]  Date of Admission: 9/27  Date of Discharge: 10/2  Reason(s) for Admission: Left  lung abscess w multiple pulmonary nodules    Was your hospitalization related to COVID-19? No   Problems taking medications regularly:  None  Medication changes since discharge: None  Problems adhering to non-medication therapy:  None    Summary of hospitalization:  [facility] discharge summary reviewed  Diagnostic Tests/Treatments reviewed.  Follow up needed: BMP, CBC, chest CT and pulm appt  Other Healthcare Providers Involved in Patient s Care:         Specialist appointment - ID, hepatology, and Imaging  Update since discharge: improved.    Post Medication Reconciliation Status: Discharge medications reconciled, continue medications without change      Plan of care communicated with patient

## 2022-10-18 ENCOUNTER — TELEPHONE (OUTPATIENT)
Dept: FAMILY MEDICINE | Facility: CLINIC | Age: 37
End: 2022-10-18

## 2022-10-18 LAB
ANION GAP SERPL CALCULATED.3IONS-SCNC: 16 MMOL/L (ref 7–15)
BUN SERPL-MCNC: 11.9 MG/DL (ref 6–20)
CALCIUM SERPL-MCNC: 9.4 MG/DL (ref 8.6–10)
CHLORIDE SERPL-SCNC: 99 MMOL/L (ref 98–107)
CHOLEST SERPL-MCNC: 144 MG/DL
CREAT SERPL-MCNC: 0.76 MG/DL (ref 0.67–1.17)
CREAT UR-MCNC: 41.5 MG/DL
DEPRECATED HCO3 PLAS-SCNC: 20 MMOL/L (ref 22–29)
GFR SERPL CREATININE-BSD FRML MDRD: >90 ML/MIN/1.73M2
GLUCOSE SERPL-MCNC: 247 MG/DL (ref 70–99)
HDLC SERPL-MCNC: 27 MG/DL
LDLC SERPL CALC-MCNC: 85 MG/DL
MICROALBUMIN UR-MCNC: <12 MG/L
MICROALBUMIN/CREAT UR: NORMAL MG/G{CREAT}
NONHDLC SERPL-MCNC: 117 MG/DL
POTASSIUM SERPL-SCNC: 3.8 MMOL/L (ref 3.4–5.3)
SODIUM SERPL-SCNC: 135 MMOL/L (ref 136–145)
TRIGL SERPL-MCNC: 159 MG/DL

## 2022-10-18 NOTE — TELEPHONE ENCOUNTER
Called pt and spoke to wife. Wife does not have CTC on file so writer asked for pt to call back. The wife stated she will relay the message to him.    Jj Taylor Cem Say, BSN RN  [facility]

## 2022-10-18 NOTE — TELEPHONE ENCOUNTER
Pt called back and writer relayed message. Pt already has appt with MTM set up. Pt verbalized understanding but stated that he could not pick his medication (saxagliptin) up from pharmacy today. Writer called pharmacy an they stated that the medication is just being ordered and it will be ready for  tomorrow after noon. Writer relayed this back to pt.    Jj Taylor Cem Say, BSN RN  [facility]

## 2022-10-18 NOTE — TELEPHONE ENCOUNTER
----- Message from Claritza Alcala MD sent at 10/18/2022  1:56 PM CDT -----  Please call w results - blood and urine tests are all ok, except high blood sugar. Continue diabetes medications as prescribed and check blood sugar 1-2 times daily. Schedule MTM visit for diabetes - they will be calling him to set up appointment.

## 2022-10-24 ENCOUNTER — HOSPITAL ENCOUNTER (OUTPATIENT)
Dept: CT IMAGING | Facility: HOSPITAL | Age: 37
Discharge: HOME | End: 2022-10-24
Attending: INTERNAL MEDICINE | Admitting: INTERNAL MEDICINE
Payer: COMMERCIAL

## 2022-10-24 DIAGNOSIS — J85.1 ABSCESS OF LOWER LOBE OF LEFT LUNG WITH PNEUMONIA (H): ICD-10-CM

## 2022-10-24 PROCEDURE — 71250 CT THORAX DX C-: CPT

## 2022-10-26 ENCOUNTER — TELEPHONE (OUTPATIENT)
Dept: INFECTIOUS DISEASES | Facility: CLINIC | Age: 37
End: 2022-10-26

## 2022-10-26 DIAGNOSIS — J85.1 ABSCESS OF LOWER LOBE OF LEFT LUNG WITH PNEUMONIA (H): ICD-10-CM

## 2022-10-26 NOTE — TELEPHONE ENCOUNTER
Missed appointment today.    CT reviewed, looks much better.     Can you call him and see how he is doing? If he feels back to normal he can finish his antibiotic this week. If he still has some pain in his lung/back area, can extend antibiotic another week. Testing for tuberculosis is still negative. This was a bacterial lung abscess.     Hans Davila MD

## 2022-10-27 LAB
BACTERIA BRONCH: NO GROWTH

## 2022-10-27 RX ORDER — LEVOFLOXACIN 750 MG/1
750 TABLET, FILM COATED ORAL DAILY
Qty: 6 TABLET | Refills: 0 | Status: SHIPPED | OUTPATIENT
Start: 2022-10-27 | End: 2022-11-02

## 2022-10-27 NOTE — TELEPHONE ENCOUNTER
I called the pt via  services, his pain is better, however he is not back to normal. I informed that I will send additional abx,per the MD. I informed of the below CT and TB test results. RX sent. Appt scheduled for 11/2 with Dr Davila.

## 2022-10-28 NOTE — TELEPHONE ENCOUNTER
DIAGNOSIS:  Hepatitis B infection without delta agent without hepatic coma, unspecified chronicity, Cirrhosis of liver without ascites, unspecified hepatic cirrhosis type    Appt Date: 12.05.2022   NOTES STATUS DETAILS   OFFICE NOTE from referring provider Internal 09.27.2022Grazyna Bush MD   OFFICE NOTES from other specialists     DISCHARGE SUMMARY from hospital Internal 09.27.2022 Grazyna Bush MD   MEDICATION LIST Internal    LIVER BIOSPY (IF APPLICABLE)      PATHOLOGY REPORTS      IMAGING     ENDOSCOPY (IF AVAILABLE)     COLONOSCOPY (IF AVAILABLE)     ULTRASOUND LIVER     CT OF ABDOMEN Internal 09.26.2022 CT ABDOMEN PELVIS W CONTRAST   MRI OF LIVER     FIBROSCAN, US ELASTOGRAPHY, FIBROSIS SCAN, MR ELASTOGRAPHY     LABS     HEPATIC PANEL (LIVER PANEL) Internal 09.26.2022   BASIC METABOLIC PANEL Internal 10.17.2022   COMPLETE METABOLIC PANEL Internal 10.17.2022   COMPLETE BLOOD COUNT (CBC) Internal 10.17.2022   INTERNATIONAL NORMALIZED RATIO (INR) Internal 09.26.2022   HEPATITIS C ANTIBODY     HEPATITIS C VIRAL LOAD/PCR     HEPATITIS C GENOTYPE     HEPATITIS B SURFACE ANTIGEN     HEPATITIS B SURFACE ANTIBODY     HEPATITIS B DNA QUANT LEVEL     HEPATITIS B CORE ANTIBODY

## 2022-11-02 ENCOUNTER — OFFICE VISIT (OUTPATIENT)
Dept: INFECTIOUS DISEASES | Facility: CLINIC | Age: 37
End: 2022-11-02
Payer: COMMERCIAL

## 2022-11-02 ENCOUNTER — LAB (OUTPATIENT)
Dept: LAB | Facility: CLINIC | Age: 37
End: 2022-11-02
Payer: COMMERCIAL

## 2022-11-02 VITALS
BODY MASS INDEX: 25.61 KG/M2 | OXYGEN SATURATION: 98 % | WEIGHT: 149.2 LBS | TEMPERATURE: 98 F | DIASTOLIC BLOOD PRESSURE: 74 MMHG | HEART RATE: 76 BPM | SYSTOLIC BLOOD PRESSURE: 102 MMHG

## 2022-11-02 DIAGNOSIS — J85.1 ABSCESS OF LOWER LOBE OF LEFT LUNG WITH PNEUMONIA (H): Primary | ICD-10-CM

## 2022-11-02 DIAGNOSIS — J85.1 ABSCESS OF LOWER LOBE OF LEFT LUNG WITH PNEUMONIA (H): ICD-10-CM

## 2022-11-02 LAB
ALBUMIN SERPL BCG-MCNC: 4.3 G/DL (ref 3.5–5.2)
ALP SERPL-CCNC: 126 U/L (ref 40–129)
ALT SERPL W P-5'-P-CCNC: 33 U/L (ref 10–50)
ANION GAP SERPL CALCULATED.3IONS-SCNC: 9 MMOL/L (ref 7–15)
AST SERPL W P-5'-P-CCNC: 38 U/L (ref 10–50)
BASOPHILS # BLD AUTO: 0 10E3/UL (ref 0–0.2)
BASOPHILS NFR BLD AUTO: 0 %
BILIRUB SERPL-MCNC: 0.7 MG/DL
BUN SERPL-MCNC: 12.1 MG/DL (ref 6–20)
CALCIUM SERPL-MCNC: 9.5 MG/DL (ref 8.6–10)
CHLORIDE SERPL-SCNC: 102 MMOL/L (ref 98–107)
CREAT SERPL-MCNC: 0.67 MG/DL (ref 0.67–1.17)
CRP SERPL-MCNC: <3 MG/L
DEPRECATED HCO3 PLAS-SCNC: 27 MMOL/L (ref 22–29)
EOSINOPHIL # BLD AUTO: 0.1 10E3/UL (ref 0–0.7)
EOSINOPHIL NFR BLD AUTO: 2 %
ERYTHROCYTE [DISTWIDTH] IN BLOOD BY AUTOMATED COUNT: 13.7 % (ref 10–15)
GFR SERPL CREATININE-BSD FRML MDRD: >90 ML/MIN/1.73M2
GLUCOSE SERPL-MCNC: 197 MG/DL (ref 70–99)
HCT VFR BLD AUTO: 49 % (ref 40–53)
HGB BLD-MCNC: 16.4 G/DL (ref 13.3–17.7)
IMM GRANULOCYTES # BLD: 0 10E3/UL
IMM GRANULOCYTES NFR BLD: 0 %
LYMPHOCYTES # BLD AUTO: 2.4 10E3/UL (ref 0.8–5.3)
LYMPHOCYTES NFR BLD AUTO: 35 %
MCH RBC QN AUTO: 29 PG (ref 26.5–33)
MCHC RBC AUTO-ENTMCNC: 33.5 G/DL (ref 31.5–36.5)
MCV RBC AUTO: 87 FL (ref 78–100)
MONOCYTES # BLD AUTO: 0.5 10E3/UL (ref 0–1.3)
MONOCYTES NFR BLD AUTO: 7 %
NEUTROPHILS # BLD AUTO: 3.8 10E3/UL (ref 1.6–8.3)
NEUTROPHILS NFR BLD AUTO: 56 %
PLATELET # BLD AUTO: 142 10E3/UL (ref 150–450)
POTASSIUM SERPL-SCNC: 5 MMOL/L (ref 3.4–5.3)
PROT SERPL-MCNC: 7.5 G/DL (ref 6.4–8.3)
RBC # BLD AUTO: 5.66 10E6/UL (ref 4.4–5.9)
SODIUM SERPL-SCNC: 138 MMOL/L (ref 136–145)
WBC # BLD AUTO: 6.8 10E3/UL (ref 4–11)

## 2022-11-02 PROCEDURE — 80053 COMPREHEN METABOLIC PANEL: CPT

## 2022-11-02 PROCEDURE — 86140 C-REACTIVE PROTEIN: CPT

## 2022-11-02 PROCEDURE — 36415 COLL VENOUS BLD VENIPUNCTURE: CPT

## 2022-11-02 PROCEDURE — 85025 COMPLETE CBC W/AUTO DIFF WBC: CPT

## 2022-11-02 PROCEDURE — 99214 OFFICE O/P EST MOD 30 MIN: CPT | Performed by: INTERNAL MEDICINE

## 2022-11-02 NOTE — PATIENT INSTRUCTIONS
The CT of your lungs looks better -- the infection is going away. This was a bacterial infection. Testing for tuberculosis has been negative, so this is not tuberculosis.     We will contact you with lab results from today. If these look good, you can take the antibiotic for this next week and then be finished with treatment.     Your liver clinic appointment is December 5th.     Let me know if you have worsening cough, chest pain, fever.     Hans Davila MD

## 2022-11-02 NOTE — PROGRESS NOTES
[facility] FOLLOW UP NOTE    Date: 11/2/2022  Patient Name: Reinaldo Real   YOB: 1985  MRN: [id number]      ASSESSMENT:  1. Remains on treatment for Klebsiella pneumoniae hypermucoviscous phenotype causing lung abscesses, one lesion was cavitary. TB ruled out with testing (cultures not finalized but improved without treatment, PCRs were negative). Bronchoscopy 9/29 -- erythematous airways. Clinically improved. CT shows improvement in all lesions.   2. Treated for latent TB 2013 he recalls with 6 months of medication, presumably INH.  3. Hep B. Not treated that he is aware of. Has follow up with hepatology December 5.   4. Cirrhosis  5. DM.      PLAN:  Levofloxacin 750mg daily -- refill for 6 more days, this will be 6 weeks of treatment, should be adequate.   Labs today -- results were discussed with him after the visit over the phone.   Advised about symptoms to watch for    Return to clinic as needed.     Hans Davila MD  [facility]   Clinic phone: [phone]   Clinic fax: [phone]    ______________________________________________________________________    SUBJECTIVE / INTERVAL HISTORY: Reinaldo Real returns for follow up of lung abscesses.     Knees hurt with working after several hours, pain started last week. He works as PCA.     Some pain in chest/back after running. Still coughs some, non-productive. No fever. Has previous injury to right upper extremity. Has some shoulder pain in rotator cuff muscles.     Overall feeling better. No fever. Tolerating levofloxacin -- he missed a couple of days.     Past Medical History:   Diagnosis Date     Alcohol use disorder, moderate, dependence (H) 3/6/2019     Hepatitis B      TB (tuberculosis)      Type 2 diabetes mellitus without complication, without long-term current use of insulin (H) 11/30/2016             ROS: All other systems negative except as listed above.      Current Outpatient  Medications:      atorvastatin (LIPITOR) 40 MG tablet, TAKE 1 TABLET (40 MG TOTAL) BY MOUTH DAILY FOR CHOLESTEROL, Disp: 90 tablet, Rfl: 3     blood glucose (NO BRAND SPECIFIED) lancing device, Use to test blood sugars 2 times weekly or as directed., Disp: 1 each, Rfl: 0     blood glucose monitoring (ACCU-CHEK MULTICLIX) lancets, Use to test blood sugar 2 times daily or as directed., Disp: 100 each, Rfl: 0     blood glucose monitoring (NO BRAND SPECIFIED) meter device kit, Use to test blood sugar 2 times weekly or as directed., Disp: 1 kit, Rfl: 0     blood glucose monitoring (NO BRAND SPECIFIED) test strip, Use to test blood sugars 2 times weekly or as directed, Disp: 100 strip, Rfl: 0     gabapentin (NEURONTIN) 100 MG capsule, Take 1 capsule (100 mg) by mouth 3 times daily, Disp: 90 capsule, Rfl: 0     JARDIANCE 25 MG TABS tablet, TAKE 1 TABLET (25 MG TOTAL) BY MOUTH DAILY FOR DIABETES, Disp: 90 tablet, Rfl: 1     metFORMIN (GLUCOPHAGE XR) 500 MG 24 hr tablet, TAKE 2 TABLETS (1,000 MG TOTAL) BY MOUTH DAILY WITH BREAKFAST FOR DIABETES, Disp: 120 tablet, Rfl: 11     omeprazole (PRILOSEC) 20 MG DR capsule, TAKE 1 CAPSULE (20 MG TOTAL) BY MOUTH 2 (TWO) TIMES A DAY BEFORE MEALS, Disp: 180 capsule, Rfl: 3     oxyCODONE (ROXICODONE) 5 MG tablet, Take 1 tablet (5 mg) by mouth every 4 hours as needed for moderate to severe pain, Disp: 12 tablet, Rfl: 0     saxagliptin (ONGLYZA) 5 MG TABS tablet, Take 1 tablet (5 mg) by mouth daily, Disp: 90 tablet, Rfl: 0     acetaminophen (TYLENOL) 325 MG tablet, Take 2 tablets (650 mg) by mouth every 6 hours as needed for mild pain or other (and adjunct with moderate or severe pain or per patient request) (Patient not taking: Reported on 11/2/2022), Disp: , Rfl:       OBJECTIVE:  /74 (BP Location: Right arm, Patient Position: Sitting, Cuff Size: Adult Regular)   Pulse 76   Temp 98  F (36.7  C) (Oral)   Wt 67.7 kg (149 lb 3.2 oz)   SpO2 98%   BMI 25.61 kg/m        GEN: No acute  distress.    RESPIRATORY:  Normal breathing pattern. Clear to auscultation  CARDIOVASCULAR:  Regular rate and rhythm. Normal S1 and S2. No murmur  ABDOMEN:  Soft, normal bowel sounds, non-tender  EXTREMITIES: No edema.  MSK: joints normal  SKIN/HAIR/NAILS:  No rashes          Pertinent labs:    Lab Results   Component Value Date    CRP <3.00 11/02/2022         Lab Results   Component Value Date    ALT 33 11/02/2022    AST 38 11/02/2022     (H) 05/10/2021    ALKPHOS 126 11/02/2022         MICROBIOLOGY DATA:  Klebsiella    RADIOLOGY:  Reviewed CT images with him in clinic

## 2022-11-07 ENCOUNTER — OFFICE VISIT (OUTPATIENT)
Dept: PHARMACY | Facility: CLINIC | Age: 37
End: 2022-11-07
Attending: FAMILY MEDICINE
Payer: COMMERCIAL

## 2022-11-07 VITALS
DIASTOLIC BLOOD PRESSURE: 70 MMHG | HEART RATE: 90 BPM | OXYGEN SATURATION: 98 % | WEIGHT: 150.75 LBS | BODY MASS INDEX: 25.88 KG/M2 | SYSTOLIC BLOOD PRESSURE: 106 MMHG

## 2022-11-07 DIAGNOSIS — E11.9 TYPE 2 DIABETES MELLITUS WITHOUT COMPLICATION, WITHOUT LONG-TERM CURRENT USE OF INSULIN (H): Primary | ICD-10-CM

## 2022-11-07 DIAGNOSIS — F17.200 NICOTINE DEPENDENCE, UNCOMPLICATED, UNSPECIFIED NICOTINE PRODUCT TYPE: ICD-10-CM

## 2022-11-07 DIAGNOSIS — K21.9 GASTROESOPHAGEAL REFLUX DISEASE, UNSPECIFIED WHETHER ESOPHAGITIS PRESENT: ICD-10-CM

## 2022-11-07 DIAGNOSIS — R52 PAIN: ICD-10-CM

## 2022-11-07 PROCEDURE — 99605 MTMS BY PHARM NP 15 MIN: CPT | Performed by: PHARMACIST

## 2022-11-07 PROCEDURE — 99607 MTMS BY PHARM ADDL 15 MIN: CPT | Performed by: PHARMACIST

## 2022-11-07 RX ORDER — METFORMIN HCL 500 MG
TABLET, EXTENDED RELEASE 24 HR ORAL
Qty: 120 TABLET | Refills: 11 | Status: SHIPPED | OUTPATIENT
Start: 2022-11-07 | End: 2023-03-13

## 2022-11-07 NOTE — PATIENT INSTRUCTIONS
How to Quit Smoking  Smoking is a hard habit to break. About 50% of all people who have ever smoked have been able to quit. Most people who still smoke want to quit. Here are some of the best ways to stop smoking.     Keep in mind the health benefits of quitting  The health benefits of quitting start right away. They keep improving the longer you go without smoking. Knowing this can help inspire you to stay on track. These benefits occur at any age. If you are 17 or 70, quitting is a good choice. Some of the health benefits after your last cigarette include:     After 20 minutes: Your blood pressure and pulse return to normal.    After 8 hours: Your oxygen levels return to normal.    After 2 days: Your ability to smell and taste start to improve as damaged nerves regrow.    After 2 to 3 weeks: Your circulation and lung function improve.    After 1 to 9 months: Your coughing, congestion, and shortness of breath decrease. Your tiredness decreases.    After 1 year: Your risk of heart attack decreases by 50%.    After 5 years: Your risk of lung cancer decreases by 50%. Your risk of stroke becomes the same as a nonsmoker s.  Go cold turkey  Most former smokers quit cold turkey. This means stopping all at once. Trying to cut back slowly often doesn't work as well. This may be because it continues the habit of smoking. Also you may inhale more smoke while smoking fewer cigarettes. This leads to the same amount of nicotine in your body.   Get support  Support programs can be a big help, especially for heavy smokers. These groups offer lectures, ways to change behavior, and peer support. Here are some ways to find a support program:     Free national quitline 800-QUIT-NOW (638-207-5366)    [facility] quit-smoking programs    American Lung Association 168-942-8745    American Cancer Society 119-481-8552  Support at home is important too. Family and friends can offer praise and reassurance. If the smoker in your life finds  it hard to quit, encourage them to keep trying.   Try over-the-counter medicine  Nicotine replacement therapy may make it easier to quit. Some aids are available without a prescription. These include a nicotine patch, gum, and lozenges. But it's best to use these under the care of your healthcare provider. The skin patch gives a steady supply of nicotine. Nicotine gum and lozenges give short-time doses of low levels of nicotine. Both methods reduce the craving for cigarettes. If you have nausea, vomiting, dizziness, weakness, or a fast heartbeat, stop using these products. See your provider.   Ask about prescription medicine  After reviewing your smoking patterns and past attempts to quit, your healthcare provider may offer a prescription medicine such as bupropion, varenicline, a nicotine inhaler, or nasal spray. Each has advantages and side effects. Your provider can review these with you.   Keep trying  Most smokers make many attempts at quitting before they succeed. It s important not to give up.   To learn more  For more on how to quit smoking, try these resources:     www.cdc.gov/tobacco/quit_smoking/ 800-QUIT-NOW (152-235-5628)    www.smokefree.gov 877-44U-QUIT (321-445-9947)    www.lung.org/stop-smoking/ 800-LUNGUSA (609-917-2851)  Tatiana last reviewed this educational content on 12/1/2019 2000-2021 [employer]. All rights reserved. This information is not intended as a substitute for professional medical care. Always follow your healthcare professional's instructions.

## 2022-11-07 NOTE — Clinical Note
Cassy -  I wanted to know if you would like to have him come every 3 months for diabetes follow up?? It looks like he may have been lost to follow up for some time. IF so I will have him schedule with you. Thanks Hugo

## 2022-11-07 NOTE — PROGRESS NOTES
Medication Therapy Management (MTM) Encounter    ASSESSMENT:                            Medication Adherence/Access: See below for considerations    1. Type 2 diabetes mellitus without complication, without long-term current use of insulin (H)  Next A1c due in December, not meeting goal of <7%.  Reviewed with patient today appropriate dosing of Jardiance, 1 tablet daily as prescribed.  Patient experiencing nausea/vomiting, potentially related to higher than prescribed dose of metformin.  The symptoms tolerable if eating smaller more frequent meals, therefore will have patient continue current dose until follow-up visit.  If GI symptoms persist, would recommend metformin 1000 mg daily.  We will avoid switching DPP 4 to GLP-1 at this time considering current GI symptoms.    2. Pain  Stable on current therapy.    3. Gastroesophageal reflux disease, unspecified whether esophagitis present  Unclear based on chart review of patient is taking for additional medication beyond GERD.  For now, acceptable to continue as prescribed.  Would recommend lowest effective dose in the future, could also consider switching to trial of H2 blocker.    4. Nicotine dependence, uncomplicated, unspecified nicotine product type  Patient is currently in preparation phase of quitting.  MTM recommended utilizing nicotine replacement therapy, lozenges to help with patient's cravings, patient agreeable to plan.    PLAN:                            1. Jardiance 25 mg once daily as prescribed  2. 3-4 gala meals daily instead of 2 large meals  3. Referral for diabetic eye exam sent today  4. Patient to check blood sugar fasting and/or 2 hours post-prandial  5. Nicotine lozenges 4 mg as needed for cravings    Future:   - If GI symptoms persist would recommend decreasing dose of metformin    Follow-up: Return in about 5 weeks (around 12/15/2022) for with me.    SUBJECTIVE/OBJECTIVE:                          Reinaldo Real is a 37 year old male coming in for  an initial visit. He was referred to me from Claritza Otero. Patient seen with Jenifer sigala.     Reason for visit: Referral from PCP for diabetes.    Allergies/ADRs: Reviewed in chart  Past Medical History: Reviewed in chart  Tobacco: He reports that he has been smoking cigarettes and cigarettes. He has been smoking an average of .2 packs per day. His smokeless tobacco use includes chew and chew.Nicotine/Tobacco Cessation Plan:   see below  Alcohol: history of alcohol dependence, none anymore.     Medication Adherence/Access: Patient did not bring his medications with him today, though states that he knows what he is taking.   Patient takes medications directly from bottles.  Patient takes medications 2 time(s) per day.   Per patient, misses medication 2 times per week. Admits that he will forget sometimes (will fall asleep early). Notes that he previously used a pillbox but not anymore.    Type 2 Diabetes:    Saxagliptin 5 mg daily  Jardiance 25 mg twice daily (prescirbed once daily)  Metformin  mg - 2 tablets twice daily (prescribed once daily). Nausea after eating sometimes, and vomits at times. Last time vomiting was this morning, occurs almost every day if eating too much/drinking too much. If eating small amounts he is fine.  Blood sugar monitoring: Ranges 100-120 in AM, 200-300 in the evening sometimes.   Symptoms of low blood sugar? none  Symptoms of high blood sugar? none  Eye exam: due  Foot exam: due  Diet/Exercise: Rice and veggies, fish paste, chicken, eggs. 2-3 meals per day. Drinking coke once wqeekly. Exercises with walking.   Aspirin: No  Statin: Yes: Atorvastatin  ACEi/ARB: No. Not indicated  Urine Albumin:   Lab Results   Component Value Date    ALCR  10/17/2022      Comment:      Unable to calculate, urine albumin and/or urine creatinine is outside detectable limits.  Microalbuminuria is defined as an albumin:creatinine ratio of 17 to 299 for males and 25 to 299 for females. A  ratio of albumin:creatinine of 300 or higher is indicative of overt proteinuria.  Due to biologic variability, positive results should be confirmed by a second, first-morning random or 24-hour timed urine specimen. If there is discrepancy, a third specimen is recommended. When 2 out of 3 results are in the microalbuminuria range, this is evidence for incipient nephropathy and warrants increased efforts at glucose control, blood pressure control, and institution of therapy with an angiotensin-converting-enzyme (ACE) inhibitor (if the patient can tolerate it).        Lab Results   Component Value Date    A1C 8.2 09/26/2022    A1C 8.4 05/10/2021    A1C 9.3 02/08/2021     Creatinine   Date Value Ref Range Status   11/02/2022 0.67 0.67 - 1.17 mg/dL Final   07/02/2018 0.9 0.7 - 1.3 mg/dL Final     Lower back pain with sciatica: Gabapentin 100 mg once daily (3 times daily), oxycodone 5 mg every 4 hours as needed, acetaminophen 325 mg 2 tablets every 6 hours as needed.  - Taking oxycodone at least once daily usually.  - Tylenol 2 tablets up to 3 times daily.   - Pain on his arms and his knees. Also having lower back pain and shoulder pain.     GERD: Omeprazole 20 mg twice daily.  - No symptoms or concerns, only sometimes gets symptoms from certain foods. No signs of blood in stool.     Tobacco use: Only smoking 2 cigarettes per day, just after eating - that is when he craves it. Notes that he tried to quit in the past and was unsuccessful. Smokes more when he is out and about.   - Never used nicotine gum or lozenge.     Pneumonia: Levofloxacin 750 mg daily - completed. Had recent follow up with Infectious disease provider.    Cirrhosis/Chronic Hep B: Hepatology appointment upcoming on 12/5    Today's Vitals: /70   Pulse 90   Wt 150 lb 12 oz (68.4 kg)   SpO2 98%   BMI 25.88 kg/m    ----------------      I spent 60 minutes with this patient today. All changes were made via collaborative practice agreement with Cassy  KODY Berrios MD. A copy of the visit note was provided to the patient's provider(s).    The patient was given a summary of these recommendations.     Miriam Vallecillo, PharmD, [facility]  Medication Therapy Management Pharmacist     Medication Therapy Recommendations  Nicotine dependence, uncomplicated, unspecified nicotine product type    Current Medication: nicotine (NICORETTE) 4 MG lozenge   Rationale: Untreated condition - Needs additional medication therapy - Indication   Recommendation: Start Medication   Status: Accepted - no CPA Needed         Type 2 diabetes mellitus without complication, without long-term current use of insulin (H)    Current Medication: JARDIANCE 25 MG TABS tablet   Rationale: Does not understand instructions - Adherence - Adherence   Recommendation: Provide Education   Status: Patient Agreed - Adherence/Education

## 2022-11-22 LAB
ACID FAST STAIN (ARUP): NORMAL

## 2022-11-23 LAB
ACID FAST STAIN (ARUP): NORMAL

## 2022-11-24 LAB
ACID FAST STAIN (ARUP): NORMAL

## 2022-12-04 NOTE — PROGRESS NOTES
Date of Service: 2022     Referring Provider: Grazyna Bush    Subjective:            Reinaldo Real is a 37 year old male presenting for evaluation of liver disease    History of Present Illness   Reinaldo Real is a 37 year old male with past medical history of alcohol misuse, chronic hepatitis B, Klebsiella lung abscess, and DM who presents with concerns of chronic hepatitis B and possible cirrhosis.     Due to language barrier, an  was present during the history-taking and subsequent discussion (and for part of the physical exam) with this patient.    He reports that he was initially diagnosed with hepatitis B approximately 9 years ago.  He denies ever having been on treatment for this condition.  Denies a known family history of liver disease, hepatitis, or liver cancer.  He reports that he used to drink alcohol to excess, often upwards of 6 alcohol equivalents on a nightly basis.  He embrace sobriety in  and remained sober until approximately 3 to 4 months ago when a friend  and he had a slip, but has been sober over the past several months.  Notes that he does have a history of several DUIs and has undergone rehab program.  Notes that he feels much better since stopping drinking alcohol and does not believe that he will participate in ongoing alcohol use.    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  He denies any issues with lower extremity edema or abdominal swelling.    He is currently on antibiotics for treatment of a Klebsiella lung abscess.  He does work closely with his primary regarding management of his diabetes.    Past Medical History:  Past Medical History:   Diagnosis Date     Alcohol use disorder, moderate, dependence (H) 2019     Hepatitis B      History of H. pylori infection      TB (tuberculosis)      Type 2 diabetes mellitus without  complication, without long-term current use of insulin (H) 2016       Surgical History:  No past surgical history on file.    Social History:  Social History     Tobacco Use     Smoking status: Every Day     Packs/day: 0.20     Types: Cigarettes     Last attempt to quit: 2013     Years since quittin.0     Smokeless tobacco: Current     Types: Chew     Tobacco comments:     smokes 0-1/day--Chew betel nut; pt states he started smoking at 10 yo   Substance Use Topics     Alcohol use: No     Comment: Alcoholic Drinks/day: pt states he haven't had a drink since January     Drug use: No       Family History:  Family History   Problem Relation Age of Onset     Coronary Artery Disease Mother      Hypertension Mother      Diabetes No family hx of    No known family history of hepatitis, cirrhosis, nor liver cancer    Medications:  Current Outpatient Medications   Medication     atorvastatin (LIPITOR) 40 MG tablet     blood glucose (NO BRAND SPECIFIED) lancing device     blood glucose monitoring (ACCU-CHEK MULTICLIX) lancets     blood glucose monitoring (NO BRAND SPECIFIED) meter device kit     blood glucose monitoring (NO BRAND SPECIFIED) test strip     gabapentin (NEURONTIN) 100 MG capsule     JARDIANCE 25 MG TABS tablet     metFORMIN (GLUCOPHAGE XR) 500 MG 24 hr tablet     nicotine (NICORETTE) 4 MG lozenge     omeprazole (PRILOSEC) 20 MG DR capsule     oxyCODONE (ROXICODONE) 5 MG tablet     saxagliptin (ONGLYZA) 5 MG TABS tablet     acetaminophen (TYLENOL) 325 MG tablet     No current facility-administered medications for this visit.       Review of Systems  A complete 10 point review of systems was asked and answered in the negative unless specifically commented upon in the HPI    Objective:           Vitals:    22 0830   BP: 119/77   BP Location: Right arm   Patient Position: Sitting   Cuff Size: Adult Regular   Pulse: 63   SpO2: 99%   Weight: 69.9 kg (154 lb)     Body mass index is 26.43 kg/m .      Physical Exam    Vitals reviewed.   Constitutional: Well-developed, well-nourished, in no apparent distress.    HEENT: Normocephalic. no scleral icterus.   Neck/Lymph: Normal ROM  Cardiac:  Regular rate  Respiratory: Normal respiratory excursion   GI:  Abdomen soft, non-distended, non-tender.   Skin:  Skin is warm and dry. No rash noted.  no jaundice. no spider nevi noted.  no palmar erythema  Peripheral Vascular: no lower extremity edema. 2+ pulses in all extremities  Musculoskeletal:  ROM intact, normal muscle bulk    Psychiatric: Normal mood and affect. Behavior is normal.  Neuro:  no asterixis, no tremor    Labs and Diagnostic tests:  Lab Results   Component Value Date     11/02/2022    .1 07/02/2018    POTASSIUM 5.0 11/02/2022    POTASSIUM 3.9 05/10/2021    POTASSIUM 4.2 07/02/2018    CHLORIDE 102 11/02/2022    CHLORIDE 103 05/10/2021    CHLORIDE 97.8 07/02/2018    CO2 27 11/02/2022    CO2 21 05/10/2021    CO2 25.2 07/02/2018    BUN 12.1 11/02/2022    BUN 11 05/10/2021    BUN 9.8 07/02/2018    CR 0.67 11/02/2022    CR 0.9 07/02/2018     Lab Results   Component Value Date    BILITOTAL 0.7 11/02/2022    BILITOTAL 0.3 07/02/2018    ALT 33 11/02/2022    ALT 47.5 07/02/2018    AST 38 11/02/2022    AST 30.0 07/02/2018    ALKPHOS 126 11/02/2022    ALKPHOS 155.8 07/02/2018     Lab Results   Component Value Date    ALBUMIN 4.3 11/02/2022    ALBUMIN 3.8 05/10/2021    ALBUMIN 3.9 02/02/2015    PROTTOTAL 7.5 11/02/2022    PROTTOTAL 7.6 07/02/2018      Lab Results   Component Value Date    WBC 6.8 11/02/2022    HGB 16.4 11/02/2022    HGB 16.5 02/14/2017    MCV 87 11/02/2022    MCV 92.9 02/14/2017     11/02/2022     Lab Results   Component Value Date    INR 1.04 09/26/2022     Hepatitis B s Ag positive 2013  Hepatitis B c Ab positive   Hepatitis B e Ag negative  Hepatitis B e Ab positive  HBV DNA 39 (5/2021)    Hepatitis delta Ab negative  Hepatitis A IgG positive  Hepatitis C Ab  negative    Radiology  CT Abdomen Pelvis w/Contrast 9/2022:  1.  Consolidation left lower lobe with small air-fluid collection could be infection with pulmonary abscess. Follow-up to confirm resolution necessary to exclude necrotic lesion.  2.  The liver is nodular in contour suggesting cirrhosis.  3.  Underdistention the bladder. Wall thickening/cystitis not excluded.  * Normal spleen    Endoscopy  EGD 2/2017: Possible small esophageal varices.       Assessment and Plan:    Chronic Hepatitis B:    -Available data he has E antigen negative, E antibody positive chronic hepatitis B that has been inactive  -Review of the chart demonstrates never had a viral load of greater than 2000 IUs/mL  -He is tested negative previously to hepatitis A, C, delta, HIV  -Felt prudent to risk stratify with hepatitis B DNA and surface antigen on today's clinic visit  -We will plan on fibrosis assessment with next clinic (elastography).    Question of cirrhosis:  -While he does have a nodular appearing liver that has been read as the presence of cirrhosis  -Other than his last labs he has had normal platelet count historically, most recent INR was normal at 1.04, he does not have evidence of pulm erythema nor spider angioma and he does have normal protein counts in the setting of imaging evidence of a normal-sized spleen: All suggesting against the presence of overt cirrhosis with portal hypertension  -Felt prudent to further stratify and we will order an ultrasound with elastography  -Discussed ongoing need for absolute sobriety from alcohol    Alcohol use disorder, dependence, in remission:  -Has a history of excessive alcohol use, drinking upwards of 6 alcohol equivalents daily, with a history of DUI and has undergone rehab course  -Discussed the need for ongoing sobriety and services offered    Chronic Hepatitis B Education:  - Patient was educated as to mode of spread of virus  - Encouraged to avoid sharing personal items such as:  toothbrushes, nail clippers, razors.  If shared, they should all be cleaned thoroughly.  - Recommend all family members be screened for hepatitis B and vaccinated if they are not infected.    Screening for Hepatocellular Carcinoma:  No liver masses based on recent CT scan  -We will plan to repeat abdominal ultrasound approximately 6 months    History of H. Pylori:  -Noted that he has been treated multiple times, but last record indicated positive stool antigen  -We will repeat stool antigen at this time; partner note the patient is asymptomatic currently    Follow Up:  6 months     Thank you very much for the opportunity to participate in the care of this patient.  If you have any further questions, please don't hesitate to contact our office.    Thomas M. Leventhal, M.D.   of Medicine  Advanced & Transplant Hepatology  [facility]

## 2022-12-05 ENCOUNTER — LAB (OUTPATIENT)
Dept: LAB | Facility: CLINIC | Age: 37
End: 2022-12-05
Payer: COMMERCIAL

## 2022-12-05 ENCOUNTER — OFFICE VISIT (OUTPATIENT)
Dept: GASTROENTEROLOGY | Facility: CLINIC | Age: 37
End: 2022-12-05
Attending: HOSPITALIST
Payer: COMMERCIAL

## 2022-12-05 ENCOUNTER — PRE VISIT (OUTPATIENT)
Dept: GASTROENTEROLOGY | Facility: CLINIC | Age: 37
End: 2022-12-05

## 2022-12-05 VITALS
BODY MASS INDEX: 26.43 KG/M2 | HEART RATE: 63 BPM | WEIGHT: 154 LBS | DIASTOLIC BLOOD PRESSURE: 77 MMHG | OXYGEN SATURATION: 99 % | SYSTOLIC BLOOD PRESSURE: 119 MMHG

## 2022-12-05 DIAGNOSIS — K74.60 CIRRHOSIS OF LIVER WITHOUT ASCITES, UNSPECIFIED HEPATIC CIRRHOSIS TYPE (H): ICD-10-CM

## 2022-12-05 DIAGNOSIS — F10.20 ALCOHOL USE DISORDER, MODERATE, DEPENDENCE (H): Primary | ICD-10-CM

## 2022-12-05 DIAGNOSIS — A04.8 HELICOBACTER PYLORI INFECTION: ICD-10-CM

## 2022-12-05 DIAGNOSIS — B19.10 HEPATITIS B INFECTION WITHOUT DELTA AGENT WITHOUT HEPATIC COMA, UNSPECIFIED CHRONICITY: ICD-10-CM

## 2022-12-05 PROCEDURE — 36415 COLL VENOUS BLD VENIPUNCTURE: CPT | Performed by: PATHOLOGY

## 2022-12-05 PROCEDURE — 87517 HEPATITIS B DNA QUANT: CPT | Performed by: INTERNAL MEDICINE

## 2022-12-05 PROCEDURE — 87340 HEPATITIS B SURFACE AG IA: CPT | Performed by: INTERNAL MEDICINE

## 2022-12-05 PROCEDURE — G0463 HOSPITAL OUTPT CLINIC VISIT: HCPCS

## 2022-12-05 PROCEDURE — 99204 OFFICE O/P NEW MOD 45 MIN: CPT | Performed by: INTERNAL MEDICINE

## 2022-12-05 NOTE — NURSING NOTE
Chief Complaint   Patient presents with     Consult     Cirrhosis     Blood pressure 119/77, pulse 63, weight 69.9 kg (154 lb), SpO2 99 %.    Nick Mendoza on 12/5/2022 at 8:32 AM

## 2022-12-05 NOTE — LETTER
2022         RE: Reinaldo Real  [address] [place] [address]        Dear Colleague,    Thank you for referring your patient, Reinaldo Real, to the [facility] [place]. Please see a copy of my visit note below.    Date of Service: 2022     Referring Provider: Grazyna Bush    Subjective:            Reinaldo Real is a 37 year old male presenting for evaluation of liver disease    History of Present Illness   Reinaldo Real is a 37 year old male with past medical history of alcohol misuse, chronic hepatitis B, Klebsiella lung abscess, and DM who presents with concerns of chronic hepatitis B and possible cirrhosis.     Due to language barrier, an  was present during the history-taking and subsequent discussion (and for part of the physical exam) with this patient.    He reports that he was initially diagnosed with hepatitis B approximately 9 years ago.  He denies ever having been on treatment for this condition.  Denies a known family history of liver disease, hepatitis, or liver cancer.  He reports that he used to drink alcohol to excess, often upwards of 6 alcohol equivalents on a nightly basis.  He embrace sobriety in  and remained sober until approximately 3 to 4 months ago when a friend  and he had a slip, but has been sober over the past several months.  Notes that he does have a history of several DUIs and has undergone rehab program.  Notes that he feels much better since stopping drinking alcohol and does not believe that he will participate in ongoing alcohol use.    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  He denies any issues with lower extremity edema or abdominal swelling.    He is currently on antibiotics for treatment of a Klebsiella lung abscess.  He does work closely with his primary regarding management of  his diabetes.    Past Medical History:  Past Medical History:   Diagnosis Date     Alcohol use disorder, moderate, dependence (H) 2019     Hepatitis B      History of H. pylori infection      TB (tuberculosis)      Type 2 diabetes mellitus without complication, without long-term current use of insulin (H) 2016       Surgical History:  No past surgical history on file.    Social History:  Social History     Tobacco Use     Smoking status: Every Day     Packs/day: 0.20     Types: Cigarettes     Last attempt to quit: 2013     Years since quittin.0     Smokeless tobacco: Current     Types: Chew     Tobacco comments:     smokes 0-1/day--Chew betel nut; pt states he started smoking at 10 yo   Substance Use Topics     Alcohol use: No     Comment: Alcoholic Drinks/day: pt states he haven't had a drink since January     Drug use: No       Family History:  Family History   Problem Relation Age of Onset     Coronary Artery Disease Mother      Hypertension Mother      Diabetes No family hx of    No known family history of hepatitis, cirrhosis, nor liver cancer    Medications:  Current Outpatient Medications   Medication     atorvastatin (LIPITOR) 40 MG tablet     blood glucose (NO BRAND SPECIFIED) lancing device     blood glucose monitoring (ACCU-CHEK MULTICLIX) lancets     blood glucose monitoring (NO BRAND SPECIFIED) meter device kit     blood glucose monitoring (NO BRAND SPECIFIED) test strip     gabapentin (NEURONTIN) 100 MG capsule     JARDIANCE 25 MG TABS tablet     metFORMIN (GLUCOPHAGE XR) 500 MG 24 hr tablet     nicotine (NICORETTE) 4 MG lozenge     omeprazole (PRILOSEC) 20 MG DR capsule     oxyCODONE (ROXICODONE) 5 MG tablet     saxagliptin (ONGLYZA) 5 MG TABS tablet     acetaminophen (TYLENOL) 325 MG tablet     No current facility-administered medications for this visit.       Review of Systems  A complete 10 point review of systems was asked and answered in the negative unless specifically  commented upon in the HPI    Objective:           Vitals:    12/05/22 0830   BP: 119/77   BP Location: Right arm   Patient Position: Sitting   Cuff Size: Adult Regular   Pulse: 63   SpO2: 99%   Weight: 69.9 kg (154 lb)     Body mass index is 26.43 kg/m .     Physical Exam    Vitals reviewed.   Constitutional: Well-developed, well-nourished, in no apparent distress.    HEENT: Normocephalic. no scleral icterus.   Neck/Lymph: Normal ROM  Cardiac:  Regular rate  Respiratory: Normal respiratory excursion   GI:  Abdomen soft, non-distended, non-tender.   Skin:  Skin is warm and dry. No rash noted.  no jaundice. no spider nevi noted.  no palmar erythema  Peripheral Vascular: no lower extremity edema. 2+ pulses in all extremities  Musculoskeletal:  ROM intact, normal muscle bulk    Psychiatric: Normal mood and affect. Behavior is normal.  Neuro:  no asterixis, no tremor    Labs and Diagnostic tests:  Lab Results   Component Value Date     11/02/2022    .1 07/02/2018    POTASSIUM 5.0 11/02/2022    POTASSIUM 3.9 05/10/2021    POTASSIUM 4.2 07/02/2018    CHLORIDE 102 11/02/2022    CHLORIDE 103 05/10/2021    CHLORIDE 97.8 07/02/2018    CO2 27 11/02/2022    CO2 21 05/10/2021    CO2 25.2 07/02/2018    BUN 12.1 11/02/2022    BUN 11 05/10/2021    BUN 9.8 07/02/2018    CR 0.67 11/02/2022    CR 0.9 07/02/2018     Lab Results   Component Value Date    BILITOTAL 0.7 11/02/2022    BILITOTAL 0.3 07/02/2018    ALT 33 11/02/2022    ALT 47.5 07/02/2018    AST 38 11/02/2022    AST 30.0 07/02/2018    ALKPHOS 126 11/02/2022    ALKPHOS 155.8 07/02/2018     Lab Results   Component Value Date    ALBUMIN 4.3 11/02/2022    ALBUMIN 3.8 05/10/2021    ALBUMIN 3.9 02/02/2015    PROTTOTAL 7.5 11/02/2022    PROTTOTAL 7.6 07/02/2018      Lab Results   Component Value Date    WBC 6.8 11/02/2022    HGB 16.4 11/02/2022    HGB 16.5 02/14/2017    MCV 87 11/02/2022    MCV 92.9 02/14/2017     11/02/2022     Lab Results   Component Value Date     INR 1.04 09/26/2022     Hepatitis B s Ag positive 2013  Hepatitis B c Ab positive   Hepatitis B e Ag negative  Hepatitis B e Ab positive  HBV DNA 39 (5/2021)    Hepatitis delta Ab negative  Hepatitis A IgG positive  Hepatitis C Ab negative    Radiology  CT Abdomen Pelvis w/Contrast 9/2022:  1.  Consolidation left lower lobe with small air-fluid collection could be infection with pulmonary abscess. Follow-up to confirm resolution necessary to exclude necrotic lesion.  2.  The liver is nodular in contour suggesting cirrhosis.  3.  Underdistention the bladder. Wall thickening/cystitis not excluded.  * Normal spleen    Endoscopy  EGD 2/2017: Possible small esophageal varices.       Assessment and Plan:    Chronic Hepatitis B:    -Available data he has E antigen negative, E antibody positive chronic hepatitis B that has been inactive  -Review of the chart demonstrates never had a viral load of greater than 2000 IUs/mL  -He is tested negative previously to hepatitis A, C, delta, HIV  -Felt prudent to risk stratify with hepatitis B DNA and surface antigen on today's clinic visit  -We will plan on fibrosis assessment with next clinic (elastography).    Question of cirrhosis:  -While he does have a nodular appearing liver that has been read as the presence of cirrhosis  -Other than his last labs he has had normal platelet count historically, most recent INR was normal at 1.04, he does not have evidence of pulm erythema nor spider angioma and he does have normal protein counts in the setting of imaging evidence of a normal-sized spleen: All suggesting against the presence of overt cirrhosis with portal hypertension  -Felt prudent to further stratify and we will order an ultrasound with elastography  -Discussed ongoing need for absolute sobriety from alcohol    Alcohol use disorder, dependence, in remission:  -Has a history of excessive alcohol use, drinking upwards of 6 alcohol equivalents daily, with a history of DUI and  has undergone rehab course  -Discussed the need for ongoing sobriety and services offered    Chronic Hepatitis B Education:  - Patient was educated as to mode of spread of virus  - Encouraged to avoid sharing personal items such as: toothbrushes, nail clippers, razors.  If shared, they should all be cleaned thoroughly.  - Recommend all family members be screened for hepatitis B and vaccinated if they are not infected.    Screening for Hepatocellular Carcinoma:  No liver masses based on recent CT scan  -We will plan to repeat abdominal ultrasound approximately 6 months    History of H. Pylori:  -Noted that he has been treated multiple times, but last record indicated positive stool antigen  -We will repeat stool antigen at this time; partner note the patient is asymptomatic currently    Follow Up:  6 months     Thank you very much for the opportunity to participate in the care of this patient.  If you have any further questions, please don't hesitate to contact our office.    Thomas M. Leventhal, M.D.   of Medicine  Advanced & Transplant Hepatology  [facility]

## 2022-12-06 LAB
HBV DNA SERPL NAA+PROBE-ACNC: 20 IU/ML
HBV DNA SERPL NAA+PROBE-LOG IU: 1.3 {LOG_IU}/ML
HBV SURFACE AG SERPL QL IA: REACTIVE

## 2022-12-15 ENCOUNTER — OFFICE VISIT (OUTPATIENT)
Dept: PHARMACY | Facility: CLINIC | Age: 37
End: 2022-12-15
Payer: COMMERCIAL

## 2022-12-15 VITALS — BODY MASS INDEX: 26.65 KG/M2 | WEIGHT: 155.25 LBS

## 2022-12-15 DIAGNOSIS — E11.9 TYPE 2 DIABETES MELLITUS WITHOUT COMPLICATION, WITHOUT LONG-TERM CURRENT USE OF INSULIN (H): Primary | ICD-10-CM

## 2022-12-15 DIAGNOSIS — R52 PAIN: ICD-10-CM

## 2022-12-15 DIAGNOSIS — K21.9 GASTROESOPHAGEAL REFLUX DISEASE, UNSPECIFIED WHETHER ESOPHAGITIS PRESENT: ICD-10-CM

## 2022-12-15 DIAGNOSIS — B18.1 HEPATITIS B, CHRONIC (H): ICD-10-CM

## 2022-12-15 DIAGNOSIS — F17.200 NICOTINE DEPENDENCE, UNCOMPLICATED, UNSPECIFIED NICOTINE PRODUCT TYPE: ICD-10-CM

## 2022-12-15 PROCEDURE — 99607 MTMS BY PHARM ADDL 15 MIN: CPT | Performed by: PHARMACIST

## 2022-12-15 PROCEDURE — 99606 MTMS BY PHARM EST 15 MIN: CPT | Performed by: PHARMACIST

## 2022-12-15 RX ORDER — LANCETS
EACH MISCELLANEOUS
Qty: 200 EACH | Refills: 4 | Status: SHIPPED | OUTPATIENT
Start: 2022-12-15 | End: 2024-08-22

## 2022-12-15 RX ORDER — SAXAGLIPTIN 5 MG/1
5 TABLET, FILM COATED ORAL DAILY
Qty: 90 TABLET | Refills: 3 | Status: SHIPPED | OUTPATIENT
Start: 2022-12-15 | End: 2023-05-08

## 2022-12-15 NOTE — Clinical Note
Please check status of eye referral - patient doesn't know if he has an appt scheduled yet.   Thank you! Hugo

## 2022-12-15 NOTE — PROGRESS NOTES
Medication Therapy Management (MTM) Encounter    ASSESSMENT:                            Medication Adherence/Access: Recommended alarm to help with medication adherence. Based on fill history and current medication today, appears that patient has not been taking medications consistently.  Reviewed the importance of medication adherence with patient today.     1. Type 2 diabetes mellitus without complication, without long-term current use of insulin (H)  Patient's last A1c showed slight improvement, though still not meeting goal of <7%.  Reviewed with patient today appropriate dosing of Jardiance, 1 tablet daily as prescribed.  Sending refills for patient to resume daily use of Onglyza and blood sugar monitoring.    2. Pain  Patient taking medicine prescribed dose of gabapentin, reviewed appropriate administration with patient today.  Additionally, patient has concerns about his elbow pain, will route to PCP to determine next steps.    3. Gastroesophageal reflux disease, unspecified whether esophagitis present  Unclear based on chart review if patient is taking for additional indication beyond GERD.  For now, acceptable to continue as prescribed.  Would recommend lowest effective PPI daily dose in the future considering concern for long-term side effects, could also consider switching to trial of H2 blocker as an alternative to chronic PPI.    4. Nicotine dependence, uncomplicated, unspecified nicotine product type  Patient is currently in action step of smoking cessation.  Patient agreeable to continue to use nicotine lozenges as needed for smoking cessation.    5. Hepatitis B, chronic (H)  Recommend patient continue to follow with gastroenterology/hepatology as directed.    PLAN:                            1.  Patient to take medications as prescirbed: Gabapentin 100 three times daily and Jardiance 25 mg daily in the morning  2.  Refills sent for onglyza and testing supplies today  3.  Recommend patient set twice  daily alarms on phone to help with medciation adherence  4.  Routing note to PCP regarding patient's elbow pain and specialty  to help with adult eye referral    Follow-up: Return in about 3 months (around 3/6/2023) for with me.  PCP ; GI   SUBJECTIVE/OBJECTIVE:                          Reinaldo Real is a 37 year old male coming in for a follow-up visit. Patient seen with Jenifer sigala. Today's visit is a follow-up MTM visit from 22.  LUPE Singh.     Reason for visit: MTM follow up.    Allergies/ADRs: Reviewed in chart  Past Medical History: Reviewed in chart  Tobacco: He reports that he has been smoking cigarettes and cigarettes. He has been smoking an average of .2 packs per day. His smokeless tobacco use includes chew and chew.Nicotine/Tobacco Cessation Plan:   see below  Alcohol: history of alcohol dependence, none anymore.     Medication Adherence/Access: Uses a twice daily pillbox at home.   Patient takes medications 2 time(s) per day.   Per patient, misses medication 1 times per week. Admits that he will forget sometimes (will fall asleep early).     Type 2 Diabetes:    Saxagliptin (Onglyza) 5 mg daily (not taking for 1-2 weeks, ran out of refill)  Empaglifloxin (Jardiance) 25 mg daily (patient states that he has been taking this but current vial today is full and last filled 10/14/22)  Metformin  mg - 2 tablets twice daily.   - No side effects noted though historically had nausea from metformin.   Blood sugar monitorin-2 times daily per glucometer:  Date FBG 2hours post   12/15  394   12/10 155 251    119     136      272    88    11/10 196    Symptoms of low blood sugar? none  Symptoms of high blood sugar? none  Diet/Exercise: Rice and veggies, fish paste, chicken, eggs. 2-3 meals per day. Drinking coke once weekly. Walking for exercise.    Aspirin: No. Not indicated.   Statin: Yes: Atorvastatin  ACEi/ARB: No. Not indicated  Lab Results   Component  Value Date    A1C 8.2 09/26/2022    A1C 8.4 05/10/2021    A1C 9.3 02/08/2021     Creatinine   Date Value Ref Range Status   11/02/2022 0.67 0.67 - 1.17 mg/dL Final   07/02/2018 0.9 0.7 - 1.3 mg/dL Final     Lower back pain with sciatica: Gabapentin 100 mg - 2 capsules (200 mg) 3 times daily (prescribed 1 cap three times daily), oxycodone 5 mg every 4 hours as needed, acetaminophen 325 mg 2 tablets every 6 hours as needed.  - Tylenol 2 tablets up to 3 times daily.   - Patient is 1 tablet of oxycodone remaining, rarely using this.  - Pain on his arms and his knees. Also having lower back pain and shoulder pain. Thinks that this is getting better.   - Of note, patient states that he has had pain in his right elbow for the last 3-4 months.  Patient fell and landed on his elbow when he was fishing and it has been painful since.     GERD: Omeprazole 20 mg twice daily.  - No symptoms or concerns, only sometimes gets symptoms from certain foods. No signs of blood in stool.     Tobacco use: Trying to cut back on smoking. Currently smoking 1 cigarette every other day. Notes that he tried to quit in the past and was unsuccessful. Smokes more when he is out and about.   - Not using the lozenges because it gave him headaches/stomach upset. Though agreeable to continue using this as needed at this time. It did help his past the cravings.     Chronic Hep B: Hepatology appointment was on 12/5.  Patient was told to do some extra tests and follow up in 6 months.     Today's Vitals: Wt 155 lb 4 oz (70.4 kg)   BMI 26.65 kg/m    ----------------       I spent 45 minutes with this patient today. All changes were made via collaborative practice agreement with Cassy Berrios MD. A copy of the visit note was provided to the patient's provider(s).    A summary of these recommendations was declined by the patient.    Miriam Vallecillo, PharmD, BCACP  Medication Therapy Management Pharmacist     Medication Therapy Recommendations  Type 2  diabetes mellitus without complication, without long-term current use of insulin (H)    Current Medication: JARDIANCE 25 MG TABS tablet   Rationale: Does not understand instructions - Adherence - Adherence   Recommendation: Provide Adherence Intervention   Status: Patient Agreed - Adherence/Education

## 2022-12-15 NOTE — Clinical Note
Cassy, Patient mentioned that his elbow hurts and he would like an xray (has been hurting for a few months since he fell). I told him I would route the note to you to determine next steps - not sure if you want to see him first? But your next appt isnt until Feb. Also he didn't appear in too much distress about it, but just wanted to check with you.   Thanks Hugo

## 2022-12-19 ENCOUNTER — TELEPHONE (OUTPATIENT)
Dept: FAMILY MEDICINE | Facility: CLINIC | Age: 37
End: 2022-12-19

## 2022-12-19 DIAGNOSIS — H72.90 PERFORATION OF TYMPANIC MEMBRANE, UNSPECIFIED LATERALITY: Primary | ICD-10-CM

## 2022-12-19 NOTE — TELEPHONE ENCOUNTER
Order/Referral Request    Who is requesting: Patient    Orders being requested: ear doctor referral for hole in eardrum last seen in 2017 at [facility]    Reason service is needed/diagnosis: hole in ear drum    When are orders needed by: ASAP    Has this been discussed with Provider: No    Does patient have a preference on a Group/Provider/Facility? no    Does patient have an appointment scheduled?: No    Where to send orders: EP[facility]    Okay to leave a detailed message?: Yes at Home number on file [phone] (home)    Call made on 12/19/22 at 5 pm  By FAISAL Lombardo

## 2022-12-19 NOTE — PROGRESS NOTES
Pt scheduled for [facility] (he went there before) on 1/21/23 at 1215 pm. Thanks

## 2022-12-20 NOTE — PROGRESS NOTES
Saran Arias,     I am sorry but call pt about what?  I was the one who scheduled him for the [facility] and [facility] is his pharmacy - they will call him when meds are ready or deliver them to him -if he is doing that service.  Thanks     Yanet

## 2022-12-20 NOTE — PROGRESS NOTES
Saran Arias,    I am sorry - inform pt of what?  I am the one who scheduled his eye appt with [facility].  Thanks.     Yanet

## 2023-01-03 PROCEDURE — 87338 HPYLORI STOOL AG IA: CPT | Performed by: INTERNAL MEDICINE

## 2023-01-05 LAB — H PYLORI AG STL QL IA: NEGATIVE

## 2023-01-21 ENCOUNTER — TRANSFERRED RECORDS (OUTPATIENT)
Dept: HEALTH INFORMATION MANAGEMENT | Facility: CLINIC | Age: 38
End: 2023-01-21

## 2023-01-21 LAB — RETINOPATHY: NEGATIVE

## 2023-02-06 ENCOUNTER — ANCILLARY PROCEDURE (OUTPATIENT)
Dept: GENERAL RADIOLOGY | Facility: CLINIC | Age: 38
End: 2023-02-06
Attending: FAMILY MEDICINE
Payer: COMMERCIAL

## 2023-02-06 ENCOUNTER — OFFICE VISIT (OUTPATIENT)
Dept: FAMILY MEDICINE | Facility: CLINIC | Age: 38
End: 2023-02-06
Payer: COMMERCIAL

## 2023-02-06 VITALS
SYSTOLIC BLOOD PRESSURE: 111 MMHG | WEIGHT: 160 LBS | OXYGEN SATURATION: 98 % | HEIGHT: 64 IN | HEART RATE: 77 BPM | TEMPERATURE: 97.8 F | RESPIRATION RATE: 16 BRPM | DIASTOLIC BLOOD PRESSURE: 72 MMHG | BODY MASS INDEX: 27.31 KG/M2

## 2023-02-06 DIAGNOSIS — D69.6 THROMBOCYTOPENIA (H): ICD-10-CM

## 2023-02-06 DIAGNOSIS — M25.511 CHRONIC RIGHT SHOULDER PAIN: ICD-10-CM

## 2023-02-06 DIAGNOSIS — G89.29 CHRONIC RIGHT SHOULDER PAIN: ICD-10-CM

## 2023-02-06 DIAGNOSIS — B18.1 HEPATITIS B, CHRONIC (H): ICD-10-CM

## 2023-02-06 DIAGNOSIS — E11.9 TYPE 2 DIABETES MELLITUS WITHOUT COMPLICATION, WITHOUT LONG-TERM CURRENT USE OF INSULIN (H): Primary | ICD-10-CM

## 2023-02-06 DIAGNOSIS — F10.21 ALCOHOL DEPENDENCE IN REMISSION (H): ICD-10-CM

## 2023-02-06 DIAGNOSIS — M25.521 RIGHT ELBOW PAIN: ICD-10-CM

## 2023-02-06 PROBLEM — F10.20 ALCOHOL USE DISORDER, MODERATE, DEPENDENCE (H): Status: RESOLVED | Noted: 2019-03-06 | Resolved: 2023-02-06

## 2023-02-06 LAB
ERYTHROCYTE [DISTWIDTH] IN BLOOD BY AUTOMATED COUNT: 12.8 % (ref 10–15)
HBA1C MFR BLD: 7.6 % (ref 0–5.6)
HCT VFR BLD AUTO: 48 % (ref 40–53)
HGB BLD-MCNC: 16.7 G/DL (ref 13.3–17.7)
MCH RBC QN AUTO: 30.2 PG (ref 26.5–33)
MCHC RBC AUTO-ENTMCNC: 34.8 G/DL (ref 31.5–36.5)
MCV RBC AUTO: 87 FL (ref 78–100)
PLATELET # BLD AUTO: 163 10E3/UL (ref 150–450)
RBC # BLD AUTO: 5.53 10E6/UL (ref 4.4–5.9)
WBC # BLD AUTO: 8.3 10E3/UL (ref 4–11)

## 2023-02-06 PROCEDURE — 99215 OFFICE O/P EST HI 40 MIN: CPT | Performed by: FAMILY MEDICINE

## 2023-02-06 PROCEDURE — 73070 X-RAY EXAM OF ELBOW: CPT | Mod: TC | Performed by: RADIOLOGY

## 2023-02-06 PROCEDURE — 36415 COLL VENOUS BLD VENIPUNCTURE: CPT | Performed by: FAMILY MEDICINE

## 2023-02-06 PROCEDURE — 73030 X-RAY EXAM OF SHOULDER: CPT | Mod: TC | Performed by: RADIOLOGY

## 2023-02-06 PROCEDURE — 83036 HEMOGLOBIN GLYCOSYLATED A1C: CPT | Performed by: FAMILY MEDICINE

## 2023-02-06 PROCEDURE — 85027 COMPLETE CBC AUTOMATED: CPT | Performed by: FAMILY MEDICINE

## 2023-02-06 NOTE — PROGRESS NOTES
Assessment & Plan     Type 2 diabetes mellitus without complication, without long-term current use of insulin (H): will not make further medication changes at this time. Discussed dietary and exercise changes. If A1C is still >7 at next visit, could consider addition of a GLP-1 agonist.    - Hemoglobin A1c    Hepatitis B, chronic (H): following with GI and has US elastography scheduled in June.      Thrombocytopenia (H): noted on last labs. Rechecked today, normal.   - CBC with platelets    Alcohol dependence in remission: congratulated him on his sobriety    Right elbow pain: obtained x-rays of right shoulder and elbow, images not yet released at the time of this note. Given his right shoulder weakness and ongoing pain and clicking at the elbow, recommend Ortho referral. Scheduled today.     Chronic right shoulder pain:         No follow-ups on file.    Cassy Berrios MD  [facility] LES Najera is a 37 year old, presenting for the following health issues:  med check      History of Present Illness       Reason for visit:  Med check  Symptom onset:  More than a month  Symptoms include:  Unknown  Symptom intensity:  Mild  Symptom progression:  Staying the same  Had these symptoms before:  No  What makes it worse:  Unknown  What makes it better:  Unknown    He eats 2-3 servings of fruits and vegetables daily.He consumes 0 sweetened beverage(s) daily.He exercises with enough effort to increase his heart rate 9 or less minutes per day.  He exercises with enough effort to increase his heart rate 3 or less days per week.   He is taking medications regularly.     Right elbow started hurting in November. He was fishing and slipped and fell and hit his elbow. It still clicks when moving it and hurts to put pressure on it. His right shoulder hurts, too. When he fell, he fell forward onto his right arm out in front of him. He does think the pain radiates down his forearm, not sure which side.  "Right shoulder is also weaker. Can't lift well from right arm. Wondering if he can get some imaging done.    Here also to check on diabetes. He has a meter and notices when he eats more rice, his numbers go up. Taking metformin, jardiance and onglyza (saxagliptin). He does sometimes get a feeling like his blood sugar might be too low, where he feels shaky, and then if he drinks some juice, he feels better. He's checked his sugar before when he felt this way and it was 98. One time it was 80. Never lower than this.     He finds it hard to exercise in the winter and he exercises more in the summer. But sometimes he does do sit-ups and squats.     He hasn't drank in a few years. He got 2 DUIs in the past and now doesn't want to drink again.         Objective    /72   Pulse 77   Temp 97.8  F (36.6  C) (Oral)   Resp 16   Ht 1.626 m (5' 4\")   Wt 72.6 kg (160 lb)   SpO2 98%   BMI 27.46 kg/m    Body mass index is 27.46 kg/m .  Physical Exam   GENERAL: healthy, alert and no distress  MS: Right shoulder is weak compared to left. Right elbow has clicking over later epicondylitis and tenderness to palpation.   Ext: Able to feel monofilament on all areas of both soles tested.   SKIN: no suspicious lesions or rashes  NEURO: Normal strength and tone, mentation intact and speech normal  PSYCH: mentation appears normal, affect normal/bright      spent >45 minutes on the encounter including chart review, the visit and documentation.         "

## 2023-02-23 ENCOUNTER — TELEPHONE (OUTPATIENT)
Dept: GASTROENTEROLOGY | Facility: CLINIC | Age: 38
End: 2023-02-23
Payer: COMMERCIAL

## 2023-02-23 NOTE — TELEPHONE ENCOUNTER
+ interp LVM & sent physical letter // pt needs to reschedule appt with Dr. Leventhal on 6.6.23 (can move labs & ultrasound as well) // first attempt, AN 2.23.23

## 2023-02-27 ENCOUNTER — TRANSFERRED RECORDS (OUTPATIENT)
Dept: HEALTH INFORMATION MANAGEMENT | Facility: CLINIC | Age: 38
End: 2023-02-27

## 2023-03-01 ENCOUNTER — TRANSFERRED RECORDS (OUTPATIENT)
Dept: MULTI SPECIALTY CLINIC | Facility: CLINIC | Age: 38
End: 2023-03-01

## 2023-03-01 LAB — RETINOPATHY: NORMAL

## 2023-03-13 ENCOUNTER — OFFICE VISIT (OUTPATIENT)
Dept: PHARMACY | Facility: CLINIC | Age: 38
End: 2023-03-13
Payer: COMMERCIAL

## 2023-03-13 VITALS — WEIGHT: 157.5 LBS | BODY MASS INDEX: 27.03 KG/M2

## 2023-03-13 DIAGNOSIS — F17.200 NICOTINE DEPENDENCE, UNCOMPLICATED, UNSPECIFIED NICOTINE PRODUCT TYPE: Primary | ICD-10-CM

## 2023-03-13 DIAGNOSIS — E11.9 TYPE 2 DIABETES MELLITUS WITHOUT COMPLICATION, WITHOUT LONG-TERM CURRENT USE OF INSULIN (H): ICD-10-CM

## 2023-03-13 DIAGNOSIS — R52 PAIN: ICD-10-CM

## 2023-03-13 DIAGNOSIS — K21.9 GASTROESOPHAGEAL REFLUX DISEASE, UNSPECIFIED WHETHER ESOPHAGITIS PRESENT: ICD-10-CM

## 2023-03-13 PROCEDURE — 99605 MTMS BY PHARM NP 15 MIN: CPT | Performed by: PHARMACIST

## 2023-03-13 PROCEDURE — 99607 MTMS BY PHARM ADDL 15 MIN: CPT | Performed by: PHARMACIST

## 2023-03-13 RX ORDER — METFORMIN HCL 500 MG
TABLET, EXTENDED RELEASE 24 HR ORAL
Qty: 60 TABLET | Refills: 11 | Status: SHIPPED | OUTPATIENT
Start: 2023-03-13 | End: 2024-03-13

## 2023-03-13 RX ORDER — ACETAMINOPHEN 325 MG/1
650 TABLET ORAL EVERY 6 HOURS PRN
Qty: 100 TABLET | Refills: 3 | Status: SHIPPED | OUTPATIENT
Start: 2023-03-13

## 2023-03-13 NOTE — PATIENT INSTRUCTIONS
"Recommendations from today's MTM visit:                                                    1.  Decrease dose of metformin ER: 500 mg twice daily (side effect)  2.  Sent refill for patient to use Tylenol every 6 hours as needed for pain  3.  Removed gabapentin from medication list, not currently taking  4.  Medication adherence to address today, all medications refilled at retail pharmacy, patient to  and continue consistent use    Follow-up: Return in about 3 months (around 6/13/2023) for with me.    It was great speaking with you today.  I value your experience and would be very thankful for your time in providing feedback in our clinic survey. In the next few days, you may receive an email or text message from [employer] with a link to a survey related to your  clinical pharmacist.\"     To schedule another MTM appointment, please call the clinic directly or you may call the MTM scheduling line at [phone] or toll-free at [phone].     My Clinical Pharmacist's contact information:                                                      Please feel free to contact me with any questions or concerns you have.      Miriam Vallecillo, PharmD, BCACP  Medication Therapy Management Pharmacist  "

## 2023-03-13 NOTE — PROGRESS NOTES
Medication Therapy Management (MTM) Encounter    ASSESSMENT:                            Medication Adherence/Access: Unclear why patient has not picked up his refills after running out of medication.  Called pharmacy to ensure all medications are able to be refilled, patient to  when able.  Reiterated the importance of continuing chronic medications without skipping doses and appropriate process to refill medications going forward, patient verbalizes understanding.    1. Type 2 diabetes mellitus without complication, without long-term current use of insulin (H)  Patient's last A1c showed improvement, though still not meeting goal of <7%.  Patient currently prescribed max dose metformin, DPP 4, and SGLT2 inhibitor.  Unfortunately, patient has been inconsistently taking medications, as noted above patient medications to be refilled and continuing as directed.  Of note recommend decreased dose of metformin due to patient's symptoms of nausea and vomiting, also advised patient to eat more frequent, smaller meals to avoid the symptoms, especially since this has been going on for a long time.  Most importantly, advised patient of dietary adjustments to improve blood sugar control.    2. Pain  Recommended patient continue to use Tylenol as needed for pain.  Patient described pain today appears to be unrelated to neuropathy, patient not currently taking gabapentin due to no refills.  Recommend continuation without gabapentin unless otherwise directed by PCP, removed from medication list today.    3. Nicotine dependence, uncomplicated, unspecified nicotine product type  Recommended switching current nicotine lozenge to nicotine gum as needed for cravings.  Advised patient on appropriate use of Nicorette gum and to avoid swallowing medication contents to avoid medication side effects.    4. Gastroesophageal reflux disease, unspecified whether esophagitis present  Unclear based on chart review if patient is taking for  additional indication beyond GERD.  For now, acceptable to continue as prescribed.  Would recommend lowest effective PPI daily dose in the future considering concern for long-term side effects, could also consider switching to trial of H2 blocker as an alternative to chronic PPI.    PLAN:                            1.  Decrease dose of metformin ER: 500 mg twice daily (side effect)  2.  Sent refill for patient to use Tylenol every 6 hours as needed for pain  3.  Removed gabapentin from medication list, not currently taking  4.  Medication adherence to address today, all medications refilled at retail pharmacy, patient to  and continue consistent use    Medication issues to be addressed at a future visit:   1. If A1c elevated at next check, recommend switching Onglyza to GLP-1 agonist    FOLLOW-UP: Return in about 3 months (around 6/13/2023) for with me.  PCP 5/8; Gastro 6/6  SUBJECTIVE/OBJECTIVE:                          Reinaldo Real is a 37 year old male coming in for an initial visit. He was referred to me from Cassy Berrios. Patient seen with Jenifer sigala.     Reason for visit: [facility] initial 2023.    Allergies/ADRs: Reviewed in chart  Past Medical History: Reviewed in chart  Tobacco: He reports that he has been smoking cigarettes and cigarettes. He has been smoking an average of .2 packs per day. His smokeless tobacco use includes chew and chew.Nicotine/Tobacco Cessation Plan:   see below  Alcohol: history of alcohol dependence, none anymore.     Medication Adherence/Access: Uses a twice daily pillbox at home.   Patient takes medications 2 time(s) per day.   Per patient, misses medication 1 times per week.   -Patient presents today with 3 of his medication vials: Jardiance, atorvastatin, and omeprazole.  Of note, besides his atorvastatin, all other medications were last filled 2/2 with a 30-day supply and patient states that he has a hard time getting refills from the pharmacy because they will not fill the  medications for him.  MTM contacted retail pharmacy today and all medications are able to be filled, patient to  when able.    Type 2 Diabetes:    Saxagliptin (Onglyza) 5 mg daily (not taking for 1-2 weeks, reports ran out of refill)  Empaglifloxin (Jardiance) 25 mg daily AM  Metformin  mg - 2 tablets twice daily (does not bring this today but still has some at home)  - Side effects? Notes nausea almost every day, vomiting 10 times per week after eating. If eating larger portions of food/if feeling bloated. Better when eating smaller portions. Been occurring for 10+ years, not a new symptom for him.   Blood sugar monitorin-2 times daily per glucometer: Reports that he needs refills of his test strips.   Date FBG 2hours post   3/13 150 250   3/12 264    3/11 129    3/9 148    3/8 125    3/7 155    3/5 126    Symptoms of low blood sugar? none  Symptoms of high blood sugar? none  Diet/Exercise: Rice and veggies, fish paste, chicken, eggs. 2-3 meals per day. Drinking coke once weekly. Walking for exercise.  Notes that if he eats anything sweet his blood sugar goes very high, he has been cutting back on this. Notes that he doesn't like brown rice.   Aspirin: No. Not indicated.   Statin: Yes: Atorvastatin  ACEi/ARB: No. Not indicated  Lab Results   Component Value Date    A1C 7.6 2023    A1C 8.2 2022    A1C 8.4 05/10/2021    A1C 9.3 2021     Creatinine   Date Value Ref Range Status   2022 0.67 0.67 - 1.17 mg/dL Final   2018 0.9 0.7 - 1.3 mg/dL Final     Lab Test 10/17/22  1723 21  1717   CHOL 144 277*   HDL 27*  --    LDL 85  --    TRIG 159* 1,252*   CHOLHDLRATIO  --   --      Pain: Gabapentin 100 mg - reports he was taking twice daily (does not have this with him and requesting refill), acetaminophen 325 mg 2 tablets every 6 hours as needed.  - Using Tylenol OTC, only a couple times a month.   - Reports back pain has been ok, more pain in his right arm (elbow and  wrist). Xray done and wrist was fractured.   - No nerve pain noted. Doesn't think he has ever had an issue with numbness/tingling pain.   - Ortho referral per PCP placed on 2/6/23.     GERD: Omeprazole 20 mg twice daily.  - No symptoms or concerns, only sometimes gets symptoms from certain foods, such as spicy foods.  - No signs of blood in stool.     Tobacco use: Trying to cut back on smoking. Currently smoking 1 cigarette every 2-3 days. Smokes more when he is out and about.   -  Not using the lozenges because it gave him chest/stomach upset.  Patient agreeable to switch to alternative nicotine replacement therapy, nicotine gum.    Today's Vitals: Wt 157 lb 8 oz (71.4 kg)   BMI 27.03 kg/m    ----------------      I spent 30 minutes with this patient today. All changes were made via collaborative practice agreement with Cassy Berrios MD. A copy of the visit note was provided to the patient's provider(s).    A summary of these recommendations was declined by the patient.    Miriam Vallecillo, PharmD, BCACP  Medication Therapy Management Pharmacist     Medication Therapy Recommendations  Type 2 diabetes mellitus without complication, without long-term current use of insulin (H)    Current Medication: metFORMIN (GLUCOPHAGE XR) 500 MG 24 hr tablet   Rationale: Undesirable effect - Adverse medication event - Safety   Recommendation: Decrease Dose   Status: Accepted per CPA

## 2023-03-14 ENCOUNTER — TELEPHONE (OUTPATIENT)
Dept: GASTROENTEROLOGY | Facility: CLINIC | Age: 38
End: 2023-03-14
Payer: COMMERCIAL

## 2023-05-08 ENCOUNTER — OFFICE VISIT (OUTPATIENT)
Dept: FAMILY MEDICINE | Facility: CLINIC | Age: 38
End: 2023-05-08
Payer: COMMERCIAL

## 2023-05-08 VITALS
HEIGHT: 64 IN | RESPIRATION RATE: 16 BRPM | TEMPERATURE: 97 F | WEIGHT: 162.75 LBS | BODY MASS INDEX: 27.78 KG/M2 | DIASTOLIC BLOOD PRESSURE: 84 MMHG | SYSTOLIC BLOOD PRESSURE: 122 MMHG | HEART RATE: 76 BPM | OXYGEN SATURATION: 100 %

## 2023-05-08 DIAGNOSIS — F17.200 TOBACCO DEPENDENCE SYNDROME: ICD-10-CM

## 2023-05-08 DIAGNOSIS — E11.9 TYPE 2 DIABETES MELLITUS WITHOUT COMPLICATION, WITHOUT LONG-TERM CURRENT USE OF INSULIN (H): Primary | ICD-10-CM

## 2023-05-08 DIAGNOSIS — F17.200 NICOTINE DEPENDENCE, UNCOMPLICATED, UNSPECIFIED NICOTINE PRODUCT TYPE: ICD-10-CM

## 2023-05-08 DIAGNOSIS — K02.9 DENTAL CARIES: ICD-10-CM

## 2023-05-08 DIAGNOSIS — F10.21 ALCOHOL DEPENDENCE IN REMISSION (H): ICD-10-CM

## 2023-05-08 LAB
ANION GAP SERPL CALCULATED.3IONS-SCNC: 13 MMOL/L (ref 7–15)
BUN SERPL-MCNC: 13.4 MG/DL (ref 6–20)
CALCIUM SERPL-MCNC: 9.5 MG/DL (ref 8.6–10)
CHLORIDE SERPL-SCNC: 102 MMOL/L (ref 98–107)
CREAT SERPL-MCNC: 0.77 MG/DL (ref 0.67–1.17)
DEPRECATED HCO3 PLAS-SCNC: 22 MMOL/L (ref 22–29)
GFR SERPL CREATININE-BSD FRML MDRD: >90 ML/MIN/1.73M2
GLUCOSE SERPL-MCNC: 206 MG/DL (ref 70–99)
HBA1C MFR BLD: 8.2 % (ref 0–5.6)
POTASSIUM SERPL-SCNC: 4.1 MMOL/L (ref 3.4–5.3)
SODIUM SERPL-SCNC: 137 MMOL/L (ref 136–145)

## 2023-05-08 PROCEDURE — 83036 HEMOGLOBIN GLYCOSYLATED A1C: CPT | Performed by: FAMILY MEDICINE

## 2023-05-08 PROCEDURE — 99214 OFFICE O/P EST MOD 30 MIN: CPT | Performed by: FAMILY MEDICINE

## 2023-05-08 PROCEDURE — 36415 COLL VENOUS BLD VENIPUNCTURE: CPT | Performed by: FAMILY MEDICINE

## 2023-05-08 PROCEDURE — 80048 BASIC METABOLIC PNL TOTAL CA: CPT | Performed by: FAMILY MEDICINE

## 2023-05-08 RX ORDER — ATORVASTATIN CALCIUM 40 MG/1
40 TABLET, FILM COATED ORAL DAILY
Qty: 90 TABLET | Refills: 3 | Status: SHIPPED | OUTPATIENT
Start: 2023-05-08 | End: 2024-08-22

## 2023-05-08 RX ORDER — SAXAGLIPTIN 5 MG/1
5 TABLET, FILM COATED ORAL DAILY
Qty: 90 TABLET | Refills: 3 | Status: SHIPPED | OUTPATIENT
Start: 2023-05-08 | End: 2023-09-11

## 2023-05-08 NOTE — PROGRESS NOTES
"  Assessment & Plan       Reinaldo was seen today for follow up.    Diagnoses and all orders for this visit:    Type 2 diabetes mellitus without complication, without long-term current use of insulin (H): he is already on 3 agents (metformin, jardiance, saxagliptin), and we will try adding glipizide 5 mg XR daily since A1C is still 8.2.   -     Hemoglobin A1c; Future  -     Basic metabolic panel  (Ca, Cl, CO2, Creat, Gluc, K, Na, BUN)  -     Hemoglobin A1c  -     saxagliptin (ONGLYZA) 5 MG TABS tablet; Take 1 tablet (5 mg) by mouth daily  -     glipiZIDE (GLUCOTROL XL) 5 MG 24 hr tablet; Take 1 tablet (5 mg) by mouth daily    Tobacco dependence syndrome    Nicotine dependence, uncomplicated, unspecified nicotine product type: refilled gum. Encouraged him to use this as often as needed.  -     nicotine (NICORETTE) 2 MG gum; Place 1 each (2 mg) inside cheek every hour as needed for smoking cessation    Dental caries: gave several names and numbers of low cost dental clinics in the area.     Alcohol dependence in remission (H): provided praise for quitting alcohol.     Other orders  -     atorvastatin (LIPITOR) 40 MG tablet; Take 1 tablet (40 mg) by mouth daily  -     empagliflozin (JARDIANCE) 25 MG TABS tablet; Take 1 tablet (25 mg) by mouth daily           BMI:   Estimated body mass index is 27.94 kg/m  as calculated from the following:    Height as of this encounter: 1.626 m (5' 4\").    Weight as of this encounter: 73.8 kg (162 lb 12 oz).       Cassy Berrios MD  [facility] Najera is a 38 year old, presenting for the following health issues:  Follow Up (Diabetes )        5/8/2023     2:19 PM   Additional Questions   Roomed by Lorraine Bhatti   Accompanied by Self     History of Present Illness       Diabetes:   He presents for follow up of diabetes.  He is checking home blood glucose two times daily. He checks blood glucose before and after meals.  Blood glucose is never over 200 and never " "under 70. He is aware of hypoglycemia symptoms including other. He is concerned about blood sugar frequently over 200.  He is not experiencing numbness or burning in feet, excessive thirst, blurry vision, weight changes or redness, sores or blisters on feet. The patient has had a diabetic eye exam in the last 12 months. Eye exam performed on March 2023 [facility]. Location of last eye exam [facility].          Doing well overall. 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     He is not drinking.     Some days 2 cigarettes, some days 0. If he forgets the gum and is out somewhere, he smokes.     Wants help scheduling dental appt. He missed 2 appts so his office won't see him anymore. The first one he missed because the dentist was unavailable and the second one he missed because he had no transportation.        Objective    /84 (BP Location: Left arm, Patient Position: Sitting, Cuff Size: Adult Regular)   Pulse 76   Temp 97  F (36.1  C) (Oral)   Resp 16   Ht 1.626 m (5' 4\")   Wt 73.8 kg (162 lb 12 oz)   SpO2 100%   BMI 27.94 kg/m    Body mass index is 27.94 kg/m .  Physical Exam   GENERAL: healthy, alert and no distress  HEENT:  Obvious dental caries  EYES: Eyes grossly normal to inspection, PERRL and conjunctivae and sclerae normal  MS: no gross musculoskeletal defects noted, no edema  SKIN: no suspicious lesions or rashes  NEURO: Normal strength and tone, mentation intact and speech normal  PSYCH: mentation appears normal, affect normal/bright  Diabetic foot exam: normal DP and PT pulses, no trophic changes or ulcerative lesions and normal sensory exam                "

## 2023-05-09 RX ORDER — GLIPIZIDE 5 MG/1
5 TABLET, FILM COATED, EXTENDED RELEASE ORAL DAILY
Qty: 90 TABLET | Refills: 3 | Status: SHIPPED | OUTPATIENT
Start: 2023-05-09 | End: 2023-11-29

## 2023-06-05 ENCOUNTER — ANCILLARY PROCEDURE (OUTPATIENT)
Dept: ULTRASOUND IMAGING | Facility: CLINIC | Age: 38
End: 2023-06-05
Attending: INTERNAL MEDICINE
Payer: COMMERCIAL

## 2023-06-05 DIAGNOSIS — B19.10 HEPATITIS B INFECTION WITHOUT DELTA AGENT WITHOUT HEPATIC COMA, UNSPECIFIED CHRONICITY: ICD-10-CM

## 2023-06-05 DIAGNOSIS — K74.60 CIRRHOSIS OF LIVER WITHOUT ASCITES, UNSPECIFIED HEPATIC CIRRHOSIS TYPE (H): ICD-10-CM

## 2023-06-05 PROCEDURE — 76981 USE PARENCHYMA: CPT | Mod: GC | Performed by: RADIOLOGY

## 2023-06-13 ENCOUNTER — OFFICE VISIT (OUTPATIENT)
Dept: PHARMACY | Facility: CLINIC | Age: 38
End: 2023-06-13
Payer: COMMERCIAL

## 2023-06-13 VITALS
BODY MASS INDEX: 27.81 KG/M2 | SYSTOLIC BLOOD PRESSURE: 116 MMHG | OXYGEN SATURATION: 99 % | WEIGHT: 162 LBS | DIASTOLIC BLOOD PRESSURE: 78 MMHG | HEART RATE: 72 BPM

## 2023-06-13 DIAGNOSIS — K21.9 GASTROESOPHAGEAL REFLUX DISEASE, UNSPECIFIED WHETHER ESOPHAGITIS PRESENT: ICD-10-CM

## 2023-06-13 DIAGNOSIS — E11.9 TYPE 2 DIABETES MELLITUS WITHOUT COMPLICATION, WITHOUT LONG-TERM CURRENT USE OF INSULIN (H): Primary | ICD-10-CM

## 2023-06-13 DIAGNOSIS — F17.200 NICOTINE DEPENDENCE, UNCOMPLICATED, UNSPECIFIED NICOTINE PRODUCT TYPE: ICD-10-CM

## 2023-06-13 PROCEDURE — 99606 MTMS BY PHARM EST 15 MIN: CPT | Performed by: PHARMACIST

## 2023-06-13 NOTE — PROGRESS NOTES
Medication Therapy Management (MTM) Encounter    ASSESSMENT:                            Medication Adherence/Access: Reviewed with patient today the importance of consistent medication use without missed doses.    1. Type 2 diabetes mellitus without complication, without long-term current use of insulin (H)  A1c was increased, not meeting goal of <7%. Patient currently taking max tolerated dose  metformin, DPP 4, sulfonylurea, and SGLT2 inhibitor.  Unable to fully assess diabetes control today without blood sugars present.  For now, recommended continuation of current medications without change.  Encouraged dietary and exercise adjustments to improve blood sugar control.  In the future, could consider switching onglyza to GLP-1 agonist.    2. Gastroesophageal reflux disease, unspecified whether esophagitis present  Stable, continue current twice daily PPI.  Patient has upcoming appointment with gastroenterology later this month.    3. Nicotine dependence, uncomplicated, unspecified nicotine product type  Encouraged tobacco cessation today.  Patient prefers independent discontinuation rather than use of nicotine replacement therapy, removed NRT for medication list today due to medication side effects.    PLAN:                            -Continue current medications for now without change, please bring medication and glucometer to follow-up visit for further review  - Patient to refill all medications from retail pharmacy and continue consistent use    Medication issues to be addressed at a future visit:   1. If A1c elevated at next check, recommend switching Onglyza to GLP-1 agonist    Follow-up: Return in about 22 days (around 7/5/2023) for with me.  PCP 8/9; Gastro 6/27  SUBJECTIVE/OBJECTIVE:                          Reinaldo Real is a 38 year old male coming in for a follow-up visit. Patient seen with Jenifer sigala. Today's visit is a follow-up MTM visit from 3/13/23. LUPE .    Reason for visit: MTM follow  up.    Allergies/ADRs: Reviewed in chart  Past Medical History: Reviewed in chart  Tobacco: He reports that he has been smoking cigarettes. He has been smoking an average of .2 packs per day. He has been exposed to tobacco smoke. His smokeless tobacco use includes chew.Nicotine/Tobacco Cessation Plan:   see below  Alcohol: history of alcohol dependence, none anymore.     Medication Adherence/Access: Uses a twice daily pillbox at home, self management.  Patient takes medications 2 time(s) per day.   Per patient, misses medication 0 times per week. Reports only rarely will miss a dose, not very often.  - Patient presents today without his medications. Patient able to recall appropriate administration of each medication today.  - Notes that he feels confident in the process of getting medication refills from retail pharmacy.   -Appears that most medications were filled early May, and should be due for refill at this time, reviewed this with patient today and recommended he refill medications and resume consistent use.    Type 2 Diabetes:      Saxagliptin (Onglyza) 5 mg daily    Empaglifloxin (Jardiance) 25 mg daily AM    Metformin  mg - 1 tablets twice daily    Glipizide XL 5 mg daily (started )  - Side effects? Does have urinary discomfort sometimes, mostly when eating spicy foods, no symptoms at this time. Noticing he feels different since starting glipizide, not sure how though.   - Med Hx: metformin >1000 mg/day (vomiting)  Blood sugar monitorin-2 times daily. Patient does NOT bring his glucometer with today. Reports readings lately of 140 AM, 300 PP, 180, 200, 120, 250, 130, etc.  Symptoms of low blood sugar? none  Symptoms of high blood sugar? none  Diet/Exercise: Rice and veggies, fish paste, chicken, eggs. 2-3 meals per day. Been eating more veggies lately. Stopped drinking soda since it increased his readings. Walking for exercise.  Notes that he doesn't like brown rice.   Aspirin: No. Not  indicated.   Statin: Yes: Atorvastatin  ACEi/ARB: No. Not indicated  Lab Results   Component Value Date    A1C 8.2 05/08/2023    A1C 7.6 02/06/2023    A1C 8.2 09/26/2022    A1C 8.4 05/10/2021    A1C 9.3 02/08/2021     Creatinine   Date Value Ref Range Status   05/08/2023 0.77 0.67 - 1.17 mg/dL Final   07/02/2018 0.9 0.7 - 1.3 mg/dL Final     Lab Test 10/17/22  1723 02/08/21  1717   CHOL 144 277*   HDL 27*  --    LDL 85  --    TRIG 159* 1,252*   CHOLHDLRATIO  --   --       Latest Reference Range & Units 10/17/22 17:29   Albumin Urine mg/g Cr  See Comment   Albumin Urine mg/L mg/L <12.0     GERD: Omeprazole 20 mg twice daily before meals  - Wondering if he has refills of omeprazole  - Certain foods (spicy, oily food) exacerbate the symptoms of heartburn. Drinking milk helps with burning sensation. He has been trying to avoid these foods as well.   - No signs of blood in stool.     Tobacco use: Trying to cut back on smoking. Has not smoked for 4 days already.   - Used the nicotine patch and gum that caused him headache. His cravings are getting less and less. Wants to continue cessation on his own without use of NRT. The gum also caused heartburn.     Today's Vitals: /78   Pulse 72   Wt 162 lb (73.5 kg)   SpO2 99%   BMI 27.81 kg/m    ----------------      I spent 30 minutes with this patient today. All changes were made via collaborative practice agreement with Cassy Berrios MD. A copy of the visit note was provided to the patient's provider(s).    A summary of these recommendations was declined by the patient.    Miriam Vallecillo, PharmD, BCACP  Medication Therapy Management Pharmacist     Medication Therapy Recommendations  No medication therapy recommendations to display

## 2023-06-14 DIAGNOSIS — Z76.0 ENCOUNTER FOR MEDICATION REFILL: ICD-10-CM

## 2023-06-15 NOTE — TELEPHONE ENCOUNTER
"Last Written Prescription Date:  5/4/22  Last Fill Quantity: 180,  # refills: 3   Last office visit provider:  5/8/23     Requested Prescriptions   Pending Prescriptions Disp Refills     omeprazole (PRILOSEC) 20 MG DR capsule [Pharmacy Med Name: OMEPRAZOLE DR 20 MG CAPSULE 20 Capsule] 90 capsule 3     Sig: TAKE 1 CAPSULE (20 MG TOTAL) BY MOUTH 2 (TWO) TIMES A DAY BEFORE MEALS FOR STOMACH       PPI Protocol Passed - 6/15/2023  3:23 PM        Passed - Not on Clopidogrel (unless Pantoprazole ordered)        Passed - No diagnosis of osteoporosis on record        Passed - Recent (12 mo) or future (30 days) visit within the authorizing provider's specialty     Patient has had an office visit with the authorizing provider or a provider within the authorizing providers department within the previous 12 mos or has a future within next 30 days. See \"Patient Info\" tab in inbasket, or \"Choose Columns\" in Meds & Orders section of the refill encounter.              Passed - Medication is active on med list        Passed - Patient is age 18 or older             Jack Wayne RN 06/15/23 3:23 PM  "

## 2023-07-05 ENCOUNTER — TELEPHONE (OUTPATIENT)
Dept: PHARMACY | Facility: CLINIC | Age: 38
End: 2023-07-05
Payer: COMMERCIAL

## 2023-07-05 NOTE — TELEPHONE ENCOUNTER
PharmD Outbound Call:     Reason for call: missed MTM appointment today     Called patient with Jenifer , ID # [id number]    Call attempt: Was able to reschedule MTM appt.       Carmen Roque, Pharm.D., MPH  MTM Pharmacist Resident   [facility]

## 2023-07-19 ENCOUNTER — OFFICE VISIT (OUTPATIENT)
Dept: PHARMACY | Facility: CLINIC | Age: 38
End: 2023-07-19
Payer: COMMERCIAL

## 2023-07-19 VITALS — SYSTOLIC BLOOD PRESSURE: 110 MMHG | DIASTOLIC BLOOD PRESSURE: 72 MMHG | BODY MASS INDEX: 28.06 KG/M2 | WEIGHT: 163.5 LBS

## 2023-07-19 DIAGNOSIS — K21.9 GASTROESOPHAGEAL REFLUX DISEASE, UNSPECIFIED WHETHER ESOPHAGITIS PRESENT: ICD-10-CM

## 2023-07-19 DIAGNOSIS — F17.200 NICOTINE DEPENDENCE, UNCOMPLICATED, UNSPECIFIED NICOTINE PRODUCT TYPE: ICD-10-CM

## 2023-07-19 DIAGNOSIS — E11.9 TYPE 2 DIABETES MELLITUS WITHOUT COMPLICATION, WITHOUT LONG-TERM CURRENT USE OF INSULIN (H): Primary | ICD-10-CM

## 2023-07-19 PROCEDURE — 99606 MTMS BY PHARM EST 15 MIN: CPT

## 2023-07-19 NOTE — PATIENT INSTRUCTIONS
"Recommendations from today's MTM visit:                                                    MTM (medication therapy management) is a service provided by a clinical pharmacist designed to help you get the most of out of your medicines.      - Continue current medications for now without change  - Recommend daily administration of all medications in the morning for ease and to take omeprazole 30 mins before eating   - Please work on not eating sweets/sugar and continuing to get exercise     Follow-up: Return in about 7 weeks (around 9/6/2023).    It was great speaking with you today.  I value your experience and would be very thankful for your time in providing feedback in our clinic survey. In the next few days, you may receive an email or text message from [employer] with a link to a survey related to your  clinical pharmacist.\"     To schedule another MTM appointment, please call the clinic directly or you may call the MTM scheduling line at [phone] or toll-free at [phone].     My Clinical Pharmacist's contact information:                                                      Please feel free to contact me with any questions or concerns you have.      Carmen Roque, Pharm.D., MPH  MTM Pharmacist Resident   [facility] / [facility]    "

## 2023-07-19 NOTE — PROGRESS NOTES
Medication Therapy Management (MTM) Encounter    ASSESSMENT:                            Medication Adherence/Access: Reviewed with patient today the importance of consistent medication use without missed doses.    1. Type 2 diabetes mellitus without complication, without long-term current use of insulin (H)   A1c not meeting goal of <7% and would recommend recheck at next PCP visit. Patient currently taking max tolerated dose  metformin, DPP-4, sulfonylurea, and SGLT2 inhibitor.  Patient declines medication changes such as changing to an injectable GLP-1 and would prefer to work on diet and exercise. In the future, could revisit switching onglyza to GLP-1 agonist.    2. Gastroesophageal reflux disease, unspecified whether esophagitis present  Stable, continue current twice daily PPI.     3. Nicotine dependence, uncomplicated, unspecified nicotine product type  Encouraged tobacco cessation today.  Patient prefers independent discontinuation rather than use of nicotine replacement therapy. Education provided that if he desires help in the future to reach out to a healthcare professional or the clinic.     PLAN:                            - Continue current medications for now without change  - Recommend daily administration of all once daily medications in the morning for ease of use and to take omeprazole 30 mins before eating   - Please work on not eating sweets/sugar and continuing to get exercise     Medication issues to be addressed at a future visit:   1. If A1c elevated at next check, recommend switching Onglyza to GLP-1 agonist    Follow-up: Return in about 7 weeks (around 9/6/2023).  PCP 8/9    SUBJECTIVE/OBJECTIVE:                          Reinaldo Real is a 38 year old male coming in for a follow-up visit. Patient seen with Jenifer sigala. Today's visit is a follow-up MTM visit from 6/13/23 .   ID#[id number].    Reason for visit: MTM follow up.    Allergies/ADRs: Reviewed in chart  Past Medical History:  Reviewed in chart  Tobacco: He reports that he has been smoking cigarettes. He has been smoking an average of .2 packs per day. He has been exposed to tobacco smoke. His smokeless tobacco use includes chew.Nicotine/Tobacco Cessation Plan:   see below  Alcohol: history of alcohol dependence, none anymore.     Medication Adherence/Access: No longer using pillbox and is instead taking the medications out of the vials. He manages his own medications. Patient reports missing no doses in the past week.    Type 2 Diabetes: Saxagliptin (Onglyza) 5 mg daily, Empaglifloxin (Jardiance) 25 mg daily AM, Metformin  mg twice daily (max tolerated dose), Glipizide XL 5 mg daily    Med Hx: metformin >1000 mg/day (vomiting)  Blood gluclose monitoring: traditional meter 1-2 times per day. Patient declines CGM and prefer to use the traditional meter. Patient also declines any injectable medications.   Ranges:     Date FBG RANDOM   7/19 202 105   7/18 179 115   7/17 126 321   7/11 135    7/10 175      Symptoms of low blood sugar? none.  Frequency of hypoglycemia? None per patient report   Recent symptoms of high blood sugar? Patient notes that in the past he had numbness and tingling in his fingers and toes but this has gotten better. He sometimes feels more thirsty than normal when his sugars are 200-300s.  Last eye exam was: 03/2023  Last foot exam was: 05/2023  Diet/Exercise: Reports eating variable number of meals per day. Patient notes he likes to drink soda and eat rice but this raises his sugars.  Aspirin: No. Not indicated.   Statin: Atorvastatin, last lipid panel 10/2022  Urine Albumin: 10/2022, no therapy indicated     Lab Results   Component Value Date    A1C 8.2 05/08/2023    A1C 7.6 02/06/2023    A1C 8.2 09/26/2022       GERD: Omeprazole 20 mg twice daily before meals  - Patient denies heartburn symptoms currently. Occasionally he will have some burning     Tobacco use: Trying to cut back on smoking. Will smoke about  2-6 cigarettes in a week.   - Used the nicotine patch and gum that caused him headaches and heartburn. His cravings are getting less and less. Wants to continue cessation on his own without use of NRT.    Today's Vitals: /72   Wt 163 lb 8 oz (74.2 kg)   BMI 28.06 kg/m    ----------------    I spent 30 minutes with this patient today. All changes were made via collaborative practice agreement with Cassy Berrios MD. A copy of the visit note was provided to the patient's provider(s).    A summary of these recommendations was declined by the patient.    Carmen Roque, Pharm.D., MPH  MTM Pharmacist Resident   [facility] / [facility]    Preceptor cosignature: Patient was seen independently by Dr. Roque. I have reviewed the assessment and plan. Miriam Vallecillo, PharmD, BCACP     Medication Therapy Recommendations  No medication therapy recommendations to display

## 2023-08-09 ENCOUNTER — TELEPHONE (OUTPATIENT)
Dept: FAMILY MEDICINE | Facility: CLINIC | Age: 38
End: 2023-08-09

## 2023-08-09 ENCOUNTER — OFFICE VISIT (OUTPATIENT)
Dept: FAMILY MEDICINE | Facility: CLINIC | Age: 38
End: 2023-08-09
Payer: COMMERCIAL

## 2023-08-09 VITALS
HEART RATE: 87 BPM | RESPIRATION RATE: 16 BRPM | HEIGHT: 64 IN | TEMPERATURE: 97.9 F | OXYGEN SATURATION: 99 % | DIASTOLIC BLOOD PRESSURE: 68 MMHG | BODY MASS INDEX: 27.57 KG/M2 | WEIGHT: 161.5 LBS | SYSTOLIC BLOOD PRESSURE: 118 MMHG

## 2023-08-09 DIAGNOSIS — E11.9 TYPE 2 DIABETES MELLITUS WITHOUT COMPLICATION, WITHOUT LONG-TERM CURRENT USE OF INSULIN (H): Primary | ICD-10-CM

## 2023-08-09 DIAGNOSIS — B19.10 HEPATITIS B INFECTION WITHOUT DELTA AGENT WITHOUT HEPATIC COMA, UNSPECIFIED CHRONICITY: ICD-10-CM

## 2023-08-09 DIAGNOSIS — K76.0 HEPATIC STEATOSIS: ICD-10-CM

## 2023-08-09 DIAGNOSIS — F17.200 TOBACCO DEPENDENCE SYNDROME: ICD-10-CM

## 2023-08-09 LAB
AFP SERPL-MCNC: 2.2 NG/ML
ALBUMIN SERPL BCG-MCNC: 4.6 G/DL (ref 3.5–5.2)
ALP SERPL-CCNC: 106 U/L (ref 40–129)
ALT SERPL W P-5'-P-CCNC: 89 U/L (ref 0–70)
ANION GAP SERPL CALCULATED.3IONS-SCNC: 14 MMOL/L (ref 7–15)
AST SERPL W P-5'-P-CCNC: 72 U/L (ref 0–45)
BILIRUB DIRECT SERPL-MCNC: <0.2 MG/DL (ref 0–0.3)
BILIRUB SERPL-MCNC: 0.5 MG/DL
BUN SERPL-MCNC: 12.8 MG/DL (ref 6–20)
CALCIUM SERPL-MCNC: 9.3 MG/DL (ref 8.6–10)
CHLORIDE SERPL-SCNC: 101 MMOL/L (ref 98–107)
CREAT SERPL-MCNC: 0.74 MG/DL (ref 0.67–1.17)
DEPRECATED HCO3 PLAS-SCNC: 19 MMOL/L (ref 22–29)
ERYTHROCYTE [DISTWIDTH] IN BLOOD BY AUTOMATED COUNT: 12 % (ref 10–15)
GFR SERPL CREATININE-BSD FRML MDRD: >90 ML/MIN/1.73M2
GLUCOSE SERPL-MCNC: 379 MG/DL (ref 70–99)
HBA1C MFR BLD: 7.5 % (ref 0–5.6)
HCT VFR BLD AUTO: 49.2 % (ref 40–53)
HGB BLD-MCNC: 17.1 G/DL (ref 13.3–17.7)
INR PPP: 1.07 (ref 0.85–1.15)
MCH RBC QN AUTO: 30.5 PG (ref 26.5–33)
MCHC RBC AUTO-ENTMCNC: 34.8 G/DL (ref 31.5–36.5)
MCV RBC AUTO: 88 FL (ref 78–100)
PLATELET # BLD AUTO: 178 10E3/UL (ref 150–450)
POTASSIUM SERPL-SCNC: 4.3 MMOL/L (ref 3.4–5.3)
PROT SERPL-MCNC: 7.8 G/DL (ref 6.4–8.3)
RBC # BLD AUTO: 5.61 10E6/UL (ref 4.4–5.9)
SODIUM SERPL-SCNC: 134 MMOL/L (ref 136–145)
WBC # BLD AUTO: 7.5 10E3/UL (ref 4–11)

## 2023-08-09 PROCEDURE — 83036 HEMOGLOBIN GLYCOSYLATED A1C: CPT | Performed by: FAMILY MEDICINE

## 2023-08-09 PROCEDURE — 36415 COLL VENOUS BLD VENIPUNCTURE: CPT | Performed by: FAMILY MEDICINE

## 2023-08-09 PROCEDURE — 85027 COMPLETE CBC AUTOMATED: CPT | Performed by: FAMILY MEDICINE

## 2023-08-09 PROCEDURE — 82105 ALPHA-FETOPROTEIN SERUM: CPT | Performed by: FAMILY MEDICINE

## 2023-08-09 PROCEDURE — 87517 HEPATITIS B DNA QUANT: CPT | Performed by: FAMILY MEDICINE

## 2023-08-09 PROCEDURE — 82248 BILIRUBIN DIRECT: CPT | Performed by: FAMILY MEDICINE

## 2023-08-09 PROCEDURE — 99214 OFFICE O/P EST MOD 30 MIN: CPT | Performed by: FAMILY MEDICINE

## 2023-08-09 PROCEDURE — 80053 COMPREHEN METABOLIC PANEL: CPT | Performed by: FAMILY MEDICINE

## 2023-08-09 PROCEDURE — 85610 PROTHROMBIN TIME: CPT | Performed by: FAMILY MEDICINE

## 2023-08-09 NOTE — PROGRESS NOTES
"  Assessment & Plan     Type 2 diabetes mellitus without complication, without long-term current use of insulin (H): A1c improved but still 7.5, above goal. Will try ordering ozempic and see if this is covered by insurance. If so, would discontinue saxagliptin.   - semaglutide (OZEMPIC) 2 MG/3ML pen  Dispense: 3 mL; Refill: 1    Tobacco dependence syndrome: he wants to try quitting on his own    Hepatitis B infection without delta agent without hepatic coma, unspecified chronicity: branden labs today ordered by GI. Scheduled follow up. He has been compensated and does not have cirrhosis.   - CBC with platelets  - Basic metabolic panel  - Hepatic function panel  - INR  - AFP tumor marker  - Hep B Virus DNA Quant Real Time PCR  - Adult GI  Referral - Consult Only    Hepatic steatosis: likely multifactorial due to diabetes/metabolic syndrome, history of alcohol use disorder, and Hep B.          BMI:   Estimated body mass index is 27.72 kg/m  as calculated from the following:    Height as of this encounter: 1.626 m (5' 4\").    Weight as of this encounter: 73.3 kg (161 lb 8 oz).       Cassy Berrios MD  [facility] Najera is a 38 year old, presenting for the following health issues:  Diabetes      8/9/2023     1:36 PM   Additional Questions   Roomed by Amparo RICE       History of Present Illness       Diabetes:   He presents for follow up of diabetes.  He is checking home blood glucose two times daily.   He checks blood glucose before and after meals.  Blood glucose is sometimes over 200 and never under 70. He is aware of hypoglycemia symptoms including none and blurred vision.    He has no concerns regarding his diabetes at this time.   He is not experiencing numbness or burning in feet, excessive thirst, blurry vision, weight changes or redness, sores or blisters on feet.           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     Had a liver elastography test which showed hepatic steatosis but no cirrhosis.    He is taking 4 pills for diabetes. He does sometimes forget to take them. But for the last month, he was pretty consistent. He does get sleepy after meals when his blood pressure is high.      He has been more active in the summer.         Objective    /68   Pulse 87   Temp 97.9  F (36.6  C) (Oral)   Resp 16   Ht 1.626 m (5' 4\")   Wt 73.3 kg (161 lb 8 oz)   SpO2 99%   BMI 27.72 kg/m    Body mass index is 27.72 kg/m .  Physical Exam   GENERAL: healthy, alert and no distress  HENT: normal cephalic/atraumatic, oral mucous membranes moist, and teeth stained from betel nut  MS: no gross musculoskeletal defects noted, no edema  SKIN: no suspicious lesions or rashes  NEURO: Normal strength and tone, mentation intact and speech normal  PSYCH: mentation appears normal, affect normal/bright                  "

## 2023-08-10 LAB — HBV DNA SERPL NAA+PROBE-ACNC: NOT DETECTED IU/ML

## 2023-08-14 NOTE — TELEPHONE ENCOUNTER
Central Prior Authorization Team   Phone: [phone]      PRIOR AUTHORIZATION DENIED    Medication: OZEMPIC (0.25 OR 0.5 MG/DOSE) 2 MG/3ML SC SOPN  Insurance Company: [employer] - Phone [phone] Fax [phone]  Denial Date: 8/9/2023  Denial Rational: FOR USE WITH PATIENTS THAT HAVE TYPE II DM; PATIENT NEEDS TO HAVE TRY AND FAIL AT LEAST TWO PREFERRED MEDICATIONS FROM LIST: BYETTA, BYDUREON BCISE, AND VICTOZA.              Appeal Information:       Patient Notified: No  Please note: Providers/Clinics are to notify the patients of the denial outcomes and steps going forward.

## 2023-09-11 ENCOUNTER — OFFICE VISIT (OUTPATIENT)
Dept: PHARMACY | Facility: CLINIC | Age: 38
End: 2023-09-11
Payer: COMMERCIAL

## 2023-09-11 VITALS — WEIGHT: 163.5 LBS | BODY MASS INDEX: 28.06 KG/M2

## 2023-09-11 DIAGNOSIS — E11.9 TYPE 2 DIABETES MELLITUS WITHOUT COMPLICATION, WITHOUT LONG-TERM CURRENT USE OF INSULIN (H): Primary | ICD-10-CM

## 2023-09-11 PROCEDURE — 99606 MTMS BY PHARM EST 15 MIN: CPT | Performed by: PHARMACIST

## 2023-09-11 PROCEDURE — 99607 MTMS BY PHARM ADDL 15 MIN: CPT | Performed by: PHARMACIST

## 2023-09-11 NOTE — PATIENT INSTRUCTIONS
"Recommendations from today's MTM visit:                                                    Education provided to patient today regarding appropriate use of Bydureon injection  Patient to bring CGM Naveen 2 to next visit for education on use    Follow-up: Return in about 1 week (around 9/18/2023).    It was great speaking with you today.  I value your experience and would be very thankful for your time in providing feedback in our clinic survey. In the next few days, you may receive an email or text message from [employer] with a link to a survey related to your  clinical pharmacist.\"     To schedule another MTM appointment, please call the clinic directly or you may call the MTM scheduling line at [phone] or toll-free at [phone].     My Clinical Pharmacist's contact information:                                                      Please feel free to contact me with any questions or concerns you have.      Miriam Vallecillo, PharmD, BCACP  Medication Therapy Management Pharmacist  "

## 2023-09-11 NOTE — PROGRESS NOTES
Medication Therapy Management (MTM) Encounter    ASSESSMENT:                            Medication Adherence/Access: Due to time constraints, unable to assess all disease states, recommend further addressing at follow-up visit.    1. Type 2 diabetes mellitus without complication, without long-term current use of insulin (H)  A1c improved, not yet at goal <7%.  Patient currently taking max dose metformin, sulfonylurea, SGLT2 inhibitor, and most recently initiated once weekly GLP-1 agonist.  Unfortunately, patient experiencing side effects from the GLP-1 agonist and not sure entirely how to use the injection.  MTM provides education on appropriate use to patient today with demonstration pen, patient appropriately able to administer his next dose of Bydureon during visit today.  Pending follow-up at next visit, could consider trial with Victoza if Bydureon remains not tolerated.  Patient would prefer to initiate CGM for blood sugar monitoring rather than traditional meter, prescription sent today and recommended he bring new device to follow-up visit for education on use.    PLAN:                            Education provided to patient today regarding appropriate use of Bydureon injection  Patient to bring CGM Naveen 2 to next visit for education on use    Medication issues to be addressed at a future visit:   GERD/tobacco use  Victoza instead of Bydureon?    Follow-up: Return in about 1 week (around 9/18/2023).  PCP 11/9    SUBJECTIVE/OBJECTIVE:                          Reinaldo Real is a 38 year old male coming in for a follow-up visit from 7/19/23. Patient seen with Jenifer sigala. ID#[id number].    Reason for visit: MTM follow up.    Allergies/ADRs: Reviewed in chart  Past Medical History: Reviewed in chart  Tobacco: He reports that he has been smoking cigarettes. He has been smoking an average of .2 packs per day. He has been exposed to tobacco smoke. His smokeless tobacco use includes chew.Nicotine/Tobacco Cessation  Plan:   see below  Alcohol: history of alcohol dependence, none anymore.     Medication Adherence/Access: No longer using pillbox and is instead taking the medications out of the vials. He manages his own medications. Patient reports missing no doses in the past week.    Diabetes   Type 2 Diabetes:    Bydureon Bcise 2 mg once weekly (started 8/14)  Empaglifloxin (Jardiance) 25 mg daily in the morning  Metformin  mg twice daily (max tolerated dose)  Glipizide XL 5 mg daily      Patient is experiencing the following side effects: Reports pain and bruising at injection site of Bydureon Bcise. Reports that the bruise took a while to go away. Reports last dose on 9/4, has not yet used dose this week. Then patient states that he doesn't entirely know how to use the injection and is looking for education on how to use it today. Also reports sometimes feeling nausea, especially if eating a lot, though this is overall getting better.   Med Hx: metformin >1000 mg/day (vomiting), Onglyza (switched to Bydureon per PCP on 8/14)  Blood sugar monitoring: Traditional meter 1-2 times per day.  Reports that his lancing device isn't always working but doesn't bring lancet today to demonstrate, appears functional today without demonstration. Patient would like to try use of CGM.  Current diabetes symptoms: none   Diet/Exercise: Reports eating 3 meals per day. Patient notes he likes to drink soda and eat rice but this raises his sugars.     Eye exam is up to date  Foot exam is up to date  Urine Albumin:   Lab Results   Component Value Date    ALCR  10/17/2022      Comment:      Unable to calculate, urine albumin and/or urine creatinine is outside detectable limits.  Microalbuminuria is defined as an albumin:creatinine ratio of 17 to 299 for males and 25 to 299 for females. A ratio of albumin:creatinine of 300 or higher is indicative of overt proteinuria.  Due to biologic variability, positive results should be confirmed by a  second, first-morning random or 24-hour timed urine specimen. If there is discrepancy, a third specimen is recommended. When 2 out of 3 results are in the microalbuminuria range, this is evidence for incipient nephropathy and warrants increased efforts at glucose control, blood pressure control, and institution of therapy with an angiotensin-converting-enzyme (ACE) inhibitor (if the patient can tolerate it).        Lab Results   Component Value Date    A1C 7.5 (H) 08/09/2023     Date FBG RANDOM   9/11 152 420   9/10 109    9/9 97 304   9/8 176    9/7 255      Today's Vitals: Wt 163 lb 8 oz (74.2 kg)   BMI 28.06 kg/m    ----------------      I spent 30 minutes with this patient today. All changes were made via collaborative practice agreement with Cassy Berrios MD. A copy of the visit note was provided to the patient's provider(s).    A summary of these recommendations was declined by the patient.    Miriam Vallecillo, PharmD, BCACP  Medication Therapy Management Pharmacist     Medication Therapy Recommendations  Type 2 diabetes mellitus without complication, without long-term current use of insulin (H)    Current Medication: exenatide ER (BYDUREON BCISE) 2 MG/0.85ML auto-injector   Rationale: Does not understand instructions - Adherence - Adherence   Recommendation: Provide Education   Status: Patient Agreed - Adherence/Education

## 2023-09-18 ENCOUNTER — OFFICE VISIT (OUTPATIENT)
Dept: PHARMACY | Facility: CLINIC | Age: 38
End: 2023-09-18
Payer: COMMERCIAL

## 2023-09-18 VITALS — BODY MASS INDEX: 28.58 KG/M2 | WEIGHT: 166.5 LBS

## 2023-09-18 DIAGNOSIS — E11.9 TYPE 2 DIABETES MELLITUS WITHOUT COMPLICATION, WITHOUT LONG-TERM CURRENT USE OF INSULIN (H): Primary | ICD-10-CM

## 2023-09-18 PROCEDURE — 99207 PR NO CHARGE LOS: CPT | Performed by: PHARMACIST

## 2023-09-18 NOTE — PROGRESS NOTES
Clinical Pharmacy Consult:                                                    Reinaldo Real is a 38 year old male coming in for a clinical pharmacist consult.  He was referred to me from Cassy Berrios     Reason for Consult: Naveen 2 educaiton    Discussion: [facility] pharmacist helps walk patient through the instructions and first Freestyle Naveen 2 sensor was placed on the back of the left arm today. Provided education to patient today. Recommended patient monitor blood sugars 3-4 times daily: in the morning, at mealtimes, and before bed.  Informed patient that the sensor is waterproof, to be replaced every 14 days, and has alarms that will sound if patients blood sugar goes too high or too low.  Patients questions were answered today. Directed patient to call Freestyle Naveen  if issues with Naveen 2 sensor arise: [phone].   Current blood sugar per meter:  Date FBG 2hours post   9/18 122    9/15 106    9/14 123    9/13 116 179   9/11 152 420   9/10 109    9/9 97 304     Plan:  1. [facility] pharmacist provides Freestyle Naveen 2 education during visit today; sensor placed on the back of left arm      Miriam Vallecillo, PharmD, BCACP  Medication Therapy Management Pharmacist

## 2023-10-11 ENCOUNTER — TELEPHONE (OUTPATIENT)
Dept: PHARMACY | Facility: CLINIC | Age: 38
End: 2023-10-11
Payer: COMMERCIAL

## 2023-10-11 NOTE — TELEPHONE ENCOUNTER
PharmD Outbound Call:     Reason for call: MTM appointment today - patient cancelled MTM appointment today. States that he missed appt today. Has been using his Naveen 2.     Agreed to reschedule MTM visit for 10/17.        Miriam Vallecillo, PharmD, BCACP  Medication Therapy Management Pharmacist

## 2023-10-17 ENCOUNTER — OFFICE VISIT (OUTPATIENT)
Dept: PHARMACY | Facility: CLINIC | Age: 38
End: 2023-10-17
Payer: COMMERCIAL

## 2023-10-17 VITALS
OXYGEN SATURATION: 97 % | WEIGHT: 163.8 LBS | DIASTOLIC BLOOD PRESSURE: 94 MMHG | BODY MASS INDEX: 28.12 KG/M2 | HEART RATE: 72 BPM | SYSTOLIC BLOOD PRESSURE: 132 MMHG

## 2023-10-17 DIAGNOSIS — E11.9 TYPE 2 DIABETES MELLITUS WITHOUT COMPLICATION, WITHOUT LONG-TERM CURRENT USE OF INSULIN (H): Primary | ICD-10-CM

## 2023-10-17 DIAGNOSIS — R03.0 ELEVATED BP WITHOUT DIAGNOSIS OF HYPERTENSION: ICD-10-CM

## 2023-10-17 PROCEDURE — 99607 MTMS BY PHARM ADDL 15 MIN: CPT | Performed by: PHARMACIST

## 2023-10-17 PROCEDURE — 99606 MTMS BY PHARM EST 15 MIN: CPT | Performed by: PHARMACIST

## 2023-10-17 NOTE — PROGRESS NOTES
Medication Therapy Management (MTM) Encounter    ASSESSMENT:                            Medication Adherence/Access: Recommended patient bring all medications to follow-up visit to further assess medication adherence, unable to fully address today.    1. Type 2 diabetes mellitus without complication, without long-term current use of insulin (H)  A1c improved, not yet at goal <7%, recommend rechecking at next PCP visit.  Unable to fully assess which dose of metformin patient is taking today, will update medication list at next visit.  Removed by yeimy from medication list today as patient is no longer taking due to side effects.  Alternatively, recommended initiating DPP 4, patient agreed and prescription sent today.  Could also consider switching to alternative, Victoza in the future pending blood sugar results (Victoza preferred per patient insurance).  Provided patient freestyle jessi 2 sensor today to replace sensor that fell off early, patient will start monitoring consistently again today.  Also discussed with patient today the importance of increased exercise and avoiding sugary beverages/foods.     2. Elevated BP without diagnosis of hypertension  BP elevated in clinic today, though no history of hypertension.  Recommend rechecking at follow-up visit with PCP, could consider initiating ACE inhibitor or ARB at next visit if elevated.    PLAN:                            Start januvia 100 mg daily; Stop use of Bydrueon  Provided patient freestyle jessi 2 sensor     Medication issues to be addressed at a future visit:   Recheck A1c at next visit  GERD/tobacco use  Switch to victoza     Follow-up: Return in about 6 weeks (around 11/29/2023) for With PharmD.  PCP 11/9  SUBJECTIVE/OBJECTIVE:                          Reinaldo Real is a 38 year old male coming in for a follow-up visit from 9/11/23. Patient seen with Jenifer sigala.  ID#[id number].     Reason for visit: MTM follow up.    Allergies/ADRs: Reviewed in  chart  Past Medical History: Reviewed in chart  Tobacco: He reports that he has been smoking cigarettes. He has been smoking an average of .2 packs per day. He has been exposed to tobacco smoke. His smokeless tobacco use includes chew.Nicotine/Tobacco Cessation Plan:   see below  Alcohol: history of alcohol dependence, none anymore.     Medication Adherence/Access: No longer using pillbox and is instead taking the medications out of the vials. He manages his own medications. Patient reports missing no doses in the past week.  Patient does not bring his medications with today.     Diabetes   Type 2 Diabetes:    Bydureon Bcise 2 mg once weekly (NOT taking, see below)  Empaglifloxin (Jardiance) 25 mg daily in the morning  Metformin  mg twice daily (max tolerated dose)  Glipizide XL 5 mg daily      Not experiencing medication side effects.   Stopped using the Bydureon after using it twice due to injection site pain.  Med Hx: metformin >1000 mg/day (vomiting), Onglyza (switched to Bydureon per PCP on 8/14)  Blood sugar monitoring: Naveen 2 CGM; see below - reports that the sensor fell off early. States that he doesn't have a sensor to use anymore.   Current diabetes symptoms: Hypoglycemia, this morning his blood sugar was 72, asymptomatic though still ate something. When blood sugar >300 he is tired and thirsty/polyuria.    Diet/Exercise: Eating 3 meals per day.      Eye exam is up to date  Foot exam is up to date  Urine Albumin:   Lab Results   Component Value Date    UMALCR  10/17/2022      Comment:      Unable to calculate, urine albumin and/or urine creatinine is outside detectable limits.  Microalbuminuria is defined as an albumin:creatinine ratio of 17 to 299 for males and 25 to 299 for females. A ratio of albumin:creatinine of 300 or higher is indicative of overt proteinuria.  Due to biologic variability, positive results should be confirmed by a second, first-morning random or 24-hour timed urine specimen.  If there is discrepancy, a third specimen is recommended. When 2 out of 3 results are in the microalbuminuria range, this is evidence for incipient nephropathy and warrants increased efforts at glucose control, blood pressure control, and institution of therapy with an angiotensin-converting-enzyme (ACE) inhibitor (if the patient can tolerate it).        Lab Results   Component Value Date    A1C 7.5 (H) 08/09/2023       He couldn't sleep on the night of the 12th, therefore he was eating sweets.     Today's Vitals: BP (!) 132/94   Pulse 72   Wt 163 lb 12.8 oz (74.3 kg)   SpO2 97%   BMI 28.12 kg/m    ----------------      I spent 30 minutes with this patient today. All changes were made via collaborative practice agreement with Cassy Berrios MD. A copy of the visit note was provided to the patient's provider(s).    A summary of these recommendations was given to the patient.    Miriam Vallecillo, PharmD, BCACP  Medication Therapy Management Pharmacist     Medication Therapy Recommendations  Type 2 diabetes mellitus without complication, without long-term current use of insulin (H)    Current Medication: exenatide ER (BYDUREON BCISE) 2 MG/0.85ML auto-injector (Discontinued)   Rationale: Undesirable effect - Adverse medication event - Safety   Recommendation: Change Medication - Januvia 100 MG Tabs   Status: Accepted per CPA

## 2023-10-17 NOTE — Clinical Note
ROBERTO - switched his bydureon to januvia today. You see him in a few weeks. Also his BP was elevated today, may require adding medication if elevated at next visit.   Thanks Hugo

## 2023-11-28 NOTE — PROGRESS NOTES
Medication Therapy Management (MTM) Encounter    ASSESSMENT:                            Medication Adherence/Access: Reviewed with patient today the importance of consistently refilling chronic medications and avoiding missed doses, recommended he request refills and  all of his medications from the pharmacy, including naveen sensors for blood sugar monitoring.    1. Type 2 diabetes mellitus without complication, without long-term current use of insulin (H)  A1c at goal <7%, and recent episodes of hypoglycemia per CGM data, therefore recommend discontinuing sulfonylurea.  Patient having concerns with CGM use, recommended he bring freestyle naveen sensor to clinic tomorrow for education on sensor placement, if still having issues could consider returning to traditional blood sugar monitoring.    2. Mixed hyperlipidemia  Patient due for annual fasting lipid, rechecking today.  Patient appropriately taking high intensity statin, no changes recommended today.    PLAN:                            Patient to refill at pharmacy: Jardiance, metformin, naveen sensors, and januvia  Stop taking glipizide  Patient to bring Naveen sensor to next visit for education on placement  Labs today: A1c, lipid, urine albumin    Medication issues to be addressed at a future visit:   GERD/tobacco use    Follow-up: Return in about 1 day (around 11/30/2023) for With PharmD.  PCP 1/29    SUBJECTIVE/OBJECTIVE:                          Reinaldo Real is a 38 year old male coming in for a follow-up visit from 10/17/23. Patient seen with Jenifer sigala.  ID#[id number]    Reason for visit: MTM follow up.    Allergies/ADRs: Reviewed in chart  Past Medical History: Reviewed in chart  Tobacco: He reports that he has been smoking cigarettes. He has been smoking an average of .2 packs per day. He has been exposed to tobacco smoke. His smokeless tobacco use includes chew.Nicotine/Tobacco Cessation Plan:   Unable to address with patient today  Alcohol: history  of alcohol dependence, none anymore.     Medication Adherence/Access: No longer using pillbox and is instead taking the medications out of the vials. He manages his own medications. Patient reports no missed doses, but per fill history appears he is late to fill most of his medications. States that he needs refills of his medications and requesting them today: Januvia, Jardiance and metformin.   Patient does not bring his medications with today but able to recall how he takes each.     Diabetes   Empaglifloxin (Jardiance) 25 mg daily in the morning  Metformin  mg 2 tablets once daily in the morning (max tolerated dose)  Glipizide XL 5 mg daily    Januvia 100 mg daily    Not experiencing medication side effects.   Med Hx: metformin >1000 mg/day (vomiting), Onglyza (switched to Bydureon per PCP on 8/14), Bydureon Bcise (injection site pain)  Blood sugar monitoring: Naveen 2 CGM; see below - reports that the last sensor fell off early. States that he prefers to use the traditional meter, Cont[employer] next EZ, but states that he doesn't have any more of the testing supplies. Then states that he is willing to try the CGM again if he can get help with placement of sensor. States that he has had issues with sensor placement.   Current diabetes symptoms: Asymptomatic hypoglycemia, see below per CGM data.   Diet/Exercise: Eating 3 meals per day.   [place] he doesn't eat rice and therefore his sugar has been lower at those times. Goes hunting a few times per week until February.      Eye exam is up to date  Foot exam is up to date  Urine Albumin:   Lab Results   Component Value Date    ALCR  10/17/2022      Comment:      Unable to calculate, urine albumin and/or urine creatinine is outside detectable limits.  Microalbuminuria is defined as an albumin:creatinine ratio of 17 to 299 for males and 25 to 299 for females. A ratio of albumin:creatinine of 300 or higher is indicative of overt  proteinuria.  Due to biologic variability, positive results should be confirmed by a second, first-morning random or 24-hour timed urine specimen. If there is discrepancy, a third specimen is recommended. When 2 out of 3 results are in the microalbuminuria range, this is evidence for incipient nephropathy and warrants increased efforts at glucose control, blood pressure control, and institution of therapy with an angiotensin-converting-enzyme (ACE) inhibitor (if the patient can tolerate it).        Lab Results   Component Value Date    A1C 6.0 (H) 11/29/2023     Date FBG Lunch/2hours post   11/28 108    11/21 167    11/19  123   11/13 59            Hyperlipidemia   Atorvastatin 40mg daily  Patient reports no significant myalgias or other side effects.  The 10-year ASCVD risk score (Freedom DK, et al., 2019) is: 7.5%    Values used to calculate the score:      Age: 38 years      Sex: Male      Is Non- : No      Diabetic: Yes      Tobacco smoker: Yes      Systolic Blood Pressure: 116 mmHg      Is BP treated: No      HDL Cholesterol: 27 mg/dL      Total Cholesterol: 144 mg/dL     Recent Labs   Lab Test 10/17/22  1723 02/08/21  1717 03/16/20  1506 03/16/20  1506 12/05/17  0928   CHOL 144 277*   < > 225* 200.9*   HDL 27*  --   --  31* 32.5*   LDL 85  --   --  131* 90   TRIG 159* 1,252*   < > 608* 394.2*   CHOLHDLRATIO  --   --   --   --  6.2*    < > = values in this interval not displayed.     Today's Vitals: /82   Pulse 72   Wt 162 lb 12 oz (73.8 kg)   SpO2 97%   BMI 27.94 kg/m    ----------------      I spent 30 minutes with this patient today. All changes were made via collaborative practice agreement with Cassy Berrios MD. A copy of the visit note was provided to the patient's provider(s).    A summary of these recommendations was given to the patient.    Miriam Vallecillo, PharmD, BCACP  Medication Therapy Management Pharmacist     Medication Therapy Recommendations  Type 2 diabetes  mellitus without complication, without long-term current use of insulin (H)    Current Medication: glipiZIDE (GLUCOTROL XL) 5 MG 24 hr tablet (Discontinued)   Rationale: Undesirable effect - Adverse medication event - Safety   Recommendation: Discontinue Medication   Status: Accepted per CPA

## 2023-11-29 ENCOUNTER — OFFICE VISIT (OUTPATIENT)
Dept: PHARMACY | Facility: CLINIC | Age: 38
End: 2023-11-29
Payer: COMMERCIAL

## 2023-11-29 ENCOUNTER — LAB (OUTPATIENT)
Dept: LAB | Facility: CLINIC | Age: 38
End: 2023-11-29
Payer: COMMERCIAL

## 2023-11-29 VITALS
OXYGEN SATURATION: 97 % | DIASTOLIC BLOOD PRESSURE: 82 MMHG | BODY MASS INDEX: 27.94 KG/M2 | SYSTOLIC BLOOD PRESSURE: 116 MMHG | HEART RATE: 72 BPM | WEIGHT: 162.75 LBS

## 2023-11-29 DIAGNOSIS — E11.9 TYPE 2 DIABETES MELLITUS WITHOUT COMPLICATION, WITHOUT LONG-TERM CURRENT USE OF INSULIN (H): Primary | ICD-10-CM

## 2023-11-29 DIAGNOSIS — E78.2 MIXED HYPERLIPIDEMIA: ICD-10-CM

## 2023-11-29 LAB
CHOLEST SERPL-MCNC: 200 MG/DL
CREAT UR-MCNC: 100 MG/DL
HAV IGG SER QL IA: REACTIVE
HBA1C MFR BLD: 6 % (ref 0–5.6)
HDLC SERPL-MCNC: 46 MG/DL
LDLC SERPL CALC-MCNC: 104 MG/DL
MICROALBUMIN UR-MCNC: <12 MG/L
MICROALBUMIN/CREAT UR: NORMAL MG/G{CREAT}
NONHDLC SERPL-MCNC: 154 MG/DL
TRIGL SERPL-MCNC: 252 MG/DL

## 2023-11-29 PROCEDURE — 82043 UR ALBUMIN QUANTITATIVE: CPT

## 2023-11-29 PROCEDURE — 86708 HEPATITIS A ANTIBODY: CPT

## 2023-11-29 PROCEDURE — 36415 COLL VENOUS BLD VENIPUNCTURE: CPT

## 2023-11-29 PROCEDURE — 80061 LIPID PANEL: CPT

## 2023-11-29 PROCEDURE — 83036 HEMOGLOBIN GLYCOSYLATED A1C: CPT

## 2023-11-29 PROCEDURE — 82570 ASSAY OF URINE CREATININE: CPT

## 2023-11-29 PROCEDURE — 99607 MTMS BY PHARM ADDL 15 MIN: CPT | Performed by: PHARMACIST

## 2023-11-29 PROCEDURE — 99606 MTMS BY PHARM EST 15 MIN: CPT | Performed by: PHARMACIST

## 2023-11-29 NOTE — PATIENT INSTRUCTIONS
"Recommendations from today's MTM visit:                                                      Patient to refill at pharmacy: Jardiance, metformin, jessi sensors, and januvia  Stop taking glipizide  Bring your jessi sensor to visit tomorrow    Follow-up: Return in about 1 day (around 11/30/2023) for With PharmD.    It was great speaking with you today.  I value your experience and would be very thankful for your time in providing feedback in our clinic survey. In the next few days, you may receive an email or text message from [employer] with a link to a survey related to your  clinical pharmacist.\"     To schedule another MTM appointment, please call the clinic directly or you may call the MTM scheduling line at [phone] or toll-free at [phone].     My Clinical Pharmacist's contact information:                                                      Please feel free to contact me with any questions or concerns you have.      Miriam Vallecillo, PharmD, BCACP  Medication Therapy Management Pharmacist    "

## 2023-11-30 ENCOUNTER — OFFICE VISIT (OUTPATIENT)
Dept: PHARMACY | Facility: CLINIC | Age: 38
End: 2023-11-30
Payer: COMMERCIAL

## 2023-11-30 DIAGNOSIS — E11.9 TYPE 2 DIABETES MELLITUS WITHOUT COMPLICATION, WITHOUT LONG-TERM CURRENT USE OF INSULIN (H): Primary | ICD-10-CM

## 2023-11-30 PROCEDURE — 99207 PR NO CHARGE LOS: CPT | Performed by: PHARMACIST

## 2023-11-30 NOTE — PROGRESS NOTES
Clinical Pharmacy Consult:                                                    Reinaldo Real is a 38 year old male coming in for a clinical pharmacist consult.  He was referred to me from Cassy Berrios  ID# [id number]    Reason for Consult: Questions/concerns about Naveen 2 CGM system.     Discussion: Initial naveen education consult was provided with patient on 9/18/23. Patient requested additional naveen education visit today. Patient states that when he puts the naveen sensor on his arm it doesn't usually last for the full 2 weeks. He doesn't know if he puts the sensor on correctly, states that it usually falls off after 5-6 days. States that he has never been able to make a sensor work on his right arm.   Brings with new sensors today from pharmacy but forgot his reader at home. Applies sensor to left arm today with use of Skin Tac wipe before application of sensor.      Plan:  1. Helped patient apply naveen sensor today with skin tac to help with adhesive    Miriam Vallecillo, PharmD, BCACP  Medication Therapy Management Pharmacist

## 2024-02-14 ENCOUNTER — APPOINTMENT (OUTPATIENT)
Dept: RADIOLOGY | Facility: HOSPITAL | Age: 39
End: 2024-02-14
Attending: EMERGENCY MEDICINE
Payer: COMMERCIAL

## 2024-02-14 ENCOUNTER — APPOINTMENT (OUTPATIENT)
Dept: CT IMAGING | Facility: HOSPITAL | Age: 39
End: 2024-02-14
Attending: EMERGENCY MEDICINE
Payer: COMMERCIAL

## 2024-02-14 ENCOUNTER — APPOINTMENT (OUTPATIENT)
Dept: ULTRASOUND IMAGING | Facility: HOSPITAL | Age: 39
End: 2024-02-14
Attending: EMERGENCY MEDICINE
Payer: COMMERCIAL

## 2024-02-14 ENCOUNTER — HOSPITAL ENCOUNTER (INPATIENT)
Facility: HOSPITAL | Age: 39
LOS: 3 days | Discharge: HOME | End: 2024-02-17
Attending: EMERGENCY MEDICINE | Admitting: INTERNAL MEDICINE
Payer: COMMERCIAL

## 2024-02-14 DIAGNOSIS — L03.116 CELLULITIS OF LEFT FOOT: ICD-10-CM

## 2024-02-14 DIAGNOSIS — M79.5 FOREIGN BODY (FB) IN SOFT TISSUE: ICD-10-CM

## 2024-02-14 DIAGNOSIS — B18.1 HEPATITIS B, CHRONIC (H): Primary | ICD-10-CM

## 2024-02-14 LAB
ALBUMIN SERPL BCG-MCNC: 4.2 G/DL (ref 3.5–5.2)
ALBUMIN UR-MCNC: 20 MG/DL
ALP SERPL-CCNC: 88 U/L (ref 40–150)
ALT SERPL W P-5'-P-CCNC: 72 U/L (ref 0–70)
ANION GAP SERPL CALCULATED.3IONS-SCNC: 9 MMOL/L (ref 7–15)
APPEARANCE UR: CLEAR
AST SERPL W P-5'-P-CCNC: 50 U/L (ref 0–45)
BASE EXCESS BLDV CALC-SCNC: 1.6 MMOL/L (ref -3–3)
BASOPHILS # BLD AUTO: 0 10E3/UL (ref 0–0.2)
BASOPHILS NFR BLD AUTO: 0 %
BILIRUB SERPL-MCNC: 0.5 MG/DL
BILIRUB UR QL STRIP: NEGATIVE
BUN SERPL-MCNC: 9.7 MG/DL (ref 6–20)
CALCIUM SERPL-MCNC: 9 MG/DL (ref 8.6–10)
CHLORIDE SERPL-SCNC: 105 MMOL/L (ref 98–107)
COLOR UR AUTO: YELLOW
CREAT SERPL-MCNC: 0.81 MG/DL (ref 0.67–1.17)
DEPRECATED HCO3 PLAS-SCNC: 26 MMOL/L (ref 22–29)
EGFRCR SERPLBLD CKD-EPI 2021: >90 ML/MIN/1.73M2
EOSINOPHIL # BLD AUTO: 0.1 10E3/UL (ref 0–0.7)
EOSINOPHIL NFR BLD AUTO: 1 %
ERYTHROCYTE [DISTWIDTH] IN BLOOD BY AUTOMATED COUNT: 12.1 % (ref 10–15)
GLUCOSE SERPL-MCNC: 121 MG/DL (ref 70–99)
GLUCOSE UR STRIP-MCNC: >1000 MG/DL
HCO3 BLDV-SCNC: 28 MMOL/L (ref 21–28)
HCT VFR BLD AUTO: 47.4 % (ref 40–53)
HGB BLD-MCNC: 16.6 G/DL (ref 13.3–17.7)
HGB UR QL STRIP: NEGATIVE
IMM GRANULOCYTES # BLD: 0 10E3/UL
IMM GRANULOCYTES NFR BLD: 0 %
KETONES UR STRIP-MCNC: ABNORMAL MG/DL
LACTATE SERPL-SCNC: 1.5 MMOL/L (ref 0.7–2)
LACTATE SERPL-SCNC: 2.6 MMOL/L (ref 0.7–2)
LACTATE SERPL-SCNC: 2.6 MMOL/L (ref 0.7–2)
LEUKOCYTE ESTERASE UR QL STRIP: NEGATIVE
LIPASE SERPL-CCNC: 28 U/L (ref 13–60)
LYMPHOCYTES # BLD AUTO: 1.8 10E3/UL (ref 0.8–5.3)
LYMPHOCYTES NFR BLD AUTO: 26 %
MCH RBC QN AUTO: 31.7 PG (ref 26.5–33)
MCHC RBC AUTO-ENTMCNC: 35 G/DL (ref 31.5–36.5)
MCV RBC AUTO: 91 FL (ref 78–100)
MONOCYTES # BLD AUTO: 0.5 10E3/UL (ref 0–1.3)
MONOCYTES NFR BLD AUTO: 7 %
MUCOUS THREADS #/AREA URNS LPF: PRESENT /LPF
NEUTROPHILS # BLD AUTO: 4.5 10E3/UL (ref 1.6–8.3)
NEUTROPHILS NFR BLD AUTO: 66 %
NITRATE UR QL: NEGATIVE
NRBC # BLD AUTO: 0 10E3/UL
NRBC BLD AUTO-RTO: 0 /100
O2/TOTAL GAS SETTING VFR VENT: 21 %
OXYHGB MFR BLDV: 31 % (ref 70–75)
PCO2 BLDV: 56 MM HG (ref 40–50)
PH BLDV: 7.31 [PH] (ref 7.32–7.43)
PH UR STRIP: 6 [PH] (ref 5–7)
PLATELET # BLD AUTO: 131 10E3/UL (ref 150–450)
PO2 BLDV: 22 MM HG (ref 25–47)
POTASSIUM SERPL-SCNC: 4.3 MMOL/L (ref 3.4–5.3)
PROT SERPL-MCNC: 7.4 G/DL (ref 6.4–8.3)
RBC # BLD AUTO: 5.24 10E6/UL (ref 4.4–5.9)
RBC URINE: <1 /HPF
SAO2 % BLDV: 31.9 % (ref 70–75)
SODIUM SERPL-SCNC: 140 MMOL/L (ref 135–145)
SP GR UR STRIP: 1.03 (ref 1–1.03)
SQUAMOUS EPITHELIAL: 1 /HPF
UROBILINOGEN UR STRIP-MCNC: <2 MG/DL
WBC # BLD AUTO: 7 10E3/UL (ref 4–11)
WBC URINE: <1 /HPF

## 2024-02-14 PROCEDURE — 250N000011 HC RX IP 250 OP 636: Mod: JZ | Performed by: EMERGENCY MEDICINE

## 2024-02-14 PROCEDURE — 120N000001 HC R&B MED SURG/OB

## 2024-02-14 PROCEDURE — 93005 ELECTROCARDIOGRAM TRACING: CPT | Performed by: EMERGENCY MEDICINE

## 2024-02-14 PROCEDURE — 36415 COLL VENOUS BLD VENIPUNCTURE: CPT | Performed by: INTERNAL MEDICINE

## 2024-02-14 PROCEDURE — 84075 ASSAY ALKALINE PHOSPHATASE: CPT | Performed by: EMERGENCY MEDICINE

## 2024-02-14 PROCEDURE — 83690 ASSAY OF LIPASE: CPT | Performed by: EMERGENCY MEDICINE

## 2024-02-14 PROCEDURE — 99285 EMERGENCY DEPT VISIT HI MDM: CPT | Mod: 25

## 2024-02-14 PROCEDURE — 258N000003 HC RX IP 258 OP 636: Performed by: INTERNAL MEDICINE

## 2024-02-14 PROCEDURE — 87040 BLOOD CULTURE FOR BACTERIA: CPT | Performed by: INTERNAL MEDICINE

## 2024-02-14 PROCEDURE — 83605 ASSAY OF LACTIC ACID: CPT | Performed by: EMERGENCY MEDICINE

## 2024-02-14 PROCEDURE — 85025 COMPLETE CBC W/AUTO DIFF WBC: CPT | Performed by: EMERGENCY MEDICINE

## 2024-02-14 PROCEDURE — 76882 US LMTD JT/FCL EVL NVASC XTR: CPT | Mod: LT

## 2024-02-14 PROCEDURE — 0LJY0ZZ INSPECTION OF LOWER TENDON, OPEN APPROACH: ICD-10-PCS | Performed by: EMERGENCY MEDICINE

## 2024-02-14 PROCEDURE — 73630 X-RAY EXAM OF FOOT: CPT | Mod: LT

## 2024-02-14 PROCEDURE — 96360 HYDRATION IV INFUSION INIT: CPT

## 2024-02-14 PROCEDURE — 84155 ASSAY OF PROTEIN SERUM: CPT | Performed by: EMERGENCY MEDICINE

## 2024-02-14 PROCEDURE — 82805 BLOOD GASES W/O2 SATURATION: CPT | Performed by: EMERGENCY MEDICINE

## 2024-02-14 PROCEDURE — 83605 ASSAY OF LACTIC ACID: CPT | Performed by: INTERNAL MEDICINE

## 2024-02-14 PROCEDURE — 99223 1ST HOSP IP/OBS HIGH 75: CPT | Performed by: INTERNAL MEDICINE

## 2024-02-14 PROCEDURE — 250N000011 HC RX IP 250 OP 636: Performed by: INTERNAL MEDICINE

## 2024-02-14 PROCEDURE — C9113 INJ PANTOPRAZOLE SODIUM, VIA: HCPCS | Performed by: INTERNAL MEDICINE

## 2024-02-14 PROCEDURE — 10060 I&D ABSCESS SIMPLE/SINGLE: CPT

## 2024-02-14 PROCEDURE — 36415 COLL VENOUS BLD VENIPUNCTURE: CPT | Performed by: EMERGENCY MEDICINE

## 2024-02-14 PROCEDURE — 250N000013 HC RX MED GY IP 250 OP 250 PS 637: Performed by: EMERGENCY MEDICINE

## 2024-02-14 PROCEDURE — 73700 CT LOWER EXTREMITY W/O DYE: CPT | Mod: LT

## 2024-02-14 PROCEDURE — 81001 URINALYSIS AUTO W/SCOPE: CPT | Performed by: EMERGENCY MEDICINE

## 2024-02-14 PROCEDURE — 76942 ECHO GUIDE FOR BIOPSY: CPT | Mod: LT

## 2024-02-14 PROCEDURE — 258N000003 HC RX IP 258 OP 636: Performed by: EMERGENCY MEDICINE

## 2024-02-14 RX ORDER — AMOXICILLIN 250 MG
1 CAPSULE ORAL 2 TIMES DAILY PRN
Status: DISCONTINUED | OUTPATIENT
Start: 2024-02-14 | End: 2024-02-17 | Stop reason: HOSPADM

## 2024-02-14 RX ORDER — PIPERACILLIN SODIUM, TAZOBACTAM SODIUM 3; .375 G/15ML; G/15ML
3.38 INJECTION, POWDER, LYOPHILIZED, FOR SOLUTION INTRAVENOUS EVERY 8 HOURS
Status: DISCONTINUED | OUTPATIENT
Start: 2024-02-14 | End: 2024-02-14

## 2024-02-14 RX ORDER — ACETAMINOPHEN 650 MG/1
650 SUPPOSITORY RECTAL EVERY 4 HOURS PRN
Status: DISCONTINUED | OUTPATIENT
Start: 2024-02-14 | End: 2024-02-17 | Stop reason: HOSPADM

## 2024-02-14 RX ORDER — CALCIUM CARBONATE 500 MG/1
1000 TABLET, CHEWABLE ORAL 4 TIMES DAILY PRN
Status: DISCONTINUED | OUTPATIENT
Start: 2024-02-14 | End: 2024-02-17 | Stop reason: HOSPADM

## 2024-02-14 RX ORDER — NICOTINE POLACRILEX 4 MG
15-30 LOZENGE BUCCAL
Status: DISCONTINUED | OUTPATIENT
Start: 2024-02-14 | End: 2024-02-17 | Stop reason: HOSPADM

## 2024-02-14 RX ORDER — ONDANSETRON 2 MG/ML
4 INJECTION INTRAMUSCULAR; INTRAVENOUS EVERY 6 HOURS PRN
Status: DISCONTINUED | OUTPATIENT
Start: 2024-02-14 | End: 2024-02-17 | Stop reason: HOSPADM

## 2024-02-14 RX ORDER — ACETAMINOPHEN 325 MG/1
650 TABLET ORAL ONCE
Status: COMPLETED | OUTPATIENT
Start: 2024-02-14 | End: 2024-02-14

## 2024-02-14 RX ORDER — MAGNESIUM HYDROXIDE/ALUMINUM HYDROXICE/SIMETHICONE 120; 1200; 1200 MG/30ML; MG/30ML; MG/30ML
30 SUSPENSION ORAL EVERY 4 HOURS PRN
Status: DISCONTINUED | OUTPATIENT
Start: 2024-02-14 | End: 2024-02-17 | Stop reason: HOSPADM

## 2024-02-14 RX ORDER — LIDOCAINE 40 MG/G
CREAM TOPICAL
Status: DISCONTINUED | OUTPATIENT
Start: 2024-02-14 | End: 2024-02-17 | Stop reason: HOSPADM

## 2024-02-14 RX ORDER — SODIUM CHLORIDE 9 MG/ML
INJECTION, SOLUTION INTRAVENOUS CONTINUOUS
Status: DISCONTINUED | OUTPATIENT
Start: 2024-02-14 | End: 2024-02-15

## 2024-02-14 RX ORDER — PIPERACILLIN SODIUM, TAZOBACTAM SODIUM 3; .375 G/15ML; G/15ML
3.38 INJECTION, POWDER, LYOPHILIZED, FOR SOLUTION INTRAVENOUS EVERY 8 HOURS
Status: DISCONTINUED | OUTPATIENT
Start: 2024-02-15 | End: 2024-02-16

## 2024-02-14 RX ORDER — ATORVASTATIN CALCIUM 40 MG/1
40 TABLET, FILM COATED ORAL DAILY
Status: DISCONTINUED | OUTPATIENT
Start: 2024-02-15 | End: 2024-02-17 | Stop reason: HOSPADM

## 2024-02-14 RX ORDER — LEVOFLOXACIN 750 MG/1
750 TABLET, FILM COATED ORAL ONCE
Status: COMPLETED | OUTPATIENT
Start: 2024-02-14 | End: 2024-02-14

## 2024-02-14 RX ORDER — ACETAMINOPHEN 325 MG/1
650 TABLET ORAL EVERY 4 HOURS PRN
Status: DISCONTINUED | OUTPATIENT
Start: 2024-02-14 | End: 2024-02-17 | Stop reason: HOSPADM

## 2024-02-14 RX ORDER — HYDROMORPHONE HYDROCHLORIDE 2 MG/1
2 TABLET ORAL EVERY 4 HOURS PRN
Status: DISCONTINUED | OUTPATIENT
Start: 2024-02-14 | End: 2024-02-17 | Stop reason: HOSPADM

## 2024-02-14 RX ORDER — AMOXICILLIN 250 MG
2 CAPSULE ORAL 2 TIMES DAILY PRN
Status: DISCONTINUED | OUTPATIENT
Start: 2024-02-14 | End: 2024-02-17 | Stop reason: HOSPADM

## 2024-02-14 RX ORDER — ONDANSETRON 4 MG/1
4 TABLET, ORALLY DISINTEGRATING ORAL EVERY 6 HOURS PRN
Status: DISCONTINUED | OUTPATIENT
Start: 2024-02-14 | End: 2024-02-17 | Stop reason: HOSPADM

## 2024-02-14 RX ORDER — PIPERACILLIN SODIUM, TAZOBACTAM SODIUM 3; .375 G/15ML; G/15ML
3.38 INJECTION, POWDER, LYOPHILIZED, FOR SOLUTION INTRAVENOUS ONCE
Qty: 15 ML | Refills: 0 | Status: COMPLETED | OUTPATIENT
Start: 2024-02-15 | End: 2024-02-15

## 2024-02-14 RX ORDER — LEVOFLOXACIN 750 MG/1
750 TABLET, FILM COATED ORAL DAILY
Qty: 7 TABLET | Refills: 0 | Status: SHIPPED | OUTPATIENT
Start: 2024-02-14 | End: 2024-02-17

## 2024-02-14 RX ORDER — DEXTROSE MONOHYDRATE 25 G/50ML
25-50 INJECTION, SOLUTION INTRAVENOUS
Status: DISCONTINUED | OUTPATIENT
Start: 2024-02-14 | End: 2024-02-17 | Stop reason: HOSPADM

## 2024-02-14 RX ORDER — AMPICILLIN AND SULBACTAM 2; 1 G/1; G/1
3 INJECTION, POWDER, FOR SOLUTION INTRAMUSCULAR; INTRAVENOUS ONCE
Qty: 3 G | Refills: 0 | Status: COMPLETED | OUTPATIENT
Start: 2024-02-14 | End: 2024-02-14

## 2024-02-14 RX ADMIN — SODIUM CHLORIDE, POTASSIUM CHLORIDE, SODIUM LACTATE AND CALCIUM CHLORIDE 1000 ML: 600; 310; 30; 20 INJECTION, SOLUTION INTRAVENOUS at 16:49

## 2024-02-14 RX ADMIN — ACETAMINOPHEN 650 MG: 325 TABLET ORAL at 16:47

## 2024-02-14 RX ADMIN — IBUPROFEN 800 MG: 600 TABLET, FILM COATED ORAL at 16:46

## 2024-02-14 RX ADMIN — SODIUM CHLORIDE 1000 ML: 9 INJECTION, SOLUTION INTRAVENOUS at 22:05

## 2024-02-14 RX ADMIN — AMPICILLIN SODIUM AND SULBACTAM SODIUM 3 G: 2; 1 INJECTION, POWDER, FOR SOLUTION INTRAMUSCULAR; INTRAVENOUS at 19:46

## 2024-02-14 RX ADMIN — LEVOFLOXACIN 750 MG: 750 TABLET, FILM COATED ORAL at 16:47

## 2024-02-14 RX ADMIN — PANTOPRAZOLE SODIUM 40 MG: 40 INJECTION, POWDER, FOR SOLUTION INTRAVENOUS at 22:32

## 2024-02-14 ASSESSMENT — ACTIVITIES OF DAILY LIVING (ADL)
ADLS_ACUITY_SCORE: 35

## 2024-02-14 NOTE — ED NOTES
EMERGENCY DEPARTMENT SIGN OUT NOTE        ED COURSE AND MEDICAL DECISION MAKING  Patient was signed out to me by Dr Cammie Easton at 4:25 PM    In brief, Reinaldo Real is a 38 year old male who presents for left foot pain after getting a porcupine quill stuck in his left heel on Saturday. He was wearing a boot and was walking in woods (hunting) and the porcupine quill somehow went through his boot and into his ankle. 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 He has continued pain and redness to this area and is wondering if there is still a foreign body in the area. He is up to date on tetanus. He has some body chills, lightheadedness which started a few days ago. He denies cough, chest pain, abdominal pain.     At time of sign out, disposition was pending ultrasound.     Update: Ultrasound did show a linear foreign body in the area of soft tissue edema.  I brought the patient into a treatment space, attempted foreign body removal and even did it under ultrasound guidance at bedside.  I can see the foreign body, but I cannot visualize it through the open skin, and I can see the fibrous tissue of the Achilles tendon and do not feel comfortable cutting any deeper based on the location.  I ended up aborting the procedure unsuccessfully, discussed the case with a constellation of general surgery, orthopedics, and ultimately podiatry and ended up also getting a CT scan.  Unfortunately, it looks like the porcupine Quill is actually stuck embedded inside his Achilles tendon so this will require surgical removal with subspecialty care.  Dr. Cheng of podiatry recommended admission with IV antibiotics this evening and plan for n.p.o. at midnight and he will surgically remove and deal with the Achilles tendon tomorrow.    PROCEDURE: Foreign Body Removal   INDICATIONS: Foreign Body   PROCEDURE PROVIDER: Dr Mandi Carranza   SITE: [facility]   CONSENT: Risks, benefits and  alternatives were discussed with and Verbal consent was obtained from Patient.   TIME OUT: Universal protocol was followed. TIME OUT conducted just prior to starting procedure confirmed patient identity, site/side, procedure, patient position, and availability of correct equipment. Yes   MEDICATION: N/A, no sedation meds were given   DESCRIPTION OF PROCEDURE: Area anesthetized using 1% lidocaine with epinephrine.  11 blade scalpel used to open the overlying superficial skin.attempted foreign body removal and even did it under ultrasound guidance at bedside.  I can see the foreign body, but I cannot visualize it through the open skin, and I can see the fibrous tissue of the Achilles tendon and do not feel comfortable cutting any deeper based on the location.  I ended up aborting the procedure unsuccessfully   COMPLICATIONS: Patient tolerated procedure well, with complication: Unsuccessful removal          5:55 PM I spoke with Dr. Garrido, general surgery.   6:52 PM I spoke with [facility].    6:54 PM I spoke to podiatry.   FINAL IMPRESSION    1. Cellulitis of left foot    2. Foreign body (FB) in soft tissue        ED MEDS  Medications   ibuprofen (ADVIL/MOTRIN) tablet 800 mg (has no administration in time range)   acetaminophen (TYLENOL) tablet 650 mg (has no administration in time range)   lactated ringers BOLUS 1,000 mL (has no administration in time range)   levofloxacin (LEVAQUIN) tablet 750 mg (has no administration in time range)       LAB  Labs Ordered and Resulted from Time of ED Arrival to Time of ED Departure   COMPREHENSIVE METABOLIC PANEL - Abnormal       Result Value    Sodium 140      Potassium 4.3      Carbon Dioxide (CO2) 26      Anion Gap 9      Urea Nitrogen 9.7      Creatinine 0.81      GFR Estimate >90      Calcium 9.0      Chloride 105      Glucose 121 (*)     Alkaline Phosphatase 88      AST 50 (*)     ALT 72 (*)     Protein Total 7.4      Albumin 4.2      Bilirubin Total 0.5     BLOOD GAS  VENOUS - Abnormal    pH Venous 7.31 (*)     pCO2 Venous 56 (*)     pO2 Venous 22 (*)     Bicarbonate Venous 28      Base Excess/Deficit Venous 1.6      FIO2 21      Oxyhemoglobin Venous 31 (*)     O2 Sat, Venous 31.9 (*)    LACTIC ACID WHOLE BLOOD - Abnormal    Lactic Acid 2.6 (*)    ROUTINE UA WITH MICROSCOPIC REFLEX TO CULTURE - Abnormal    Color Urine Yellow      Appearance Urine Clear      Glucose Urine >1000 (*)     Bilirubin Urine Negative      Ketones Urine Trace (*)     Specific Gravity Urine 1.032 (*)     Blood Urine Negative      pH Urine 6.0      Protein Albumin Urine 20 (*)     Urobilinogen Urine <2.0      Nitrite Urine Negative      Leukocyte Esterase Urine Negative      Mucus Urine Present (*)     RBC Urine <1      WBC Urine <1      Squamous Epithelials Urine 1     CBC WITH PLATELETS AND DIFFERENTIAL - Abnormal    WBC Count 7.0      RBC Count 5.24      Hemoglobin 16.6      Hematocrit 47.4      MCV 91      MCH 31.7      MCHC 35.0      RDW 12.1      Platelet Count 131 (*)     % Neutrophils 66      % Lymphocytes 26      % Monocytes 7      % Eosinophils 1      % Basophils 0      % Immature Granulocytes 0      NRBCs per 100 WBC 0      Absolute Neutrophils 4.5      Absolute Lymphocytes 1.8      Absolute Monocytes 0.5      Absolute Eosinophils 0.1      Absolute Basophils 0.0      Absolute Immature Granulocytes 0.0      Absolute NRBCs 0.0     LIPASE - Normal    Lipase 28     LACTIC ACID WHOLE BLOOD       EKG  N/A    RADIOLOGY    Foot XR, G/E 3 views, left   Final Result   IMPRESSION: Normal joint spaces and alignment. No acute fracture. No radiopaque foreign body. Soft tissue thickening of the distal insertional Achilles tendon which may be sequela of prior trauma and/or chronic tendinosis.      US Soft Tissue Chest Wall or Upper Back    (Results Pending)       DISCHARGE MEDS  New Prescriptions    No medications on file         I, Mary Garrido, am serving as a scribe to document services personally performed by  Mandi Enriquez MD, based on my observations and the provider's statements to me. I, Mandi Enriquez MD attest that Mary Garrido is acting in a scribe capacity, has observed my performance of the services and has documented them in accordance with my direction.       Mandi PADRON [facility] EMERGENCY DEPARTMENT  [address] [place] [id number]  [phone]       Mandi Enriquez MD  02/14/24 2000

## 2024-02-14 NOTE — ED PROVIDER NOTES
EMERGENCY DEPARTMENT ENCOUNTER      NAME: Reinaldo Real  AGE: 38 year old male  YOB: 1985  MRN: [id number]  EVALUATION DATE & TIME: No admission date for patient encounter.    PCP: Cassy Berrios    ED PROVIDER: Cammie Easton M.D.      Chief Complaint   Patient presents with    Foot Injury    Abdominal Pain    Dizziness       FINAL IMPRESSION:  1. Cellulitis of left foot        ED COURSE & MEDICAL DECISION MAKING:    Foreign body left foot vs cellulitis from puncture wound.   Rebecca quill stuck in foot, tried to remove but uncertain if it is all out.  XR left foot shows no clear FB.  Ultrasound soft tissue ordered to further evaluate for FB.  Hx of diabetes, some redness surrounding FB area, and some body aches with low grade temp in ED, evaluate for infection with lactic, cbc, cmp, blood gas.    1:06 PM I met with the patient to gather history and to perform my initial exam. I discussed the plan for care while in the Emergency Department.     Pertinent Labs & Imaging studies reviewed. (See chart for details).    Medical Decision Making  Obtained supplemental history:Supplemental history obtained?: No  Reviewed external records: External records reviewed?: No  Care impacted by chronic illness:Diabetes  Care significantly affected by social determinants of health:N/A  Did you consider but not order tests?: Work up considered but not performed and documented in chart, if applicable  Did you interpret images independently?: Independent interpretation of ECG and images noted in documentation, when applicable.  Consultation discussion with other provider:Did you involve another provider (consultant, MH, pharmacy, etc.)?: No  Admission considered. Patient was signed out to the oncoming physician, disposition pending.    At the conclusion of the encounter I discussed the results of all of the tests and the disposition. The questions were answered. The patient or family acknowledged understanding and was  agreeable with the care plan.      CRITICAL CARE:  N/A    HPI    Patient information was obtained from: patient    Use of : Yes Jenifer  using [place] Burks Farhan is a 38 year old male who presents for left foot pain after getting a porcupine quill stuck in his left heel on Saturday. He was wearing a boot and was walking in woods (hunting) and the porcupine quill somehow went through his boot and into his ankle. 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 He has continued pain and redness to this area and is wondering if there is still a foreign body in the area. He is up to date on tetanus. He has some body chills, lightheadedness which started a few days ago. He denies cough, chest pain, abdominal pain.     Per Chart Review: hx diabetes.      REVIEW OF SYSTEMS  All other systems negative unless noted in HPI.    PAST MEDICAL HISTORY:  Past Medical History:   Diagnosis Date    Alcohol use disorder, moderate, dependence (H) 03/06/2019    Hepatitis B     History of H. pylori infection     TB (tuberculosis)     Type 2 diabetes mellitus without complication, without long-term current use of insulin (H) 11/30/2016       PAST SURGICAL HISTORY:  No past surgical history on file.      CURRENT MEDICATIONS:    No current facility-administered medications for this encounter.     Current Outpatient Medications   Medication    levofloxacin (LEVAQUIN) 750 MG tablet    acetaminophen (TYLENOL) 325 MG tablet    atorvastatin (LIPITOR) 40 MG tablet    blood glucose (CONTOUR NEXT TEST) test strip    Continuous Blood Gluc Sensor (FREESTYLE DEVON 2 SENSOR) MISC    empagliflozin (JARDIANCE) 25 MG TABS tablet    metFORMIN (GLUCOPHAGE XR) 500 MG 24 hr tablet    Microlet Lancets MISC    omeprazole (PRILOSEC) 20 MG DR capsule    sitagliptin (JANUVIA) 100 MG tablet         ALLERGIES:  No Known Allergies    FAMILY HISTORY:  Family History   Problem Relation Age  "of Onset    Coronary Artery Disease Mother     Hypertension Mother     Diabetes No family hx of        SOCIAL HISTORY:  Social History     Socioeconomic History    Marital status:    Tobacco Use    Smoking status: Every Day     Packs/day: .2     Types: Cigarettes     Last attempt to quit: 11/29/2013     Years since quitting: 10.2     Passive exposure: Current    Smokeless tobacco: Current     Types: Chew    Tobacco comments:     smokes 0-1/day--Chew betel nut; pt states he started smoking at 12 yo   Vaping Use    Vaping Use: Never used   Substance and Sexual Activity    Alcohol use: No     Comment: Alcoholic Drinks/day: pt states he haven't had a drink since January    Drug use: No   Social History Narrative    The patient works as a PCA at [employer].       VITALS:  Patient Vitals for the past 24 hrs:   BP Temp Temp src Pulse Resp SpO2 Height Weight   02/14/24 1241 (!) 159/108 99  F (37.2  C) Oral 80 16 99 % 1.626 m (5' 4\") 73.5 kg (162 lb)       PHYSICAL EXAM    VITAL SIGNS: BP (!) 159/108   Pulse 80   Temp 99  F (37.2  C) (Oral)   Resp 16   Ht 1.626 m (5' 4\")   Wt 73.5 kg (162 lb)   SpO2 99%   BMI 27.81 kg/m    Physical Exam  Vitals and nursing note reviewed.   Constitutional:       General: He is not in acute distress.     Appearance: He is not toxic-appearing.   HENT:      Mouth/Throat:      Comments: Poor dentition present  Eyes:      General: No scleral icterus.        Right eye: No discharge.         Left eye: No discharge.   Cardiovascular:      Rate and Rhythm: Normal rate and regular rhythm.   Pulmonary:      Effort: Pulmonary effort is normal. No respiratory distress.      Breath sounds: Normal breath sounds.   Abdominal:      General: There is no distension.      Palpations: Abdomen is soft.      Tenderness: There is no abdominal tenderness.   Musculoskeletal:      Cervical back: Neck supple. No rigidity.        Feet:    Feet:      Comments: No clear fluctuance but erythema and " induration to left posterior ankle overlying achilles. Soft tissue swelling surrounding the area. No obvious FB palpable.  Skin:     General: Skin is warm and dry.      Capillary Refill: Capillary refill takes less than 2 seconds.   Neurological:      General: No focal deficit present.      Mental Status: He is alert and oriented to person, place, and time. Mental status is at baseline.      Comments: No slurred speech, following commands spontaneously. No facial droop.   Psychiatric:         Mood and Affect: Mood normal.         Behavior: Behavior normal.         LABS  Labs Ordered and Resulted from Time of ED Arrival to Time of ED Departure   COMPREHENSIVE METABOLIC PANEL - Abnormal       Result Value    Sodium 140      Potassium 4.3      Carbon Dioxide (CO2) 26      Anion Gap 9      Urea Nitrogen 9.7      Creatinine 0.81      GFR Estimate >90      Calcium 9.0      Chloride 105      Glucose 121 (*)     Alkaline Phosphatase 88      AST 50 (*)     ALT 72 (*)     Protein Total 7.4      Albumin 4.2      Bilirubin Total 0.5     BLOOD GAS VENOUS - Abnormal    pH Venous 7.31 (*)     pCO2 Venous 56 (*)     pO2 Venous 22 (*)     Bicarbonate Venous 28      Base Excess/Deficit Venous 1.6      FIO2 21      Oxyhemoglobin Venous 31 (*)     O2 Sat, Venous 31.9 (*)    LACTIC ACID WHOLE BLOOD - Abnormal    Lactic Acid 2.6 (*)    ROUTINE UA WITH MICROSCOPIC REFLEX TO CULTURE - Abnormal    Color Urine Yellow      Appearance Urine Clear      Glucose Urine >1000 (*)     Bilirubin Urine Negative      Ketones Urine Trace (*)     Specific Gravity Urine 1.032 (*)     Blood Urine Negative      pH Urine 6.0      Protein Albumin Urine 20 (*)     Urobilinogen Urine <2.0      Nitrite Urine Negative      Leukocyte Esterase Urine Negative      Mucus Urine Present (*)     RBC Urine <1      WBC Urine <1      Squamous Epithelials Urine 1     CBC WITH PLATELETS AND DIFFERENTIAL - Abnormal    WBC Count 7.0      RBC Count 5.24      Hemoglobin 16.6       Hematocrit 47.4      MCV 91      MCH 31.7      MCHC 35.0      RDW 12.1      Platelet Count 131 (*)     % Neutrophils 66      % Lymphocytes 26      % Monocytes 7      % Eosinophils 1      % Basophils 0      % Immature Granulocytes 0      NRBCs per 100 WBC 0      Absolute Neutrophils 4.5      Absolute Lymphocytes 1.8      Absolute Monocytes 0.5      Absolute Eosinophils 0.1      Absolute Basophils 0.0      Absolute Immature Granulocytes 0.0      Absolute NRBCs 0.0     LIPASE - Normal    Lipase 28     LACTIC ACID WHOLE BLOOD         RADIOLOGY  US Lower Extremity Non Vascular Left  pending   Foot XR, G/E 3 views, left   Final Result   IMPRESSION: Normal joint spaces and alignment. No acute fracture. No radiopaque foreign body. Soft tissue thickening of the distal insertional Achilles tendon which may be sequela of prior trauma and/or chronic tendinosis.         I have independently reviewed the above image. See radiology report for detail.      EKG:    NA       PROCEDURES:  N/A      MEDICATIONS GIVEN IN THE EMERGENCY:  Medications   ibuprofen (ADVIL/MOTRIN) tablet 800 mg (800 mg Oral $Given 2/14/24 1646)   acetaminophen (TYLENOL) tablet 650 mg (650 mg Oral $Given 2/14/24 1647)   lactated ringers BOLUS 1,000 mL (1,000 mLs Intravenous $New Bag 2/14/24 1649)   levofloxacin (LEVAQUIN) tablet 750 mg (750 mg Oral $Given 2/14/24 1647)       NEW PRESCRIPTIONS STARTED AT TODAY'S ER VISIT  New Prescriptions    LEVOFLOXACIN (LEVAQUIN) 750 MG TABLET    Take 1 tablet (750 mg) by mouth daily for 7 days        Cammie Easton MD  Emergency Medicine  [facility] EMERGENCY DEPARTMENT  [facility] [facility] [id number] [phone]  Dept: [phone]             Cammie Easton MD  02/14/24 6545

## 2024-02-14 NOTE — ED TRIAGE NOTES
Pt presents to triage from home with multiple complaints. Primary concern is left foot injury from stepping on a porcupine quill last Saturday while out in the woods. He was able to get most of the quill out but believes there is still more in the heel of his foot. Area is swollen and reddened. Pt also reports epigastric abdominal pain that feels like burning and is worse after eating. Pt reports occasionally vomiting after eating. Denies current N/V/D. This pain has been going on since yesterday. Pt is also concerned about mild dizziness x3 days. Pt reports his head feels heavy and dull all the time. Denies any recent sick contacts.     Triage Assessment (Adult)       Row Name 02/14/24 1242          Triage Assessment    Airway WDL WDL        Respiratory WDL    Respiratory WDL WDL        Skin Circulation/Temperature WDL    Skin Circulation/Temperature WDL X  foot wound        Cardiac WDL    Cardiac WDL WDL        Peripheral/Neurovascular WDL    Peripheral Neurovascular WDL WDL        Cognitive/Neuro/Behavioral WDL    Cognitive/Neuro/Behavioral WDL WDL

## 2024-02-15 ENCOUNTER — ANESTHESIA EVENT (OUTPATIENT)
Dept: SURGERY | Facility: HOSPITAL | Age: 39
End: 2024-02-15
Payer: COMMERCIAL

## 2024-02-15 ENCOUNTER — ANESTHESIA (OUTPATIENT)
Dept: SURGERY | Facility: HOSPITAL | Age: 39
End: 2024-02-15
Payer: COMMERCIAL

## 2024-02-15 LAB
ANION GAP SERPL CALCULATED.3IONS-SCNC: 6 MMOL/L (ref 7–15)
BUN SERPL-MCNC: 7.9 MG/DL (ref 6–20)
CALCIUM SERPL-MCNC: 8.3 MG/DL (ref 8.6–10)
CHLORIDE SERPL-SCNC: 108 MMOL/L (ref 98–107)
CREAT SERPL-MCNC: 0.67 MG/DL (ref 0.67–1.17)
DEPRECATED HCO3 PLAS-SCNC: 24 MMOL/L (ref 22–29)
EGFRCR SERPLBLD CKD-EPI 2021: >90 ML/MIN/1.73M2
ERYTHROCYTE [DISTWIDTH] IN BLOOD BY AUTOMATED COUNT: 11.9 % (ref 10–15)
GLUCOSE BLDC GLUCOMTR-MCNC: 106 MG/DL (ref 70–99)
GLUCOSE BLDC GLUCOMTR-MCNC: 111 MG/DL (ref 70–99)
GLUCOSE BLDC GLUCOMTR-MCNC: 112 MG/DL (ref 70–99)
GLUCOSE BLDC GLUCOMTR-MCNC: 112 MG/DL (ref 70–99)
GLUCOSE BLDC GLUCOMTR-MCNC: 121 MG/DL (ref 70–99)
GLUCOSE BLDC GLUCOMTR-MCNC: 123 MG/DL (ref 70–99)
GLUCOSE BLDC GLUCOMTR-MCNC: 97 MG/DL (ref 70–99)
GLUCOSE SERPL-MCNC: 96 MG/DL (ref 70–99)
HCT VFR BLD AUTO: 42.2 % (ref 40–53)
HGB BLD-MCNC: 14.6 G/DL (ref 13.3–17.7)
MCH RBC QN AUTO: 30.7 PG (ref 26.5–33)
MCHC RBC AUTO-ENTMCNC: 34.6 G/DL (ref 31.5–36.5)
MCV RBC AUTO: 89 FL (ref 78–100)
PLATELET # BLD AUTO: 113 10E3/UL (ref 150–450)
POTASSIUM SERPL-SCNC: 3.9 MMOL/L (ref 3.4–5.3)
RBC # BLD AUTO: 4.75 10E6/UL (ref 4.4–5.9)
SODIUM SERPL-SCNC: 138 MMOL/L (ref 135–145)
WBC # BLD AUTO: 5.8 10E3/UL (ref 4–11)

## 2024-02-15 PROCEDURE — 250N000009 HC RX 250: Performed by: PODIATRIST

## 2024-02-15 PROCEDURE — 999N000141 HC STATISTIC PRE-PROCEDURE NURSING ASSESSMENT: Performed by: PODIATRIST

## 2024-02-15 PROCEDURE — 99233 SBSQ HOSP IP/OBS HIGH 50: CPT | Performed by: INTERNAL MEDICINE

## 2024-02-15 PROCEDURE — 28192 REMOVAL OF FOOT FOREIGN BODY: CPT | Mod: LT | Performed by: PODIATRIST

## 2024-02-15 PROCEDURE — 85027 COMPLETE CBC AUTOMATED: CPT | Performed by: INTERNAL MEDICINE

## 2024-02-15 PROCEDURE — 250N000011 HC RX IP 250 OP 636: Performed by: ANESTHESIOLOGY

## 2024-02-15 PROCEDURE — 99253 IP/OBS CNSLTJ NEW/EST LOW 45: CPT | Mod: 57 | Performed by: PODIATRIST

## 2024-02-15 PROCEDURE — 82962 GLUCOSE BLOOD TEST: CPT

## 2024-02-15 PROCEDURE — 250N000011 HC RX IP 250 OP 636: Performed by: STUDENT IN AN ORGANIZED HEALTH CARE EDUCATION/TRAINING PROGRAM

## 2024-02-15 PROCEDURE — 250N000013 HC RX MED GY IP 250 OP 250 PS 637: Performed by: INTERNAL MEDICINE

## 2024-02-15 PROCEDURE — 250N000011 HC RX IP 250 OP 636: Performed by: INTERNAL MEDICINE

## 2024-02-15 PROCEDURE — 250N000013 HC RX MED GY IP 250 OP 250 PS 637: Performed by: ANESTHESIOLOGY

## 2024-02-15 PROCEDURE — 0LCP0ZZ EXTIRPATION OF MATTER FROM LEFT LOWER LEG TENDON, OPEN APPROACH: ICD-10-PCS | Performed by: PODIATRIST

## 2024-02-15 PROCEDURE — C9113 INJ PANTOPRAZOLE SODIUM, VIA: HCPCS | Performed by: INTERNAL MEDICINE

## 2024-02-15 PROCEDURE — 370N000017 HC ANESTHESIA TECHNICAL FEE, PER MIN: Performed by: PODIATRIST

## 2024-02-15 PROCEDURE — 250N000025 HC SEVOFLURANE, PER MIN: Performed by: PODIATRIST

## 2024-02-15 PROCEDURE — 258N000003 HC RX IP 258 OP 636: Performed by: ANESTHESIOLOGY

## 2024-02-15 PROCEDURE — 258N000003 HC RX IP 258 OP 636: Performed by: INTERNAL MEDICINE

## 2024-02-15 PROCEDURE — 250N000009 HC RX 250: Performed by: ANESTHESIOLOGY

## 2024-02-15 PROCEDURE — 120N000001 HC R&B MED SURG/OB

## 2024-02-15 PROCEDURE — 710N000009 HC RECOVERY PHASE 1, LEVEL 1, PER MIN: Performed by: PODIATRIST

## 2024-02-15 PROCEDURE — 99254 IP/OBS CNSLTJ NEW/EST MOD 60: CPT | Performed by: INTERNAL MEDICINE

## 2024-02-15 PROCEDURE — 80048 BASIC METABOLIC PNL TOTAL CA: CPT | Performed by: INTERNAL MEDICINE

## 2024-02-15 PROCEDURE — 250N000011 HC RX IP 250 OP 636: Performed by: PODIATRIST

## 2024-02-15 PROCEDURE — 36415 COLL VENOUS BLD VENIPUNCTURE: CPT | Performed by: INTERNAL MEDICINE

## 2024-02-15 PROCEDURE — 360N000075 HC SURGERY LEVEL 2, PER MIN: Performed by: PODIATRIST

## 2024-02-15 PROCEDURE — 272N000001 HC OR GENERAL SUPPLY STERILE: Performed by: PODIATRIST

## 2024-02-15 RX ORDER — NALOXONE HYDROCHLORIDE 0.4 MG/ML
0.2 INJECTION, SOLUTION INTRAMUSCULAR; INTRAVENOUS; SUBCUTANEOUS
Status: DISCONTINUED | OUTPATIENT
Start: 2024-02-15 | End: 2024-02-17 | Stop reason: HOSPADM

## 2024-02-15 RX ORDER — NALOXONE HYDROCHLORIDE 0.4 MG/ML
0.4 INJECTION, SOLUTION INTRAMUSCULAR; INTRAVENOUS; SUBCUTANEOUS
Status: DISCONTINUED | OUTPATIENT
Start: 2024-02-15 | End: 2024-02-17 | Stop reason: HOSPADM

## 2024-02-15 RX ORDER — PROPOFOL 10 MG/ML
INJECTION, EMULSION INTRAVENOUS CONTINUOUS PRN
Status: DISCONTINUED | OUTPATIENT
Start: 2024-02-15 | End: 2024-02-15

## 2024-02-15 RX ORDER — LIDOCAINE 40 MG/G
CREAM TOPICAL
Status: DISCONTINUED | OUTPATIENT
Start: 2024-02-15 | End: 2024-02-15 | Stop reason: HOSPADM

## 2024-02-15 RX ORDER — DEXAMETHASONE SODIUM PHOSPHATE 10 MG/ML
INJECTION, SOLUTION INTRAMUSCULAR; INTRAVENOUS PRN
Status: DISCONTINUED | OUTPATIENT
Start: 2024-02-15 | End: 2024-02-15

## 2024-02-15 RX ORDER — MEPERIDINE HYDROCHLORIDE 25 MG/ML
12.5 INJECTION INTRAMUSCULAR; INTRAVENOUS; SUBCUTANEOUS EVERY 5 MIN PRN
Status: DISCONTINUED | OUTPATIENT
Start: 2024-02-15 | End: 2024-02-15 | Stop reason: HOSPADM

## 2024-02-15 RX ORDER — ACETAMINOPHEN 325 MG/1
975 TABLET ORAL ONCE
Status: COMPLETED | OUTPATIENT
Start: 2024-02-15 | End: 2024-02-15

## 2024-02-15 RX ORDER — FENTANYL CITRATE 50 UG/ML
INJECTION, SOLUTION INTRAMUSCULAR; INTRAVENOUS PRN
Status: DISCONTINUED | OUTPATIENT
Start: 2024-02-15 | End: 2024-02-15

## 2024-02-15 RX ORDER — SODIUM CHLORIDE, SODIUM LACTATE, POTASSIUM CHLORIDE, CALCIUM CHLORIDE 600; 310; 30; 20 MG/100ML; MG/100ML; MG/100ML; MG/100ML
INJECTION, SOLUTION INTRAVENOUS CONTINUOUS PRN
Status: DISCONTINUED | OUTPATIENT
Start: 2024-02-15 | End: 2024-02-15

## 2024-02-15 RX ORDER — PROPOFOL 10 MG/ML
INJECTION, EMULSION INTRAVENOUS PRN
Status: DISCONTINUED | OUTPATIENT
Start: 2024-02-15 | End: 2024-02-15

## 2024-02-15 RX ORDER — FENTANYL CITRATE 50 UG/ML
25-100 INJECTION, SOLUTION INTRAMUSCULAR; INTRAVENOUS
Status: DISCONTINUED | OUTPATIENT
Start: 2024-02-15 | End: 2024-02-16

## 2024-02-15 RX ORDER — BUPIVACAINE HYDROCHLORIDE AND EPINEPHRINE 5; 5 MG/ML; UG/ML
INJECTION, SOLUTION PERINEURAL
Status: COMPLETED | OUTPATIENT
Start: 2024-02-15 | End: 2024-02-15

## 2024-02-15 RX ORDER — HYDROMORPHONE HYDROCHLORIDE 1 MG/ML
0.2 INJECTION, SOLUTION INTRAMUSCULAR; INTRAVENOUS; SUBCUTANEOUS EVERY 5 MIN PRN
Status: DISCONTINUED | OUTPATIENT
Start: 2024-02-15 | End: 2024-02-15 | Stop reason: HOSPADM

## 2024-02-15 RX ORDER — ONDANSETRON 2 MG/ML
INJECTION INTRAMUSCULAR; INTRAVENOUS PRN
Status: DISCONTINUED | OUTPATIENT
Start: 2024-02-15 | End: 2024-02-15

## 2024-02-15 RX ORDER — ONDANSETRON 4 MG/1
4 TABLET, ORALLY DISINTEGRATING ORAL EVERY 30 MIN PRN
Status: DISCONTINUED | OUTPATIENT
Start: 2024-02-15 | End: 2024-02-15 | Stop reason: HOSPADM

## 2024-02-15 RX ORDER — SODIUM CHLORIDE, SODIUM LACTATE, POTASSIUM CHLORIDE, CALCIUM CHLORIDE 600; 310; 30; 20 MG/100ML; MG/100ML; MG/100ML; MG/100ML
INJECTION, SOLUTION INTRAVENOUS CONTINUOUS
Status: DISCONTINUED | OUTPATIENT
Start: 2024-02-15 | End: 2024-02-15

## 2024-02-15 RX ORDER — SODIUM CHLORIDE, SODIUM LACTATE, POTASSIUM CHLORIDE, CALCIUM CHLORIDE 600; 310; 30; 20 MG/100ML; MG/100ML; MG/100ML; MG/100ML
INJECTION, SOLUTION INTRAVENOUS CONTINUOUS
Status: DISCONTINUED | OUTPATIENT
Start: 2024-02-15 | End: 2024-02-15 | Stop reason: HOSPADM

## 2024-02-15 RX ORDER — HALOPERIDOL 5 MG/ML
1 INJECTION INTRAMUSCULAR
Status: DISCONTINUED | OUTPATIENT
Start: 2024-02-15 | End: 2024-02-15 | Stop reason: HOSPADM

## 2024-02-15 RX ORDER — LIDOCAINE 40 MG/G
CREAM TOPICAL
Status: DISCONTINUED | OUTPATIENT
Start: 2024-02-15 | End: 2024-02-16

## 2024-02-15 RX ORDER — FENTANYL CITRATE 50 UG/ML
25 INJECTION, SOLUTION INTRAMUSCULAR; INTRAVENOUS EVERY 5 MIN PRN
Status: DISCONTINUED | OUTPATIENT
Start: 2024-02-15 | End: 2024-02-15 | Stop reason: HOSPADM

## 2024-02-15 RX ORDER — HYDROMORPHONE HYDROCHLORIDE 1 MG/ML
0.4 INJECTION, SOLUTION INTRAMUSCULAR; INTRAVENOUS; SUBCUTANEOUS EVERY 5 MIN PRN
Status: DISCONTINUED | OUTPATIENT
Start: 2024-02-15 | End: 2024-02-15 | Stop reason: HOSPADM

## 2024-02-15 RX ORDER — FENTANYL CITRATE 50 UG/ML
50 INJECTION, SOLUTION INTRAMUSCULAR; INTRAVENOUS EVERY 5 MIN PRN
Status: DISCONTINUED | OUTPATIENT
Start: 2024-02-15 | End: 2024-02-15 | Stop reason: HOSPADM

## 2024-02-15 RX ORDER — FENTANYL CITRATE 50 UG/ML
25-100 INJECTION, SOLUTION INTRAMUSCULAR; INTRAVENOUS
Status: DISCONTINUED | OUTPATIENT
Start: 2024-02-15 | End: 2024-02-15 | Stop reason: HOSPADM

## 2024-02-15 RX ORDER — POVIDONE-IODINE 10 MG/G
OINTMENT TOPICAL PRN
Status: DISCONTINUED | OUTPATIENT
Start: 2024-02-15 | End: 2024-02-15 | Stop reason: HOSPADM

## 2024-02-15 RX ORDER — LIDOCAINE HYDROCHLORIDE 10 MG/ML
INJECTION, SOLUTION INFILTRATION; PERINEURAL PRN
Status: DISCONTINUED | OUTPATIENT
Start: 2024-02-15 | End: 2024-02-15

## 2024-02-15 RX ORDER — ONDANSETRON 2 MG/ML
4 INJECTION INTRAMUSCULAR; INTRAVENOUS EVERY 30 MIN PRN
Status: DISCONTINUED | OUTPATIENT
Start: 2024-02-15 | End: 2024-02-15 | Stop reason: HOSPADM

## 2024-02-15 RX ADMIN — SODIUM CHLORIDE, PRESERVATIVE FREE: 5 INJECTION INTRAVENOUS at 17:02

## 2024-02-15 RX ADMIN — FENTANYL CITRATE 100 MCG: 50 INJECTION, SOLUTION INTRAMUSCULAR; INTRAVENOUS at 17:17

## 2024-02-15 RX ADMIN — PROPOFOL 100 MCG/KG/MIN: 10 INJECTION, EMULSION INTRAVENOUS at 17:44

## 2024-02-15 RX ADMIN — DEXAMETHASONE SODIUM PHOSPHATE 4 MG: 10 INJECTION, SOLUTION INTRAMUSCULAR; INTRAVENOUS at 17:35

## 2024-02-15 RX ADMIN — PANTOPRAZOLE SODIUM 40 MG: 40 INJECTION, POWDER, FOR SOLUTION INTRAVENOUS at 09:36

## 2024-02-15 RX ADMIN — PIPERACILLIN AND TAZOBACTAM 3.38 G: 3; .375 INJECTION, POWDER, FOR SOLUTION INTRAVENOUS at 22:53

## 2024-02-15 RX ADMIN — ATORVASTATIN CALCIUM 40 MG: 40 TABLET, FILM COATED ORAL at 09:35

## 2024-02-15 RX ADMIN — PIPERACILLIN AND TAZOBACTAM 3.38 G: 3; .375 INJECTION, POWDER, FOR SOLUTION INTRAVENOUS at 00:34

## 2024-02-15 RX ADMIN — ONDANSETRON 4 MG: 2 INJECTION INTRAMUSCULAR; INTRAVENOUS at 17:50

## 2024-02-15 RX ADMIN — PHENYLEPHRINE HYDROCHLORIDE 200 MCG: 10 INJECTION INTRAVENOUS at 17:59

## 2024-02-15 RX ADMIN — SODIUM CHLORIDE, POTASSIUM CHLORIDE, SODIUM LACTATE AND CALCIUM CHLORIDE: 600; 310; 30; 20 INJECTION, SOLUTION INTRAVENOUS at 18:46

## 2024-02-15 RX ADMIN — PROPOFOL 200 MG: 10 INJECTION, EMULSION INTRAVENOUS at 17:34

## 2024-02-15 RX ADMIN — ACETAMINOPHEN 975 MG: 325 TABLET ORAL at 16:58

## 2024-02-15 RX ADMIN — SUGAMMADEX 200 MG: 100 INJECTION, SOLUTION INTRAVENOUS at 18:04

## 2024-02-15 RX ADMIN — BUPIVACAINE HYDROCHLORIDE AND EPINEPHRINE 20 ML: 5; 5 INJECTION, SOLUTION PERINEURAL at 17:25

## 2024-02-15 RX ADMIN — SODIUM CHLORIDE, POTASSIUM CHLORIDE, SODIUM LACTATE AND CALCIUM CHLORIDE: 600; 310; 30; 20 INJECTION, SOLUTION INTRAVENOUS at 18:18

## 2024-02-15 RX ADMIN — PIPERACILLIN AND TAZOBACTAM 3.38 G: 3; .375 INJECTION, POWDER, FOR SOLUTION INTRAVENOUS at 06:17

## 2024-02-15 RX ADMIN — HYDROMORPHONE HYDROCHLORIDE 2 MG: 2 TABLET ORAL at 00:53

## 2024-02-15 RX ADMIN — BUPIVACAINE HYDROCHLORIDE AND EPINEPHRINE BITARTRATE 10 ML: 5; .005 INJECTION, SOLUTION PERINEURAL at 17:22

## 2024-02-15 RX ADMIN — ACETAMINOPHEN 650 MG: 325 TABLET ORAL at 00:52

## 2024-02-15 RX ADMIN — FENTANYL CITRATE 100 MCG: 50 INJECTION INTRAMUSCULAR; INTRAVENOUS at 17:34

## 2024-02-15 RX ADMIN — SODIUM CHLORIDE, PRESERVATIVE FREE: 5 INJECTION INTRAVENOUS at 00:32

## 2024-02-15 RX ADMIN — PHENYLEPHRINE HYDROCHLORIDE 200 MCG: 10 INJECTION INTRAVENOUS at 17:49

## 2024-02-15 RX ADMIN — LIDOCAINE HYDROCHLORIDE 50 MG: 10 INJECTION, SOLUTION INFILTRATION; PERINEURAL at 17:34

## 2024-02-15 RX ADMIN — ROCURONIUM BROMIDE 50 MG: 50 INJECTION, SOLUTION INTRAVENOUS at 17:34

## 2024-02-15 RX ADMIN — PIPERACILLIN AND TAZOBACTAM 3.38 G: 3; .375 INJECTION, POWDER, FOR SOLUTION INTRAVENOUS at 14:10

## 2024-02-15 RX ADMIN — MIDAZOLAM HYDROCHLORIDE 2 MG: 1 INJECTION, SOLUTION INTRAMUSCULAR; INTRAVENOUS at 17:17

## 2024-02-15 ASSESSMENT — ACTIVITIES OF DAILY LIVING (ADL)
ADLS_ACUITY_SCORE: 35
ADLS_ACUITY_SCORE: 20
ADLS_ACUITY_SCORE: 20
ADLS_ACUITY_SCORE: 35
ADLS_ACUITY_SCORE: 20
ADLS_ACUITY_SCORE: 35
ADLS_ACUITY_SCORE: 20
ADLS_ACUITY_SCORE: 35

## 2024-02-15 ASSESSMENT — LIFESTYLE VARIABLES: TOBACCO_USE: 1

## 2024-02-15 NOTE — MEDICATION SCRIBE - ADMISSION MEDICATION HISTORY
Medication Scribe Admission Medication History    Admission medication history is complete. The information provided in this note is only as accurate as the sources available at the time of the update.    Information Source(s): Patient via in-person    Pertinent Information: Freestyle sensor will  in 13 days on 2024. Glucose monitoring strips, lancets, and device are on hand for a back up option to the continuous glucose monitor.     Changes made to PTA medication list:  Added: None  Deleted: None  Changed: None    Allergies reviewed with patient and updates made in EHR: yes    Medication History Completed By: Ricardo Ayers 2024 8:19 PM    Prior to Admission medications    Medication Sig Last Dose Taking? Auth Provider Long Term End Date   acetaminophen (TYLENOL) 325 MG tablet Take 2 tablets (650 mg) by mouth every 6 hours as needed for mild pain or other (and adjunct with moderate or severe pain or per patient request) 2024 at AM, prior to hospital administration Yes Cassy Berrios MD     atorvastatin (LIPITOR) 40 MG tablet Take 1 tablet (40 mg) by mouth daily 2024 at AM Yes Cassy Berrios MD Yes    blood glucose (CONTOUR NEXT TEST) test strip Use to test blood sugar 2 times daily or as directed.  Yes Cassy Berrios MD     Continuous Blood Gluc Sensor (FREESTYLE DEVON 2 SENSOR) MISC 1 each every 14 days Use 1 sensor every 14 days. Use to read blood sugars per 's instructions. 2024 Yes Cassy Berrios MD     empagliflozin (JARDIANCE) 25 MG TABS tablet Take 1 tablet (25 mg) by mouth daily 2024 at AM Yes Cassy Berrios MD     levofloxacin (LEVAQUIN) 750 MG tablet Take 1 tablet (750 mg) by mouth daily for 7 days  Yes Cammie Easton MD  24   metFORMIN (GLUCOPHAGE XR) 500 MG 24 hr tablet TAKE 2 TABLETS (1,000 MG TOTAL) BY MOUTH ONCE DAILY FOR DIABETES 2024 at AM Yes Cassy Berrios MD Yes    Microlet Lancets MISC Use to test blood sugars two times daily as directed.   Yes Cassy Berrios MD     omeprazole (PRILOSEC) 20 MG DR capsule TAKE 1 CAPSULE (20 MG TOTAL) BY MOUTH 2 (TWO) TIMES A DAY BEFORE MEALS FOR STOMACH 2/14/2024 at AM Yes Cassy Berrios MD     sitagliptin (JANUVIA) 100 MG tablet Take 1 tablet (100 mg) by mouth daily 2/14/2024 at AM Yes Cassy Berrios MD Yes

## 2024-02-15 NOTE — ANESTHESIA PROCEDURE NOTES
Adductor canal Procedure Note    Pre-Procedure   Staff -        Anesthesiologist:  Cheryl Ingram MD       Performed By: anesthesiologist       Location: pre-op       Procedure Start/Stop Times: 2/15/2024 5:22 PM and 2/15/2024 5:23 PM       Pre-Anesthestic Checklist: patient identified, IV checked, site marked, risks and benefits discussed, informed consent, monitors and equipment checked, pre-op evaluation, at physician/surgeon's request and post-op pain management  Timeout:       Correct Patient: Yes        Correct Procedure: Yes        Correct Site: Yes        Correct Position: Yes        Correct Laterality: Yes        Site Marked: Yes  Procedure Documentation  Procedure: Adductor canal       Laterality: left       Patient Position: supine       Patient Prep/Sterile Barriers: sterile gloves, mask       Skin prep: Chloraprep       Needle Gauge: 20.        Needle Length (Inches): 4        Ultrasound guided       1. Ultrasound was used to identify targeted nerve, plexus, vascular marker, or fascial plane and place a needle adjacent to it in real-time.       2. Ultrasound was used to visualize the spread of anesthetic in close proximity to the above referenced structure.       3. A permanent image is entered into the patient's record.       4. The visualized anatomic structures appeared normal.       5. There were no apparent abnormal pathologic findings.    Assessment/Narrative         The placement was negative for: blood aspirated, painful injection and site bleeding       Paresthesias: No.       Bolus given via needle..        Secured via.        Insertion/Infusion Method: Single Shot       Complications: none       Injection made incrementally with aspirations every 5 mL.    Medication(s) Administered   Bupivacaine 0.5% w/ 1:200K Epi (Other) - Other   10 mL - 2/15/2024 5:22:00 PM  Medication Administration Time: 2/15/2024 5:22 PM      FOR [facility] ([facility]) ONLY:   Pain Team Contact information: please  "page the Pain Team Via [facility]. Search \"Pain\". During daytime hours, please page the attending first. At night please page the resident first.      "

## 2024-02-15 NOTE — CONSULTS
"FOOT AND ANKLE SURGERY/PODIATRY CONSULT NOTE         ASSESSMENT:   Foreign body left foot       TREATMENT:  Patient to be taken to surgery for removal of foreign body left foot.  The procedure will be done under general anesthesia with popliteal block.  Surgery to be scheduled for 2/15/2024.      HPI: I was asked to see Reinaldo Burks Farhan today to evaluate and treat a small puncture wound of the left foot.  The patient is a 38-year-old male who presented to the ED with left foot pain after getting a porcupine \"stuck in the left heel on Saturday.  He was wearing a pair of boots and the Quill somehow penetrated his boot and into his left foot.  Patient indicated he was able to pull out a portion of the Quill.  He subsequently developed continued pain and redness at the posterior aspect of the left heel.  An attempt was made in the ED to remove the remaining portion of the foreign body but this was unsuccessful.  Ultrasound confirmed the presence of a foreign body in the area of the injury.  The patient complains of significant pain, redness and swelling.  He also has had some moderate bleeding.  Past Medical History:   Diagnosis Date    Alcohol use disorder, moderate, dependence (H) 03/06/2019    Hepatitis B     History of H. pylori infection     TB (tuberculosis)     Type 2 diabetes mellitus without complication, without long-term current use of insulin (H) 11/30/2016       Social History     Socioeconomic History    Marital status:      Spouse name: Not on file    Number of children: Not on file    Years of education: Not on file    Highest education level: Not on file   Occupational History    Not on file   Tobacco Use    Smoking status: Every Day     Packs/day: .2     Types: Cigarettes     Last attempt to quit: 11/29/2013     Years since quitting: 10.2     Passive exposure: Current    Smokeless tobacco: Current     Types: Chew    Tobacco comments:     smokes 0-1/day--Chew betel nut; pt states he started smoking at " 10 yo   Vaping Use    Vaping Use: Never used   Substance and Sexual Activity    Alcohol use: No     Comment: Alcoholic Drinks/day: pt states he haven't had a drink since January    Drug use: No    Sexual activity: Not on file   Other Topics Concern    Not on file   Social History Narrative    The patient works as a PCA at [employer].     Social Determinants of Health     Financial Resource Strain: Not on file   Food Insecurity: Not on file   Transportation Needs: Not on file   Physical Activity: Not on file   Stress: Not on file   Social Connections: Not on file   Interpersonal Safety: Not on file   Housing Stability: Not on file        No Known Allergies       Current Facility-Administered Medications:     acetaminophen (TYLENOL) tablet 650 mg, 650 mg, Oral, Q4H PRN, 650 mg at 02/15/24 0052 **OR** acetaminophen (TYLENOL) Suppository 650 mg, 650 mg, Rectal, Q4H PRN, Nadeem Lipscomb MD    alum & mag hydroxide-simethicone (MAALOX) suspension 30 mL, 30 mL, Oral, Q4H PRN, Nadeem Lipscomb MD    atorvastatin (LIPITOR) tablet 40 mg, 40 mg, Oral, Daily, Nadeem Lipscomb MD, 40 mg at 02/15/24 0935    calcium carbonate (TUMS) chewable tablet 1,000 mg, 1,000 mg, Oral, 4x Daily PRN, Nadeem Lipscomb MD    glucose gel 15-30 g, 15-30 g, Oral, Q15 Min PRN **OR** dextrose 50 % injection 25-50 mL, 25-50 mL, Intravenous, Q15 Min PRN **OR** glucagon injection 1 mg, 1 mg, Subcutaneous, Q15 Min PRN, Nadeem Lipscomb MD    [Held by provider] empagliflozin (JARDIANCE) tablet 25 mg, 25 mg, Oral, Daily, Nadeem Lipscomb MD    HYDROmorphone (DILAUDID) half-tab 1 mg, 1 mg, Oral, Q4H PRN, Nadeem Lipscomb MD    HYDROmorphone (DILAUDID) tablet 2 mg, 2 mg, Oral, Q4H PRN, Nadeem Lipscomb MD, 2 mg at 02/15/24 0053    insulin aspart (NovoLOG) injection (RAPID ACTING), 1-6 Units, Subcutaneous, Q4H, Nadeem Lipscomb MD    lidocaine (LMX4) cream, , Topical, Q1H PRN, Nadeem Lipscomb MD    lidocaine 1 % 0.1-1 mL, 0.1-1 mL, Other,  Q1H PRN, Nadeem Lipscomb MD    ondansetron (ZOFRAN ODT) ODT tab 4 mg, 4 mg, Oral, Q6H PRN **OR** ondansetron (ZOFRAN) injection 4 mg, 4 mg, Intravenous, Q6H PRN, Nadeem Lipscomb MD    pantoprazole (PROTONIX) IV push injection 40 mg, 40 mg, Intravenous, Daily with breakfast, Nadeem Lipscomb MD, 40 mg at 02/15/24 0936    piperacillin-tazobactam (ZOSYN) 3.375 g vial to attach to  mL bag, 3.375 g, Intravenous, Q8H, Nadeem Lipscomb MD, 3.375 g at 02/15/24 0617    senna-docusate (SENOKOT-S/PERICOLACE) 8.6-50 MG per tablet 1 tablet, 1 tablet, Oral, BID PRN **OR** senna-docusate (SENOKOT-S/PERICOLACE) 8.6-50 MG per tablet 2 tablet, 2 tablet, Oral, BID PRN, Nadeem Lipscomb MD    [Held by provider] sitagliptin (JANUVIA) tablet 100 mg, 100 mg, Oral, Daily, Nadeem Lipscomb MD    sodium chloride (PF) 0.9% PF flush 3 mL, 3 mL, Intracatheter, Q8H, Nadeem Lipscomb MD, 3 mL at 02/15/24 0046    sodium chloride (PF) 0.9% PF flush 3 mL, 3 mL, Intracatheter, q1 min prn, Nadeem Lipscomb MD    sodium chloride 0.9 % infusion, , Intravenous, Continuous, Nadeem Lipscomb MD, Last Rate: 100 mL/hr at 02/15/24 0745, Rate Verify at 02/15/24 0745    Current Outpatient Medications:     acetaminophen (TYLENOL) 325 MG tablet, Take 2 tablets (650 mg) by mouth every 6 hours as needed for mild pain or other (and adjunct with moderate or severe pain or per patient request), Disp: 100 tablet, Rfl: 3    atorvastatin (LIPITOR) 40 MG tablet, Take 1 tablet (40 mg) by mouth daily, Disp: 90 tablet, Rfl: 3    blood glucose (CONTOUR NEXT TEST) test strip, Use to test blood sugar 2 times daily or as directed., Disp: 200 strip, Rfl: 4    Continuous Blood Gluc Sensor (FREESTYLE DEVON 2 SENSOR) [facility], 1 each every 14 days Use 1 sensor every 14 days. Use to read blood sugars per 's instructions., Disp: 2 each, Rfl: 5    empagliflozin (JARDIANCE) 25 MG TABS tablet, Take 1 tablet (25 mg) by mouth daily, Disp: 90 tablet, Rfl: 3     levofloxacin (LEVAQUIN) 750 MG tablet, Take 1 tablet (750 mg) by mouth daily for 7 days, Disp: 7 tablet, Rfl: 0    metFORMIN (GLUCOPHAGE XR) 500 MG 24 hr tablet, TAKE 2 TABLETS (1,000 MG TOTAL) BY MOUTH ONCE DAILY FOR DIABETES, Disp: 60 tablet, Rfl: 11    Microlet Lancets MISC, Use to test blood sugars two times daily as directed., Disp: 200 each, Rfl: 4    omeprazole (PRILOSEC) 20 MG DR capsule, TAKE 1 CAPSULE (20 MG TOTAL) BY MOUTH 2 (TWO) TIMES A DAY BEFORE MEALS FOR STOMACH, Disp: 90 capsule, Rfl: 3    sitagliptin (JANUVIA) 100 MG tablet, Take 1 tablet (100 mg) by mouth daily, Disp: 30 tablet, Rfl: 11     Family History   Problem Relation Age of Onset    Coronary Artery Disease Mother     Hypertension Mother     Diabetes No family hx of         Social History     Socioeconomic History    Marital status:      Spouse name: Not on file    Number of children: Not on file    Years of education: Not on file    Highest education level: Not on file   Occupational History    Not on file   Tobacco Use    Smoking status: Every Day     Packs/day: .2     Types: Cigarettes     Last attempt to quit: 11/29/2013     Years since quitting: 10.2     Passive exposure: Current    Smokeless tobacco: Current     Types: Chew    Tobacco comments:     smokes 0-1/day--Chew betel nut; pt states he started smoking at 12 yo   Vaping Use    Vaping Use: Never used   Substance and Sexual Activity    Alcohol use: No     Comment: Alcoholic Drinks/day: pt states he haven't had a drink since January    Drug use: No    Sexual activity: Not on file   Other Topics Concern    Not on file   Social History Narrative    The patient works as a PCA at [employer].     Social Determinants of Health     Financial Resource Strain: Not on file   Food Insecurity: Not on file   Transportation Needs: Not on file   Physical Activity: Not on file   Stress: Not on file   Social Connections: Not on file   Interpersonal Safety: Not on file   Housing  "Stability: Not on file        Review of Systems - Patient denies fever, chills, rash, wound, stiffness, limping, numbness, weakness, heart burn, blood in stool, chest pain with activity, calf pain when walking, shortness of breath with activity, chronic cough, easy bleeding/bruising, swelling of ankles, excessive thirst, fatigue, depression, anxiety.  Patient admits to foreign body left foot.      OBJECTIVE:  Appearance: alert, well appearing, and in no distress.    /73 (BP Location: Right arm)   Pulse 67   Temp 97.7  F (36.5  C) (Oral)   Resp 16   Ht 1.626 m (5' 4\")   Wt 73.5 kg (162 lb)   SpO2 97%   BMI 27.81 kg/m       Body mass index is 27.81 kg/m .     General appearance: Patient is alert and fully cooperative with history & exam.  No sign of distress is noted during the visit.  Psychiatric: Affect is pleasant & appropriate.  Patient appears motivated to improve health.  Respiratory: Breathing is regular & unlabored while sitting.  HEENT: Hearing is intact to spoken word.  Speech is clear.  No gross evidence of visual impairment that would impact ambulation.    Vascular: Dorsalis pedis and posterior tibial pulses are palpable. There is no pedal hair growth bilaterally.  CFT < 3 sec from anterior tibial surface to distal digits bilaterally. There is  appreciable edema and erythema noted posterior aspect left heel.   Dermatologic: Small puncture wound noted posterior aspect left heel.  Turgor and texture are within normal limits.  No other primary or secondary lesions noted.  Neurologic: All epicritic and proprioceptive sensations are grossly intact bilaterally.  Musculoskeletal: All active and passive ankle, subtalar, midtarsal, and 1st MPJ range of motion are grossly intact without pain or crepitus, with the exception of none. Manual muscle strength is within normal limits bilaterally. All dorsiflexors, plantarflexors, invertors, evertors are intact bilaterally. Tenderness present to the posterior " aspect of the left heel near the insertion of the Achilles tendon on palpation. Tenderness to the posterior aspect of the left heel with range of motion. Calf is soft/non-tender without warmth/induration    Imaging:       [unfilled]     CT Ankle Left w/o Contrast    Result Date: 2/14/2024  EXAM: CT ANKLE LEFT W/O CONTRAST LOCATION: [facility] DATE: 2/14/2024 INDICATION: History of puncture wound (porcupine quill) with redness and swelling. COMPARISON: Radiographs, ultrasound, same date TECHNIQUE: Noncontrast. Axial, sagittal and coronal thin-section reconstruction. Dose reduction techniques were used. FINDINGS: BONES: -Normal. SOFT TISSUES: -Skin thickening and subcutaneous edema over the posterior aspect of the heel and ankle, compatible with redness and swelling as clinically evident. There is skin irregularity that reflects laceration (axial series 4 image 38). There is linear radiolucency within the substance of the Achilles tendon (see axial series 4 image 36, sagittal series 6 images 25-26), which is suspicious for a retained radiolucent foreign body, but difficult to completely confirm. The ultrasound would be more sensitive to the presence of residual foreign body. The degree of thickening and intrasubstance heterogeneity suggests that there is at least some component of background Achilles tendinopathy. -No ankle or subtalar joint effusions. -Soft tissues are otherwise unremarkable.     IMPRESSION: 1.  Skin thickening and subcutaneous edema over the posterior aspect of the calcaneus and ankle, may be cellulitis or posttraumatic edema. 2.  Skin irregularity over the posterior aspect of the heel compatible with laceration/puncture wound. 3.  Linear hypoattenuation within the substance of the Achilles, is suspicious/concerning for residual radiolucent foreign body. However, ultrasound would be more sensitive for discriminating a small amount of fluid in a penetrating wound defect  versus residual echogenic foreign body. 4.  Additional swelling and heterogeneous intrasubstance signal in the Achilles tendon that suggests a background of at least mild tendinosis. 5.  No significant bone or joint abnormality.     US Lower Extremity Non Vascular Left    Result Date: 2/14/2024  EXAM: US LOWER EXTREMITY NON VASCULAR LEFT LOCATION: [facility] DATE: 2/14/2024 INDICATION: heel foreign body rule out COMPARISON: None. TECHNIQUE: Routine. FINDINGS/    IMPRESSION: There is a 1.5 cm long linear echogenic foreign body in the area of symptoms with mild surrounding soft tissue edema. This is approximately 1 cm superior to the puncture wound. No discrete abscess or drainable collection.    Foot XR, G/E 3 views, left    Result Date: 2/14/2024  EXAM: XR FOOT LEFT G/E 3 VIEWS LOCATION: [facility] DATE: 2/14/2024 INDICATION: foreign body COMPARISON: None.     IMPRESSION: Normal joint spaces and alignment. No acute fracture. No radiopaque foreign body. Soft tissue thickening of the distal insertional Achilles tendon which may be sequela of prior trauma and/or chronic tendinosis.     CT Ankle Left w/o Contrast    Result Date: 2/14/2024  EXAM: CT ANKLE LEFT W/O CONTRAST LOCATION: [facility] DATE: 2/14/2024 INDICATION: History of puncture wound (porcupine quill) with redness and swelling. COMPARISON: Radiographs, ultrasound, same date TECHNIQUE: Noncontrast. Axial, sagittal and coronal thin-section reconstruction. Dose reduction techniques were used. FINDINGS: BONES: -Normal. SOFT TISSUES: -Skin thickening and subcutaneous edema over the posterior aspect of the heel and ankle, compatible with redness and swelling as clinically evident. There is skin irregularity that reflects laceration (axial series 4 image 38). There is linear radiolucency within the substance of the Achilles tendon (see axial series 4 image 36, sagittal series 6 images  25-26), which is suspicious for a retained radiolucent foreign body, but difficult to completely confirm. The ultrasound would be more sensitive to the presence of residual foreign body. The degree of thickening and intrasubstance heterogeneity suggests that there is at least some component of background Achilles tendinopathy. -No ankle or subtalar joint effusions. -Soft tissues are otherwise unremarkable.     IMPRESSION: 1.  Skin thickening and subcutaneous edema over the posterior aspect of the calcaneus and ankle, may be cellulitis or posttraumatic edema. 2.  Skin irregularity over the posterior aspect of the heel compatible with laceration/puncture wound. 3.  Linear hypoattenuation within the substance of the Achilles, is suspicious/concerning for residual radiolucent foreign body. However, ultrasound would be more sensitive for discriminating a small amount of fluid in a penetrating wound defect versus residual echogenic foreign body. 4.  Additional swelling and heterogeneous intrasubstance signal in the Achilles tendon that suggests a background of at least mild tendinosis. 5.  No significant bone or joint abnormality.     US Lower Extremity Non Vascular Left    Result Date: 2/14/2024  EXAM: US LOWER EXTREMITY NON VASCULAR LEFT LOCATION: [facility] DATE: 2/14/2024 INDICATION: heel foreign body rule out COMPARISON: None. TECHNIQUE: Routine. FINDINGS/    IMPRESSION: There is a 1.5 cm long linear echogenic foreign body in the area of symptoms with mild surrounding soft tissue edema. This is approximately 1 cm superior to the puncture wound. No discrete abscess or drainable collection.    Foot XR, G/E 3 views, left    Result Date: 2/14/2024  EXAM: XR FOOT LEFT G/E 3 VIEWS LOCATION: [facility] DATE: 2/14/2024 INDICATION: foreign body COMPARISON: None.     IMPRESSION: Normal joint spaces and alignment. No acute fracture. No radiopaque foreign body. Soft tissue  thickening of the distal insertional Achilles tendon which may be sequela of prior trauma and/or chronic tendinosis.             Ag Lambert DPM  [facility] Foot & Ankle Surgery/Podiatry

## 2024-02-15 NOTE — CONSULTS
Consultation - Infectious Disease  Reinaldo Real,  1985, MRN [id number]    Admitting Dx: Foreign body (FB) in soft tissue [M79.5]    PCP: Cassy Berrios, [phone]   Code status:  Full Code       Extended Emergency Contact Information  Primary Emergency Contact: Sa Gio Jessica  Address: [address] [place] [address] [facility]  Mobile Phone: [phone]  Relation: Spouse       ASSESSMENT   Left heel infection with foreign body. Injury from reported porcupine quill.   Treated for Latent TB   Hepatitis B, last viral load was low level positive. Has not seen GI or had treatment  H/o Klebsiella lung abscess, hypermucoviscous strain .     Principal Problem:    Foreign body (FB) in soft tissue  Active Problems:    Cellulitis of left foot       PLAN   Surgery today -- send cultures of infected tissue  Continue pip/joeo    Hans Davila MD  [facility]  [phone] clinic  [employer] paging  ______________________________________________________________________        Reason For Consult: Foreign body (FB) in soft tissue       HPI    We have been requested by Dr. Parham to evaluate Reinaldo Real who is a 38 year old.  Presented yesterday, had been hunting and felt a porcupine quill infiltrate his ankle through his boot.  Continue to have pain and redness to this area and wondered if he has an infection.       ED exam showed evidence for infection and erythema, attempted removal of foreign body was unsuccessful in the ED spoke to podiatry who recommended admission for operative intervention, planned for today.    I had seen him in  for Klebsiella hypermucoviscous phenotype lung abscess, treated with 6 weeks levofloxacin. He had been treated for latent TB in  with 6 months of medication, presumably INH.     DM, cirrhosis, Hep B.     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        Medical History  Past Medical History:   Diagnosis Date    Alcohol use  disorder, moderate, dependence (H) 03/06/2019    Hepatitis B     History of H. pylori infection     TB (tuberculosis)     Type 2 diabetes mellitus without complication, without long-term current use of insulin (H) 11/30/2016    Surgical History  He  has no past surgical history on file.   Social History  Reviewed, and he  reports that he has been smoking cigarettes. He has been smoking an average of 0.2 packs per day. He has been exposed to tobacco smoke. His smokeless tobacco use includes chew. He reports that he does not drink alcohol and does not use drugs.   Allergies  No Known Allergies Family History  family history includes Coronary Artery Disease in his mother; Hypertension in his mother.   Psychosocial Needs  Social History     Social History Narrative    The patient works as a PCA at [employer].     Additional psychosocial needs reviewed per nursing assessment.         Prior to Admission Medications   (Not in a hospital admission)         Anti-infectives: pip/tazo 2/15-  Levofloxacin, amp/sulbactam 2/14  Cultures: 2/14 pending  Imaging: reviewed images          Review of Systems:  All other systems negative in detail except what is noted above. Physical Exam:  Temp:  [97.7  F (36.5  C)-99  F (37.2  C)] 97.7  F (36.5  C)  Pulse:  [67-81] 67  Resp:  [16] 16  BP: (128-159)/() 128/73  SpO2:  [97 %-99 %] 97 %    GENERAL:  In no acute distress, is alert and oriented to person, place, time.  EYES: No conjunctival injection, pupils equally reactive to light, extra-ocular movement intact  HEAD, EARS, NOSE, MOUTH, AND THROAT: Nontraumatic, mouth without oral ulcers.   RESPIRATORY: Clear breath sounds to auscultation bilaterally.   CARDIOVASCULAR: Regular rate and rhythm, normal S1 and S2, no murmurs, rubs, or gallops.  ABDOMEN: Soft, nontender, no masses.  Normal bowel sounds.  MUSCULOSKELETAL: No synovitis.  LYMPHATIC: No inguinal lymphadenopathy.  SKIN/HAIR/NAILS: swelling, erythema, mildly tender  posterior near achilles insertion  NEUROLOGIC: Grossly intact.       Pertinent Labs         Recent Labs   Lab 02/15/24  0755 02/14/24  1424    140   CO2 24 26   BUN 7.9 9.7       Lab Results   Component Value Date    ALT 72 (H) 02/14/2024    AST 50 (H) 02/14/2024     (H) 05/10/2021    ALKPHOS 88 02/14/2024          CRP Inflammation   Date Value Ref Range Status   11/02/2022 <3.00 <5.00 mg/L Final          Pertinent Radiology  Radiology Results: Reviewed  CT Ankle Left w/o Contrast    Result Date: 2/14/2024  EXAM: CT ANKLE LEFT W/O CONTRAST LOCATION: [facility] DATE: 2/14/2024 INDICATION: History of puncture wound (porcupine quill) with redness and swelling. COMPARISON: Radiographs, ultrasound, same date TECHNIQUE: Noncontrast. Axial, sagittal and coronal thin-section reconstruction. Dose reduction techniques were used. FINDINGS: BONES: -Normal. SOFT TISSUES: -Skin thickening and subcutaneous edema over the posterior aspect of the heel and ankle, compatible with redness and swelling as clinically evident. There is skin irregularity that reflects laceration (axial series 4 image 38). There is linear radiolucency within the substance of the Achilles tendon (see axial series 4 image 36, sagittal series 6 images 25-26), which is suspicious for a retained radiolucent foreign body, but difficult to completely confirm. The ultrasound would be more sensitive to the presence of residual foreign body. The degree of thickening and intrasubstance heterogeneity suggests that there is at least some component of background Achilles tendinopathy. -No ankle or subtalar joint effusions. -Soft tissues are otherwise unremarkable.     IMPRESSION: 1.  Skin thickening and subcutaneous edema over the posterior aspect of the calcaneus and ankle, may be cellulitis or posttraumatic edema. 2.  Skin irregularity over the posterior aspect of the heel compatible with laceration/puncture wound. 3.  Linear  hypoattenuation within the substance of the Achilles, is suspicious/concerning for residual radiolucent foreign body. However, ultrasound would be more sensitive for discriminating a small amount of fluid in a penetrating wound defect versus residual echogenic foreign body. 4.  Additional swelling and heterogeneous intrasubstance signal in the Achilles tendon that suggests a background of at least mild tendinosis. 5.  No significant bone or joint abnormality.     US Lower Extremity Non Vascular Left    Result Date: 2/14/2024  EXAM: US LOWER EXTREMITY NON VASCULAR LEFT LOCATION: [facility] DATE: 2/14/2024 INDICATION: heel foreign body rule out COMPARISON: None. TECHNIQUE: Routine. FINDINGS/    IMPRESSION: There is a 1.5 cm long linear echogenic foreign body in the area of symptoms with mild surrounding soft tissue edema. This is approximately 1 cm superior to the puncture wound. No discrete abscess or drainable collection.    Foot XR, G/E 3 views, left    Result Date: 2/14/2024  EXAM: XR FOOT LEFT G/E 3 VIEWS LOCATION: [facility] DATE: 2/14/2024 INDICATION: foreign body COMPARISON: None.     IMPRESSION: Normal joint spaces and alignment. No acute fracture. No radiopaque foreign body. Soft tissue thickening of the distal insertional Achilles tendon which may be sequela of prior trauma and/or chronic tendinosis.

## 2024-02-15 NOTE — PROGRESS NOTES
[facility]    Medicine Progress Note - Hospitalist Service    Date of Admission:  2/14/2024    Assessment & Plan   Reinaldo Real is a 38 year old male who presents for left foot pain after getting a porcupine quill stuck in his left heel on Saturday. He was wearing a boot and was walking in woods (hunting) and the porcupine quill somehow went through his boot and into his ankle.      Retained porcupine Quill in the left heel/achilles tendon with local infection, Achilles tendonitis  Left heel pain and swelling  -CT 2/14: skin thickening and edema over calcaneous, foreign body within achilles with associated tendonitis  -Ultrasound with foreign body noted with surrounding edema  -Podiatry planning operative intervention tomorrow  -Begin IV antibiotics, aggressive pain control  -N.p.o. at midnight for surgical intervention  up-to-date with tetanus per ED note  -ID consulted     Lactic acidosis- resolved  -History of diabetes so therefore immunosuppressed  -However vitals are stable and not septic currently  -Aggressive IV fluids given  -Follow lactic acid closely  -Send blood cultures and continue IV antibiotic as above     Type 2 diabetes  -Home medications continued except metformin  -Sliding-scale insulin started     Mild epigastric pain- resolved  -History of H. pylori infection in the past  -Possible gastritis  -Begin IV PPI  -Follow-up in primary care for definitive testing     Alcohol use disorder  -Denies excessive use to me  -Not currently in withdrawal  -Will follow for now     History of TB in the past  -treated in 2013, had exposure in 2019 but latenet infection ruled out by ID 11/2022  -Follow-up in primary care     Hepatitis B infection  Cirrhosis  -Mild LFT elevation noted  -Normal bilirubin  -Follow-up in primary care        Diet: NPO for Medical/Clinical Reasons Except for: Meds    DVT Prophylaxis: Pneumatic Compression Devices  Moody Catheter: Not present  Lines: None    "  Cardiac Monitoring: None  Code Status: Full Code      Clinically Significant Risk Factors Present on Admission                # Thrombocytopenia: Lowest platelets = 113 in last 2 days, will monitor for bleeding        # Overweight: Estimated body mass index is 27.81 kg/m  as calculated from the following:    Height as of this encounter: 1.626 m (5' 4\").    Weight as of this encounter: 73.5 kg (162 lb).              Disposition Plan      Expected Discharge Date: 02/16/2024                    Jodie Parham MD  Hospitalist Service  [facility]  Securely message with [employer] (more info)  Text page via [facility] Paging/Directory   ______________________________________________________________________    Interval History   Mr. Real is doing okay this morning. He is NPO in preparation for surgery by podiatry today. He has pain and swelling in the left foot, ankle and lower leg. Otherwise he has no complaints.     Physical Exam   Vital Signs: Temp: 98.2  F (36.8  C) Temp src: Oral BP: (!) 151/99 Pulse: 80   Resp: 16 SpO2: 99 % O2 Device: None (Room air)    Weight: 162 lbs 0 oz    General Appearance: Awake, alert, in no acute distress  Respiratory: CTAB, no wheeze  Cardiovascular: RRR, no murmur noted  GI: soft, nontender, non distended, normal bowel sounds  Skin: edema, erythema of left foot/ankle/Lower leg, dried blood, wound      Medical Decision Making       50 MINUTES SPENT BY ME on the date of service doing chart review, history, exam, documentation & further activities per the note.      Data     I have personally reviewed the following data over the past 24 hrs:    5.8  \   14.6   / 113 (L)     138 108 (H) 7.9 /  96   3.9 24 0.67 \     ALT: 72 (H) AST: 50 (H) AP: 88 TBILI: 0.5   ALB: 4.2 TOT PROTEIN: 7.4 LIPASE: 28     Procal: N/A CRP: N/A Lactic Acid: 1.5         Imaging results reviewed over the past 24 hrs:   Recent Results (from the past 24 hour(s))   Foot XR, G/E 3 views, left    " Narrative    EXAM: XR FOOT LEFT G/E 3 VIEWS  LOCATION: [facility]  DATE: 2/14/2024    INDICATION: foreign body  COMPARISON: None.      Impression    IMPRESSION: Normal joint spaces and alignment. No acute fracture. No radiopaque foreign body. Soft tissue thickening of the distal insertional Achilles tendon which may be sequela of prior trauma and/or chronic tendinosis.   US Lower Extremity Non Vascular Left    Narrative    EXAM: US LOWER EXTREMITY NON VASCULAR LEFT  LOCATION: [facility]  DATE: 2/14/2024    INDICATION: heel foreign body rule out  COMPARISON: None.  TECHNIQUE: Routine.    FINDINGS/    Impression    IMPRESSION: There is a 1.5 cm long linear echogenic foreign body in the area of symptoms with mild surrounding soft tissue edema. This is approximately 1 cm superior to the puncture wound. No discrete abscess or drainable collection.   CT Ankle Left w/o Contrast    Narrative    EXAM: CT ANKLE LEFT W/O CONTRAST  LOCATION: [facility]  DATE: 2/14/2024    INDICATION: History of puncture wound (porcupine quill) with redness and swelling.  COMPARISON: Radiographs, ultrasound, same date  TECHNIQUE: Noncontrast. Axial, sagittal and coronal thin-section reconstruction. Dose reduction techniques were used.     FINDINGS:     BONES:  -Normal.    SOFT TISSUES:  -Skin thickening and subcutaneous edema over the posterior aspect of the heel and ankle, compatible with redness and swelling as clinically evident. There is skin irregularity that reflects laceration (axial series 4 image 38). There is linear   radiolucency within the substance of the Achilles tendon (see axial series 4 image 36, sagittal series 6 images 25-26), which is suspicious for a retained radiolucent foreign body, but difficult to completely confirm. The ultrasound would be more   sensitive to the presence of residual foreign body. The degree of thickening and intrasubstance  heterogeneity suggests that there is at least some component of background Achilles tendinopathy.  -No ankle or subtalar joint effusions.  -Soft tissues are otherwise unremarkable.      Impression    IMPRESSION:  1.  Skin thickening and subcutaneous edema over the posterior aspect of the calcaneus and ankle, may be cellulitis or posttraumatic edema.    2.  Skin irregularity over the posterior aspect of the heel compatible with laceration/puncture wound.    3.  Linear hypoattenuation within the substance of the Achilles, is suspicious/concerning for residual radiolucent foreign body. However, ultrasound would be more sensitive for discriminating a small amount of fluid in a penetrating wound defect versus   residual echogenic foreign body.    4.  Additional swelling and heterogeneous intrasubstance signal in the Achilles tendon that suggests a background of at least mild tendinosis.    5.  No significant bone or joint abnormality.

## 2024-02-15 NOTE — H&P (VIEW-ONLY)
"FOOT AND ANKLE SURGERY/PODIATRY CONSULT NOTE         ASSESSMENT:   Foreign body left foot       TREATMENT:  Patient to be taken to surgery for removal of foreign body left foot.  The procedure will be done under general anesthesia with popliteal block.  Surgery to be scheduled for 2/15/2024.      HPI: I was asked to see Reinaldo Burks Farhan today to evaluate and treat a small puncture wound of the left foot.  The patient is a 38-year-old male who presented to the ED with left foot pain after getting a porcupine \"stuck in the left heel on Saturday.  He was wearing a pair of boots and the Quill somehow penetrated his boot and into his left foot.  Patient indicated he was able to pull out a portion of the Quill.  He subsequently developed continued pain and redness at the posterior aspect of the left heel.  An attempt was made in the ED to remove the remaining portion of the foreign body but this was unsuccessful.  Ultrasound confirmed the presence of a foreign body in the area of the injury.  The patient complains of significant pain, redness and swelling.  He also has had some moderate bleeding.  Past Medical History:   Diagnosis Date    Alcohol use disorder, moderate, dependence (H) 03/06/2019    Hepatitis B     History of H. pylori infection     TB (tuberculosis)     Type 2 diabetes mellitus without complication, without long-term current use of insulin (H) 11/30/2016       Social History     Socioeconomic History    Marital status:      Spouse name: Not on file    Number of children: Not on file    Years of education: Not on file    Highest education level: Not on file   Occupational History    Not on file   Tobacco Use    Smoking status: Every Day     Packs/day: .2     Types: Cigarettes     Last attempt to quit: 11/29/2013     Years since quitting: 10.2     Passive exposure: Current    Smokeless tobacco: Current     Types: Chew    Tobacco comments:     smokes 0-1/day--Chew betel nut; pt states he started smoking at " 12 yo   Vaping Use    Vaping Use: Never used   Substance and Sexual Activity    Alcohol use: No     Comment: Alcoholic Drinks/day: pt states he haven't had a drink since January    Drug use: No    Sexual activity: Not on file   Other Topics Concern    Not on file   Social History Narrative    The patient works as a PCA at [employer].     Social Determinants of Health     Financial Resource Strain: Not on file   Food Insecurity: Not on file   Transportation Needs: Not on file   Physical Activity: Not on file   Stress: Not on file   Social Connections: Not on file   Interpersonal Safety: Not on file   Housing Stability: Not on file        No Known Allergies       Current Facility-Administered Medications:     acetaminophen (TYLENOL) tablet 650 mg, 650 mg, Oral, Q4H PRN, 650 mg at 02/15/24 0052 **OR** acetaminophen (TYLENOL) Suppository 650 mg, 650 mg, Rectal, Q4H PRN, Nadeem Lipscomb MD    alum & mag hydroxide-simethicone (MAALOX) suspension 30 mL, 30 mL, Oral, Q4H PRN, Nadeem iLpscomb MD    atorvastatin (LIPITOR) tablet 40 mg, 40 mg, Oral, Daily, Nadeem Lipscomb MD, 40 mg at 02/15/24 0935    calcium carbonate (TUMS) chewable tablet 1,000 mg, 1,000 mg, Oral, 4x Daily PRN, Nadeem Lipscomb MD    glucose gel 15-30 g, 15-30 g, Oral, Q15 Min PRN **OR** dextrose 50 % injection 25-50 mL, 25-50 mL, Intravenous, Q15 Min PRN **OR** glucagon injection 1 mg, 1 mg, Subcutaneous, Q15 Min PRN, Nadeem Lipscomb MD    [Held by provider] empagliflozin (JARDIANCE) tablet 25 mg, 25 mg, Oral, Daily, Nadeem Lipscomb MD    HYDROmorphone (DILAUDID) half-tab 1 mg, 1 mg, Oral, Q4H PRN, Nadeem Lipscomb MD    HYDROmorphone (DILAUDID) tablet 2 mg, 2 mg, Oral, Q4H PRN, Nadeem Lipscomb MD, 2 mg at 02/15/24 0053    insulin aspart (NovoLOG) injection (RAPID ACTING), 1-6 Units, Subcutaneous, Q4H, Nadeem Lipscomb MD    lidocaine (LMX4) cream, , Topical, Q1H PRN, Nadeem Lipscomb MD    lidocaine 1 % 0.1-1 mL, 0.1-1 mL, Other,  Q1H PRN, Nadeem Lipscomb MD    ondansetron (ZOFRAN ODT) ODT tab 4 mg, 4 mg, Oral, Q6H PRN **OR** ondansetron (ZOFRAN) injection 4 mg, 4 mg, Intravenous, Q6H PRN, Nadeem Lipscomb MD    pantoprazole (PROTONIX) IV push injection 40 mg, 40 mg, Intravenous, Daily with breakfast, Nadeem Lipscomb MD, 40 mg at 02/15/24 0936    piperacillin-tazobactam (ZOSYN) 3.375 g vial to attach to  mL bag, 3.375 g, Intravenous, Q8H, Nadeem Lipscomb MD, 3.375 g at 02/15/24 0617    senna-docusate (SENOKOT-S/PERICOLACE) 8.6-50 MG per tablet 1 tablet, 1 tablet, Oral, BID PRN **OR** senna-docusate (SENOKOT-S/PERICOLACE) 8.6-50 MG per tablet 2 tablet, 2 tablet, Oral, BID PRN, Nadeem Lipscomb MD    [Held by provider] sitagliptin (JANUVIA) tablet 100 mg, 100 mg, Oral, Daily, Nadeem Lipscomb MD    sodium chloride (PF) 0.9% PF flush 3 mL, 3 mL, Intracatheter, Q8H, Nadeem Lipscomb MD, 3 mL at 02/15/24 0046    sodium chloride (PF) 0.9% PF flush 3 mL, 3 mL, Intracatheter, q1 min prn, Nadeem Lipscomb MD    sodium chloride 0.9 % infusion, , Intravenous, Continuous, Nadeem Lipscomb MD, Last Rate: 100 mL/hr at 02/15/24 0745, Rate Verify at 02/15/24 0745    Current Outpatient Medications:     acetaminophen (TYLENOL) 325 MG tablet, Take 2 tablets (650 mg) by mouth every 6 hours as needed for mild pain or other (and adjunct with moderate or severe pain or per patient request), Disp: 100 tablet, Rfl: 3    atorvastatin (LIPITOR) 40 MG tablet, Take 1 tablet (40 mg) by mouth daily, Disp: 90 tablet, Rfl: 3    blood glucose (CONTOUR NEXT TEST) test strip, Use to test blood sugar 2 times daily or as directed., Disp: 200 strip, Rfl: 4    Continuous Blood Gluc Sensor (FREESTYLE DEVON 2 SENSOR) [facility], 1 each every 14 days Use 1 sensor every 14 days. Use to read blood sugars per 's instructions., Disp: 2 each, Rfl: 5    empagliflozin (JARDIANCE) 25 MG TABS tablet, Take 1 tablet (25 mg) by mouth daily, Disp: 90 tablet, Rfl: 3     levofloxacin (LEVAQUIN) 750 MG tablet, Take 1 tablet (750 mg) by mouth daily for 7 days, Disp: 7 tablet, Rfl: 0    metFORMIN (GLUCOPHAGE XR) 500 MG 24 hr tablet, TAKE 2 TABLETS (1,000 MG TOTAL) BY MOUTH ONCE DAILY FOR DIABETES, Disp: 60 tablet, Rfl: 11    Microlet Lancets MISC, Use to test blood sugars two times daily as directed., Disp: 200 each, Rfl: 4    omeprazole (PRILOSEC) 20 MG DR capsule, TAKE 1 CAPSULE (20 MG TOTAL) BY MOUTH 2 (TWO) TIMES A DAY BEFORE MEALS FOR STOMACH, Disp: 90 capsule, Rfl: 3    sitagliptin (JANUVIA) 100 MG tablet, Take 1 tablet (100 mg) by mouth daily, Disp: 30 tablet, Rfl: 11     Family History   Problem Relation Age of Onset    Coronary Artery Disease Mother     Hypertension Mother     Diabetes No family hx of         Social History     Socioeconomic History    Marital status:      Spouse name: Not on file    Number of children: Not on file    Years of education: Not on file    Highest education level: Not on file   Occupational History    Not on file   Tobacco Use    Smoking status: Every Day     Packs/day: .2     Types: Cigarettes     Last attempt to quit: 11/29/2013     Years since quitting: 10.2     Passive exposure: Current    Smokeless tobacco: Current     Types: Chew    Tobacco comments:     smokes 0-1/day--Chew betel nut; pt states he started smoking at 10 yo   Vaping Use    Vaping Use: Never used   Substance and Sexual Activity    Alcohol use: No     Comment: Alcoholic Drinks/day: pt states he haven't had a drink since January    Drug use: No    Sexual activity: Not on file   Other Topics Concern    Not on file   Social History Narrative    The patient works as a PCA at [employer].     Social Determinants of Health     Financial Resource Strain: Not on file   Food Insecurity: Not on file   Transportation Needs: Not on file   Physical Activity: Not on file   Stress: Not on file   Social Connections: Not on file   Interpersonal Safety: Not on file   Housing  "Stability: Not on file        Review of Systems - Patient denies fever, chills, rash, wound, stiffness, limping, numbness, weakness, heart burn, blood in stool, chest pain with activity, calf pain when walking, shortness of breath with activity, chronic cough, easy bleeding/bruising, swelling of ankles, excessive thirst, fatigue, depression, anxiety.  Patient admits to foreign body left foot.      OBJECTIVE:  Appearance: alert, well appearing, and in no distress.    /73 (BP Location: Right arm)   Pulse 67   Temp 97.7  F (36.5  C) (Oral)   Resp 16   Ht 1.626 m (5' 4\")   Wt 73.5 kg (162 lb)   SpO2 97%   BMI 27.81 kg/m       Body mass index is 27.81 kg/m .     General appearance: Patient is alert and fully cooperative with history & exam.  No sign of distress is noted during the visit.  Psychiatric: Affect is pleasant & appropriate.  Patient appears motivated to improve health.  Respiratory: Breathing is regular & unlabored while sitting.  HEENT: Hearing is intact to spoken word.  Speech is clear.  No gross evidence of visual impairment that would impact ambulation.    Vascular: Dorsalis pedis and posterior tibial pulses are palpable. There is no pedal hair growth bilaterally.  CFT < 3 sec from anterior tibial surface to distal digits bilaterally. There is  appreciable edema and erythema noted posterior aspect left heel.   Dermatologic: Small puncture wound noted posterior aspect left heel.  Turgor and texture are within normal limits.  No other primary or secondary lesions noted.  Neurologic: All epicritic and proprioceptive sensations are grossly intact bilaterally.  Musculoskeletal: All active and passive ankle, subtalar, midtarsal, and 1st MPJ range of motion are grossly intact without pain or crepitus, with the exception of none. Manual muscle strength is within normal limits bilaterally. All dorsiflexors, plantarflexors, invertors, evertors are intact bilaterally. Tenderness present to the posterior " aspect of the left heel near the insertion of the Achilles tendon on palpation. Tenderness to the posterior aspect of the left heel with range of motion. Calf is soft/non-tender without warmth/induration    Imaging:       [unfilled]     CT Ankle Left w/o Contrast    Result Date: 2/14/2024  EXAM: CT ANKLE LEFT W/O CONTRAST LOCATION: [facility] DATE: 2/14/2024 INDICATION: History of puncture wound (porcupine quill) with redness and swelling. COMPARISON: Radiographs, ultrasound, same date TECHNIQUE: Noncontrast. Axial, sagittal and coronal thin-section reconstruction. Dose reduction techniques were used. FINDINGS: BONES: -Normal. SOFT TISSUES: -Skin thickening and subcutaneous edema over the posterior aspect of the heel and ankle, compatible with redness and swelling as clinically evident. There is skin irregularity that reflects laceration (axial series 4 image 38). There is linear radiolucency within the substance of the Achilles tendon (see axial series 4 image 36, sagittal series 6 images 25-26), which is suspicious for a retained radiolucent foreign body, but difficult to completely confirm. The ultrasound would be more sensitive to the presence of residual foreign body. The degree of thickening and intrasubstance heterogeneity suggests that there is at least some component of background Achilles tendinopathy. -No ankle or subtalar joint effusions. -Soft tissues are otherwise unremarkable.     IMPRESSION: 1.  Skin thickening and subcutaneous edema over the posterior aspect of the calcaneus and ankle, may be cellulitis or posttraumatic edema. 2.  Skin irregularity over the posterior aspect of the heel compatible with laceration/puncture wound. 3.  Linear hypoattenuation within the substance of the Achilles, is suspicious/concerning for residual radiolucent foreign body. However, ultrasound would be more sensitive for discriminating a small amount of fluid in a penetrating wound defect  versus residual echogenic foreign body. 4.  Additional swelling and heterogeneous intrasubstance signal in the Achilles tendon that suggests a background of at least mild tendinosis. 5.  No significant bone or joint abnormality.     US Lower Extremity Non Vascular Left    Result Date: 2/14/2024  EXAM: US LOWER EXTREMITY NON VASCULAR LEFT LOCATION: [facility] [place] DATE: 2/14/2024 INDICATION: heel foreign body rule out COMPARISON: None. TECHNIQUE: Routine. FINDINGS/    IMPRESSION: There is a 1.5 cm long linear echogenic foreign body in the area of symptoms with mild surrounding soft tissue edema. This is approximately 1 cm superior to the puncture wound. No discrete abscess or drainable collection.    Foot XR, G/E 3 views, left    Result Date: 2/14/2024  EXAM: XR FOOT LEFT G/E 3 VIEWS LOCATION: [facility] [place] DATE: 2/14/2024 INDICATION: foreign body COMPARISON: None.     IMPRESSION: Normal joint spaces and alignment. No acute fracture. No radiopaque foreign body. Soft tissue thickening of the distal insertional Achilles tendon which may be sequela of prior trauma and/or chronic tendinosis.     CT Ankle Left w/o Contrast    Result Date: 2/14/2024  EXAM: CT ANKLE LEFT W/O CONTRAST LOCATION: [facility] [place] DATE: 2/14/2024 INDICATION: History of puncture wound (porcupine quill) with redness and swelling. COMPARISON: Radiographs, ultrasound, same date TECHNIQUE: Noncontrast. Axial, sagittal and coronal thin-section reconstruction. Dose reduction techniques were used. FINDINGS: BONES: -Normal. SOFT TISSUES: -Skin thickening and subcutaneous edema over the posterior aspect of the heel and ankle, compatible with redness and swelling as clinically evident. There is skin irregularity that reflects laceration (axial series 4 image 38). There is linear radiolucency within the substance of the Achilles tendon (see axial series 4 image 36, sagittal series 6 images  25-26), which is suspicious for a retained radiolucent foreign body, but difficult to completely confirm. The ultrasound would be more sensitive to the presence of residual foreign body. The degree of thickening and intrasubstance heterogeneity suggests that there is at least some component of background Achilles tendinopathy. -No ankle or subtalar joint effusions. -Soft tissues are otherwise unremarkable.     IMPRESSION: 1.  Skin thickening and subcutaneous edema over the posterior aspect of the calcaneus and ankle, may be cellulitis or posttraumatic edema. 2.  Skin irregularity over the posterior aspect of the heel compatible with laceration/puncture wound. 3.  Linear hypoattenuation within the substance of the Achilles, is suspicious/concerning for residual radiolucent foreign body. However, ultrasound would be more sensitive for discriminating a small amount of fluid in a penetrating wound defect versus residual echogenic foreign body. 4.  Additional swelling and heterogeneous intrasubstance signal in the Achilles tendon that suggests a background of at least mild tendinosis. 5.  No significant bone or joint abnormality.     US Lower Extremity Non Vascular Left    Result Date: 2/14/2024  EXAM: US LOWER EXTREMITY NON VASCULAR LEFT LOCATION: [facility] DATE: 2/14/2024 INDICATION: heel foreign body rule out COMPARISON: None. TECHNIQUE: Routine. FINDINGS/    IMPRESSION: There is a 1.5 cm long linear echogenic foreign body in the area of symptoms with mild surrounding soft tissue edema. This is approximately 1 cm superior to the puncture wound. No discrete abscess or drainable collection.    Foot XR, G/E 3 views, left    Result Date: 2/14/2024  EXAM: XR FOOT LEFT G/E 3 VIEWS LOCATION: [facility] DATE: 2/14/2024 INDICATION: foreign body COMPARISON: None.     IMPRESSION: Normal joint spaces and alignment. No acute fracture. No radiopaque foreign body. Soft tissue  thickening of the distal insertional Achilles tendon which may be sequela of prior trauma and/or chronic tendinosis.             Ag aLmbert DPM  [facility] Foot & Ankle Surgery/Podiatry

## 2024-02-15 NOTE — ANESTHESIA PROCEDURE NOTES
Sciatic Procedure Note    Pre-Procedure   Staff -        Anesthesiologist:  Cheryl Ingram MD       Performed By: anesthesiologist       Location: pre-op       Procedure Start/Stop Times: 2/15/2024 5:25 PM and 2/15/2024 5:26 PM       Pre-Anesthestic Checklist: patient identified, IV checked, site marked, risks and benefits discussed, informed consent, monitors and equipment checked, pre-op evaluation, at physician/surgeon's request and post-op pain management  Timeout:       Correct Patient: Yes        Correct Procedure: Yes        Correct Site: Yes        Correct Position: Yes        Correct Laterality: Yes        Site Marked: Yes  Procedure Documentation  Procedure: Sciatic       Laterality: left       Patient Position: supine       Patient Prep/Sterile Barriers: sterile gloves, mask       Skin prep: Chloraprep (popliteal approach).       Needle Gauge: 20.        Needle Length (Inches): 4        Ultrasound guided       1. Ultrasound was used to identify targeted nerve, plexus, vascular marker, or fascial plane and place a needle adjacent to it in real-time.       2. Ultrasound was used to visualize the spread of anesthetic in close proximity to the above referenced structure.       3. A permanent image is entered into the patient's record.       4. The visualized anatomic structures appeared normal.       5. There were no apparent abnormal pathologic findings.    Assessment/Narrative         The placement was negative for: blood aspirated, painful injection and site bleeding       Paresthesias: No.       Bolus given via needle..        Secured via.        Insertion/Infusion Method: Single Shot       Complications: none       Injection made incrementally with aspirations every 5 mL.    Medication(s) Administered   Bupivacaine 0.5% w/ 1:200K Epi (Other) - Other   20 mL - 2/15/2024 5:25:00 PM  Medication Administration Time: 2/15/2024 5:25 PM      FOR [facility] ([facility] ONLY:   Pain Team Contact information:  "please page the Pain Team Via [facility]. Search \"Pain\". During daytime hours, please page the attending first. At night please page the resident first.      "

## 2024-02-15 NOTE — ANESTHESIA PROCEDURE NOTES
Airway       Patient location during procedure: OR       Procedure Start/Stop Times: 2/15/2024 5:38 PM  Staff -        CRNA: Vanessa De La O APRN CRNA       Performed By: CRNAIndications and Patient Condition       Indications for airway management: amparo-procedural         Mask difficulty assessment: 1 - vent by mask    Final Airway Details       Final airway type: endotracheal airway       Successful airway: ETT - single  Endotracheal Airway Details        ETT size (mm): 8.0       Successful intubation technique: direct laryngoscopy       DL Blade Type: MAC 4       Grade View of Cords: 1       Adjucts: stylet       Position: Right       Measured from: lips       Secured at (cm): 23       Bite block used: None    Post intubation assessment        Placement verified by: capnometry, equal breath sounds and chest rise        Number of attempts at approach: 1       Number of other approaches attempted: 0       Secured with: tape       Ease of procedure: easy       Dentition: Intact and Unchanged    Medication(s) Administered   Medication Administration Time: 2/15/2024 5:38 PM

## 2024-02-15 NOTE — PROGRESS NOTES
NURSING PROGRESS NOTE  Shift Summary      Date: February 15, 2024   [id number]  Neuro/Musculoskeletal:  A&Ox4. Calm and pleasant towards writer and other staff. Receptive to all cares. Knows some English but mostly Jenifer speaking.   Cardiac:  Not on TELE monitoring. Apical pulse regular. Denied and offered no c/o CP. BP slightly elevated.    Respiratory: LS: CTA, absent of any adventitious sounds. SpO2: 99% on RA,. Denied SOB. Absent of cough.   GI/: BS: normoactive x4Q. Last BM: 2/15. Voiding spontaneously without difficulty. Urine is clear yellow.   Diet/Appetite:  NPO. Denied nausea.   Activity: Indep. Mobility is limited r/t LLE pain/ discomfort.   Pain: C/o 8/10 L heel/ ankle pain, which decreased w/ use of PRN PO Dilaudid and Acetaminophen.   Skin:Bandage to L ankle/ heel remained CDI.   LDAs + Drips/IVF:  PIV x1 to R AC, dressing CDI. Continued on mIVF: Ns@100ml/hr. On scheduled Zosyn  Protocols/Labs:  Not on any electrolyte replacement protocols. BG willian 4 hours w/ sliding scale coverage. BG, 111 and 97

## 2024-02-15 NOTE — H&P
Admission History and Physical   Reinaldo Real,    1985,   [id number]    [facility]   Foreign body (FB) in soft tissue [M79.5]    PCP: Cassy Berrios,    Code status:  Full Code       Extended Emergency Contact Information  Primary Emergency Contact: Sa Aracely Gio  Address: [address] [place] [address] [place]  Mobile Phone: [phone]  Relation: Spouse       Reinaldo Real is a 38 year old male who presents for left foot pain after getting a porcupine quill stuck in his left heel on Saturday. He was wearing a boot and was walking in woods (hunting) and the porcupine quill somehow went through his boot and into his ankle.         ASSESSMENT AND PLAN:  Left heel pain and swelling  Likely due to porcupine quill infiltration while hunting  CT confirms hypoattenuation within Achilles tendon suspicious for foreign body  Ultrasound with foreign body noted with surrounding edema  Podiatry planning operative intervention tomorrow  Begin IV antibiotics, aggressive pain control  N.p.o. at midnight for surgical intervention  up-to-date with tetanus per ED note    Lactic acidosis  History of diabetes so therefore immunosuppressed  However vitals are stable and not septic currently  Aggressive IV fluids given  Follow lactic acid closely  Send blood cultures and continue IV antibiotic as above    Type 2 diabetes  Home medications continued except metformin  Sliding-scale insulin started    Mild epigastric pain  History of H. pylori infection in the past  Possible gastritis  Begin IV PPI  Follow-up in primary care for definitive testing    Alcohol use disorder  Denies excessive use to me  Not currently in withdrawal  Will follow for now    History of TB in the past  Unclear about treatment status  Follow-up in primary care    Hepatitis B infection  Mild LFT elevation noted  Normal bilirubin  Follow-up in primary care    Blood gases with mild hypercarbia  No pulmonary symptoms  currently  Will follow symptoms closely      Full code per discussion with the patient      DVT PPX:  Mechanical    Barriers to discharge left heel foreign body    Anticipated Discharge date inpatient multiple days          Chief Complaint:  Left heel pain and swelling few days ago    HPI:  Reinaldo Real is a 38 year old old male who was hunting and felt up a porcupine quill infiltrate his ankle through his boot.  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  Continue to have pain and redness to this area and wondered if he has an infection.  He denies any fever, some body chills and lightheadedness.  No cough show chest pain and no other pulmonary or cardiac symptoms.    Also complains of mild epigastric pain with is worse with meals, he does have a history of frequent heartburn and takes unknown medications.  Denies excessive use of NSAIDs, no melena and no hematemesis.    ED exam showed evidence for infection and erythema, attempted removal of foreign body was unsuccessful in the ED spoke to podiatry who recommended admission for operative intervention in a.m.      Medical History  Past Medical History:   Diagnosis Date    Alcohol use disorder, moderate, dependence (H) 03/06/2019    Hepatitis B     History of H. pylori infection     TB (tuberculosis)     Type 2 diabetes mellitus without complication, without long-term current use of insulin (H) 11/30/2016          Surgical History  He  has no past surgical history on file.      SOCIAL HISTORY:  Social History     Socioeconomic History    Marital status:      Spouse name: Not on file    Number of children: Not on file    Years of education: Not on file    Highest education level: Not on file   Occupational History    Not on file   Tobacco Use    Smoking status: Every Day     Packs/day: .2     Types: Cigarettes     Last attempt to quit: 11/29/2013     Years since quitting: 10.2     Passive exposure: Current    Smokeless tobacco: Current     Types: Chew     "Tobacco comments:     smokes 0-1/day--Chew betel nut; pt states he started smoking at 10 yo   Vaping Use    Vaping Use: Never used   Substance and Sexual Activity    Alcohol use: No     Comment: Alcoholic Drinks/day: pt states he haven't had a drink since January    Drug use: No    Sexual activity: Not on file   Other Topics Concern    Not on file   Social History Narrative    The patient works as a PCA at [employer].     Social Determinants of Health     Financial Resource Strain: Not on file   Food Insecurity: Not on file   Transportation Needs: Not on file   Physical Activity: Not on file   Stress: Not on file   Social Connections: Not on file   Interpersonal Safety: Not on file   Housing Stability: Not on file       FAMILY HISTORY:  Family History   Problem Relation Age of Onset    Coronary Artery Disease Mother     Hypertension Mother     Diabetes No family hx of          ALLERGIES:  No Known Allergies    MEDICATIONS: Please see pharmacy note        ROS:  12 point review of systems reviewed and is negative except as stated above      PHYSICAL EXAM:  BP (!) 138/91   Pulse 81   Temp 99  F (37.2  C) (Oral)   Resp 16   Ht 1.626 m (5' 4\")   Wt 73.5 kg (162 lb)   SpO2 99%   BMI 27.81 kg/m      General: Alert and oriented x 3. Not in obvious distress.  HEENT: Pupils equal and reactive,ENT WNL   Neck- supple, No JVP elevation, lymphadenopathy or thyromegaly. Trachea-central.  Chest: Clear to auscultation bilaterally.  Heart: S1S2 regular. No M/R/G.  Abdomen: Soft. NT, ND. No organomegaly. Bowel sounds- active.  Back: No spine tenderness. No CVA tenderness.  Extremities: Left heel is dressed, please see ED notes for picture of infected area peripheral pulses 2+ bilaterally.  Neuro: No focal neurological deficit  Skin: no skin rashes     DIAGNOSTIC DATA:      EKG Results: Personally reviewed no acute ischemia        Advanced Care Planning       Nadeem Lipscomb MD     "

## 2024-02-15 NOTE — PLAN OF CARE
Problem: Adult Inpatient Plan of Care  Goal: Optimal Comfort and Wellbeing  Outcome: Progressing  Intervention: Monitor Pain and Promote Comfort  Recent Flowsheet Documentation  Taken 2/15/2024 0800 by Jonas Calix, RN  Pain Management Interventions: medication (see MAR)     Problem: Pain Acute  Goal: Optimal Pain Control and Function  Outcome: Progressing  Intervention: Develop Pain Management Plan  Recent Flowsheet Documentation  Taken 2/15/2024 0800 by Jonas Calix, RN  Pain Management Interventions: medication (see MAR)  Intervention: Prevent or Manage Pain  Recent Flowsheet Documentation  Taken 2/15/2024 1530 by Jonas Calix, RN  Sensory Stimulation Regulation:   television on   care clustered  Sleep/Rest Enhancement: awakenings minimized  Medication Review/Management:   medications reviewed   high-risk medications identified  Taken 2/15/2024 0800 by Jonas Calix, RN  Sensory Stimulation Regulation:   television on   care clustered  Sleep/Rest Enhancement: awakenings minimized  Medication Review/Management:   medications reviewed   high-risk medications identified  Intervention: Optimize Psychosocial Wellbeing  Recent Flowsheet Documentation  Taken 2/15/2024 1530 by Jonas Calix, RN  Spiritual Activities Assistance: affirmation provided  Supportive Measures:   active listening utilized   self-care encouraged  Taken 2/15/2024 0800 by Jonas Calix, RN  Spiritual Activities Assistance: affirmation provided  Supportive Measures:   active listening utilized   self-care encouraged   Goal Outcome Evaluation:  Patient A&Ox4, makes needs known. Patient has bilateral tingling in lower extremities from neuropathy. Left foot/ankle edematous, warm and painful to touch. NPO for procedure today. Voiding appropriately in bedside urinal. Had two bowel movements. Blood sugars WNL. Vitally stable on room air. Belongings sent with patient to procedure with insulin pen, and hospital medications in bag. Patient sent to  procedure around 1620. Gave report to SEBASTIAN DODSON. IVF running at 100 mL/hr. Tolerating IV abx.        H

## 2024-02-15 NOTE — ANESTHESIA PREPROCEDURE EVALUATION
Anesthesia Pre-Procedure Evaluation    Patient: Reinaldo Real   MRN: [id number] : 1985        Procedure : Procedure(s):  REMOVAL, FOREIGN BODY left foot          Past Medical History:   Diagnosis Date    Alcohol use disorder, moderate, dependence (H) 2019    Hepatitis B     History of H. pylori infection     TB (tuberculosis)     Type 2 diabetes mellitus without complication, without long-term current use of insulin (H) 2016      History reviewed. No pertinent surgical history.   No Known Allergies   Social History     Tobacco Use    Smoking status: Every Day     Packs/day: .2     Types: Cigarettes     Last attempt to quit: 2013     Years since quitting: 10.2     Passive exposure: Current    Smokeless tobacco: Current     Types: Chew    Tobacco comments:     smokes 0-1/day--Chew betel nut; pt states he started smoking at 12 yo   Substance Use Topics    Alcohol use: No     Comment: Alcoholic Drinks/day: pt states he haven't had a drink since January      Wt Readings from Last 1 Encounters:   24 73.5 kg (162 lb)        Anesthesia Evaluation   Pt has had prior anesthetic.         ROS/MED HX  ENT/Pulmonary:  - neg pulmonary ROS   (+)                tobacco use, Past use,                       Neurologic:  - neg neurologic ROS     Cardiovascular:  - neg cardiovascular ROS     METS/Exercise Tolerance: >4 METS    Hematologic:  - neg hematologic  ROS     Musculoskeletal:  - neg musculoskeletal ROS     GI/Hepatic:  - neg GI/hepatic ROS   (+) GERD,          hepatitis type B,        Renal/Genitourinary:  - neg Renal ROS     Endo:  - neg endo ROS   (+)  type II DM, Last HgA1c: 6.0,  Not using insulin,                 Psychiatric/Substance Use:  - neg psychiatric ROS   (+)   alcohol abuse      Infectious Disease:  - neg infectious disease ROS   (+)      TB,      Malignancy:  - neg malignancy ROS     Other:  - neg other ROS          Physical Exam    Airway        Mallampati: II   TM distance: > 3 FB  "  Neck ROM: full   Mouth opening: > 3 cm    Respiratory Devices and Support         Dental       (+) Minor Abnormalities - some fillings, tiny chips    B=Bridge, C=Chipped, L=Loose, M=Missing    Cardiovascular   cardiovascular exam normal          Pulmonary   pulmonary exam normal                OUTSIDE LABS:  CBC:   Lab Results   Component Value Date    WBC 5.8 02/15/2024    WBC 7.0 02/14/2024    HGB 14.6 02/15/2024    HGB 16.6 02/14/2024    HCT 42.2 02/15/2024    HCT 47.4 02/14/2024     (L) 02/15/2024     (L) 02/14/2024     BMP:   Lab Results   Component Value Date     02/15/2024     02/14/2024    POTASSIUM 3.9 02/15/2024    POTASSIUM 4.3 02/14/2024    CHLORIDE 108 (H) 02/15/2024    CHLORIDE 105 02/14/2024    CO2 24 02/15/2024    CO2 26 02/14/2024    BUN 7.9 02/15/2024    BUN 9.7 02/14/2024    CR 0.67 02/15/2024    CR 0.81 02/14/2024     (H) 02/15/2024     (H) 02/15/2024     COAGS:   Lab Results   Component Value Date    INR 1.07 08/09/2023     POC: No results found for: \"BGM\", \"HCG\", \"HCGS\"  HEPATIC:   Lab Results   Component Value Date    ALBUMIN 4.2 02/14/2024    PROTTOTAL 7.4 02/14/2024    ALT 72 (H) 02/14/2024    AST 50 (H) 02/14/2024     (H) 05/10/2021    ALKPHOS 88 02/14/2024    BILITOTAL 0.5 02/14/2024    BILIDIRECT 0.3 12/12/2014     OTHER:   Lab Results   Component Value Date    LACT 1.5 02/14/2024    A1C 6.0 (H) 11/29/2023    RACHEL 8.3 (L) 02/15/2024    MAG 2.2 09/26/2022    LIPASE 28 02/14/2024    TSH 1.51 09/26/2022    CRP 0.6 02/02/2015    SED 28 (H) 09/27/2022       Anesthesia Plan    ASA Status:  3, emergent    NPO Status:  NPO Appropriate    Anesthesia Type: General.     - Airway: Native airway   Induction: Propofol.   Maintenance: TIVA.        Consents    Anesthesia Plan(s) and associated risks, benefits, and realistic alternatives discussed. Questions answered and patient/representative(s) expressed understanding.     - Discussed: Risks, Benefits and " "Alternatives for BOTH SEDATION and the PROCEDURE were discussed     - Discussed with:  Patient            Postoperative Care    Pain management: Multi-modal analgesia, Peripheral nerve block (Single Shot).   PONV prophylaxis: Ondansetron (or other 5HT-3), Dexamethasone or Solumedrol     Comments:    Other Comments: Reviewed anesthetic options and risks, including risk of dental trauma. Patient agrees to proceed.     GA with native airway, mask oxygen    TIVA propofol    Saph/sciatic blocks for postop pain control per surgeon request.           Luis Mascorro MD    I have reviewed the pertinent notes and labs in the chart from the past 30 days and (re)examined the patient.  Any updates or changes from those notes are reflected in this note.       # Hypocalcemia: Lowest Ca = 8.3 mg/dL in last 2 days, will monitor and replace as appropriate       # Thrombocytopenia: Lowest platelets = 113 in last 2 days, will monitor for bleeding  # Overweight: Estimated body mass index is 27.81 kg/m  as calculated from the following:    Height as of this encounter: 1.626 m (5' 4\").    Weight as of this encounter: 73.5 kg (162 lb).      "

## 2024-02-15 NOTE — ED NOTES
"[facility] ED Handoff Report    ED Chief Complaint: Foot injury, abdominal pain, dizzines    ED Diagnosis:  (L03.116) Cellulitis of left foot  Comment:   Plan:     (M79.5) Foreign body (FB) in soft tissue  Comment:   Plan:        PMH:    Past Medical History:   Diagnosis Date    Alcohol use disorder, moderate, dependence (H) 03/06/2019    Hepatitis B     History of H. pylori infection     TB (tuberculosis)     Type 2 diabetes mellitus without complication, without long-term current use of insulin (H) 11/30/2016        Code Status:  Prior     Falls Risk: No Band: Not applicable    Current Living Situation/Residence: lives with a significant other     Elimination Status: Continent: Yes     Activity Level: Independent    Patients Preferred Language:  Other: Jenifer     Needed: Yes, patient knows a little english    Vital Signs:  BP (!) 138/91   Pulse 81   Temp 99  F (37.2  C) (Oral)   Resp 16   Ht 1.626 m (5' 4\")   Wt 73.5 kg (162 lb)   SpO2 99%   BMI 27.81 kg/m       Cardiac Rhythm:     Pain Score: 3/10    Is the Patient Confused:  No    Last Food or Drink: 02/14/24 at 2030    Focused Assessment:  Reinaldo Real is a 38 year old male who presents for left foot pain after getting a porcupine quill stuck in his left heel on Saturday. He was wearing a boot and was walking in woods (hunting) and the porcupine quill somehow went through his boot and into his ankle. 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 He has continued pain and redness to this area and is wondering if there is still a foreign body in the area. He is up to date on tetanus. He has some body chills, lightheadedness which started a few days ago. He denies cough, chest pain, abdominal pain.      Tests Performed: Done: Labs and Imaging    Treatments Provided:  SEE MAR    Family Dynamics/Concerns: No    Family Updated On Visitor Policy: Yes    Plan of Care Communicated to " Family: Yes    Who Was Updated about Plan of Care: wife    Belongings Checklist Done and Signed by Patient: Yes    Medications sent with patient:     Covid: asymptomatic , not tested    Additional Information:     RN: Roxy Ames RN 2/14/2024 9:09 PM

## 2024-02-16 LAB
ALBUMIN SERPL BCG-MCNC: 3.6 G/DL (ref 3.5–5.2)
ALP SERPL-CCNC: 54 U/L (ref 40–150)
ALT SERPL W P-5'-P-CCNC: 45 U/L (ref 0–70)
ANION GAP SERPL CALCULATED.3IONS-SCNC: 7 MMOL/L (ref 7–15)
AST SERPL W P-5'-P-CCNC: 37 U/L (ref 0–45)
BILIRUB SERPL-MCNC: 0.6 MG/DL
BUN SERPL-MCNC: 9.8 MG/DL (ref 6–20)
CALCIUM SERPL-MCNC: 8.8 MG/DL (ref 8.6–10)
CHLORIDE SERPL-SCNC: 105 MMOL/L (ref 98–107)
CREAT SERPL-MCNC: 0.77 MG/DL (ref 0.67–1.17)
DEPRECATED HCO3 PLAS-SCNC: 24 MMOL/L (ref 22–29)
EGFRCR SERPLBLD CKD-EPI 2021: >90 ML/MIN/1.73M2
ERYTHROCYTE [DISTWIDTH] IN BLOOD BY AUTOMATED COUNT: 12.1 % (ref 10–15)
GLUCOSE BLDC GLUCOMTR-MCNC: 149 MG/DL (ref 70–99)
GLUCOSE BLDC GLUCOMTR-MCNC: 156 MG/DL (ref 70–99)
GLUCOSE BLDC GLUCOMTR-MCNC: 194 MG/DL (ref 70–99)
GLUCOSE BLDC GLUCOMTR-MCNC: 195 MG/DL (ref 70–99)
GLUCOSE BLDC GLUCOMTR-MCNC: 251 MG/DL (ref 70–99)
GLUCOSE SERPL-MCNC: 177 MG/DL (ref 70–99)
HCT VFR BLD AUTO: 42.7 % (ref 40–53)
HGB BLD-MCNC: 14.7 G/DL (ref 13.3–17.7)
MCH RBC QN AUTO: 31.1 PG (ref 26.5–33)
MCHC RBC AUTO-ENTMCNC: 34.4 G/DL (ref 31.5–36.5)
MCV RBC AUTO: 90 FL (ref 78–100)
PLATELET # BLD AUTO: 126 10E3/UL (ref 150–450)
POTASSIUM SERPL-SCNC: 4.1 MMOL/L (ref 3.4–5.3)
PROT SERPL-MCNC: 6.6 G/DL (ref 6.4–8.3)
RBC # BLD AUTO: 4.73 10E6/UL (ref 4.4–5.9)
SODIUM SERPL-SCNC: 136 MMOL/L (ref 135–145)
WBC # BLD AUTO: 8 10E3/UL (ref 4–11)

## 2024-02-16 PROCEDURE — 250N000011 HC RX IP 250 OP 636: Performed by: PODIATRIST

## 2024-02-16 PROCEDURE — 250N000011 HC RX IP 250 OP 636: Performed by: INTERNAL MEDICINE

## 2024-02-16 PROCEDURE — 85048 AUTOMATED LEUKOCYTE COUNT: CPT | Performed by: PODIATRIST

## 2024-02-16 PROCEDURE — 99233 SBSQ HOSP IP/OBS HIGH 50: CPT | Performed by: HOSPITALIST

## 2024-02-16 PROCEDURE — 99232 SBSQ HOSP IP/OBS MODERATE 35: CPT | Performed by: INTERNAL MEDICINE

## 2024-02-16 PROCEDURE — 250N000013 HC RX MED GY IP 250 OP 250 PS 637: Performed by: HOSPITALIST

## 2024-02-16 PROCEDURE — 80053 COMPREHEN METABOLIC PANEL: CPT | Performed by: PODIATRIST

## 2024-02-16 PROCEDURE — 120N000001 HC R&B MED SURG/OB

## 2024-02-16 PROCEDURE — 250N000013 HC RX MED GY IP 250 OP 250 PS 637: Performed by: PODIATRIST

## 2024-02-16 PROCEDURE — C9113 INJ PANTOPRAZOLE SODIUM, VIA: HCPCS | Performed by: PODIATRIST

## 2024-02-16 PROCEDURE — 36415 COLL VENOUS BLD VENIPUNCTURE: CPT | Performed by: PODIATRIST

## 2024-02-16 RX ORDER — PIPERACILLIN SODIUM, TAZOBACTAM SODIUM 3; .375 G/15ML; G/15ML
3.38 INJECTION, POWDER, LYOPHILIZED, FOR SOLUTION INTRAVENOUS EVERY 8 HOURS
Status: COMPLETED | OUTPATIENT
Start: 2024-02-16 | End: 2024-02-17

## 2024-02-16 RX ORDER — CEFUROXIME AXETIL 500 MG/1
500 TABLET ORAL EVERY 12 HOURS SCHEDULED
Status: DISCONTINUED | OUTPATIENT
Start: 2024-02-17 | End: 2024-02-17 | Stop reason: HOSPADM

## 2024-02-16 RX ORDER — PANTOPRAZOLE SODIUM 40 MG/1
40 TABLET, DELAYED RELEASE ORAL
Status: DISCONTINUED | OUTPATIENT
Start: 2024-02-17 | End: 2024-02-17 | Stop reason: HOSPADM

## 2024-02-16 RX ADMIN — PANTOPRAZOLE SODIUM 40 MG: 40 INJECTION, POWDER, FOR SOLUTION INTRAVENOUS at 09:04

## 2024-02-16 RX ADMIN — PIPERACILLIN AND TAZOBACTAM 3.38 G: 3; .375 INJECTION, POWDER, FOR SOLUTION INTRAVENOUS at 21:19

## 2024-02-16 RX ADMIN — PIPERACILLIN AND TAZOBACTAM 3.38 G: 3; .375 INJECTION, POWDER, FOR SOLUTION INTRAVENOUS at 06:44

## 2024-02-16 RX ADMIN — EMPAGLIFLOZIN 25 MG: 25 TABLET, FILM COATED ORAL at 11:45

## 2024-02-16 RX ADMIN — ATORVASTATIN CALCIUM 40 MG: 40 TABLET, FILM COATED ORAL at 09:05

## 2024-02-16 RX ADMIN — PIPERACILLIN AND TAZOBACTAM 3.38 G: 3; .375 INJECTION, POWDER, FOR SOLUTION INTRAVENOUS at 13:20

## 2024-02-16 RX ADMIN — SITAGLIPTIN 100 MG: 25 TABLET, FILM COATED ORAL at 11:45

## 2024-02-16 ASSESSMENT — ACTIVITIES OF DAILY LIVING (ADL)
ADLS_ACUITY_SCORE: 22
ADLS_ACUITY_SCORE: 22
ADLS_ACUITY_SCORE: 20
ADLS_ACUITY_SCORE: 22
ADLS_ACUITY_SCORE: 20
ADLS_ACUITY_SCORE: 20
DEPENDENT_IADLS:: INDEPENDENT
ADLS_ACUITY_SCORE: 22
ADLS_ACUITY_SCORE: 20
ADLS_ACUITY_SCORE: 22
ADLS_ACUITY_SCORE: 20

## 2024-02-16 NOTE — ANESTHESIA POSTPROCEDURE EVALUATION
Patient: Reinaldo Burks Farhan    Procedure: Procedure(s):  REMOVAL, FOREIGN BODY left foot       Anesthesia Type:  General    Note:  Disposition: Inpatient   Postop Pain Control: Uneventful            Sign Out: Well controlled pain   PONV: No   Neuro/Psych: Uneventful            Sign Out: Acceptable/Baseline neuro status   Airway/Respiratory: Uneventful            Sign Out: Acceptable/Baseline resp. status   CV/Hemodynamics: Uneventful            Sign Out: Acceptable CV status; No obvious hypovolemia; No obvious fluid overload   Other NRE: NONE   DID A NON-ROUTINE EVENT OCCUR? No           Last vitals:  Vitals Value Taken Time   /94 02/15/24 1830   Temp 36.2  C (97.2  F) 02/15/24 1821   Pulse 87 02/15/24 1831   Resp 20 02/15/24 1831   SpO2 98 % 02/15/24 1831   Vitals shown include unfiled device data.    Electronically Signed By: Luis Mascorro MD  February 15, 2024  6:33 PM

## 2024-02-16 NOTE — ANESTHESIA CARE TRANSFER NOTE
Patient: Reinaldo Burks Farhan    Procedure: Procedure(s):  REMOVAL, FOREIGN BODY left foot       Diagnosis: Cellulitis of left foot [L03.116]  Foreign body (FB) in soft tissue [M79.5]  Diagnosis Additional Information: No value filed.    Anesthesia Type:   General     Note:    Oropharynx: oropharynx clear of all foreign objects  Level of Consciousness: awake  Oxygen Supplementation: face mask  Level of Supplemental Oxygen (L/min / FiO2): 6  Independent Airway: airway patency satisfactory and stable  Dentition: dentition unchanged  Vital Signs Stable: post-procedure vital signs reviewed and stable  Report to RN Given: handoff report given  Patient transferred to: PACU    Handoff Report: Identifed the Patient, Identified the Reponsible Provider, Reviewed the pertinent medical history, Discussed the surgical course, Reviewed Intra-OP anesthesia mangement and issues during anesthesia, Set expectations for post-procedure period and Allowed opportunity for questions and acknowledgement of understanding      Vitals:  Vitals Value Taken Time   /94 02/15/24 1830   Temp 36.2  C (97.2  F) 02/15/24 1821   Pulse 84 02/15/24 1839   Resp 16 02/15/24 1839   SpO2 97 % 02/15/24 1839   Vitals shown include unfiled device data.    Electronically Signed By: ROXANA Dupont CRNA  February 15, 2024  6:41 PM

## 2024-02-16 NOTE — OP NOTE
Surgery: 2/15/2024    Surgeon: Ag Cheng D.P.M.    Preoperative diagnosis: Foreign body left foot    Postoperative diagnosis: Same    Procedure: Removal foreign body left foot    Anesthesia: General with popliteal block    Hemostasis: Pneumatic thigh tourniquet 20 mmHg    Blood loss: None    Specimen: [place] Quill    Complications: None    Description: The patient was taken to the operating room and placed on table in the prone position.  Under general anesthesia with a popliteal block the left lower extremity was prepped and draped in usual aseptic manner.  Following exsanguination of the left lower extremity with a binding bandage the tourniquet was inflated and formal procedure was performed.  Attention was directed to the posterior aspect of the left heel where a linear longitudinal skin incision was made approximately 3.5 cm in length.  The incision was then deepened with sharp dissection.  Care was taken to identify retract all vital structures.  Wound margins were then undermined.  Dissection was carried down to the level of the Achilles tendon where a large black sharp object was easily visible within the surgical site.  Utilizing sharp dissection the foreign body was removed.  It measured 2.0 cm in length.  The wound was then flushed with copious amounts of sterile saline solution.  Deep closure was then accomplished utilizing 3-0 Monocryl suture material.  Skin closure was accomplished utilizing 4-0 nylon suture material.  A sterile dressing was then applied comprising of Betadine ointment, Adaptic, 4 x 4 gauze, Kerlix and an Ace bandage. The tourniquet was then deflated and normal color returned to all the digits of the left foot.  The patient appeared to tolerate the procedure and anesthesia well and was discharged in good condition.

## 2024-02-16 NOTE — PLAN OF CARE
Problem: Adult Inpatient Plan of Care  Goal: Optimal Comfort and Wellbeing  Outcome: Progressing     Problem: Skin or Soft Tissue Infection  Goal: Absence of Infection Signs and Symptoms  Outcome: Progressing  Intervention: Minimize and Manage Infection Progression  Recent Flowsheet Documentation  Taken 2/16/2024 0415 by Israel Emerson, RN  Infection Prevention: rest/sleep promoted  Taken 2/15/2024 2000 by Israel Emerson, RN  Infection Prevention: rest/sleep promoted   Goal Outcome Evaluation:  A&Ox4. Complained of pain in left foot 5/10, some relief with PRN tylenol. Uses urinal at bedside. Would recommend one assist out of bed. 0200 .

## 2024-02-16 NOTE — PROGRESS NOTES
[facility]    Medicine Progress Note - Hospitalist Service    Date of Admission:  2/14/2024    Assessment & Plan   eRinaldo Real is a 38 year old male who presents for left foot pain after getting a porcupine quill stuck in his left heel on Saturday. He was wearing a boot and was walking in woods (hunting) and the porcupine quill somehow went through his boot and into his ankle.      Retained porcupine Quill in the left heel/achilles tendon with local infection, Achilles tendonitis  Left heel pain and swelling  -CT 2/14: skin thickening and edema over calcaneous, foreign body within achilles with associated tendonitis  -Ultrasound with foreign body noted with surrounding edema  -Patient is up-to-date with tetanus per ED note.  -Pt underwent foreign body removal in OR on 2/15 by podiatry.  -Follow-up on cultures from the OR if sent. Blood cx neg so far  -Continue IV Zosyn, pain control  -Appreciate podiatry and ID input.     Lactic acidosis- resolved with IVF    Hyperlipidemia-PTA statin     Type 2 diabetes  -A1c 6  -Holding PTA metformin.    -Resume PTA Januvia and Jardiance  -Continue sliding scale 3 times daily and at bedtime.  -Monitor blood sugars, hypoglycemia protocol     Mild epigastric pain- resolved  -History of H. pylori infection in the past, possible gastritis  -On PPI  -Follow-up in primary care for definitive testing     Alcohol use disorder  -Not currently in withdrawal  -Monitor for now     History of TB in the past  -Treated in 2013, had exposure in 2019 but latenet infection ruled out by ID 11/2022  -Follow-up in primary care     Hepatitis B infection  Cirrhosis  -Mild LFT elevation noted  -Normal bilirubin  -Follow-up in primary care        Diet: Moderate Consistent Carb (60 g CHO per Meal) Diet    DVT Prophylaxis: Pneumatic Compression Devices  Moody Catheter: Not present  Lines: None     Cardiac Monitoring: None  Code Status: Full Code      Clinically Significant Risk  "Factors          # Hypocalcemia: Lowest Ca = 8.3 mg/dL in last 2 days, will monitor and replace as appropriate       # Thrombocytopenia: Lowest platelets = 113 in last 2 days, will monitor for bleeding          # Overweight: Estimated body mass index is 28.15 kg/m  as calculated from the following:    Height as of this encounter: 1.626 m (5' 4\").    Weight as of this encounter: 74.4 kg (164 lb 0.4 oz)., PRESENT ON ADMISSION            Disposition Plan      Expected Discharge Date: 02/17/2024    Discharge Delays: IV Medication - consider oral or Home Infusion    Discharge Comments: Pending furhter ID and Podiatry reccs            Gardenia Juares MD  Hospitalist Service  [facility]  Securely message with [employer] (more info)  Text page via [employer] Paging/Directory   ______________________________________________________________________    Interval History     Patient is new to me. Chart reviewed and events noted.  Patient seen and examined with current  over the phone  Reports mild pain in the left heel.  Denies any fever, shortness of breath, nausea, vomiting.  No calf pain reported.      Physical Exam   Vital Signs: Temp: 98.2  F (36.8  C) Temp src: Oral BP: 103/57 Pulse: 66   Resp: 18 SpO2: 98 % O2 Device: None (Room air) Oxygen Delivery: 6 LPM  Weight: 164 lbs .36 oz    General Appearance: Awake, alert, in no acute distress  Respiratory: CTAB, no wheeze  Cardiovascular: RRR, no murmur noted  GI: soft, nontender, non distended, normal bowel sounds  Skin: Left ankle in clean ACE wrap.  Neurology: Grossly normal  Psychiatry: Appropriate affect      Medical Decision Making       >45 MINUTES SPENT BY ME on the date of service doing chart review, history, exam, documentation & further activities per the note.      Plan discussed with patient,SW/CM    Data     I have personally reviewed the following data over the past 24 hrs:    8.0  \   14.7   / 126 (L)     136 105 9.8 /  195 (H)   4.1 " "24 0.77 \     ALT: 45 AST: 37 AP: 54 TBILI: 0.6   ALB: 3.6 TOT PROTEIN: 6.6 LIPASE: N/A       Imaging results reviewed over the past 24 hrs:   Recent Results (from the past 24 hour(s))   POC US Guidance Needle Placement    Narrative    Ultrasound was performed as guidance to an anesthesia procedure.  Click   \"PACS images\" hyperlink below to view any stored images.  For specific   procedure details, view procedure note authored by anesthesia.   POC US Guidance Needle Placement    Narrative    Ultrasound was performed as guidance to an anesthesia procedure.  Click   \"PACS images\" hyperlink below to view any stored images.  For specific   procedure details, view procedure note authored by anesthesia.     "

## 2024-02-16 NOTE — PROGRESS NOTES
"INFECTIOUS DISEASE FOLLOW UP NOTE      ASSESSMENT:  Left heel infection with foreign body. Injury from reported porcupine quill. S/p I+D with foreign body removal 2/15 -- tissue not appearing overly infected, no cultures sent.   Treated for Latent TB 2013  Hepatitis B, last viral load was low level positive. Has seen GI Dr. Leventhal previously. Should have follow up.   H/o Klebsiella lung abscess, hypermucoviscous strain 2022.     PLAN:  Plan 5 days cefuroxime 500mg BID at discharge  Follow up with hepatology for Hep B  ID plan in place. I will sign off. Please call if questions.     Hans Davila MD  [facility]  [phone] clinic  [employer] paging    ______________________________________________________________________    SUBJECTIVE / INTERVAL HISTORY: feels better. Nerve block still in effect at L foot. No fever. Reviewed operative findings.     ROS: All other systems negative except as listed above.        OBJECTIVE:  /57 (BP Location: Right arm)   Pulse 66   Temp 98.2  F (36.8  C) (Oral)   Resp 18   Ht 1.626 m (5' 4\")   Wt 74.4 kg (164 lb 0.4 oz)   SpO2 98%   BMI 28.15 kg/m           GEN: No acute distress.    RESPIRATORY:  Normal breathing pattern.  CARDIOVASCULAR:  Regular rate and rhythm.   ABDOMEN:  deferred  EXTREMITIES: L ankle wrapped  SKIN/HAIR/NAILS:  No rashes  IV: peripheral        Antibiotics:  pip/tazo 2/15-  Levofloxacin, amp/sulbactam 2/14    Pertinent labs:    Recent Labs   Lab 02/16/24  0743 02/15/24  0755 02/14/24  1424   WBC 8.0 5.8 7.0   HGB 14.7 14.6 16.6   HCT 42.7 42.2 47.4   * 113* 131*        Recent Labs   Lab 02/16/24  0743 02/15/24  0755 02/14/24  1424    138 140   CO2 24 24 26   BUN 9.8 7.9 9.7        Lab Results   Component Value Date    CRP 0.6 02/02/2015         Lab Results   Component Value Date    ALT 45 02/16/2024    AST 37 02/16/2024     (H) 05/10/2021    ALKPHOS 54 02/16/2024         MICROBIOLOGY DATA:  2/14 blood " "negative to date    RADIOLOGY:  POC US Guidance Needle Placement    Result Date: 2/15/2024  Ultrasound was performed as guidance to an anesthesia procedure.  Click \"PACS images\" hyperlink below to view any stored images.  For specific procedure details, view procedure note authored by anesthesia.    POC US Guidance Needle Placement    Result Date: 2/15/2024  Ultrasound was performed as guidance to an anesthesia procedure.  Click \"PACS images\" hyperlink below to view any stored images.  For specific procedure details, view procedure note authored by anesthesia.    CT Ankle Left w/o Contrast    Result Date: 2/14/2024  EXAM: CT ANKLE LEFT W/O CONTRAST LOCATION: [facility] DATE: 2/14/2024 INDICATION: History of puncture wound (porcupine quill) with redness and swelling. COMPARISON: Radiographs, ultrasound, same date TECHNIQUE: Noncontrast. Axial, sagittal and coronal thin-section reconstruction. Dose reduction techniques were used. FINDINGS: BONES: -Normal. SOFT TISSUES: -Skin thickening and subcutaneous edema over the posterior aspect of the heel and ankle, compatible with redness and swelling as clinically evident. There is skin irregularity that reflects laceration (axial series 4 image 38). There is linear radiolucency within the substance of the Achilles tendon (see axial series 4 image 36, sagittal series 6 images 25-26), which is suspicious for a retained radiolucent foreign body, but difficult to completely confirm. The ultrasound would be more sensitive to the presence of residual foreign body. The degree of thickening and intrasubstance heterogeneity suggests that there is at least some component of background Achilles tendinopathy. -No ankle or subtalar joint effusions. -Soft tissues are otherwise unremarkable.     IMPRESSION: 1.  Skin thickening and subcutaneous edema over the posterior aspect of the calcaneus and ankle, may be cellulitis or posttraumatic edema. 2.  Skin irregularity " over the posterior aspect of the heel compatible with laceration/puncture wound. 3.  Linear hypoattenuation within the substance of the Achilles, is suspicious/concerning for residual radiolucent foreign body. However, ultrasound would be more sensitive for discriminating a small amount of fluid in a penetrating wound defect versus residual echogenic foreign body. 4.  Additional swelling and heterogeneous intrasubstance signal in the Achilles tendon that suggests a background of at least mild tendinosis. 5.  No significant bone or joint abnormality.     US Lower Extremity Non Vascular Left    Result Date: 2/14/2024  EXAM: US LOWER EXTREMITY NON VASCULAR LEFT LOCATION: [facility] DATE: 2/14/2024 INDICATION: heel foreign body rule out COMPARISON: None. TECHNIQUE: Routine. FINDINGS/    IMPRESSION: There is a 1.5 cm long linear echogenic foreign body in the area of symptoms with mild surrounding soft tissue edema. This is approximately 1 cm superior to the puncture wound. No discrete abscess or drainable collection.    Foot XR, G/E 3 views, left    Result Date: 2/14/2024  EXAM: XR FOOT LEFT G/E 3 VIEWS LOCATION: [facility] DATE: 2/14/2024 INDICATION: foreign body COMPARISON: None.     IMPRESSION: Normal joint spaces and alignment. No acute fracture. No radiopaque foreign body. Soft tissue thickening of the distal insertional Achilles tendon which may be sequela of prior trauma and/or chronic tendinosis.

## 2024-02-16 NOTE — CONSULTS
Care Management Initial Consult    General Information  Assessment completed with: Patient,    Type of CM/SW Visit: Initial Assessment    Primary Care Provider verified and updated as needed: Yes   Readmission within the last 30 days: no previous admission in last 30 days         Advance Care Planning: Advance Care Planning Reviewed:  (no HCD)          Communication Assessment  Patient's communication style: spoken language (English or Bilingual)    Hearing Difficulty or Deaf: no   Wear Glasses or Blind: no    Cognitive  Cognitive/Neuro/Behavioral: WDL  Level of Consciousness: alert  Arousal Level: opens eyes spontaneously     Mood/Behavior: calm, cooperative  Best Language: 0 - No aphasia  Speech: clear, spontaneous, logical    Living Environment:   People in home: child(jean-claude), dependent, spouse     Current living Arrangements: house      Able to return to prior arrangements: yes       Family/Social Support:  Care provided by: self  Provides care for: child(jean-claude)  Marital Status:   Wife          Description of Support System: Supportive, Involved         Current Resources:   Patient receiving home care services: No     Community Resources: None  Equipment currently used at home: none  Supplies currently used at home: None    Employment/Financial:  Employment Status: employed part-time          Does the patient's insurance plan have a 3 day qualifying hospital stay waiver?  Yes     Which insurance plan 3 day waiver is available? [employer]    Will the waiver be used for post-acute placement? No    Lifestyle & Psychosocial Needs:  Social Determinants of Health     Food Insecurity: Not on file   Depression: Not at risk (8/9/2023)    PHQ-2     PHQ-2 Score: 0   Housing Stability: Not on file   Tobacco Use: High Risk (2/15/2024)    Patient History     Smoking Tobacco Use: Every Day     Smokeless Tobacco Use: Current     Passive Exposure: Current   Financial Resource Strain: Not on file   Alcohol Use: Not on file    Transportation Needs: Not on file   Physical Activity: Not on file   Interpersonal Safety: Not on file   Stress: Not on file   Social Connections: Not on file       Functional Status:  Prior to admission patient needed assistance:   Dependent ADLs:: Independent  Dependent IADLs:: Independent     Additional Information:    Assessment completed with patient. Patient reports he lives in his house with spouse and three children. He is independent with ADLs and IADLs, ambulates without devices, drives and works part-time. Family will transport at discharge.     Final discharge plan pending antibiotic recommendations at discharge.         Francia Dey RN

## 2024-02-17 VITALS
RESPIRATION RATE: 20 BRPM | HEIGHT: 64 IN | HEART RATE: 70 BPM | BODY MASS INDEX: 28 KG/M2 | SYSTOLIC BLOOD PRESSURE: 110 MMHG | DIASTOLIC BLOOD PRESSURE: 71 MMHG | OXYGEN SATURATION: 98 % | WEIGHT: 164.02 LBS | TEMPERATURE: 98 F

## 2024-02-17 LAB — GLUCOSE BLDC GLUCOMTR-MCNC: 95 MG/DL (ref 70–99)

## 2024-02-17 PROCEDURE — 250N000013 HC RX MED GY IP 250 OP 250 PS 637: Performed by: HOSPITALIST

## 2024-02-17 PROCEDURE — 99239 HOSP IP/OBS DSCHRG MGMT >30: CPT | Performed by: HOSPITALIST

## 2024-02-17 PROCEDURE — 250N000013 HC RX MED GY IP 250 OP 250 PS 637: Performed by: INTERNAL MEDICINE

## 2024-02-17 PROCEDURE — 250N000013 HC RX MED GY IP 250 OP 250 PS 637: Performed by: PODIATRIST

## 2024-02-17 RX ORDER — CEFUROXIME AXETIL 500 MG/1
500 TABLET ORAL EVERY 12 HOURS
Qty: 10 TABLET | Refills: 0 | Status: SHIPPED | OUTPATIENT
Start: 2024-02-17 | End: 2024-02-22

## 2024-02-17 RX ADMIN — PANTOPRAZOLE SODIUM 40 MG: 40 TABLET, DELAYED RELEASE ORAL at 06:24

## 2024-02-17 RX ADMIN — CEFUROXIME AXETIL 500 MG: 500 TABLET ORAL at 08:36

## 2024-02-17 RX ADMIN — SITAGLIPTIN 100 MG: 25 TABLET, FILM COATED ORAL at 08:36

## 2024-02-17 RX ADMIN — EMPAGLIFLOZIN 25 MG: 25 TABLET, FILM COATED ORAL at 08:36

## 2024-02-17 RX ADMIN — ATORVASTATIN CALCIUM 40 MG: 40 TABLET, FILM COATED ORAL at 08:36

## 2024-02-17 ASSESSMENT — ACTIVITIES OF DAILY LIVING (ADL)
ADLS_ACUITY_SCORE: 22

## 2024-02-17 NOTE — PLAN OF CARE
Problem: Pain Acute  Goal: Optimal Pain Control and Function  Outcome: Progressing  Intervention: Prevent or Manage Pain  Recent Flowsheet Documentation  Taken 2/16/2024 1645 by Katerin Nix RN  Medication Review/Management: medications reviewed  Taken 2/16/2024 0900 by Katerin Nix, IVORY  Medication Review/Management: medications reviewed     Problem: Skin or Soft Tissue Infection  Goal: Absence of Infection Signs and Symptoms  Outcome: Progressing   Goal Outcome Evaluation:         Pt AXO able to make his needs known. Pt denies any pain, and uses urinal at bedside.Pt's BG were controlled with the scheduled medications and sliding scale insulin. Abx.

## 2024-02-17 NOTE — PLAN OF CARE
Goal Outcome Evaluation:         PT able to ambulate to the bathroom with his boots on the left foot with a standby assist of staff. Pt denied any pain. Went through discharge teachings with Pt and wife at bedside. Both Pt and wife stated understanding of his discharge instructions. Pt was taken home by his wife.

## 2024-02-17 NOTE — PROGRESS NOTES
"Care Management Discharge Note    Discharge Date: 02/17/2024       Discharge Disposition: Home    Discharge Services: None    Discharge DME: None    Discharge Transportation: family or friend will provide    Private pay costs discussed: Not applicable    Does the patient's insurance plan have a 3 day qualifying hospital stay waiver?  Yes     Which insurance plan 3 day waiver is available? Alternative insurance waiver    Will the waiver be used for post-acute placement? No    PAS Confirmation Code: N/A  Patient/family educated on Medicare website which has current facility and service quality ratings: no    Education Provided on the Discharge Plan: Yes  Persons Notified of Discharge Plans: per team  Patient/Family in Agreement with the Plan: yes    Handoff Referral Completed: Yes    Additional Information:  Plan is for Pt to discharge home where he lives with his wife and three children. Pt is independent and works part-time. Per ID, Pt will switch and discharge with PO antibiotics. Family to transport. No CM needs requested or identified.     CM will sign off. Please contact CM if any additional needs arise prior to discharge.      Social History:  \"Patient reports he lives in his house with spouse and three children. He is independent with ADLs and IADLs, ambulates without devices, drives and works part-time. Family will transport at discharge.\"       MODESTA Pedersen      "

## 2024-02-17 NOTE — DISCHARGE SUMMARY
[facility] MEDICINE  DISCHARGE SUMMARY     Primary Care Physician: Cassy Berrios  Admission Date: 2/14/2024   Discharge Provider: Gardenia Juares MD Discharge Date: 2/17/2024   Diet:   Active Diet and Nourishment Order   Procedures    Moderate Consistent Carb (60 g CHO per Meal) Diet    Diet       Code Status: Full Code   Activity: NWB on left foot        Condition at Discharge: Stable     REASON FOR PRESENTATION(See Admission Note for Details)     Left foot cellulitis, foreign body    PRINCIPAL & ACTIVE DISCHARGE DIAGNOSES     Principal Problem:    Foreign body (FB) in soft tissue  Active Problems:    Cellulitis of left foot      PENDING LABS     Unresulted Labs Ordered in the Past 30 Days of this Admission       Date and Time Order Name Status Description    2/14/2024  9:56 PM Blood Culture Line, venous Preliminary     2/14/2024  9:56 PM Blood Culture Arm, Left Preliminary               PROCEDURES ( this hospitalization only)      Procedure(s):  REMOVAL, FOREIGN BODY LEFT FOOT    RECOMMENDATIONS TO OUTPATIENT PROVIDER FOR F/U VISIT     Follow-up Appointments     Follow-up and recommended labs and tests       Follow up with primary care provider, Cassy Berrios, within 7 days for   hospital follow- up.  Monitor liver function and Rx of Hep B  Follow up with Podiatry in 1 week with               DISPOSITION     Home    SUMMARY OF HOSPITAL COURSE:    Reinaldo Real is a 38 year old male who presents for left foot pain after getting a porcupine quill stuck in his left heel on Saturday. He was wearing a boot and was walking in woods (hunting) and the porcupine quill somehow went through his boot and into his ankle.       Retained porcupine Quill in the left heel/achilles tendon with local infection, Achilles tendonitis  Left heel pain and swelling  -CT 2/14: skin thickening and edema over calcaneous, foreign body within achilles with associated tendonitis  -Ultrasound with  foreign body noted with surrounding edema  -Patient is up-to-date with tetanus per ED note.  -Pt underwent foreign body removal in OR on 2/15 by podiatry.  -Blood cx neg so far  -Treated with IV Zosyn-->switched to cefuroxime X 5 days  -Appreciate podiatry and ID input. Follow up with Podiatry in 1 week.      Lactic acidosis- resolved with IVF     Hyperlipidemia-PTA statin     Type 2 diabetes  -A1c 6  -Continue PTA metformin, Januvia and Jardiance       Mild epigastric pain- resolved  -History of H. 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 Please refer to discharge medications and instructions for more details.           Discharge Medications with Med changes:     Current Discharge Medication List        START taking these medications    Details   cefuroxime (CEFTIN) 500 MG tablet Take 1 tablet (500 mg) by mouth every 12 hours for 5 days  Qty: 10 tablet, Refills: 0    Associated Diagnoses: Foreign body (FB) in soft tissue           CONTINUE these medications which have NOT CHANGED    Details   acetaminophen (TYLENOL) 325 MG tablet Take 2 tablets (650 mg) by mouth every 6 hours as needed for mild pain or other (and adjunct with moderate or severe pain or per patient request)  Qty: 100 tablet, Refills: 3    Associated Diagnoses: Pain      atorvastatin (LIPITOR) 40 MG tablet Take 1 tablet (40 mg) by mouth daily  Qty: 90 tablet, Refills: 3      blood glucose (CONTOUR NEXT TEST) test strip Use to test blood sugar 2 times daily or as directed.  Qty: 200 strip, Refills: 4    Associated  Diagnoses: Type 2 diabetes mellitus without complication, without long-term current use of insulin (H)      Continuous Blood Gluc Sensor (FREESTYLE DEVON 2 SENSOR) MISC 1 each every 14 days Use 1 sensor every 14 days. Use to read blood sugars per 's instructions.  Qty: 2 each, Refills: 5    Associated Diagnoses: Type 2 diabetes mellitus without complication, without long-term current use of insulin (H)      empagliflozin (JARDIANCE) 25 MG TABS tablet Take 1 tablet (25 mg) by mouth daily  Qty: 90 tablet, Refills: 3      metFORMIN (GLUCOPHAGE XR) 500 MG 24 hr tablet TAKE 2 TABLETS (1,000 MG TOTAL) BY MOUTH ONCE DAILY FOR DIABETES  Qty: 60 tablet, Refills: 11    Comments: Decrease dose  Associated Diagnoses: Type 2 diabetes mellitus without complication, without long-term current use of insulin (H)      Microlet Lancets MISC Use to test blood sugars two times daily as directed.  Qty: 200 each, Refills: 4    Associated Diagnoses: Type 2 diabetes mellitus without complication, without long-term current use of insulin (H)      omeprazole (PRILOSEC) 20 MG DR capsule TAKE 1 CAPSULE (20 MG TOTAL) BY MOUTH 2 (TWO) TIMES A DAY BEFORE MEALS FOR STOMACH  Qty: 90 capsule, Refills: 3    Associated Diagnoses: Encounter for medication refill      sitagliptin (JANUVIA) 100 MG tablet Take 1 tablet (100 mg) by mouth daily  Qty: 30 tablet, Refills: 11    Associated Diagnoses: Type 2 diabetes mellitus without complication, without long-term current use of insulin (H)                   Rationale for medication changes:      See med rec        Consults       PODIATRY IP CONSULT  INFECTIOUS DISEASES IP CONSULT  CARE MANAGEMENT / SOCIAL WORK IP CONSULT    Immunizations given this encounter     Most Recent Immunizations   Administered Date(s) Administered    COVID-19 MONOVALENT 12+ (Pfizer) 05/22/2021    HepB 11/27/2013    Influenza (IIV3) PF 10/23/2013    Influenza Vaccine >6 months,quad, PF 10/02/2022    Influenza Vaccine, 6+MO  "IM (QUADRIVALENT W/PRESERVATIVES) 11/02/2016    MMR 03/21/2013    TD,PF 7+ (Tenivac) 01/14/2013    TDAP (Adacel,Boostrix) 05/21/2013    TDAP Vaccine (Boostrix) 11/27/2013    Varicella Pt Report Hx of Varicella/Chicken Pox 12/06/2013           Anticoagulation Information      Recent INR results: No results for input(s): \"INR\" in the last 168 hours.  Warfarin doses (if applicable) or name of other anticoagulant: N/A      SIGNIFICANT IMAGING FINDINGS     Results for orders placed or performed during the hospital encounter of 02/14/24   Foot XR, G/E 3 views, left    Impression    IMPRESSION: Normal joint spaces and alignment. No acute fracture. No radiopaque foreign body. Soft tissue thickening of the distal insertional Achilles tendon which may be sequela of prior trauma and/or chronic tendinosis.   US Lower Extremity Non Vascular Left    Impression    IMPRESSION: There is a 1.5 cm long linear echogenic foreign body in the area of symptoms with mild surrounding soft tissue edema. This is approximately 1 cm superior to the puncture wound. No discrete abscess or drainable collection.   CT Ankle Left w/o Contrast    Impression    IMPRESSION:  1.  Skin thickening and subcutaneous edema over the posterior aspect of the calcaneus and ankle, may be cellulitis or posttraumatic edema.    2.  Skin irregularity over the posterior aspect of the heel compatible with laceration/puncture wound.    3.  Linear hypoattenuation within the substance of the Achilles, is suspicious/concerning for residual radiolucent foreign body. However, ultrasound would be more sensitive for discriminating a small amount of fluid in a penetrating wound defect versus   residual echogenic foreign body.    4.  Additional swelling and heterogeneous intrasubstance signal in the Achilles tendon that suggests a background of at least mild tendinosis.    5.  No significant bone or joint abnormality.         SIGNIFICANT LABORATORY FINDINGS         Discharge " Orders        Adult GI  Referral - Consult Only      Primary Care - Care Coordination Referral      Reason for your hospital stay    Left heel foreign body     Activity    Your activity upon discharge: Non weight bearing on left foot.     Follow-up and recommended labs and tests     Follow up with primary care provider, Cassy Berrios, within 7 days for hospital follow- up.  Monitor liver function and Rx of Hep B  Follow up with Podiatry in 1 week with      Diet    Follow this diet upon discharge: Orders Placed This Encounter      Moderate Consistent Carb (60 g CHO per Meal) Diet       Examination   Physical Exam   Temp:  [97.6  F (36.4  C)-98  F (36.7  C)] 98  F (36.7  C)  Pulse:  [66-72] 70  Resp:  [18-20] 20  BP: (108-117)/(63-71) 110/71  SpO2:  [97 %-99 %] 98 %  Wt Readings from Last 1 Encounters:   02/15/24 74.4 kg (164 lb 0.4 oz)       General Appearance:  Awake, alert, in no acute distress  Respiratory: CTAB, no wheeze  Cardiovascular: RRR  GI: soft, nontender, non distended, normal bowel sounds  Skin: Left ankle in clean ACE wrap.  Neurology: Grossly normal  Psychiatry: Appropriate affect      Please see EMR for more detailed significant labs, imaging, consultant notes etc.    IGardenia MD, personally saw the patient today and spent greater than 30 minutes discharging this patient.    Gardenia Juares MD  [facility]    CC:Cassy Berrios

## 2024-02-17 NOTE — PLAN OF CARE
Problem: Pain Acute  Goal: Optimal Pain Control and Function  Outcome: Progressing  Intervention: Prevent or Manage Pain  Recent Flowsheet Documentation  Taken 2/17/2024 0018 by Farhad Block, RN  Medication Review/Management: medications reviewed  Taken 2/16/2024 2010 by Farhad Block, RN  Medication Review/Management: medications reviewed   Goal Outcome Evaluation:       Pt alert and oriented. No complain of pain or discomfort. Non weight bearing on left foot. IV antibiotics administered as ordered. BG at hs 149 no insulin coverage given. VSS on room air. No acute changes noted this shift.

## 2024-02-19 LAB
ATRIAL RATE - MUSE: 73 BPM
DIASTOLIC BLOOD PRESSURE - MUSE: NORMAL MMHG
INTERPRETATION ECG - MUSE: NORMAL
P AXIS - MUSE: 85 DEGREES
PR INTERVAL - MUSE: 138 MS
QRS DURATION - MUSE: 104 MS
QT - MUSE: 388 MS
QTC - MUSE: 427 MS
R AXIS - MUSE: 41 DEGREES
SYSTOLIC BLOOD PRESSURE - MUSE: NORMAL MMHG
T AXIS - MUSE: 52 DEGREES
VENTRICULAR RATE- MUSE: 73 BPM

## 2024-02-20 ENCOUNTER — PATIENT OUTREACH (OUTPATIENT)
Dept: CARE COORDINATION | Facility: CLINIC | Age: 39
End: 2024-02-20
Payer: COMMERCIAL

## 2024-02-20 DIAGNOSIS — K74.60 CIRRHOSIS OF LIVER WITHOUT ASCITES, UNSPECIFIED HEPATIC CIRRHOSIS TYPE (H): ICD-10-CM

## 2024-02-20 DIAGNOSIS — B19.10 HEPATITIS B INFECTION WITHOUT DELTA AGENT WITHOUT HEPATIC COMA, UNSPECIFIED CHRONICITY: Primary | ICD-10-CM

## 2024-02-20 LAB
BACTERIA BLD CULT: NO GROWTH
BACTERIA BLD CULT: NO GROWTH

## 2024-02-20 NOTE — PROGRESS NOTES
Clinic Care Coordination Contact  [facility]: Post-Discharge Note  SITUATION                                                      Admission:    Admission Date: 02/14/24      Discharge:   Discharge Date: 02/17/24  Discharge Diagnosis: cellulitis    BACKGROUND                                                      Per hospital discharge summary and inpatient provider notes:  Reinaldo Real is a 38 year old male who presents for left foot pain after getting a porcupine quill stuck in his left heel on Saturday. He was wearing a boot and was walking in woods (hunting) and the porcupine quill somehow went through his boot and into his ankle.         ASSESSMENT           Discharge Assessment  How are you doing now that you are home?: well  How are your symptoms? (Red Flag symptoms escalate to triage hotline per guidelines): Improved  Do you feel your condition is stable enough to be safe at home until your provider visit?: Yes  Does the patient have their discharge instructions? : Yes  Does the patient have questions regarding their discharge instructions? : No  Were you started on any new medications or were there changes to any of your previous medications? : Yes  Does the patient have all of their medications?: Yes  Do you have questions regarding any of your medications? : No  Discharge follow-up appointment scheduled within 14 calendar days? : Yes  Discharge Follow Up Appointment Date: 02/26/24  Discharge Follow Up Appointment Scheduled with?: Specialty Care Provider         Post-op (Clinicians Only)  Did the patient have surgery or a procedure: No  Chills: No  Redness: No  Warmth: No  Swelling: No  Eating & Drinking: eating and drinking without complaints/concerns  PO Intake: regular diet  Urinary Status: voiding without complaint/concerns    Care Management       Care Mgmt General Assessment                         PLAN                                                      Outpatient Plan:     Future Appointments    Date Time Provider Department [place]   2/26/2024  3:15 PM Ag Cheng DPM [facility]   2/28/2024  3:00 PM [facility]   2/28/2024  4:00 PM Laxmi Patrick PA-C [facility]   3/4/2024  3:20 PM Ag Cheng DPM [facility]   3/21/2024  3:00 PM [facility]         For any urgent concerns, please contact our 24 hour nurse triage line: [phone]       BRIANA Antonio

## 2024-02-26 ENCOUNTER — OFFICE VISIT (OUTPATIENT)
Dept: PODIATRY | Facility: CLINIC | Age: 39
End: 2024-02-26
Payer: COMMERCIAL

## 2024-02-26 VITALS — HEART RATE: 80 BPM | OXYGEN SATURATION: 100 % | SYSTOLIC BLOOD PRESSURE: 131 MMHG | DIASTOLIC BLOOD PRESSURE: 91 MMHG

## 2024-02-26 DIAGNOSIS — M79.5 RESIDUAL FOREIGN BODY IN SOFT TISSUE: Primary | ICD-10-CM

## 2024-02-26 PROCEDURE — 99024 POSTOP FOLLOW-UP VISIT: CPT | Performed by: PODIATRIST

## 2024-02-26 ASSESSMENT — PAIN SCALES - GENERAL: PAINLEVEL: NO PAIN (1)

## 2024-02-26 NOTE — PROGRESS NOTES
Subjective findings: The patient return to the clinic today for postop visit #1, 1 week status post removal foreign body left foot.  The patient is in good spirits and he had no complaints.    Objective findings: The dressings were removed and wound margins are well coaptated and maintained.  There is no edema, erythema, cellulitis, drainage or bleeding noted.  Neurovascular status is intact.  Vital signs stable.    Impression: Foreign body left foot    Plan: Applied bandage over the incision site.  I am instructed the patient to keep the area clean and dry for an additional week.  He is to return to clinic in 1 week for postop visit #2 at which time the sutures will be removed.

## 2024-02-26 NOTE — Clinical Note
2/26/2024         RE: Reinaldo Real  [address] [place] [address]        Dear Colleague,    Thank you for referring your patient, Reinaldo Real, to the [facility]. Please see a copy of my visit note below.    Subjective findings: The patient return to the clinic today for postop visit #1, 1 week status post removal foreign body left foot.  The patient is in good spirits and he had no complaints.    Objective findings: The dressings were removed and wound margins are well coaptated and maintained.  There is no edema, erythema, cellulitis, drainage or bleeding noted.  Neurovascular status is intact.  Vital signs stable.    Impression: Foreign body left foot    Plan: Applied bandage over the incision site.  I am instructed the patient to keep the area clean and dry for an additional week.  He is to return to clinic in 1 week for postop visit #2 at which time the sutures will be removed.      Again, thank you for allowing me to participate in the care of your patient.        Sincerely,        Ag Cheng DPM

## 2024-02-28 ENCOUNTER — LAB (OUTPATIENT)
Dept: LAB | Facility: CLINIC | Age: 39
End: 2024-02-28
Payer: COMMERCIAL

## 2024-02-28 ENCOUNTER — OFFICE VISIT (OUTPATIENT)
Dept: GASTROENTEROLOGY | Facility: CLINIC | Age: 39
End: 2024-02-28
Attending: INTERNAL MEDICINE
Payer: COMMERCIAL

## 2024-02-28 VITALS
SYSTOLIC BLOOD PRESSURE: 122 MMHG | WEIGHT: 168.5 LBS | HEART RATE: 75 BPM | RESPIRATION RATE: 18 BRPM | DIASTOLIC BLOOD PRESSURE: 78 MMHG | BODY MASS INDEX: 28.77 KG/M2 | HEIGHT: 64 IN | TEMPERATURE: 98.5 F | OXYGEN SATURATION: 98 %

## 2024-02-28 DIAGNOSIS — K74.60 CIRRHOSIS OF LIVER WITHOUT ASCITES, UNSPECIFIED HEPATIC CIRRHOSIS TYPE (H): ICD-10-CM

## 2024-02-28 DIAGNOSIS — K76.0 HEPATIC STEATOSIS: ICD-10-CM

## 2024-02-28 DIAGNOSIS — B18.1 HEPATITIS B, CHRONIC (H): Primary | ICD-10-CM

## 2024-02-28 DIAGNOSIS — B19.10 HEPATITIS B INFECTION WITHOUT DELTA AGENT WITHOUT HEPATIC COMA, UNSPECIFIED CHRONICITY: ICD-10-CM

## 2024-02-28 DIAGNOSIS — K21.00 GASTROESOPHAGEAL REFLUX DISEASE WITH ESOPHAGITIS, UNSPECIFIED WHETHER HEMORRHAGE: ICD-10-CM

## 2024-02-28 PROBLEM — R76.11 NONSPECIFIC REACTION TO TUBERCULIN SKIN TEST WITHOUT ACTIVE TUBERCULOSIS: Status: ACTIVE | Noted: 2024-02-28

## 2024-02-28 PROBLEM — E78.1 HYPERTRIGLYCERIDEMIA: Status: ACTIVE | Noted: 2020-03-19

## 2024-02-28 LAB
ALBUMIN SERPL BCG-MCNC: 4.6 G/DL (ref 3.5–5.2)
ALP SERPL-CCNC: 144 U/L (ref 40–150)
ALT SERPL W P-5'-P-CCNC: NORMAL U/L
ANION GAP SERPL CALCULATED.3IONS-SCNC: 13 MMOL/L (ref 7–15)
AST SERPL W P-5'-P-CCNC: NORMAL U/L
BILIRUB DIRECT SERPL-MCNC: NORMAL MG/DL
BILIRUB SERPL-MCNC: 0.3 MG/DL
BUN SERPL-MCNC: 10.5 MG/DL (ref 6–20)
CALCIUM SERPL-MCNC: 9.4 MG/DL (ref 8.6–10)
CHLORIDE SERPL-SCNC: 101 MMOL/L (ref 98–107)
CREAT SERPL-MCNC: 0.89 MG/DL (ref 0.67–1.17)
DEPRECATED HCO3 PLAS-SCNC: 22 MMOL/L (ref 22–29)
EGFRCR SERPLBLD CKD-EPI 2021: >90 ML/MIN/1.73M2
ERYTHROCYTE [DISTWIDTH] IN BLOOD BY AUTOMATED COUNT: 11.9 % (ref 10–15)
GLUCOSE SERPL-MCNC: 202 MG/DL (ref 70–99)
HCT VFR BLD AUTO: 50.9 % (ref 40–53)
HGB BLD-MCNC: 18 G/DL (ref 13.3–17.7)
INR PPP: 0.93 (ref 0.85–1.15)
MCH RBC QN AUTO: 31.2 PG (ref 26.5–33)
MCHC RBC AUTO-ENTMCNC: 35.4 G/DL (ref 31.5–36.5)
MCV RBC AUTO: 88 FL (ref 78–100)
PLATELET # BLD AUTO: 177 10E3/UL (ref 150–450)
POTASSIUM SERPL-SCNC: 4.6 MMOL/L (ref 3.4–5.3)
PROT SERPL-MCNC: 8 G/DL (ref 6.4–8.3)
RBC # BLD AUTO: 5.77 10E6/UL (ref 4.4–5.9)
SODIUM SERPL-SCNC: 136 MMOL/L (ref 135–145)
WBC # BLD AUTO: 7.9 10E3/UL (ref 4–11)

## 2024-02-28 PROCEDURE — 82040 ASSAY OF SERUM ALBUMIN: CPT | Performed by: PATHOLOGY

## 2024-02-28 PROCEDURE — 84075 ASSAY ALKALINE PHOSPHATASE: CPT | Performed by: PATHOLOGY

## 2024-02-28 PROCEDURE — 82247 BILIRUBIN TOTAL: CPT | Performed by: PATHOLOGY

## 2024-02-28 PROCEDURE — G0463 HOSPITAL OUTPT CLINIC VISIT: HCPCS | Performed by: PHYSICIAN ASSISTANT

## 2024-02-28 PROCEDURE — 85610 PROTHROMBIN TIME: CPT | Performed by: PATHOLOGY

## 2024-02-28 PROCEDURE — 80048 BASIC METABOLIC PNL TOTAL CA: CPT | Performed by: PATHOLOGY

## 2024-02-28 PROCEDURE — 99214 OFFICE O/P EST MOD 30 MIN: CPT | Performed by: PHYSICIAN ASSISTANT

## 2024-02-28 PROCEDURE — 36415 COLL VENOUS BLD VENIPUNCTURE: CPT | Performed by: PATHOLOGY

## 2024-02-28 PROCEDURE — 85027 COMPLETE CBC AUTOMATED: CPT | Performed by: PATHOLOGY

## 2024-02-28 PROCEDURE — 99000 SPECIMEN HANDLING OFFICE-LAB: CPT | Performed by: PATHOLOGY

## 2024-02-28 PROCEDURE — 84155 ASSAY OF PROTEIN SERUM: CPT | Performed by: PATHOLOGY

## 2024-02-28 PROCEDURE — 87517 HEPATITIS B DNA QUANT: CPT | Performed by: PHYSICIAN ASSISTANT

## 2024-02-28 ASSESSMENT — PAIN SCALES - GENERAL: PAINLEVEL: NO PAIN (1)

## 2024-02-28 NOTE — NURSING NOTE
"Chief Complaint   Patient presents with    RECHECK     Hep B     Vital signs:  Temp: 98.5  F (36.9  C) Temp src: Oral BP: 122/78 Pulse: 75   Resp: 18 SpO2: 98 %     Height: 162.6 cm (5' 4.02\") Weight: 76.4 kg (168 lb 8 oz)  Estimated body mass index is 28.91 kg/m  as calculated from the following:    Height as of this encounter: 1.626 m (5' 4.02\").    Weight as of this encounter: 76.4 kg (168 lb 8 oz).      Rekha Strong, [facility]  2/28/2024 3:19 PM    "

## 2024-02-28 NOTE — PROGRESS NOTES
Hepatology Clinic note  Reinaldo Real   Date of Birth 1985    REASON FOR FOLLOW UP: Chronic HBV, hepatic steatosis, hx alcohol abuse   REFERRING PROVIDER: Leventhal, Thomas Michael, MD         Assessment/plan:     Chronic, inactive hepatitis B:    -Pending HEP B DNA from today; for now, will continue off HBV medication   -AST 37 and ALT 45 2/16/24  -Moved to [place] from [place] at age 28  -E antigen negative, E antibody positive   -Viral load always <2000 IUs/mL  -Tested negative previously to hepatitis A, C, delta, HIV  -Avoid sharing toothbrushes, nail clippers, razors. If shared, should be cleaned thoroughly  -Screen family members for hepatitis B and vaccinated if they are not infected    Hepatic steatosis (risk factors: DM, BMI 28, metabolic syndrome)  Past concerns of possible cirrhosis   -Hx of nodular appearing liver that has been read as the presence of cirrhosis  -US elastography June 2023: median liver stiffness 1.67 m/sec and the IQR/median value  is 0.05  (low value which r/o advanced chronic liver disease in asymptomatic patients)  -Hx of PLT low x3 but most recently INR and PLT WNL  -No evidence of pulm erythema nor spider angioma   -Normal protein counts in setting of imaging evidence of a normal-sized spleen: All suggesting against the presence of overt cirrhosis with portal hypertension  -Recommend slow gradual weight loss  - Maintain good control of cholesterol (is on statin)  -Keep tight glycemic control   -Limits carbs, especially simple carbs, and following Mediterranean eating patten  -Increase physical activity as able, ideally 150 minutes weekly     Alcohol use disorder, dependence, in remission:  -Hx of excessive alcohol use, drinking upwards of 6 alcohol equivalents daily; hx of DUI and has undergone rehab course  -Last drink: this month (two days) due to pain associated with leg wound   -Discussed the need for ongoing sobriety and services offered  -Deferred CD help/resources at this time      HCC Screen:  -No evidence of afocal hepatic mass on US abd 6/5/23  -Repeat abdominal US ~ every 6 months (ordered)     GERD, occasional epigastric abd pain  History of H. Pylori  -Treated multiple times in past; Negative stool antigen Jan 2023  -On daily PPI for GERD, which helps his sx's  -Moving forward, could consider referral to GI +/- EGD    Follow Up:  6 months with lab same day    Laxmi Patrick PA-C  [facility]   -----------------------------------------------------         HPI:     Pt presents today to clinic for follow-up.  Was last seen in clinic Dec 2022 for consult with Dr. Leventhal.    Patient states he has never been on hepatitis B medication.  He is unsure how he got hepatitis B.  States that he used to live in [place] until age 28.    No recent fevers or chills.  No recent confusion.  No lower extremity edema.  No yellowing of eyes.  States his eyes occasionally appear red if he lacks sleep.  Reports his weight has been stable as of late.  States his appetite is strong and that he needs to watch what he eats because of his diabetes.  Typically passes stool after each meal.  No recent bright red blood per rectum or melena.  Does admit to a history of heartburn.  States omeprazole helps.  Occasionally has episodes of vomiting up small amounts after eating.  Has never vomited blood.  Occasionally gets an upset upper abdomen.  Admits to minor right-sided shoulder pain.  No recent difficulties with breathing.  Is unsure whether or not he gets chest pain.    Admits that he recently had a wound on his left foot.  Had removal of foreign body from the left foot 2/15/2024 with podiatry.  Had follow-up 2/26.  Recommended returning to podiatry clinic in 1 week for suture removal.  Patient admits that he did consume alcohol for 2 days this month because the pain was so bad.  No alcohol use since that time.  Moving forward, patient agrees that his goal is to remain sober from  all alcohol use.    Previous work-up:   Hepatitis B s Ag positive 2013  Hepatitis B c Ab positive   Hepatitis B e Ag negative  Hepatitis B e Ab positive  HBV DNA 39 (5/2021)  Hepatitis delta Ab negative  Hepatitis A IgG positive  Hepatitis C Ab negative    PMH:    has a past medical history of Alcohol use disorder, moderate, dependence (H) (03/06/2019), Hepatitis B, History of H. pylori infection, TB (tuberculosis), and Type 2 diabetes mellitus without complication, without long-term current use of insulin (H) (11/30/2016).     SMH:    has a past surgical history that includes Remove Foreign Body Lower Extremity (Left, 2/15/2024).     Medications:   Current Outpatient Medications   Medication    acetaminophen (TYLENOL) 325 MG tablet    atorvastatin (LIPITOR) 40 MG tablet    blood glucose (CONTOUR NEXT TEST) test strip    Continuous Blood Gluc Sensor (FREESTYLE DEVON 2 SENSOR) MISC    empagliflozin (JARDIANCE) 25 MG TABS tablet    metFORMIN (GLUCOPHAGE XR) 500 MG 24 hr tablet    Microlet Lancets MISC    omeprazole (PRILOSEC) 20 MG DR capsule    sitagliptin (JANUVIA) 100 MG tablet     No current facility-administered medications for this visit.     Recent Labs   Lab Test 02/16/24  0743 02/14/24  1424 08/09/23  1330 11/02/22  1217 09/28/22  0731 09/26/22  2103 05/10/21  0910 02/08/21  1717 03/16/20  1506 04/04/19  1538   ALKPHOS 54 88 106 126 64 112 103 210* 146*  146* 103   ALT 45 72* 89* 33 28 46 109* 60* 91*  91* 57*   AST 37 50* 72* 38 31 38 82* 44* 56*  56* 33   BILITOTAL 0.6 0.5 0.5 0.7 0.5 1.0 1.0 0.4 0.4  0.4 0.3           Allergies:   No Known Allergies         Social History:     Social History     Socioeconomic History    Marital status:      Spouse name: Not on file    Number of children: Not on file    Years of education: Not on file    Highest education level: Not on file   Occupational History    Not on file   Tobacco Use    Smoking status: Every Day     Packs/day: .2     Types: Cigarettes      "Last attempt to quit: 11/29/2013     Years since quitting: 10.2     Passive exposure: Current    Smokeless tobacco: Current     Types: Chew    Tobacco comments:     smokes 0-1/day--Chew betel nut; pt states he started smoking at 10 yo   Vaping Use    Vaping Use: Never used   Substance and Sexual Activity    Alcohol use: No     Comment: Alcoholic Drinks/day: pt states he haven't had a drink since January    Drug use: No    Sexual activity: Not on file   Other Topics Concern    Not on file   Social History Narrative    The patient works as a PCA at [employer].     Social Determinants of Health     Financial Resource Strain: Not on file   Food Insecurity: Not on file   Transportation Needs: Not on file   Physical Activity: Not on file   Stress: Not on file   Social Connections: Not on file   Interpersonal Safety: Not on file   Housing Stability: Not on file          Family History:     Family History   Problem Relation Age of Onset    Coronary Artery Disease Mother     Hypertension Mother     Diabetes No family hx of           Review of Systems:   Gen: See HPI          Physical Exam:      Vital signs:  Temp: 98.5  F (36.9  C) Temp src: Oral BP: 122/78 Pulse: 75   Resp: 18 SpO2: 98 %     Height: 162.6 cm (5' 4.02\") Weight: 76.4 kg (168 lb 8 oz)  Estimated body mass index is 28.91 kg/m  as calculated from the following:    Height as of this encounter: 1.626 m (5' 4.02\").    Weight as of this encounter: 76.4 kg (168 lb 8 oz).  Gen: A&Ox3, NAD  HEENT: Sclera anicteric   CV: RRR, no overt murmurs  Lung: CTA anteriorly, no wheezing or crackles.   Abd: soft, obese, mild TTP in epigastric region, ND  Ext: no edema, intact pulses.   Skin: No rash, no palmar erythema, telangiectasias or jaundice  Neuro: grossly intact, no asterixis   Psych: appropriate mood and affects         Data:   Reviewed in person and significant for:    Lab Results   Component Value Date     02/16/2024    .1 07/02/2018      Lab Results " "  Component Value Date    POTASSIUM 4.1 02/16/2024    POTASSIUM 3.9 05/10/2021    POTASSIUM 4.2 07/02/2018     Lab Results   Component Value Date    CHLORIDE 105 02/16/2024    CHLORIDE 103 05/10/2021    CHLORIDE 97.8 07/02/2018     Lab Results   Component Value Date    CO2 24 02/16/2024    CO2 21 05/10/2021    CO2 25.2 07/02/2018     Lab Results   Component Value Date    BUN 9.8 02/16/2024    BUN 11 05/10/2021    BUN 9.8 07/02/2018     Lab Results   Component Value Date    CR 0.77 02/16/2024    CR 0.9 07/02/2018       Lab Results   Component Value Date    WBC 8.0 02/16/2024     Lab Results   Component Value Date    HGB 14.7 02/16/2024    HGB 16.5 02/14/2017     Lab Results   Component Value Date    HCT 42.7 02/16/2024    HCT 50.1 02/14/2017     Lab Results   Component Value Date    MCV 90 02/16/2024    MCV 92.9 02/14/2017     Lab Results   Component Value Date     02/16/2024       Lab Results   Component Value Date    AST 37 02/16/2024    AST 30.0 07/02/2018     Lab Results   Component Value Date    ALT 45 02/16/2024    ALT 47.5 07/02/2018     No results found for: \"BILICONJ\"   Lab Results   Component Value Date    BILITOTAL 0.6 02/16/2024    BILITOTAL 0.3 07/02/2018       Lab Results   Component Value Date    ALBUMIN 3.6 02/16/2024    ALBUMIN 3.8 05/10/2021    ALBUMIN 3.9 02/02/2015     Lab Results   Component Value Date    PROTTOTAL 6.6 02/16/2024    PROTTOTAL 7.6 07/02/2018      Lab Results   Component Value Date    ALKPHOS 54 02/16/2024    ALKPHOS 155.8 07/02/2018       Lab Results   Component Value Date    INR 1.07 08/09/2023       Imaging/Procedures:        US ABD COMPLETE, LIVER US ELASTOGRAPHY 6/5/2023      COMPARISON: 10/24/2022 chest CT without contrast.     HISTORY: Eval for hepatic fibrosis and changes of portal hypertension;  Hepatitis B infection without delta agent without hepatic coma,  unspecified chronicity; Cirrhosis of liver without ascites,  unspecified hepatic cirrhosis type (H)   "   TECHNIQUE: The abdomen was scanned in standard fashion with  specialized ultrasound transducer(s) using both gray-scale and limited  color Doppler techniques.     Ultrasound elastography of the liver was performed using a Justice  ultrasound machine using 10 separate measurements of the liver  parenchyma with patient in supine position. Measurements were obtained  approximately 2 cm beneath Lionel's capsule, perpendicular to the  liver capsule. Images are of satisfactory quality.     FINDINGS:  Liver: The liver demonstrates increased echogenicity. Focal fatty  sparing adjacent to the gallbladder fossa noted. No evidence of a  focal hepatic mass. Liver measures 13.6 cm craniocaudad. The main  portal vein is patent with antegrade flow, measuring 0.9 cm.     ELASTOGRAPHY:      The median liver stiffness is: 1.67 m/sec  The mean liver stiffness is: 1.70 m/sec  The interquartile range is: 0.09 m/sec  IQR/median: 0.05.  (IQR/median value of greater than 0.15 indicates  that a dataset is unreliable)     Gallbladder:  There is no wall thickening, pericholecystic fluid,  positive sonographic Benson's sign or evidence for cholelithiasis.     Bile Ducts: Both the intra- and extrahepatic biliary system are of  normal caliber.  The common bile duct measures 3 mm in diameter.     Pancreas: Visualized portions of the head and body of the pancreas are  unremarkable.      Kidneys: Both kidneys are of normal echotexture, without mass or  hydronephrosis.   The craniocaudal dimensions are: right- 10.6 cm,  left- 11.2 cm.     Spleen: The spleen is normal in size,  measuring 12.2 cm in sagittal  dimension.     Aorta and IVC: The visualized portions of the aorta and IVC are  unremarkable. The proximal aorta measures 2.1 cm in diameter.     Fluid: No evidence of ascites or pleural effusions.                                                                IMPRESSION:  1.  Hepatic steatosis with sparing adjacent to gallbladder fossa.  2.   The median liver stiffness is 1.67 m/sec and the IQR/median value  is 0.05 . This is a low elastography value which rules out advanced  chronic liver disease in asymptomatic patients.     Consensus criteria for interpreting liver stiffness values in patients  with viral hepatitis or NAFLD according to SRU consensus guidelines  (Cao et. al. Radiology: 2020. epub)     High probability of being normal:  <1.3 m/sec  Rules out advanced chronic liver disease in asymptomatic patients:  <1.7 m/sec  Suggestive of advanced chronic liver disease: 1.7-2.1 m/sec  Indicates advanced chronic liver disease: >2.1 m/sec  Suggestive of clinically significant portal hypertension: >2.4 m/sec     BALA REDDY MD       CT Abdomen Pelvis w/Contrast 9/2022:  1.  Consolidation left lower lobe with small air-fluid collection could be infection with pulmonary abscess. Follow-up to confirm resolution necessary to exclude necrotic lesion.  2.  The liver is nodular in contour suggesting cirrhosis.  3.  Underdistention the bladder. Wall thickening/cystitis not excluded.  * Normal spleen     Endoscopy  EGD 2/2017: Possible small esophageal varices.

## 2024-02-28 NOTE — LETTER
2/28/2024         RE: Reinaldo Real  [address] [place] [address]        Dear Colleague,    Thank you for referring your patient, Reinaldo Real, to the [facility] [place]. Please see a copy of my visit note below.    Hepatology Clinic note  Reinaldo Real   Date of Birth 1985    REASON FOR FOLLOW UP: Chronic HBV, hepatic steatosis, hx alcohol abuse   REFERRING PROVIDER: Leventhal, Thomas Michael, MD         Assessment/plan:     Chronic, inactive hepatitis B:    -Pending HEP B DNA from today; for now, will continue off HBV medication   -AST 37 and ALT 45 2/16/24  -[place] from [facility] at age 28  -E antigen negative, E antibody positive   -Viral load always <2000 IUs/mL  -Tested negative previously to hepatitis A, C, delta, HIV  -Avoid sharing toothbrushes, nail clippers, razors. If shared, should be cleaned thoroughly  -Screen family members for hepatitis B and vaccinated if they are not infected    Hepatic steatosis (risk factors: DM, BMI 28, metabolic syndrome)  ? cirrhosis  -Hx of nodular appearing liver that has been read as the presence of cirrhosis  -US elastography June 2023: median liver stiffness 1.67 m/sec and the IQR/median value  is 0.05  (low value which r/o advanced chronic liver disease in asymptomatic patients)  -Hx of PLT low x3 but most recently INR and PLT WNL  -No evidence of pulm erythema nor spider angioma   -Normal protein counts in setting of imaging evidence of a normal-sized spleen: All suggesting against the presence of overt cirrhosis with portal hypertension  -Recommend slow gradual weight loss  - Maintain good control of cholesterol (is on statin)  -Keep tight glycemic control   -Limits carbs, especially simple carbs, and following Mediterranean eating patten  -Increase physical activity as able, ideally 150 minutes weekly     Alcohol use disorder, dependence, in remission:  -Hx of excessive alcohol use, drinking upwards of 6 alcohol  equivalents daily; hx of DUI and has undergone rehab course  -Last drink: this month (two days) due to pain associated with leg wound   -Discussed the need for ongoing sobriety and services offered  -Deferred CD help/resources at this time     HCC Screen:  -No evidence of afocal hepatic mass on US abd 6/5/23  -Repeat abdominal US ~ every 6 months (ordered)     GERD, occasional epigastric abd pain  History of H. Pylori  -Treated multiple times in past; Negative stool antigen Jan 2023  -On daily PPI for GERD, which helps his sx's  -Moving forward, could consider referral to GI +/- EGD    Follow Up:  6 months with lab same day    Laxmi Patrick PA-C  [facility]   -----------------------------------------------------         HPI:     Pt presents today to clinic for follow-up.  Was last seen in clinic Dec 2022 for consult with Dr. Leventhal.    Patient states he has never been on hepatitis B medication.  He is unsure how he got hepatitis B.  States that he used to live in [place] until age 28.    No recent fevers or chills.  No recent confusion.  No lower extremity edema.  No yellowing of eyes.  States his eyes occasionally appear red if he lacks sleep.  Reports his weight has been stable as of late.  States his appetite is strong and that he needs to watch what he eats because of his diabetes.  Typically passes stool after each meal.  No recent bright red blood per rectum or melena.  Does admit to a history of heartburn.  States omeprazole helps.  Occasionally has episodes of vomiting up small amounts after eating.  Has never vomited blood.  Occasionally gets an upset upper abdomen.  Admits to minor right-sided shoulder pain.  No recent difficulties with breathing.  Is unsure whether or not he gets chest pain.    Admits that he recently had a wound on his left foot.  Had removal of foreign body from the left foot 2/15/2024 with podiatry.  Had follow-up 2/26.  Recommended returning to  podiatry clinic in 1 week for suture removal.  Patient admits that he did consume alcohol for 2 days this month because the pain was so bad.  No alcohol use since that time.  Moving forward, patient agrees that his goal is to remain sober from all alcohol use.    Previous work-up:   Hepatitis B s Ag positive 2013  Hepatitis B c Ab positive   Hepatitis B e Ag negative  Hepatitis B e Ab positive  HBV DNA 39 (5/2021)  Hepatitis delta Ab negative  Hepatitis A IgG positive  Hepatitis C Ab negative    PMH:    has a past medical history of Alcohol use disorder, moderate, dependence (H) (03/06/2019), Hepatitis B, History of H. pylori infection, TB (tuberculosis), and Type 2 diabetes mellitus without complication, without long-term current use of insulin (H) (11/30/2016).     SMH:    has a past surgical history that includes Remove Foreign Body Lower Extremity (Left, 2/15/2024).     Medications:   Current Outpatient Medications   Medication    acetaminophen (TYLENOL) 325 MG tablet    atorvastatin (LIPITOR) 40 MG tablet    blood glucose (CONTOUR NEXT TEST) test strip    Continuous Blood Gluc Sensor (FREESTYLE DEVON 2 SENSOR) MISC    empagliflozin (JARDIANCE) 25 MG TABS tablet    metFORMIN (GLUCOPHAGE XR) 500 MG 24 hr tablet    Microlet Lancets MISC    omeprazole (PRILOSEC) 20 MG DR capsule    sitagliptin (JANUVIA) 100 MG tablet     No current facility-administered medications for this visit.     Recent Labs   Lab Test 02/16/24  0743 02/14/24  1424 08/09/23  1330 11/02/22  1217 09/28/22  0731 09/26/22  2103 05/10/21  0910 02/08/21  1717 03/16/20  1506 04/04/19  1538   ALKPHOS 54 88 106 126 64 112 103 210* 146*  146* 103   ALT 45 72* 89* 33 28 46 109* 60* 91*  91* 57*   AST 37 50* 72* 38 31 38 82* 44* 56*  56* 33   BILITOTAL 0.6 0.5 0.5 0.7 0.5 1.0 1.0 0.4 0.4  0.4 0.3           Allergies:   No Known Allergies         Social History:     Social History     Socioeconomic History    Marital status:      Spouse name:  "Not on file    Number of children: Not on file    Years of education: Not on file    Highest education level: Not on file   Occupational History    Not on file   Tobacco Use    Smoking status: Every Day     Packs/day: .2     Types: Cigarettes     Last attempt to quit: 11/29/2013     Years since quitting: 10.2     Passive exposure: Current    Smokeless tobacco: Current     Types: Chew    Tobacco comments:     smokes 0-1/day--Chew betel nut; pt states he started smoking at 10 yo   Vaping Use    Vaping Use: Never used   Substance and Sexual Activity    Alcohol use: No     Comment: Alcoholic Drinks/day: pt states he haven't had a drink since January    Drug use: No    Sexual activity: Not on file   Other Topics Concern    Not on file   Social History Narrative    The patient works as a PCA at [employer].     Social Determinants of Health     Financial Resource Strain: Not on file   Food Insecurity: Not on file   Transportation Needs: Not on file   Physical Activity: Not on file   Stress: Not on file   Social Connections: Not on file   Interpersonal Safety: Not on file   Housing Stability: Not on file          Family History:     Family History   Problem Relation Age of Onset    Coronary Artery Disease Mother     Hypertension Mother     Diabetes No family hx of           Review of Systems:   Gen: See HPI          Physical Exam:      Vital signs:  Temp: 98.5  F (36.9  C) Temp src: Oral BP: 122/78 Pulse: 75   Resp: 18 SpO2: 98 %     Height: 162.6 cm (5' 4.02\") Weight: 76.4 kg (168 lb 8 oz)  Estimated body mass index is 28.91 kg/m  as calculated from the following:    Height as of this encounter: 1.626 m (5' 4.02\").    Weight as of this encounter: 76.4 kg (168 lb 8 oz).  Gen: A&Ox3, NAD  HEENT: Sclera anicteric   CV: RRR, no overt murmurs  Lung: CTA anteriorly, no wheezing or crackles.   Abd: soft, obese, mild TTP in epigastric region, ND  Ext: no edema, intact pulses.   Skin: No rash, no palmar erythema, " "telangiectasias or jaundice  Neuro: grossly intact, no asterixis   Psych: appropriate mood and affects         Data:   Reviewed in person and significant for:    Lab Results   Component Value Date     02/16/2024    .1 07/02/2018      Lab Results   Component Value Date    POTASSIUM 4.1 02/16/2024    POTASSIUM 3.9 05/10/2021    POTASSIUM 4.2 07/02/2018     Lab Results   Component Value Date    CHLORIDE 105 02/16/2024    CHLORIDE 103 05/10/2021    CHLORIDE 97.8 07/02/2018     Lab Results   Component Value Date    CO2 24 02/16/2024    CO2 21 05/10/2021    CO2 25.2 07/02/2018     Lab Results   Component Value Date    BUN 9.8 02/16/2024    BUN 11 05/10/2021    BUN 9.8 07/02/2018     Lab Results   Component Value Date    CR 0.77 02/16/2024    CR 0.9 07/02/2018       Lab Results   Component Value Date    WBC 8.0 02/16/2024     Lab Results   Component Value Date    HGB 14.7 02/16/2024    HGB 16.5 02/14/2017     Lab Results   Component Value Date    HCT 42.7 02/16/2024    HCT 50.1 02/14/2017     Lab Results   Component Value Date    MCV 90 02/16/2024    MCV 92.9 02/14/2017     Lab Results   Component Value Date     02/16/2024       Lab Results   Component Value Date    AST 37 02/16/2024    AST 30.0 07/02/2018     Lab Results   Component Value Date    ALT 45 02/16/2024    ALT 47.5 07/02/2018     No results found for: \"BILICONJ\"   Lab Results   Component Value Date    BILITOTAL 0.6 02/16/2024    BILITOTAL 0.3 07/02/2018       Lab Results   Component Value Date    ALBUMIN 3.6 02/16/2024    ALBUMIN 3.8 05/10/2021    ALBUMIN 3.9 02/02/2015     Lab Results   Component Value Date    PROTTOTAL 6.6 02/16/2024    PROTTOTAL 7.6 07/02/2018      Lab Results   Component Value Date    ALKPHOS 54 02/16/2024    ALKPHOS 155.8 07/02/2018       Lab Results   Component Value Date    INR 1.07 08/09/2023       Imaging/Procedures:        US ABD COMPLETE, LIVER US ELASTOGRAPHY 6/5/2023      COMPARISON: 10/24/2022 chest CT without " contrast.     HISTORY: Eval for hepatic fibrosis and changes of portal hypertension;  Hepatitis B infection without delta agent without hepatic coma,  unspecified chronicity; Cirrhosis of liver without ascites,  unspecified hepatic cirrhosis type (H)     TECHNIQUE: The abdomen was scanned in standard fashion with  specialized ultrasound transducer(s) using both gray-scale and limited  color Doppler techniques.     Ultrasound elastography of the liver was performed using a Justice  ultrasound machine using 10 separate measurements of the liver  parenchyma with patient in supine position. Measurements were obtained  approximately 2 cm beneath Lionel's capsule, perpendicular to the  liver capsule. Images are of satisfactory quality.     FINDINGS:  Liver: The liver demonstrates increased echogenicity. Focal fatty  sparing adjacent to the gallbladder fossa noted. No evidence of a  focal hepatic mass. Liver measures 13.6 cm craniocaudad. The main  portal vein is patent with antegrade flow, measuring 0.9 cm.     ELASTOGRAPHY:      The median liver stiffness is: 1.67 m/sec  The mean liver stiffness is: 1.70 m/sec  The interquartile range is: 0.09 m/sec  IQR/median: 0.05.  (IQR/median value of greater than 0.15 indicates  that a dataset is unreliable)     Gallbladder:  There is no wall thickening, pericholecystic fluid,  positive sonographic Benson's sign or evidence for cholelithiasis.     Bile Ducts: Both the intra- and extrahepatic biliary system are of  normal caliber.  The common bile duct measures 3 mm in diameter.     Pancreas: Visualized portions of the head and body of the pancreas are  unremarkable.      Kidneys: Both kidneys are of normal echotexture, without mass or  hydronephrosis.   The craniocaudal dimensions are: right- 10.6 cm,  left- 11.2 cm.     Spleen: The spleen is normal in size,  measuring 12.2 cm in sagittal  dimension.     Aorta and IVC: The visualized portions of the aorta and IVC  are  unremarkable. The proximal aorta measures 2.1 cm in diameter.     Fluid: No evidence of ascites or pleural effusions.                                                                IMPRESSION:  1.  Hepatic steatosis with sparing adjacent to gallbladder fossa.  2.  The median liver stiffness is 1.67 m/sec and the IQR/median value  is 0.05 . This is a low elastography value which rules out advanced  chronic liver disease in asymptomatic patients.     Consensus criteria for interpreting liver stiffness values in patients  with viral hepatitis or NAFLD according to SRU consensus guidelines  (Cao et. al. Radiology: 2020. epub)     High probability of being normal:  <1.3 m/sec  Rules out advanced chronic liver disease in asymptomatic patients:  <1.7 m/sec  Suggestive of advanced chronic liver disease: 1.7-2.1 m/sec  Indicates advanced chronic liver disease: >2.1 m/sec  Suggestive of clinically significant portal hypertension: >2.4 m/sec     BALA REDDY MD       CT Abdomen Pelvis w/Contrast 9/2022:  1.  Consolidation left lower lobe with small air-fluid collection could be infection with pulmonary abscess. Follow-up to confirm resolution necessary to exclude necrotic lesion.  2.  The liver is nodular in contour suggesting cirrhosis.  3.  Underdistention the bladder. Wall thickening/cystitis not excluded.  * Normal spleen     Endoscopy  EGD 2/2017: Possible small esophageal varices.         Laxmi Patrick PA-C

## 2024-02-29 LAB
HBV DNA SERPL NAA+PROBE-ACNC: <10 IU/ML
HBV DNA SERPL NAA+PROBE-LOG IU: <1 {LOG_IU}/ML

## 2024-03-02 ENCOUNTER — ANCILLARY PROCEDURE (OUTPATIENT)
Dept: ULTRASOUND IMAGING | Facility: CLINIC | Age: 39
End: 2024-03-02
Attending: PHYSICIAN ASSISTANT
Payer: COMMERCIAL

## 2024-03-02 DIAGNOSIS — B18.1 HEPATITIS B, CHRONIC (H): ICD-10-CM

## 2024-03-02 PROCEDURE — 76705 ECHO EXAM OF ABDOMEN: CPT | Mod: GC | Performed by: STUDENT IN AN ORGANIZED HEALTH CARE EDUCATION/TRAINING PROGRAM

## 2024-03-04 ENCOUNTER — OFFICE VISIT (OUTPATIENT)
Dept: PODIATRY | Facility: CLINIC | Age: 39
End: 2024-03-04
Payer: COMMERCIAL

## 2024-03-04 VITALS — WEIGHT: 168 LBS | HEIGHT: 64 IN | BODY MASS INDEX: 28.68 KG/M2

## 2024-03-04 DIAGNOSIS — M79.5 RESIDUAL FOREIGN BODY IN SOFT TISSUE: Primary | ICD-10-CM

## 2024-03-04 PROCEDURE — 99024 POSTOP FOLLOW-UP VISIT: CPT | Performed by: PODIATRIST

## 2024-03-04 ASSESSMENT — PAIN SCALES - GENERAL: PAINLEVEL: NO PAIN (0)

## 2024-03-04 NOTE — Clinical Note
3/4/2024         RE: Reinaldo Real  [address] [place] [address]        Dear Colleague,    Thank you for referring your patient, Reinaldo Real, to the [facility]. Please see a copy of my visit note below.    Subjective findings: The patient return to the clinic today for postop visit #2, 2 weeks status post removal foreign body left foot.  The patient is in good spirits and he had no complaints.     Objective findings: The dressings were removed and wound margins are well coaptated and maintained.  There is no edema, erythema, cellulitis, drainage or bleeding noted.  Neurovascular status is intact.  Vital signs stable.     Impression: Foreign body left foot     Plan: Removed all sutures.  The patient was instructed to gradually return to normal activities and normal bathing.  He is to return to the clinic as needed.      Again, thank you for allowing me to participate in the care of your patient.        Sincerely,        Ag Cheng DPM

## 2024-03-04 NOTE — PROGRESS NOTES
Subjective findings: The patient return to the clinic today for postop visit #2, 2 weeks status post removal foreign body left foot.  The patient is in good spirits and he had no complaints.     Objective findings: The dressings were removed and wound margins are well coaptated and maintained.  There is no edema, erythema, cellulitis, drainage or bleeding noted.  Neurovascular status is intact.  Vital signs stable.     Impression: Foreign body left foot     Plan: Removed all sutures.  The patient was instructed to gradually return to normal activities and normal bathing.  He is to return to the clinic as needed.

## 2024-03-06 DIAGNOSIS — B18.1 HEPATITIS B, CHRONIC (H): Primary | ICD-10-CM

## 2024-03-13 DIAGNOSIS — E11.9 TYPE 2 DIABETES MELLITUS WITHOUT COMPLICATION, WITHOUT LONG-TERM CURRENT USE OF INSULIN (H): ICD-10-CM

## 2024-03-13 RX ORDER — METFORMIN HCL 500 MG
TABLET, EXTENDED RELEASE 24 HR ORAL
Qty: 180 TABLET | Refills: 0 | Status: SHIPPED | OUTPATIENT
Start: 2024-03-13 | End: 2024-06-10

## 2024-03-20 DIAGNOSIS — E11.9 TYPE 2 DIABETES MELLITUS WITHOUT COMPLICATION, WITHOUT LONG-TERM CURRENT USE OF INSULIN (H): Primary | ICD-10-CM

## 2024-03-27 ENCOUNTER — TELEPHONE (OUTPATIENT)
Dept: GASTROENTEROLOGY | Facility: CLINIC | Age: 39
End: 2024-03-27
Payer: COMMERCIAL

## 2024-03-27 NOTE — TELEPHONE ENCOUNTER
w  assistance: attempted to reschedule appt & labs, pt declined to reschedule; pt stated he did not want the appt and hung up before giving scheduling number- [phone]//

## 2024-06-10 DIAGNOSIS — E11.9 TYPE 2 DIABETES MELLITUS WITHOUT COMPLICATION, WITHOUT LONG-TERM CURRENT USE OF INSULIN (H): ICD-10-CM

## 2024-06-10 RX ORDER — METFORMIN HCL 500 MG
TABLET, EXTENDED RELEASE 24 HR ORAL
Qty: 180 TABLET | Refills: 0 | Status: SHIPPED | OUTPATIENT
Start: 2024-06-10

## 2024-06-26 DIAGNOSIS — E11.9 TYPE 2 DIABETES MELLITUS WITHOUT COMPLICATION, WITHOUT LONG-TERM CURRENT USE OF INSULIN (H): Primary | ICD-10-CM

## 2024-06-26 DIAGNOSIS — D58.2 ELEVATED HEMOGLOBIN (H): ICD-10-CM

## 2024-07-19 ENCOUNTER — PATIENT OUTREACH (OUTPATIENT)
Dept: NURSING | Facility: CLINIC | Age: 39
End: 2024-07-19
Payer: COMMERCIAL

## 2024-07-19 NOTE — PROGRESS NOTES
Clinic Care Coordination Contact    Situation: Patient chart reviewed by care coordinator.    Background: Patient was requesting assist to obtain a glucometer stating that he stopped using freestyle jessi because the sensor kept falling off.     Assessment: Message sent to PCP today.     Plan/Recommendations: CCRN plans to complete initial assessment on 7/24/24.

## 2024-07-19 NOTE — Clinical Note
Hi Dr Berrios,  Patient would like a glucometer. He no longer using freestyle jessi stating that he didn't like work for him. It sensor kept falling off. Please send new script for diabetic supply if you agree. Thanks.

## 2024-07-24 ENCOUNTER — PATIENT OUTREACH (OUTPATIENT)
Dept: NURSING | Facility: CLINIC | Age: 39
End: 2024-07-24
Payer: COMMERCIAL

## 2024-07-24 NOTE — PROGRESS NOTES
Clinic Care Coordination Contact     Assessment: Unable to reach patient today to follow up on glucometer. Will sent message to clinic scheduling team to assist patient schedule appt with MTM to follow up on DM and free style liber vs glucometer.     Plan: No further assist needed from CC.

## 2024-08-06 ENCOUNTER — TELEPHONE (OUTPATIENT)
Dept: PHARMACY | Facility: CLINIC | Age: 39
End: 2024-08-06

## 2024-08-06 NOTE — TELEPHONE ENCOUNTER
PharmD Outbound Call:     Reason for call: MTM appointment today   ID#  [id number]  Call attempt: x2, no answer, UNABLE left VM for patient       Miriam Vallecillo, PharmD, BCACP  Medication Therapy Management Pharmacist

## 2024-08-22 ENCOUNTER — LAB (OUTPATIENT)
Dept: LAB | Facility: CLINIC | Age: 39
End: 2024-08-22
Payer: COMMERCIAL

## 2024-08-22 ENCOUNTER — OFFICE VISIT (OUTPATIENT)
Dept: PHARMACY | Facility: CLINIC | Age: 39
End: 2024-08-22
Payer: COMMERCIAL

## 2024-08-22 DIAGNOSIS — R52 PAIN: ICD-10-CM

## 2024-08-22 DIAGNOSIS — E11.9 TYPE 2 DIABETES MELLITUS WITHOUT COMPLICATION, WITHOUT LONG-TERM CURRENT USE OF INSULIN (H): ICD-10-CM

## 2024-08-22 DIAGNOSIS — E78.2 MIXED HYPERLIPIDEMIA: ICD-10-CM

## 2024-08-22 DIAGNOSIS — E11.9 TYPE 2 DIABETES MELLITUS WITHOUT COMPLICATION, WITHOUT LONG-TERM CURRENT USE OF INSULIN (H): Primary | ICD-10-CM

## 2024-08-22 DIAGNOSIS — B18.1 HEPATITIS B, CHRONIC (H): ICD-10-CM

## 2024-08-22 DIAGNOSIS — K21.9 GASTROESOPHAGEAL REFLUX DISEASE, UNSPECIFIED WHETHER ESOPHAGITIS PRESENT: ICD-10-CM

## 2024-08-22 LAB
ALBUMIN SERPL BCG-MCNC: 4.6 G/DL (ref 3.5–5.2)
ALP SERPL-CCNC: 129 U/L (ref 40–150)
ALT SERPL W P-5'-P-CCNC: 68 U/L (ref 0–70)
ANION GAP SERPL CALCULATED.3IONS-SCNC: 14 MMOL/L (ref 7–15)
AST SERPL W P-5'-P-CCNC: 81 U/L (ref 0–45)
BILIRUB DIRECT SERPL-MCNC: <0.2 MG/DL (ref 0–0.3)
BILIRUB SERPL-MCNC: 0.4 MG/DL
BUN SERPL-MCNC: 15.3 MG/DL (ref 6–20)
CALCIUM SERPL-MCNC: 9.4 MG/DL (ref 8.8–10.4)
CHLORIDE SERPL-SCNC: 101 MMOL/L (ref 98–107)
CREAT SERPL-MCNC: 0.9 MG/DL (ref 0.67–1.17)
EGFRCR SERPLBLD CKD-EPI 2021: >90 ML/MIN/1.73M2
ERYTHROCYTE [DISTWIDTH] IN BLOOD BY AUTOMATED COUNT: 11.8 % (ref 10–15)
GLUCOSE SERPL-MCNC: 335 MG/DL (ref 70–99)
HBA1C MFR BLD: 8.9 % (ref 0–5.6)
HCO3 SERPL-SCNC: 25 MMOL/L (ref 22–29)
HCT VFR BLD AUTO: 46.6 % (ref 40–53)
HGB BLD-MCNC: 16.8 G/DL (ref 13.3–17.7)
INR PPP: 0.93 (ref 0.85–1.15)
MCH RBC QN AUTO: 31.8 PG (ref 26.5–33)
MCHC RBC AUTO-ENTMCNC: 36.1 G/DL (ref 31.5–36.5)
MCV RBC AUTO: 88 FL (ref 78–100)
PLATELET # BLD AUTO: 154 10E3/UL (ref 150–450)
POTASSIUM SERPL-SCNC: 4.2 MMOL/L (ref 3.4–5.3)
PROT SERPL-MCNC: 7.9 G/DL (ref 6.4–8.3)
RBC # BLD AUTO: 5.28 10E6/UL (ref 4.4–5.9)
SODIUM SERPL-SCNC: 140 MMOL/L (ref 135–145)
WBC # BLD AUTO: 6.7 10E3/UL (ref 4–11)

## 2024-08-22 PROCEDURE — 99605 MTMS BY PHARM NP 15 MIN: CPT | Performed by: PHARMACIST

## 2024-08-22 PROCEDURE — 85027 COMPLETE CBC AUTOMATED: CPT

## 2024-08-22 PROCEDURE — 99607 MTMS BY PHARM ADDL 15 MIN: CPT | Performed by: PHARMACIST

## 2024-08-22 PROCEDURE — 80053 COMPREHEN METABOLIC PANEL: CPT

## 2024-08-22 PROCEDURE — 87517 HEPATITIS B DNA QUANT: CPT

## 2024-08-22 PROCEDURE — 85610 PROTHROMBIN TIME: CPT

## 2024-08-22 PROCEDURE — 83036 HEMOGLOBIN GLYCOSYLATED A1C: CPT

## 2024-08-22 PROCEDURE — 36415 COLL VENOUS BLD VENIPUNCTURE: CPT

## 2024-08-22 PROCEDURE — 82248 BILIRUBIN DIRECT: CPT

## 2024-08-22 RX ORDER — ATORVASTATIN CALCIUM 40 MG/1
40 TABLET, FILM COATED ORAL DAILY
Qty: 90 TABLET | Refills: 3 | Status: SHIPPED | OUTPATIENT
Start: 2024-08-22

## 2024-08-22 RX ORDER — LANCETS
EACH MISCELLANEOUS
Qty: 200 EACH | Refills: 4 | Status: SHIPPED | OUTPATIENT
Start: 2024-08-22

## 2024-08-22 RX ORDER — CALCIUM CARBONATE 500 MG/1
1 TABLET, CHEWABLE ORAL DAILY PRN
Qty: 30 TABLET | Refills: 3 | Status: SHIPPED | OUTPATIENT
Start: 2024-08-22

## 2024-08-22 NOTE — PROGRESS NOTES
Medication Therapy Management (MTM) Encounter    ASSESSMENT:                            Medication Adherence/Access: See below for considerations    1. Type 2 diabetes mellitus without complication, without long-term current use of insulin (H)  A1c not meeting goal <7%, likely due to medication nonadherence, refills for medications sent to pharmacy today.  Additionally, patient would benefit from monitoring blood sugars, testing supplies sent to pharmacy.  Patient also due for annual eye exam, referral sent today.  Could consider resuming glipizide in the future or adding GLP-1 agonist if additional therapy needed.    2. Gastroesophageal reflux disease, unspecified whether esophagitis present  Of note, patient fill history of omeprazole does not match patient reported use of PPI today.  Reasonable to continue PPI at this time, though would recommend addressing alternative such as H2 blocker at next visit if warranted per PCP.  Patient may benefit from adding Tums as needed for heartburn, prescription sent to pharmacy today.    3. Mixed hyperlipidemia  Currently prescribed high intensity statin, though patient has been without medication and requires refill, refill sent to pharmacy today.    4. Pain  Recommend patient use over-the-counter acetaminophen and ibuprofen as needed for pain at this time and follow-up with PCP if pain worsening or does not improve.     PLAN:                            Patient to refill at pharmacy: Jardiance, atorvastatin, and testing supplies  Start Tums as needed for heartburn   Recommended use of tylenol and ibuprofen OTC for shoulder pain and patient to see PCP if pain does not improve.   Lab today: A1c    Medication issues to be addressed at a future visit:   GLP-1?    Follow-up: Return in about 15 weeks (around 12/5/2024) for With PharmD.  PCP 9/30    SUBJECTIVE/OBJECTIVE:                          Reinaldo Real is a 39 year old male seen for a follow-up visit. Patient seen with LUPE  .      Reason for visit: MTM visit - DM focus, scheduled per care coordinator nurse.    Allergies/ADRs: Reviewed in chart  Past Medical History: Reviewed in chart  Tobacco: He reports that he has been smoking cigarettes. He has been exposed to tobacco smoke. His smokeless tobacco use includes chew.Nicotine/Tobacco Cessation Plan  Information offered: Patient not interested at this time  Reports using a lot less than before.   Alcohol: Less than 1 beverage / month    Medication Adherence/Access: Patient takes medications directly from bottles.  Patient takes medications 2 time(s) per day.   Per patient, misses medication 0 times per week.     No longer using pillbox and is instead taking the medications out of the vials. He manages his own medications. Patient reports no missed doses, but per fill history appears he is late to fill most of his medications. States that he needs refills of his medications and requesting them today: Januvia, Jardiance and metformin.   Patient does not bring his medications with today but able to recall how he takes each.        Diabetes   Empaglifloxin (Jardiance) 25 mg daily in the morning - presents without today, last filled 3/13 #30  Metformin  mg 1 tablet twice daily in the morning (max tolerated dose)  Januvia 100 mg daily    Not experiencing medication side effects.   Med Hx: metformin >1000 mg/day (vomiting), Onglyza (switched to Bydureon per PCP on 8/14), Bydureon Bcise (injection site pain)  Blood sugar monitoring: Brings traditional meter today, has not been checking because he doesn't have test strips.   - States that he stopped using the Naveen 2 because it caused skin irritation and itchiness, prefers traditional meter.   Current diabetes symptoms: None  Diet/Exercise: Eating 3 meals per day.      Eye exam in the last 12 months? No  Foot exam: due    Hyperlipidemia   Atorvastatin 40mg daily - presents without today, last filled 1/13 #90  Patient reports no  significant myalgias or other side effects.  The 10-year ASCVD risk score (Freedom CANELA, et al., 2019) is: 7.8%    Values used to calculate the score:      Age: 39 years      Sex: Male      Is Non- : No      Diabetic: Yes      Tobacco smoker: Yes      Systolic Blood Pressure: 122 mmHg      Is BP treated: No      HDL Cholesterol: 46 mg/dL      Total Cholesterol: 200 mg/dL     GERD:   Omeprazole 20 mg twice daily  - presents without today though reports still taking every day but left this at home  Reports medication effective for him. Reports he has nausea after every meal, but thinks the omeprazole helps with this but he still has this sometimes. Does think he has tried tums in the past and would like to try this again.     Pain  Patient states that he fell about 2-3 weeks ago was playing kick ball and hurt his shoulder. Has not been taking anything for the pain. Reports he still has pain there, rating 4-5/10.     Today's Vitals: There were no vitals taken for this visit.  ----------------      I spent 30 minutes with this patient today. All changes were made via collaborative practice agreement with Cassy Berrios MD. A copy of the visit note was provided to the patient's provider(s).    A summary of these recommendations was given to the patient.    Miriam Vallecillo, PharmD, [facility]CP  Medication Therapy Management Pharmacist     Medication Therapy Recommendations  Type 2 diabetes mellitus without complication, without long-term current use of insulin (H)    Current Medication: empagliflozin (JARDIANCE) 25 MG TABS tablet   Rationale: Medication product not available - Adherence - Adherence   Recommendation: Provide Adherence Intervention   Status: Patient Agreed - Adherence/Education

## 2024-08-22 NOTE — PATIENT INSTRUCTIONS
"Recommendations from today's MTM visit:                                                         Patient to refill at pharmacy: Jardiance, atorvastatin, and testing supplies  Start Tums as needed for heartburn   Recommended use of tylenol and ibuprofen OTC for shoulder pain and patient to see PCP if pain does not improve.     Follow-up: Return in about 15 weeks (around 12/5/2024) for With PharmD.    It was great speaking with you today.  I value your experience and would be very thankful for your time in providing feedback in our clinic survey. In the next few days, you may receive an email or text message from [email or web address] with a link to a survey related to your  clinical pharmacist.\"     To schedule another MTM appointment, please call the clinic directly or you may call the MTM scheduling line at [phone].    My Clinical Pharmacist's contact information:                                                      Please feel free to contact me with any questions or concerns you have.      Miriam Vallecillo, PharmD, BCACP  Medication Therapy Management Pharmacist    "

## 2024-08-23 LAB — HBV DNA SERPL NAA+PROBE-ACNC: NOT DETECTED IU/ML

## 2024-09-30 ENCOUNTER — OFFICE VISIT (OUTPATIENT)
Dept: FAMILY MEDICINE | Facility: CLINIC | Age: 39
End: 2024-09-30
Payer: COMMERCIAL

## 2024-09-30 VITALS
WEIGHT: 162 LBS | HEART RATE: 77 BPM | BODY MASS INDEX: 27.66 KG/M2 | TEMPERATURE: 98.1 F | RESPIRATION RATE: 16 BRPM | OXYGEN SATURATION: 98 % | HEIGHT: 64 IN | SYSTOLIC BLOOD PRESSURE: 120 MMHG | DIASTOLIC BLOOD PRESSURE: 70 MMHG

## 2024-09-30 DIAGNOSIS — E11.9 TYPE 2 DIABETES MELLITUS WITHOUT COMPLICATION, WITHOUT LONG-TERM CURRENT USE OF INSULIN (H): Primary | ICD-10-CM

## 2024-09-30 DIAGNOSIS — F10.21 ALCOHOL DEPENDENCE IN REMISSION (H): ICD-10-CM

## 2024-09-30 DIAGNOSIS — D58.2 ELEVATED HEMOGLOBIN (H): ICD-10-CM

## 2024-09-30 DIAGNOSIS — L03.012 CELLULITIS OF FINGER OF LEFT HAND: ICD-10-CM

## 2024-09-30 PROBLEM — K70.30 ALCOHOLIC CIRRHOSIS OF LIVER WITHOUT ASCITES (H): Status: ACTIVE | Noted: 2024-09-30

## 2024-09-30 LAB
ERYTHROCYTE [DISTWIDTH] IN BLOOD BY AUTOMATED COUNT: 12 % (ref 10–15)
EST. AVERAGE GLUCOSE BLD GHB EST-MCNC: 186 MG/DL
HBA1C MFR BLD: 8.1 % (ref 0–5.6)
HCT VFR BLD AUTO: 46.8 % (ref 40–53)
HGB BLD-MCNC: 16.7 G/DL (ref 13.3–17.7)
MCH RBC QN AUTO: 31.6 PG (ref 26.5–33)
MCHC RBC AUTO-ENTMCNC: 35.7 G/DL (ref 31.5–36.5)
MCV RBC AUTO: 89 FL (ref 78–100)
PLATELET # BLD AUTO: 151 10E3/UL (ref 150–450)
RBC # BLD AUTO: 5.29 10E6/UL (ref 4.4–5.9)
WBC # BLD AUTO: 9 10E3/UL (ref 4–11)

## 2024-09-30 PROCEDURE — 36415 COLL VENOUS BLD VENIPUNCTURE: CPT | Performed by: FAMILY MEDICINE

## 2024-09-30 PROCEDURE — 90656 IIV3 VACC NO PRSV 0.5 ML IM: CPT | Performed by: FAMILY MEDICINE

## 2024-09-30 PROCEDURE — 91320 SARSCV2 VAC 30MCG TRS-SUC IM: CPT | Performed by: FAMILY MEDICINE

## 2024-09-30 PROCEDURE — 85027 COMPLETE CBC AUTOMATED: CPT | Performed by: FAMILY MEDICINE

## 2024-09-30 PROCEDURE — 90471 IMMUNIZATION ADMIN: CPT | Performed by: FAMILY MEDICINE

## 2024-09-30 PROCEDURE — 90480 ADMN SARSCOV2 VAC 1/ONLY CMP: CPT | Performed by: FAMILY MEDICINE

## 2024-09-30 PROCEDURE — 99214 OFFICE O/P EST MOD 30 MIN: CPT | Mod: 25 | Performed by: FAMILY MEDICINE

## 2024-09-30 PROCEDURE — 80061 LIPID PANEL: CPT | Performed by: FAMILY MEDICINE

## 2024-09-30 PROCEDURE — 83036 HEMOGLOBIN GLYCOSYLATED A1C: CPT | Performed by: FAMILY MEDICINE

## 2024-09-30 RX ORDER — SULFAMETHOXAZOLE/TRIMETHOPRIM 800-160 MG
1 TABLET ORAL 2 TIMES DAILY
Qty: 14 TABLET | Refills: 0 | Status: SHIPPED | OUTPATIENT
Start: 2024-09-30 | End: 2024-10-07

## 2024-09-30 RX ORDER — GLIPIZIDE 5 MG/1
5 TABLET, FILM COATED, EXTENDED RELEASE ORAL DAILY
Qty: 90 TABLET | Refills: 3 | Status: SHIPPED | OUTPATIENT
Start: 2024-09-30

## 2024-09-30 NOTE — PROGRESS NOTES
"  Assessment & Plan     Type 2 diabetes mellitus without complication, without long-term current use of insulin (H): A1c down to 8.1 from 8.9. already on metformin, jardiance, januvia. Advised adding a GLP-1 agonist but he does not want to use anything injectable. Will add back glipizide, which he has been on in the past. Also helped schedule eye appt. Gave pill box since he states he sometimes misses some of his meds.   - Hemoglobin A1c  - Lipid Profile  - Adult Eye  Referral  - glipiZIDE (GLUCOTROL XL) 5 MG 24 hr tablet  Dispense: 90 tablet; Refill: 3    Elevated hemoglobin (H): recheck today  - CBC with platelets    Alcohol dependence in remission (H): states he has a beer very occasionally. Needs to follow up with GI. Scheduled today.     Cellulitis of finger of left hand: treat with Bactrim. Advised if this is not improving over the next 24-48 hours or if he develops any fevers, come back in to be seen.  - sulfamethoxazole-trimethoprim (BACTRIM DS) 800-160 MG tablet  Dispense: 14 tablet; Refill: 0            BMI  Estimated body mass index is 27.81 kg/m  as calculated from the following:    Height as of this encounter: 1.626 m (5' 4\").    Weight as of this encounter: 73.5 kg (162 lb).       Kellie Najera is a 39 year old, presenting for the following health issues:  Follow Up (DM)        9/30/2024    12:59 PM   Additional Questions   Roomed by Amparo RICE     History of Present Illness       Diabetes:   He presents for follow up of diabetes.  He is checking home blood glucose two times daily.   He checks blood glucose before and after meals.  Blood glucose is sometimes over 200 and never under 70.  When his blood glucose is low, the patient is asymptomatic for confusion, blurred vision, lethargy and reports not feeling dizzy, shaky, or weak.   He has no concerns regarding his diabetes at this time.  He is having blurry vision.  The patient has not had a diabetic eye exam in the last 12 months.      He " "consumes 1 sweetened beverage(s) daily.He exercises with enough effort to increase his heart rate 30 to 60 minutes per day.  He exercises with enough effort to increase his heart rate 4 days per week. He is missing 3 dose(s) of medications per week.     Cut left 2nd finger 3 weeks ago while cutting food. Is getting more painful and swollen.     A1c down to 8.1 from 8.9 in the past. Says he used to forget to take some of his meds but now he's been taking them every day.     He did injections in the past but he did not like the bruising or itching.           Objective    /70   Pulse 77   Temp 98.1  F (36.7  C) (Oral)   Resp 16   Ht 1.626 m (5' 4\")   Wt 73.5 kg (162 lb)   SpO2 98%   BMI 27.81 kg/m    Body mass index is 27.81 kg/m .  Physical Exam   GENERAL: alert and no distress  EYES: Eyes grossly normal to inspection, PERRL and conjunctivae and sclerae normal  HENT: ear canals and TM's normal, nose and mouth without ulcers or lesions  NECK: no adenopathy, no asymmetry, masses, or scars  RESP: lungs clear to auscultation - no rales, rhonchi or wheezes  CV: regular rate and rhythm, normal S1 S2, no S3 or S4, no murmur, click or rub, no peripheral edema  ABDOMEN: soft, nontender, no hepatosplenomegaly, no masses and bowel sounds normal  MS: left 2nd finger is quite swollen and tender to the touch, cannot bend due to pain. No fluctuance.   SKIN: no suspicious lesions or rashes  NEURO: Normal strength and tone, mentation intact and speech normal  PSYCH: mentation appears normal, affect normal/bright  Diabetic foot exam: normal DP and PT pulses, no trophic changes or ulcerative lesions, and normal sensory exam            Signed Electronically by: Cassy Berrios MD    "

## 2024-10-01 LAB
CHOLEST SERPL-MCNC: 157 MG/DL
FASTING STATUS PATIENT QL REPORTED: ABNORMAL
HDLC SERPL-MCNC: 41 MG/DL
LDLC SERPL CALC-MCNC: 60 MG/DL
NONHDLC SERPL-MCNC: 116 MG/DL
TRIGL SERPL-MCNC: 278 MG/DL

## 2024-10-03 ENCOUNTER — TRANSFERRED RECORDS (OUTPATIENT)
Dept: HEALTH INFORMATION MANAGEMENT | Facility: CLINIC | Age: 39
End: 2024-10-03

## 2024-10-03 LAB — RETINOPATHY: NEGATIVE

## 2024-10-22 ENCOUNTER — TELEPHONE (OUTPATIENT)
Dept: FAMILY MEDICINE | Facility: CLINIC | Age: 39
End: 2024-10-22
Payer: COMMERCIAL

## 2024-10-22 NOTE — TELEPHONE ENCOUNTER
Patient was seen on 9/30/24 for cellulitis on the finger. Finger was treated with bactrim. Patient completed course of antibiotic but finger is still not better.     - pain is getting better than before  - no fever  - swelling of the finger  - finger looks red and infected  - appt scheduled 10/25.      Jj Hunter, BSN RN  [facility]

## 2024-11-04 ENCOUNTER — HOSPITAL ENCOUNTER (EMERGENCY)
Facility: HOSPITAL | Age: 39
Discharge: STILL ADMITTED OR ADMINISTRATIVE | End: 2024-11-05
Payer: COMMERCIAL

## 2024-11-04 ENCOUNTER — OFFICE VISIT (OUTPATIENT)
Dept: FAMILY MEDICINE | Facility: CLINIC | Age: 39
End: 2024-11-04
Payer: COMMERCIAL

## 2024-11-04 ENCOUNTER — TELEPHONE (OUTPATIENT)
Dept: ORTHOPEDICS | Facility: CLINIC | Age: 39
End: 2024-11-04

## 2024-11-04 ENCOUNTER — VIRTUAL VISIT (OUTPATIENT)
Dept: INTERPRETER SERVICES | Facility: CLINIC | Age: 39
End: 2024-11-04

## 2024-11-04 ENCOUNTER — ANCILLARY PROCEDURE (OUTPATIENT)
Dept: GENERAL RADIOLOGY | Facility: CLINIC | Age: 39
End: 2024-11-04
Attending: FAMILY MEDICINE
Payer: COMMERCIAL

## 2024-11-04 VITALS
WEIGHT: 159 LBS | SYSTOLIC BLOOD PRESSURE: 122 MMHG | RESPIRATION RATE: 22 BRPM | TEMPERATURE: 98.5 F | HEART RATE: 70 BPM | BODY MASS INDEX: 27.14 KG/M2 | HEIGHT: 64 IN | DIASTOLIC BLOOD PRESSURE: 78 MMHG | OXYGEN SATURATION: 97 %

## 2024-11-04 DIAGNOSIS — M79.89 FINGER SWELLING: ICD-10-CM

## 2024-11-04 DIAGNOSIS — Z76.0 ENCOUNTER FOR MEDICATION REFILL: ICD-10-CM

## 2024-11-04 DIAGNOSIS — M86.9 OSTEOMYELITIS OF LEFT HAND, UNSPECIFIED TYPE (H): Primary | ICD-10-CM

## 2024-11-04 DIAGNOSIS — E11.9 TYPE 2 DIABETES MELLITUS WITHOUT COMPLICATION, WITHOUT LONG-TERM CURRENT USE OF INSULIN (H): ICD-10-CM

## 2024-11-04 LAB
BASOPHILS # BLD AUTO: 0 10E3/UL (ref 0–0.2)
BASOPHILS NFR BLD AUTO: 0 %
CRP SERPL-MCNC: <3 MG/L
EOSINOPHIL # BLD AUTO: 0 10E3/UL (ref 0–0.7)
EOSINOPHIL NFR BLD AUTO: 1 %
ERYTHROCYTE [DISTWIDTH] IN BLOOD BY AUTOMATED COUNT: 11.7 % (ref 10–15)
ERYTHROCYTE [SEDIMENTATION RATE] IN BLOOD BY WESTERGREN METHOD: 20 MM/HR (ref 0–15)
HCT VFR BLD AUTO: 47 % (ref 40–53)
HGB BLD-MCNC: 16.5 G/DL (ref 13.3–17.7)
IMM GRANULOCYTES # BLD: 0 10E3/UL
IMM GRANULOCYTES NFR BLD: 0 %
LYMPHOCYTES # BLD AUTO: 1.5 10E3/UL (ref 0.8–5.3)
LYMPHOCYTES NFR BLD AUTO: 23 %
MCH RBC QN AUTO: 31.3 PG (ref 26.5–33)
MCHC RBC AUTO-ENTMCNC: 35.1 G/DL (ref 31.5–36.5)
MCV RBC AUTO: 89 FL (ref 78–100)
MONOCYTES # BLD AUTO: 0.4 10E3/UL (ref 0–1.3)
MONOCYTES NFR BLD AUTO: 6 %
NEUTROPHILS # BLD AUTO: 4.7 10E3/UL (ref 1.6–8.3)
NEUTROPHILS NFR BLD AUTO: 71 %
PLATELET # BLD AUTO: 146 10E3/UL (ref 150–450)
RBC # BLD AUTO: 5.27 10E6/UL (ref 4.4–5.9)
WBC # BLD AUTO: 6.6 10E3/UL (ref 4–11)

## 2024-11-04 PROCEDURE — 73140 X-RAY EXAM OF FINGER(S): CPT | Mod: TC | Performed by: STUDENT IN AN ORGANIZED HEALTH CARE EDUCATION/TRAINING PROGRAM

## 2024-11-04 PROCEDURE — 85025 COMPLETE CBC W/AUTO DIFF WBC: CPT | Performed by: FAMILY MEDICINE

## 2024-11-04 PROCEDURE — 99214 OFFICE O/P EST MOD 30 MIN: CPT | Performed by: FAMILY MEDICINE

## 2024-11-04 PROCEDURE — 85652 RBC SED RATE AUTOMATED: CPT | Performed by: FAMILY MEDICINE

## 2024-11-04 PROCEDURE — 86140 C-REACTIVE PROTEIN: CPT | Performed by: FAMILY MEDICINE

## 2024-11-04 PROCEDURE — T1013 SIGN LANG/ORAL INTERPRETER: HCPCS | Mod: U4 | Performed by: INTERPRETER

## 2024-11-04 PROCEDURE — T1013 SIGN LANG/ORAL INTERPRETER: HCPCS | Mod: U4

## 2024-11-04 PROCEDURE — 36415 COLL VENOUS BLD VENIPUNCTURE: CPT | Performed by: FAMILY MEDICINE

## 2024-11-04 RX ORDER — METFORMIN HYDROCHLORIDE 500 MG/1
TABLET, EXTENDED RELEASE ORAL
Qty: 180 TABLET | Refills: 3 | Status: SHIPPED | OUTPATIENT
Start: 2024-11-04 | End: 2024-11-08

## 2024-11-04 NOTE — TELEPHONE ENCOUNTER
Sent to hand providers Cammie Whittaker PA-C and Dr Mario Hayes for review.  Cammie Whittaker PA-C called and spoke with Dr Alcala, advised for patient to present to [facility] ED now.  Nothing further is needed from the [facility] Orthopedic hand team. Janelle Perea RN

## 2024-11-04 NOTE — PROGRESS NOTES
"  Assessment & Plan     Left index finger wound, swelling - concern for osteomyelitis or septic arthritis  Minor laceration on index finger 2 months ago, infection developed and treated for cellulitis 1 month ago w bactrim, no improvement, and patient has not sought additional treatment until now. Xray findings today concerning for osteomyelitis or septic arthritis. No fevers or systemic symptoms, vital signs stable, and patient appears well. WBC wnl. Advised evaluation in ER for possible additional studies, ortho consult. Patient agreeable to this, but states he first needs to  daughters from school. He will then go directly to ER. Advised to go as soon as possible today.   - XR Finger Left G/E 2 Views  - CBC with platelets and differential  - ESR: Erythrocyte sedimentation rate  - CRP, inflammation      Type 2 diabetes mellitus without complication, without long-term current use of insulin (H)  Reports med compliance and improved control. Requests refill of metformin  - metFORMIN (GLUCOPHAGE XR) 500 MG 24 hr tablet  Dispense: 180 tablet; Refill: 3    GERD  Refill of omeprazole sent. Follow up if continuing to have symptoms.   - omeprazole (PRILOSEC) 20 MG DR capsule  Dispense: 90 capsule; Refill: 0      BMI  Estimated body mass index is 27.62 kg/m  as calculated from the following:    Height as of this encounter: 1.616 m (5' 3.62\").    Weight as of this encounter: 72.1 kg (159 lb).             Subjective   Moo is a 39 year old, presenting for the following health issues:  Cellulitis (Request new prescription and if needed x-ray ?) and Recheck Medication (Patient wonder if he need to keep taking metformin. Pt states he prefer omeprazole than the tums for heartburn.)        11/4/2024    10:35 AM   Additional Questions   Roomed by paw p   Accompanied by self     History of Present Illness       Reason for visit:  Cellulitis f/u      Left index finger pain and swelling - was initially seen for this 9/30, " "reported a cut to index finger 2-3 weeks prior, while cutting food with small knife - rx'd bactrim 9/30 for cellulitis, and told to return if not improving in 2-3 days. Did not improve but did not return until now.     No fever, no malaise, no n/v. Finger hurts to bend - is unable, unclear if due to pain or swelling. Patient reports finger pain and swelling is actually better than it was last week, after he used a needle to poke and drain some of the pus last week.     DM2 -  fasting. Sometimes after eating, up to 200. Reports med compliance. Januvia, glipizide, metformin, jardiance    Requests omeprazole for heartburn - tums not helping. Reports history of ulcer in past, no GI bleed per patient. Currnetly, mild burning pain after eating certain foods                    Objective    /78   Pulse 70   Temp 98.5  F (36.9  C) (Oral)   Resp 22   Ht 1.616 m (5' 3.62\")   Wt 72.1 kg (159 lb)   SpO2 97%   BMI 27.62 kg/m    Body mass index is 27.62 kg/m .  Physical Exam     Gen: NAD, well appearing  MSK: left index finger with significant swelling aound PIP and extending distally to nearly DIP joint, and proximally to base of finger, mild erythema, tender to palpation; area of open wound, currently no drainage. Decreased ROM at PIP joint          Signed Electronically by: Claritza Alcala MD    "

## 2024-11-04 NOTE — TELEPHONE ENCOUNTER
Other: Dr. Alcala called would to see if care team can review and eval for possible osteomyelitis of the finger. Please reach out to provider. [phone]      Could we send this information to you in Nitro or would you prefer to receive a phone call?:   Patient would prefer a phone call   Okay to leave a detailed message?: Yes at Other phone number:  [phone]*

## 2024-11-04 NOTE — ED NOTES
Expected Patient Referral to ED  12:46 PM    Referring Clinic/Provider:  [facility] at [facility]    Reason for referral/Clinical facts:  Had cut on finger 3 months ago.  Was started on Bactrim as an outpt about a month ago.  Xrays today show possible osteomyelitis and septic arthritis.  Patient n on compliant and [orimary doctor can't get call back from [facility] ortho.  Likely  needs admission.    Recommendations provided:  Send to ED for further evaluation    Caller was informed that this institution does possess the capabilities and/or resources to provide for patient and should be transferred to our facility.    Discussed that if direct admit is sought and any hurdles are encountered, this ED would be happy to see the patient and evaluate.    Informed caller that recommendations provided are recommendations based only on the facts provided and that they responsible to accept or reject the advice, or to seek a formal in person consultation as needed and that this ED will see/treat patient should they arrive.      Raman Bowman MD  [facility] EMERGENCY DEPARTMENT  [address] [place] [id number]  [phone]       Raman Bowman MD  11/04/24 1749

## 2024-11-06 ENCOUNTER — TELEPHONE (OUTPATIENT)
Dept: FAMILY MEDICINE | Facility: CLINIC | Age: 39
End: 2024-11-06
Payer: COMMERCIAL

## 2024-11-06 NOTE — TELEPHONE ENCOUNTER
----- Message from Claritza Alcala sent at 11/6/2024  9:47 AM CST -----  Please call patient - I see that he did not go to [facility] ER for this finger yet. He should do this today. I am concerned because there is risk of permanent damage to the finger, and worsening infection, which could also spread and make him very sick.

## 2024-11-07 NOTE — TELEPHONE ENCOUNTER
Writer called patient with [facility]  ID#  [id number]:    Left message to call back and ask to speak with an available nurse.    Called patient's spouse, Sa Powers, with [facility]  ID# [id number]:  reviewed message per Dr. Alcala.  Sa Powers verbalized understanding and stated she would call patient to relay Dr. Alcala's message/recommendation.    IKER CoombsN, RN-[facility]

## 2024-11-08 ENCOUNTER — ANESTHESIA (OUTPATIENT)
Dept: SURGERY | Facility: HOSPITAL | Age: 39
End: 2024-11-08
Payer: COMMERCIAL

## 2024-11-08 ENCOUNTER — HOSPITAL ENCOUNTER (INPATIENT)
Facility: HOSPITAL | Age: 39
LOS: 3 days | Discharge: HOME | End: 2024-11-11
Attending: EMERGENCY MEDICINE
Payer: COMMERCIAL

## 2024-11-08 ENCOUNTER — APPOINTMENT (OUTPATIENT)
Dept: MRI IMAGING | Facility: HOSPITAL | Age: 39
End: 2024-11-08
Attending: INTERNAL MEDICINE
Payer: COMMERCIAL

## 2024-11-08 ENCOUNTER — ANESTHESIA EVENT (OUTPATIENT)
Dept: SURGERY | Facility: HOSPITAL | Age: 39
End: 2024-11-08
Payer: COMMERCIAL

## 2024-11-08 DIAGNOSIS — M86.9 OSTEOMYELITIS OF LEFT HAND, UNSPECIFIED TYPE (H): Primary | ICD-10-CM

## 2024-11-08 DIAGNOSIS — L08.9 FINGER INFECTION: ICD-10-CM

## 2024-11-08 DIAGNOSIS — F10.920 ALCOHOLIC INTOXICATION WITHOUT COMPLICATION (H): ICD-10-CM

## 2024-11-08 DIAGNOSIS — E11.9 TYPE 2 DIABETES MELLITUS WITHOUT COMPLICATION, WITHOUT LONG-TERM CURRENT USE OF INSULIN (H): ICD-10-CM

## 2024-11-08 LAB
ANION GAP SERPL CALCULATED.3IONS-SCNC: 14 MMOL/L (ref 7–15)
BACTERIA SPEC CULT: NORMAL
BACTERIA SPEC CULT: NORMAL
BASOPHILS # BLD AUTO: 0 10E3/UL (ref 0–0.2)
BASOPHILS NFR BLD AUTO: 0 %
BUN SERPL-MCNC: 12.2 MG/DL (ref 6–20)
CALCIUM SERPL-MCNC: 8.9 MG/DL (ref 8.8–10.4)
CHLORIDE SERPL-SCNC: 104 MMOL/L (ref 98–107)
CREAT SERPL-MCNC: 0.76 MG/DL (ref 0.67–1.17)
CRP SERPL-MCNC: <3 MG/L
EGFRCR SERPLBLD CKD-EPI 2021: >90 ML/MIN/1.73M2
EOSINOPHIL # BLD AUTO: 0.2 10E3/UL (ref 0–0.7)
EOSINOPHIL NFR BLD AUTO: 2 %
ERYTHROCYTE [DISTWIDTH] IN BLOOD BY AUTOMATED COUNT: 12 % (ref 10–15)
ERYTHROCYTE [SEDIMENTATION RATE] IN BLOOD BY WESTERGREN METHOD: 21 MM/HR (ref 0–15)
ETHANOL SERPL-MCNC: 0.19 G/DL
GLUCOSE BLDC GLUCOMTR-MCNC: 78 MG/DL (ref 70–99)
GLUCOSE BLDC GLUCOMTR-MCNC: 92 MG/DL (ref 70–99)
GLUCOSE BLDC GLUCOMTR-MCNC: 93 MG/DL (ref 70–99)
GLUCOSE BLDC GLUCOMTR-MCNC: 96 MG/DL (ref 70–99)
GLUCOSE SERPL-MCNC: 110 MG/DL (ref 70–99)
GRAM STAIN RESULT: NORMAL
HCO3 SERPL-SCNC: 21 MMOL/L (ref 22–29)
HCT VFR BLD AUTO: 49.1 % (ref 40–53)
HGB BLD-MCNC: 17.2 G/DL (ref 13.3–17.7)
HOLD SPECIMEN: NORMAL
IMM GRANULOCYTES # BLD: 0 10E3/UL
IMM GRANULOCYTES NFR BLD: 0 %
LYMPHOCYTES # BLD AUTO: 4.4 10E3/UL (ref 0.8–5.3)
LYMPHOCYTES NFR BLD AUTO: 49 %
MAGNESIUM SERPL-MCNC: 2.5 MG/DL (ref 1.7–2.3)
MCH RBC QN AUTO: 30.7 PG (ref 26.5–33)
MCHC RBC AUTO-ENTMCNC: 35 G/DL (ref 31.5–36.5)
MCV RBC AUTO: 88 FL (ref 78–100)
MONOCYTES # BLD AUTO: 0.6 10E3/UL (ref 0–1.3)
MONOCYTES NFR BLD AUTO: 7 %
NEUTROPHILS # BLD AUTO: 3.7 10E3/UL (ref 1.6–8.3)
NEUTROPHILS NFR BLD AUTO: 41 %
NRBC # BLD AUTO: 0 10E3/UL
NRBC BLD AUTO-RTO: 0 /100
PHOSPHATE SERPL-MCNC: 3.9 MG/DL (ref 2.5–4.5)
PLATELET # BLD AUTO: 192 10E3/UL (ref 150–450)
POTASSIUM SERPL-SCNC: 3.9 MMOL/L (ref 3.4–5.3)
PROCALCITONIN SERPL IA-MCNC: 0.05 NG/ML
RBC # BLD AUTO: 5.6 10E6/UL (ref 4.4–5.9)
SODIUM SERPL-SCNC: 139 MMOL/L (ref 135–145)
WBC # BLD AUTO: 9 10E3/UL (ref 4–11)

## 2024-11-08 PROCEDURE — 255N000002 HC RX 255 OP 636: Performed by: INTERNAL MEDICINE

## 2024-11-08 PROCEDURE — 85004 AUTOMATED DIFF WBC COUNT: CPT | Performed by: EMERGENCY MEDICINE

## 2024-11-08 PROCEDURE — 96365 THER/PROPH/DIAG IV INF INIT: CPT

## 2024-11-08 PROCEDURE — 82962 GLUCOSE BLOOD TEST: CPT

## 2024-11-08 PROCEDURE — 99223 1ST HOSP IP/OBS HIGH 75: CPT | Performed by: INTERNAL MEDICINE

## 2024-11-08 PROCEDURE — 01Q60ZZ REPAIR RADIAL NERVE, OPEN APPROACH: ICD-10-PCS | Performed by: SURGERY

## 2024-11-08 PROCEDURE — 85652 RBC SED RATE AUTOMATED: CPT | Performed by: EMERGENCY MEDICINE

## 2024-11-08 PROCEDURE — 36415 COLL VENOUS BLD VENIPUNCTURE: CPT | Performed by: EMERGENCY MEDICINE

## 2024-11-08 PROCEDURE — 250N000011 HC RX IP 250 OP 636: Performed by: ANESTHESIOLOGY

## 2024-11-08 PROCEDURE — 10060 I&D ABSCESS SIMPLE/SINGLE: CPT | Performed by: ANESTHESIOLOGY

## 2024-11-08 PROCEDURE — 99207 PR APP CREDIT; MD BILLING SHARED VISIT: CPT | Performed by: HOSPITALIST

## 2024-11-08 PROCEDURE — 86140 C-REACTIVE PROTEIN: CPT | Performed by: EMERGENCY MEDICINE

## 2024-11-08 PROCEDURE — 88311 DECALCIFY TISSUE: CPT | Mod: 26 | Performed by: PATHOLOGY

## 2024-11-08 PROCEDURE — 80048 BASIC METABOLIC PNL TOTAL CA: CPT | Performed by: EMERGENCY MEDICINE

## 2024-11-08 PROCEDURE — 73223 MRI JOINT UPR EXTR W/O&W/DYE: CPT | Mod: LT

## 2024-11-08 PROCEDURE — 83735 ASSAY OF MAGNESIUM: CPT | Performed by: INTERNAL MEDICINE

## 2024-11-08 PROCEDURE — 36415 COLL VENOUS BLD VENIPUNCTURE: CPT | Performed by: INTERNAL MEDICINE

## 2024-11-08 PROCEDURE — 87205 SMEAR GRAM STAIN: CPT | Performed by: SURGERY

## 2024-11-08 PROCEDURE — 250N000013 HC RX MED GY IP 250 OP 250 PS 637: Performed by: INTERNAL MEDICINE

## 2024-11-08 PROCEDURE — 99285 EMERGENCY DEPT VISIT HI MDM: CPT | Mod: 25

## 2024-11-08 PROCEDURE — 258N000003 HC RX IP 258 OP 636: Performed by: EMERGENCY MEDICINE

## 2024-11-08 PROCEDURE — 87070 CULTURE OTHR SPECIMN AEROBIC: CPT | Performed by: SURGERY

## 2024-11-08 PROCEDURE — 88304 TISSUE EXAM BY PATHOLOGIST: CPT | Mod: TC | Performed by: SURGERY

## 2024-11-08 PROCEDURE — 87077 CULTURE AEROBIC IDENTIFY: CPT | Performed by: SURGERY

## 2024-11-08 PROCEDURE — 85014 HEMATOCRIT: CPT | Performed by: EMERGENCY MEDICINE

## 2024-11-08 PROCEDURE — 87040 BLOOD CULTURE FOR BACTERIA: CPT | Performed by: INTERNAL MEDICINE

## 2024-11-08 PROCEDURE — 88304 TISSUE EXAM BY PATHOLOGIST: CPT | Mod: 26 | Performed by: PATHOLOGY

## 2024-11-08 PROCEDURE — 370N000017 HC ANESTHESIA TECHNICAL FEE, PER MIN: Performed by: SURGERY

## 2024-11-08 PROCEDURE — A9585 GADOBUTROL INJECTION: HCPCS | Performed by: INTERNAL MEDICINE

## 2024-11-08 PROCEDURE — 250N000011 HC RX IP 250 OP 636: Performed by: INTERNAL MEDICINE

## 2024-11-08 PROCEDURE — 250N000011 HC RX IP 250 OP 636: Performed by: PHYSICIAN ASSISTANT

## 2024-11-08 PROCEDURE — 10060 I&D ABSCESS SIMPLE/SINGLE: CPT | Performed by: NURSE ANESTHETIST, CERTIFIED REGISTERED

## 2024-11-08 PROCEDURE — 120N000001 HC R&B MED SURG/OB

## 2024-11-08 PROCEDURE — 0PBV0ZZ EXCISION OF LEFT FINGER PHALANX, OPEN APPROACH: ICD-10-PCS | Performed by: SURGERY

## 2024-11-08 PROCEDURE — 250N000011 HC RX IP 250 OP 636: Performed by: EMERGENCY MEDICINE

## 2024-11-08 PROCEDURE — 87076 CULTURE ANAEROBE IDENT EACH: CPT | Performed by: SURGERY

## 2024-11-08 PROCEDURE — 250N000013 HC RX MED GY IP 250 OP 250 PS 637: Performed by: ANESTHESIOLOGY

## 2024-11-08 PROCEDURE — 82077 ASSAY SPEC XCP UR&BREATH IA: CPT | Performed by: EMERGENCY MEDICINE

## 2024-11-08 PROCEDURE — 258N000003 HC RX IP 258 OP 636: Performed by: INTERNAL MEDICINE

## 2024-11-08 PROCEDURE — 84145 PROCALCITONIN (PCT): CPT | Performed by: INTERNAL MEDICINE

## 2024-11-08 PROCEDURE — 999N000141 HC STATISTIC PRE-PROCEDURE NURSING ASSESSMENT: Performed by: SURGERY

## 2024-11-08 PROCEDURE — 272N000001 HC OR GENERAL SUPPLY STERILE: Performed by: SURGERY

## 2024-11-08 PROCEDURE — 250N000009 HC RX 250: Performed by: ANESTHESIOLOGY

## 2024-11-08 PROCEDURE — 87075 CULTR BACTERIA EXCEPT BLOOD: CPT | Performed by: SURGERY

## 2024-11-08 PROCEDURE — 84100 ASSAY OF PHOSPHORUS: CPT | Performed by: INTERNAL MEDICINE

## 2024-11-08 PROCEDURE — 96366 THER/PROPH/DIAG IV INF ADDON: CPT

## 2024-11-08 PROCEDURE — 82565 ASSAY OF CREATININE: CPT | Performed by: EMERGENCY MEDICINE

## 2024-11-08 PROCEDURE — 360N000075 HC SURGERY LEVEL 2, PER MIN: Performed by: SURGERY

## 2024-11-08 PROCEDURE — 99140 ANES COMP EMERGENCY COND: CPT | Performed by: NURSE ANESTHETIST, CERTIFIED REGISTERED

## 2024-11-08 PROCEDURE — 258N000003 HC RX IP 258 OP 636: Performed by: HOSPITALIST

## 2024-11-08 RX ORDER — NALOXONE HYDROCHLORIDE 0.4 MG/ML
0.2 INJECTION, SOLUTION INTRAMUSCULAR; INTRAVENOUS; SUBCUTANEOUS
Status: DISCONTINUED | OUTPATIENT
Start: 2024-11-08 | End: 2024-11-11 | Stop reason: HOSPADM

## 2024-11-08 RX ORDER — OXYCODONE HYDROCHLORIDE 5 MG/1
10 TABLET ORAL
Status: DISCONTINUED | OUTPATIENT
Start: 2024-11-08 | End: 2024-11-08

## 2024-11-08 RX ORDER — NICOTINE POLACRILEX 4 MG
15-30 LOZENGE BUCCAL
Status: DISCONTINUED | OUTPATIENT
Start: 2024-11-08 | End: 2024-11-11 | Stop reason: HOSPADM

## 2024-11-08 RX ORDER — AMOXICILLIN 250 MG
1 CAPSULE ORAL 2 TIMES DAILY PRN
Status: DISCONTINUED | OUTPATIENT
Start: 2024-11-08 | End: 2024-11-11 | Stop reason: HOSPADM

## 2024-11-08 RX ORDER — BUPIVACAINE HYDROCHLORIDE 5 MG/ML
INJECTION, SOLUTION EPIDURAL; INTRACAUDAL
Status: COMPLETED | OUTPATIENT
Start: 2024-11-08 | End: 2024-11-08

## 2024-11-08 RX ORDER — ATORVASTATIN CALCIUM 40 MG/1
40 TABLET, FILM COATED ORAL DAILY
Status: DISCONTINUED | OUTPATIENT
Start: 2024-11-08 | End: 2024-11-11 | Stop reason: HOSPADM

## 2024-11-08 RX ORDER — OXYCODONE HYDROCHLORIDE 5 MG/1
5 TABLET ORAL
Status: DISCONTINUED | OUTPATIENT
Start: 2024-11-08 | End: 2024-11-08

## 2024-11-08 RX ORDER — ONDANSETRON 2 MG/ML
4 INJECTION INTRAMUSCULAR; INTRAVENOUS EVERY 30 MIN PRN
Status: DISCONTINUED | OUTPATIENT
Start: 2024-11-08 | End: 2024-11-08

## 2024-11-08 RX ORDER — DEXTROSE MONOHYDRATE 25 G/50ML
25-50 INJECTION, SOLUTION INTRAVENOUS
Status: DISCONTINUED | OUTPATIENT
Start: 2024-11-08 | End: 2024-11-11 | Stop reason: HOSPADM

## 2024-11-08 RX ORDER — LIDOCAINE 40 MG/G
CREAM TOPICAL
Status: DISCONTINUED | OUTPATIENT
Start: 2024-11-08 | End: 2024-11-08 | Stop reason: HOSPADM

## 2024-11-08 RX ORDER — LORAZEPAM 2 MG/ML
1 INJECTION INTRAMUSCULAR EVERY 4 HOURS PRN
Status: DISCONTINUED | OUTPATIENT
Start: 2024-11-08 | End: 2024-11-11 | Stop reason: HOSPADM

## 2024-11-08 RX ORDER — ACETAMINOPHEN 325 MG/1
975 TABLET ORAL ONCE
Status: COMPLETED | OUTPATIENT
Start: 2024-11-08 | End: 2024-11-08

## 2024-11-08 RX ORDER — NICOTINE POLACRILEX 4 MG
15-30 LOZENGE BUCCAL
Status: DISCONTINUED | OUTPATIENT
Start: 2024-11-08 | End: 2024-11-08

## 2024-11-08 RX ORDER — CALCIUM CARBONATE 500 MG/1
1000 TABLET, CHEWABLE ORAL 4 TIMES DAILY PRN
Status: DISCONTINUED | OUTPATIENT
Start: 2024-11-08 | End: 2024-11-11 | Stop reason: HOSPADM

## 2024-11-08 RX ORDER — FOLIC ACID 1 MG/1
1 TABLET ORAL DAILY
Status: DISCONTINUED | OUTPATIENT
Start: 2024-11-11 | End: 2024-11-08

## 2024-11-08 RX ORDER — SODIUM CHLORIDE, SODIUM LACTATE, POTASSIUM CHLORIDE, CALCIUM CHLORIDE 600; 310; 30; 20 MG/100ML; MG/100ML; MG/100ML; MG/100ML
INJECTION, SOLUTION INTRAVENOUS CONTINUOUS
Status: DISCONTINUED | OUTPATIENT
Start: 2024-11-08 | End: 2024-11-08

## 2024-11-08 RX ORDER — AMOXICILLIN 250 MG
2 CAPSULE ORAL 2 TIMES DAILY PRN
Status: DISCONTINUED | OUTPATIENT
Start: 2024-11-08 | End: 2024-11-11 | Stop reason: HOSPADM

## 2024-11-08 RX ORDER — FENTANYL CITRATE 50 UG/ML
50 INJECTION, SOLUTION INTRAMUSCULAR; INTRAVENOUS EVERY 5 MIN PRN
Status: DISCONTINUED | OUTPATIENT
Start: 2024-11-08 | End: 2024-11-08

## 2024-11-08 RX ORDER — ONDANSETRON 4 MG/1
4 TABLET, ORALLY DISINTEGRATING ORAL EVERY 6 HOURS PRN
Status: DISCONTINUED | OUTPATIENT
Start: 2024-11-08 | End: 2024-11-11 | Stop reason: HOSPADM

## 2024-11-08 RX ORDER — FOLIC ACID 5 MG/ML
1 INJECTION, SOLUTION INTRAMUSCULAR; INTRAVENOUS; SUBCUTANEOUS DAILY
Status: DISCONTINUED | OUTPATIENT
Start: 2024-11-09 | End: 2024-11-08

## 2024-11-08 RX ORDER — DEXTROSE MONOHYDRATE AND SODIUM CHLORIDE 5; .9 G/100ML; G/100ML
INJECTION, SOLUTION INTRAVENOUS CONTINUOUS
Status: DISCONTINUED | OUTPATIENT
Start: 2024-11-08 | End: 2024-11-08

## 2024-11-08 RX ORDER — PROPOFOL 10 MG/ML
INJECTION, EMULSION INTRAVENOUS PRN
Status: DISCONTINUED | OUTPATIENT
Start: 2024-11-08 | End: 2024-11-08

## 2024-11-08 RX ORDER — DEXAMETHASONE SODIUM PHOSPHATE 10 MG/ML
4 INJECTION, SOLUTION INTRAMUSCULAR; INTRAVENOUS
Status: DISCONTINUED | OUTPATIENT
Start: 2024-11-08 | End: 2024-11-08

## 2024-11-08 RX ORDER — DEXTROSE MONOHYDRATE 25 G/50ML
25-50 INJECTION, SOLUTION INTRAVENOUS
Status: DISCONTINUED | OUTPATIENT
Start: 2024-11-08 | End: 2024-11-08

## 2024-11-08 RX ORDER — NALOXONE HYDROCHLORIDE 0.4 MG/ML
0.1 INJECTION, SOLUTION INTRAMUSCULAR; INTRAVENOUS; SUBCUTANEOUS
Status: DISCONTINUED | OUTPATIENT
Start: 2024-11-08 | End: 2024-11-08

## 2024-11-08 RX ORDER — HYDROMORPHONE HCL IN WATER/PF 6 MG/30 ML
0.2 PATIENT CONTROLLED ANALGESIA SYRINGE INTRAVENOUS
Status: DISCONTINUED | OUTPATIENT
Start: 2024-11-08 | End: 2024-11-09

## 2024-11-08 RX ORDER — ONDANSETRON 2 MG/ML
4 INJECTION INTRAMUSCULAR; INTRAVENOUS EVERY 6 HOURS PRN
Status: DISCONTINUED | OUTPATIENT
Start: 2024-11-08 | End: 2024-11-11 | Stop reason: HOSPADM

## 2024-11-08 RX ORDER — PIPERACILLIN SODIUM, TAZOBACTAM SODIUM 3; .375 G/15ML; G/15ML
3.38 INJECTION, POWDER, LYOPHILIZED, FOR SOLUTION INTRAVENOUS EVERY 8 HOURS
Status: DISCONTINUED | OUTPATIENT
Start: 2024-11-08 | End: 2024-11-11

## 2024-11-08 RX ORDER — HYDROMORPHONE HCL IN WATER/PF 6 MG/30 ML
0.4 PATIENT CONTROLLED ANALGESIA SYRINGE INTRAVENOUS
Status: DISCONTINUED | OUTPATIENT
Start: 2024-11-08 | End: 2024-11-09

## 2024-11-08 RX ORDER — HYDROMORPHONE HCL IN WATER/PF 6 MG/30 ML
0.2 PATIENT CONTROLLED ANALGESIA SYRINGE INTRAVENOUS EVERY 5 MIN PRN
Status: DISCONTINUED | OUTPATIENT
Start: 2024-11-08 | End: 2024-11-08

## 2024-11-08 RX ORDER — PROPOFOL 10 MG/ML
INJECTION, EMULSION INTRAVENOUS CONTINUOUS PRN
Status: DISCONTINUED | OUTPATIENT
Start: 2024-11-08 | End: 2024-11-08

## 2024-11-08 RX ORDER — FLUMAZENIL 0.1 MG/ML
0.2 INJECTION, SOLUTION INTRAVENOUS
Status: DISCONTINUED | OUTPATIENT
Start: 2024-11-08 | End: 2024-11-11 | Stop reason: HOSPADM

## 2024-11-08 RX ORDER — FOLIC ACID 5 MG/ML
1 INJECTION, SOLUTION INTRAMUSCULAR; INTRAVENOUS; SUBCUTANEOUS ONCE
Status: COMPLETED | OUTPATIENT
Start: 2024-11-08 | End: 2024-11-08

## 2024-11-08 RX ORDER — DOCUSATE SODIUM 100 MG/1
100 CAPSULE, LIQUID FILLED ORAL 2 TIMES DAILY
Status: DISCONTINUED | OUTPATIENT
Start: 2024-11-08 | End: 2024-11-11 | Stop reason: HOSPADM

## 2024-11-08 RX ORDER — CEFAZOLIN SODIUM/WATER 2 G/20 ML
2 SYRINGE (ML) INTRAVENOUS SEE ADMIN INSTRUCTIONS
Status: DISCONTINUED | OUTPATIENT
Start: 2024-11-08 | End: 2024-11-08 | Stop reason: HOSPADM

## 2024-11-08 RX ORDER — FENTANYL CITRATE 50 UG/ML
25-100 INJECTION, SOLUTION INTRAMUSCULAR; INTRAVENOUS
Status: DISCONTINUED | OUTPATIENT
Start: 2024-11-08 | End: 2024-11-08 | Stop reason: HOSPADM

## 2024-11-08 RX ORDER — PIPERACILLIN SODIUM, TAZOBACTAM SODIUM 3; .375 G/15ML; G/15ML
3.38 INJECTION, POWDER, LYOPHILIZED, FOR SOLUTION INTRAVENOUS ONCE
Status: COMPLETED | OUTPATIENT
Start: 2024-11-08 | End: 2024-11-08

## 2024-11-08 RX ORDER — THIAMINE HYDROCHLORIDE 100 MG/ML
200 INJECTION, SOLUTION INTRAMUSCULAR; INTRAVENOUS 3 TIMES DAILY
Status: DISCONTINUED | OUTPATIENT
Start: 2024-11-08 | End: 2024-11-08

## 2024-11-08 RX ORDER — CEFAZOLIN SODIUM 1 G/3ML
INJECTION, POWDER, FOR SOLUTION INTRAMUSCULAR; INTRAVENOUS
Status: DISCONTINUED
Start: 2024-11-08 | End: 2024-11-08 | Stop reason: WASHOUT

## 2024-11-08 RX ORDER — GADOBUTROL 604.72 MG/ML
7 INJECTION INTRAVENOUS ONCE
Status: COMPLETED | OUTPATIENT
Start: 2024-11-08 | End: 2024-11-08

## 2024-11-08 RX ORDER — ACETAMINOPHEN 325 MG/1
975 TABLET ORAL ONCE
Status: DISCONTINUED | OUTPATIENT
Start: 2024-11-08 | End: 2024-11-08

## 2024-11-08 RX ORDER — NALOXONE HYDROCHLORIDE 0.4 MG/ML
0.4 INJECTION, SOLUTION INTRAMUSCULAR; INTRAVENOUS; SUBCUTANEOUS
Status: DISCONTINUED | OUTPATIENT
Start: 2024-11-08 | End: 2024-11-11 | Stop reason: HOSPADM

## 2024-11-08 RX ORDER — LIDOCAINE HYDROCHLORIDE 10 MG/ML
INJECTION, SOLUTION INFILTRATION; PERINEURAL PRN
Status: DISCONTINUED | OUTPATIENT
Start: 2024-11-08 | End: 2024-11-08

## 2024-11-08 RX ORDER — ONDANSETRON 2 MG/ML
INJECTION INTRAMUSCULAR; INTRAVENOUS PRN
Status: DISCONTINUED | OUTPATIENT
Start: 2024-11-08 | End: 2024-11-08

## 2024-11-08 RX ORDER — FENTANYL CITRATE 50 UG/ML
25 INJECTION, SOLUTION INTRAMUSCULAR; INTRAVENOUS EVERY 5 MIN PRN
Status: DISCONTINUED | OUTPATIENT
Start: 2024-11-08 | End: 2024-11-08

## 2024-11-08 RX ORDER — LIDOCAINE 40 MG/G
CREAM TOPICAL
Status: DISCONTINUED | OUTPATIENT
Start: 2024-11-08 | End: 2024-11-11 | Stop reason: HOSPADM

## 2024-11-08 RX ORDER — FENTANYL CITRATE 50 UG/ML
25 INJECTION, SOLUTION INTRAMUSCULAR; INTRAVENOUS
Status: DISCONTINUED | OUTPATIENT
Start: 2024-11-08 | End: 2024-11-08

## 2024-11-08 RX ORDER — CEFAZOLIN SODIUM/WATER 2 G/20 ML
2 SYRINGE (ML) INTRAVENOUS
Status: COMPLETED | OUTPATIENT
Start: 2024-11-08 | End: 2024-11-08

## 2024-11-08 RX ORDER — CLONIDINE HYDROCHLORIDE 0.1 MG/1
0.1 TABLET ORAL EVERY 8 HOURS
Status: DISCONTINUED | OUTPATIENT
Start: 2024-11-08 | End: 2024-11-09

## 2024-11-08 RX ORDER — ONDANSETRON 4 MG/1
4 TABLET, ORALLY DISINTEGRATING ORAL EVERY 30 MIN PRN
Status: DISCONTINUED | OUTPATIENT
Start: 2024-11-08 | End: 2024-11-08

## 2024-11-08 RX ORDER — FENTANYL CITRATE 50 UG/ML
INJECTION, SOLUTION INTRAMUSCULAR; INTRAVENOUS PRN
Status: DISCONTINUED | OUTPATIENT
Start: 2024-11-08 | End: 2024-11-08

## 2024-11-08 RX ORDER — SODIUM CHLORIDE, SODIUM LACTATE, POTASSIUM CHLORIDE, CALCIUM CHLORIDE 600; 310; 30; 20 MG/100ML; MG/100ML; MG/100ML; MG/100ML
INJECTION, SOLUTION INTRAVENOUS CONTINUOUS
Status: DISCONTINUED | OUTPATIENT
Start: 2024-11-08 | End: 2024-11-08 | Stop reason: HOSPADM

## 2024-11-08 RX ORDER — HYDROMORPHONE HCL IN WATER/PF 6 MG/30 ML
0.4 PATIENT CONTROLLED ANALGESIA SYRINGE INTRAVENOUS EVERY 5 MIN PRN
Status: DISCONTINUED | OUTPATIENT
Start: 2024-11-08 | End: 2024-11-08

## 2024-11-08 RX ORDER — MULTIPLE VITAMINS W/ MINERALS TAB 9MG-400MCG
1 TAB ORAL DAILY
Status: DISCONTINUED | OUTPATIENT
Start: 2024-11-08 | End: 2024-11-08

## 2024-11-08 RX ORDER — NICOTINE 21 MG/24HR
1 PATCH, TRANSDERMAL 24 HOURS TRANSDERMAL DAILY
Status: DISCONTINUED | OUTPATIENT
Start: 2024-11-08 | End: 2024-11-11 | Stop reason: HOSPADM

## 2024-11-08 RX ADMIN — DOCUSATE SODIUM 100 MG: 100 CAPSULE, LIQUID FILLED ORAL at 08:03

## 2024-11-08 RX ADMIN — FENTANYL CITRATE 50 MCG: 50 INJECTION, SOLUTION INTRAMUSCULAR; INTRAVENOUS at 17:20

## 2024-11-08 RX ADMIN — PIPERACILLIN AND TAZOBACTAM 3.38 G: 3; .375 INJECTION, POWDER, FOR SOLUTION INTRAVENOUS at 12:32

## 2024-11-08 RX ADMIN — BUPIVACAINE HYDROCHLORIDE 25 ML: 5 INJECTION, SOLUTION EPIDURAL; INTRACAUDAL; PERINEURAL at 16:19

## 2024-11-08 RX ADMIN — Medication 1 TABLET: at 08:03

## 2024-11-08 RX ADMIN — FOLIC ACID 1 MG: 5 INJECTION, SOLUTION INTRAMUSCULAR; INTRAVENOUS; SUBCUTANEOUS at 08:02

## 2024-11-08 RX ADMIN — Medication 2 G: at 17:20

## 2024-11-08 RX ADMIN — FENTANYL CITRATE 100 MCG: 50 INJECTION, SOLUTION INTRAMUSCULAR; INTRAVENOUS at 16:20

## 2024-11-08 RX ADMIN — SODIUM CHLORIDE 1750 MG: 9 INJECTION, SOLUTION INTRAVENOUS at 05:13

## 2024-11-08 RX ADMIN — FAMOTIDINE 20 MG: 10 INJECTION, SOLUTION INTRAVENOUS at 08:02

## 2024-11-08 RX ADMIN — LIDOCAINE HYDROCHLORIDE 3 ML: 10 INJECTION, SOLUTION INFILTRATION; PERINEURAL at 17:20

## 2024-11-08 RX ADMIN — MIDAZOLAM HYDROCHLORIDE 2 MG: 1 INJECTION, SOLUTION INTRAMUSCULAR; INTRAVENOUS at 16:20

## 2024-11-08 RX ADMIN — PIPERACILLIN AND TAZOBACTAM 3.38 G: 3; .375 INJECTION, POWDER, FOR SOLUTION INTRAVENOUS at 04:42

## 2024-11-08 RX ADMIN — ACETAMINOPHEN 975 MG: 325 TABLET ORAL at 14:33

## 2024-11-08 RX ADMIN — SODIUM CHLORIDE 1250 MG: 9 INJECTION, SOLUTION INTRAVENOUS at 12:41

## 2024-11-08 RX ADMIN — PIPERACILLIN AND TAZOBACTAM 3.38 G: 3; .375 INJECTION, POWDER, FOR SOLUTION INTRAVENOUS at 19:33

## 2024-11-08 RX ADMIN — DEXTROSE AND SODIUM CHLORIDE: 5; 900 INJECTION, SOLUTION INTRAVENOUS at 08:08

## 2024-11-08 RX ADMIN — ONDANSETRON 4 MG: 2 INJECTION INTRAMUSCULAR; INTRAVENOUS at 17:23

## 2024-11-08 RX ADMIN — NICOTINE 1 PATCH: 14 PATCH, EXTENDED RELEASE TRANSDERMAL at 08:14

## 2024-11-08 RX ADMIN — DOCUSATE SODIUM 100 MG: 100 CAPSULE, LIQUID FILLED ORAL at 19:34

## 2024-11-08 RX ADMIN — GADOBUTROL 7 ML: 604.72 INJECTION INTRAVENOUS at 09:30

## 2024-11-08 RX ADMIN — PROPOFOL 150 MCG/KG/MIN: 10 INJECTION, EMULSION INTRAVENOUS at 17:23

## 2024-11-08 RX ADMIN — ATORVASTATIN CALCIUM 40 MG: 40 TABLET, FILM COATED ORAL at 08:03

## 2024-11-08 RX ADMIN — PROPOFOL 50 MG: 10 INJECTION, EMULSION INTRAVENOUS at 17:23

## 2024-11-08 ASSESSMENT — ACTIVITIES OF DAILY LIVING (ADL)
ADLS_ACUITY_SCORE: 0

## 2024-11-08 ASSESSMENT — LIFESTYLE VARIABLES
ANXIETY: NO ANXIETY, AT EASE
TREMOR: NO TREMOR
VISUAL DISTURBANCES: NOT PRESENT
AGITATION: NORMAL ACTIVITY
ORIENTATION AND CLOUDING OF SENSORIUM: ORIENTED AND CAN DO SERIAL ADDITIONS
TOBACCO_USE: 1
NAUSEA AND VOMITING: NO NAUSEA AND NO VOMITING
TOTAL SCORE: 0
PAROXYSMAL SWEATS: NO SWEAT VISIBLE
AUDITORY DISTURBANCES: NOT PRESENT
HEADACHE, FULLNESS IN HEAD: NOT PRESENT

## 2024-11-08 NOTE — ED PROVIDER NOTES
EMERGENCY DEPARTMENT ENCOUNTER      NAME: Reinaldo Real  AGE: 39 year old male  YOB: 1985  MRN: [id number]  EVALUATION DATE & TIME: 11/8/2024  3:23 AM    PCP: Cassy Berrios    ED PROVIDER: Lon Ritchie MD      Chief Complaint   Patient presents with    Hand Pain         FINAL IMPRESSION:  1. Alcoholic intoxication without complication (H)    2. Finger infection          ED COURSE & MEDICAL DECISION MAKING:    Pertinent Labs & Imaging studies reviewed. (See chart for details)  39 year old male presents to the Emergency Department for evaluation of 2 months of finger infection x-ray showed possible septic joint or osteomyelitis.    Referred from urgent care 2 days ago but never showed up.  Has not been seen by orthopedics.    ED Course as of 11/08/24 0722   Fri Nov 08, 2024 0318 Patient had x-ray done November 4 that showed possible septic arthritis or osteomyelitis in his chronically infected finger.  Was referred to the ER for likely admission and hand consult.  Never showed up on the day he was supposed to go to the ER November 6.  It appears the clinics been calling him and convince patient to go to the ER tonight.   0537 Spoke with orthopedics recommends admission, n.p.o., broad-spectrum antibiotics, MRI   On exam is 4 out of 4 Kanavel signs possible flexor tenosynovitis.  Possible septic joint.  Possible osteomyelitis.    Discussed with Dr. Barajas, who agrees to admit    Referred from clinic for admission and IV antibiotics MRI and orthopedic consult 2 days ago but showed up today    Clinically intoxicated.  Plan admit to the hospital orthopedics to see.      3:41 AM I met with the patient, obtained history, performed an initial exam, and discussed options and plan for diagnostics and treatment here in the ED.      Medical Decision Making  Obtained supplemental history:Supplemental history obtained?: No  Reviewed external records: External records reviewed?: Documented in chart  Care impacted by  chronic illness:Alcohol and/or Drug Abuse or Dependence and Diabetes  Did you consider but not order tests?: Work up considered but not performed and documented in chart, if applicable  Did you interpret images independently?: Independent interpretation of ECG and images noted in documentation, when applicable.  Consultation discussion with other provider:Did you involve another provider (consultant, , pharmacy, etc.)?: I discussed the care with another health care provider, see documentation for details.  Admit.    MIPS: Not Applicable            At the conclusion of the encounter I discussed the results of all of the tests and the disposition. The questions were answered. The patient or family acknowledged understanding and was agreeable with the care plan.       MEDICATIONS GIVEN IN THE EMERGENCY:  Medications   lidocaine 1 % 0.1-1 mL (has no administration in time range)   lidocaine (LMX4) cream (has no administration in time range)   sodium chloride (PF) 0.9% PF flush 3 mL (has no administration in time range)   sodium chloride (PF) 0.9% PF flush 3 mL (has no administration in time range)   senna-docusate (SENOKOT-S/PERICOLACE) 8.6-50 MG per tablet 1 tablet (has no administration in time range)     Or   senna-docusate (SENOKOT-S/PERICOLACE) 8.6-50 MG per tablet 2 tablet (has no administration in time range)   calcium carbonate (TUMS) chewable tablet 1,000 mg (has no administration in time range)   HYDROmorphone (DILAUDID) injection 0.2 mg (has no administration in time range)   HYDROmorphone (DILAUDID) injection 0.4 mg (has no administration in time range)   docusate sodium (COLACE) capsule 100 mg (has no administration in time range)   ondansetron (ZOFRAN ODT) ODT tab 4 mg (has no administration in time range)     Or   ondansetron (ZOFRAN) injection 4 mg (has no administration in time range)   famotidine (PEPCID) injection 20 mg (has no administration in time range)   flumazenil (ROMAZICON) injection 0.2 mg  (has no administration in time range)   folic acid injection 1 mg (has no administration in time range)   folic acid injection 1 mg (has no administration in time range)   folic acid (FOLVITE) tablet 1 mg (has no administration in time range)   thiamine (B-1) injection 200 mg (has no administration in time range)   thiamine (B-1) tablet 100 mg (has no administration in time range)   thiamine (B-1) tablet 100 mg (has no administration in time range)   multivitamin w/minerals (THERA-VIT-M) tablet 1 tablet (has no administration in time range)   cloNIDine (CATAPRES) tablet 0.1 mg (has no administration in time range)   LORazepam (ATIVAN) injection 1 mg (has no administration in time range)   lactated ringers infusion (has no administration in time range)   piperacillin-tazobactam (ZOSYN) 3.375 g vial to attach to  mL bag (has no administration in time range)   glucose gel 15-30 g (has no administration in time range)     Or   dextrose 50 % injection 25-50 mL (has no administration in time range)     Or   glucagon injection 1 mg (has no administration in time range)   glucose gel 15-30 g (has no administration in time range)     Or   dextrose 50 % injection 25-50 mL (has no administration in time range)     Or   glucagon injection 1 mg (has no administration in time range)   insulin aspart (NovoLOG) injection (RAPID ACTING) (has no administration in time range)   vancomycin (VANCOCIN) 1,250 mg in 0.9% NaCl 262.5 mL intermittent infusion (has no administration in time range)   piperacillin-tazobactam (ZOSYN) 3.375 g vial to attach to  mL bag (0 g Intravenous Stopped 11/8/24 0512)   vancomycin (VANCOCIN) 1,750 mg in 0.9% NaCl 517.5 mL intermittent infusion (1,750 mg Intravenous $New Bag 11/8/24 0513)       NEW PRESCRIPTIONS STARTED AT TODAY'S ER VISIT  New Prescriptions    No medications on file          =================================================================    TRIAGE ASSESSMENT:  Patient has a  history of diabetes. Patient injured his left second finger over a month ago, was seen at the [facility]. Patient referred here for possible osteomyelitis. Finger is edematous, erythema noted, limited range of motion.     Triage Assessment (Adult)       Row Name 11/08/24 0320          Triage Assessment    Airway WDL WDL        Respiratory WDL    Respiratory WDL WDL        Cardiac WDL    Cardiac WDL WDL        Cognitive/Neuro/Behavioral WDL    Cognitive/Neuro/Behavioral WDL WDL                          HPI    Patient information was obtained from: Patient and chart review    Use of : Could not get Interventional Imaging .  epicurio  used, language line      Moo Kimmie Real is a 39 year old male with a pertinent history of diabetes, alcohol use disorder, hepatitis, TB who presents to this ED for evaluation of left second digit swelling.  Had laceration 2 months ago.  Developed infection and treated with Bactrim for cellulitis 1 month ago.  Has not improved.  Seen in urgent care on November 4, 2024 and x-ray done that showed possible osteomyelitis for septic joint    Referred to the ER.  Never showed up.    Injury appears to be early September when he injured it cutting food.  Was placed Bactrim in September.  Never improved.  Gradually getting worse    Painful to bend.          REVIEW OF SYSTEMS   Review of Systems     PAST MEDICAL HISTORY:  Past Medical History:   Diagnosis Date    Alcohol use disorder, moderate, dependence (H) 03/06/2019    Hepatitis B     History of H. pylori infection     TB (tuberculosis)     Type 2 diabetes mellitus without complication, without long-term current use of insulin (H) 11/30/2016       PAST SURGICAL HISTORY:  Past Surgical History:   Procedure Laterality Date    REMOVE FOREIGN BODY LOWER EXTREMITY Left 2/15/2024    Procedure: REMOVAL, FOREIGN BODY LEFT FOOT;  Surgeon: Ag Cheng DPM;  Location: [facility] OR           CURRENT MEDICATIONS:    atorvastatin  "(LIPITOR) 40 MG tablet  empagliflozin (JARDIANCE) 25 MG TABS tablet  glipiZIDE (GLUCOTROL XL) 5 MG 24 hr tablet  Microlet Lancets MISC  omeprazole (PRILOSEC) 20 MG DR capsule  sitagliptin (JANUVIA) 100 MG tablet  blood glucose (CONTOUR NEXT TEST) test strip        ALLERGIES:  No Known Allergies    FAMILY HISTORY:  Family History   Problem Relation Age of Onset    Coronary Artery Disease Mother     Hypertension Mother     Diabetes No family hx of        SOCIAL HISTORY:   Social History     Socioeconomic History    Marital status:    Tobacco Use    Smoking status: Some Days     Current packs/day: 0.00     Types: Cigarettes     Last attempt to quit: 11/29/2013     Years since quitting: 10.9     Passive exposure: Current    Smokeless tobacco: Current     Types: Chew    Tobacco comments:     smokes 0-1/day--Chew betel nut; pt states he started smoking at 12 yo   Vaping Use    Vaping status: Never Used   Substance and Sexual Activity    Alcohol use: No     Comment: Alcoholic Drinks/day: pt states he haven't had a drink since January    Drug use: No   Social History Narrative    The patient works as a PCA at [employer].     Social Drivers of Health     Interpersonal Safety: Low Risk  (9/30/2024)    Interpersonal Safety     Do you feel physically and emotionally safe where you currently live?: Yes     Within the past 12 months, have you been hit, slapped, kicked or otherwise physically hurt by someone?: No     Within the past 12 months, have you been humiliated or emotionally abused in other ways by your partner or ex-partner?: No       VITALS:  /65   Pulse 76   Temp 97.4  F (36.3  C) (Temporal)   Resp 18   Ht 1.616 m (5' 3.62\")   Wt 71 kg (156 lb 9.6 oz)   SpO2 96%   BMI 27.20 kg/m      PHYSICAL EXAM      Vitals: /65   Pulse 76   Temp 97.4  F (36.3  C) (Temporal)   Resp 18   Ht 1.616 m (5' 3.62\")   Wt 71 kg (156 lb 9.6 oz)   SpO2 96%   BMI 27.20 kg/m    General: Appears in no acute " distress, awake, alert, interactive.  Eyes: Conjunctivae non-injected. Sclera anicteric.  HENT: Atraumatic.  Neck: Supple.  Respiratory/Chest: Respiration unlabored.  Abdomen: non distended  Musculoskeletal: Left second digit has sausage digit swelling, tenderness over flexor tendon, held in flexion, pain with extension, see photo below  Neurologic: Face symmetric, moves all extremities spontaneously. Speech clear.  Psychiatric: Oriented to person, place, and time. Affect appropriate.              LAB:  All pertinent labs reviewed and interpreted.  Results for orders placed or performed during the hospital encounter of 11/08/24   Result Value Ref Range    CRP Inflammation <3.00 <5.00 mg/L   Erythrocyte sedimentation rate auto   Result Value Ref Range    Erythrocyte Sedimentation Rate 21 (H) 0 - 15 mm/hr   Basic metabolic panel   Result Value Ref Range    Sodium 139 135 - 145 mmol/L    Potassium 3.9 3.4 - 5.3 mmol/L    Chloride 104 98 - 107 mmol/L    Carbon Dioxide (CO2) 21 (L) 22 - 29 mmol/L    Anion Gap 14 7 - 15 mmol/L    Urea Nitrogen 12.2 6.0 - 20.0 mg/dL    Creatinine 0.76 0.67 - 1.17 mg/dL    GFR Estimate >90 >60 mL/min/1.73m2    Calcium 8.9 8.8 - 10.4 mg/dL    Glucose 110 (H) 70 - 99 mg/dL   CBC with platelets and differential   Result Value Ref Range    WBC Count 9.0 4.0 - 11.0 10e3/uL    RBC Count 5.60 4.40 - 5.90 10e6/uL    Hemoglobin 17.2 13.3 - 17.7 g/dL    Hematocrit 49.1 40.0 - 53.0 %    MCV 88 78 - 100 fL    MCH 30.7 26.5 - 33.0 pg    MCHC 35.0 31.5 - 36.5 g/dL    RDW 12.0 10.0 - 15.0 %    Platelet Count 192 150 - 450 10e3/uL    % Neutrophils 41 %    % Lymphocytes 49 %    % Monocytes 7 %    % Eosinophils 2 %    % Basophils 0 %    % Immature Granulocytes 0 %    NRBCs per 100 WBC 0 <1 /100    Absolute Neutrophils 3.7 1.6 - 8.3 10e3/uL    Absolute Lymphocytes 4.4 0.8 - 5.3 10e3/uL    Absolute Monocytes 0.6 0.0 - 1.3 10e3/uL    Absolute Eosinophils 0.2 0.0 - 0.7 10e3/uL    Absolute Basophils 0.0 0.0 - 0.2  10e3/uL    Absolute Immature Granulocytes 0.0 <=0.4 10e3/uL    Absolute NRBCs 0.0 10e3/uL   Extra Heparinized Syringe   Result Value Ref Range    Hold Specimen JIC    Alcohol level blood   Result Value Ref Range    Alcohol ethyl 0.19 (H) <=0.01 g/dL   Result Value Ref Range    Procalcitonin 0.05 <0.50 ng/mL   Result Value Ref Range    Phosphorus 3.9 2.5 - 4.5 mg/dL       RADIOLOGY:  Reviewed all pertinent imaging. Please see official radiology report.  MR Finger Left w Contrast    (Results Pending)             I, Letha Yan, am serving as a scribe to document services personally performed by Lon Ritchie MD based on my observation and the provider's statements to me. I, Lon Ritchie MD, attest that Letha Yan is acting in a scribe capacity, has observed my performance of the services and has documented them in accordance with my direction.    Lon Ritchie MD  [facility] EMERGENCY DEPARTMENT  [address] [place] [id number] [phone]        Lon Ritchie MD  11/08/24 0722

## 2024-11-08 NOTE — ED NOTES
Patient now in MRI--His left pointer finger continues swollen and tender with movement--He states that it is sore but tolerable and not pain that needs pain medication at this time. Alert and oriented/cooperative --ambulating to the bathroom without difficulty.

## 2024-11-08 NOTE — PHARMACY-VANCOMYCIN DOSING SERVICE
"Pharmacy Vancomycin Initial Note  Date of Service 2024  Patient's  1985  39 year old, male    Indication: Skin and Soft Tissue Infection and suspected osteomyelitis & possibly septic arthritis    Current estimated CrCl = Estimated Creatinine Clearance: 114.7 mL/min (based on SCr of 0.76 mg/dL).    Creatinine for last 3 days  2024:  3:35 AM Creatinine 0.76 mg/dL    Recent Vancomycin Level(s) for last 3 days  No results found for requested labs within last 3 days.      Vancomycin IV Administrations (past 72 hours)                     vancomycin (VANCOCIN) 1,750 mg in 0.9% NaCl 517.5 mL intermittent infusion (mg) 1,750 mg New Bag 24 0513                    Nephrotoxins and other renal medications (From now, onward)      Start     Dose/Rate Route Frequency Ordered Stop    24 1100  piperacillin-tazobactam (ZOSYN) 3.375 g vial to attach to  mL bag        Note to Pharmacy: For [facility], [facility] and [facility]: For Zosyn-naive patients, use the \"Zosyn initial dose + extended infusion\" order panel.    3.375 g  over 240 Minutes Intravenous EVERY 8 HOURS 24 0620      24 0430  vancomycin (VANCOCIN) 1,750 mg in 0.9% NaCl 517.5 mL intermittent infusion         1,750 mg  over 2 Hours Intravenous ONCE 24 0414              Contrast Orders - past 72 hours (72h ago, onward)      None            [employer] Prediction of Planned Initial Vancomycin Regimen  Loading dose: 1750 mg IV on 24 at 05:13  Regimen: 1250 mg IV every 12 hours.  Start time: 13:00 on 2024  Exposure target: AUC24 (range)400-600 mg/L.hr   AUC24,ss: 526 mg/L.hr  Probability of AUC24 > 400: 76 %  Ctrough,ss: 15.3 mg/L  Probability of Ctrough,ss > 20: 30 %  Probability of nephrotoxicity (Lodise JACK ): 11 %          Plan:  Start vancomycin  1250 mg IV q12h. This regimen is timed to start at 13:00, ~8 hrs after loading dose - this timing will facilitate achieving steady state faster.  Vancomycin monitoring method: " AUC  Vancomycin therapeutic monitoring goal: 400-600 mg*h/L  Pharmacy will check vancomycin levels as appropriate in 1-3 Days.    Serum creatinine levels will be ordered daily for the first week of therapy and at least twice weekly for subsequent weeks.      Sunshine Stephen RPH
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
## 2024-11-08 NOTE — MEDICATION SCRIBE - ADMISSION MEDICATION HISTORY
Medication Scribe Admission Medication History    Admission medication history is complete. The information provided in this note is only as accurate as the sources available at the time of the update.    Information Source(s): Patient via in-person    Pertinent Information: Patient states that he stop taking Metformin after discussing with his provider and ordered to do so. Per Rx fill history patient request for Metformin refill on 11/4/2024 for 90 days supply. Sitagliptin have last Rx fill date on 8/19/2024 for 30 days and patient reports still taking it.    Changes made to PTA medication list:  Added: None  Deleted: Metformin, Tums, Tylenol (per patient)  Changed: None    Allergies reviewed with patient and updates made in EHR: yes    Medication History Completed By: Chandler Echeverria 11/8/2024 4:01 AM    PTA Med List   Medication Sig Last Dose/Taking    atorvastatin (LIPITOR) 40 MG tablet Take 1 tablet (40 mg) by mouth daily. 11/7/2024 Morning    empagliflozin (JARDIANCE) 25 MG TABS tablet Take 1 tablet (25 mg) by mouth daily. 11/7/2024 Morning    glipiZIDE (GLUCOTROL XL) 5 MG 24 hr tablet Take 1 tablet (5 mg) by mouth daily. 11/7/2024 Morning    Microlet Lancets MISC Use to test blood sugars two times daily as directed. Unknown    omeprazole (PRILOSEC) 20 MG DR capsule Take 1 capsule (20 mg) by mouth daily. 11/7/2024 Morning    sitagliptin (JANUVIA) 100 MG tablet Take 1 tablet (100 mg) by mouth daily 11/7/2024 Morning

## 2024-11-08 NOTE — TELEPHONE ENCOUNTER
Chart reviewed, it appears patient was seen in the ED at [facility] for finger infection.    NO further action needed.    IKER MillerN, RN, PHN   [facility]

## 2024-11-08 NOTE — ANESTHESIA PROCEDURE NOTES
Brachial plexus Procedure Note    Pre-Procedure   Staff -        Anesthesiologist:  Luis Mascorro MD       Performed By: anesthesiologist       Location: pre-op       Procedure Start/Stop Times: 11/8/2024 4:19 PM and 11/8/2024 4:25 PM       Pre-Anesthestic Checklist: patient identified, IV checked, site marked, risks and benefits discussed, informed consent, monitors and equipment checked, pre-op evaluation, at physician/surgeon's request and post-op pain management  Timeout:       Correct Patient: Yes        Correct Procedure: Yes        Correct Site: Yes        Correct Position: Yes        Correct Laterality: Yes        Site Marked: Yes  Procedure Documentation  Procedure: Brachial plexus       Diagnosis: POSTOP PAIN CONTROL PER SURGEON REQUEST       Patient Position: sitting       Patient Prep/Sterile Barriers: sterile gloves, mask       Skin prep: Chloraprep (supraclavicular approach).       Needle Type: insulated and short bevel       Needle Gauge: 20.        Needle Length (Inches): 4        Ultrasound guided       1. Ultrasound was used to identify targeted nerve, plexus, vascular marker, or fascial plane and place a needle adjacent to it in real-time.       2. Ultrasound was used to visualize the spread of anesthetic in close proximity to the above referenced structure.       3. A permanent image is entered into the patient's record.       4. The visualized anatomic structures appeared normal.       5. There were no apparent abnormal pathologic findings.    Assessment/Narrative         The placement was negative for: blood aspirated, painful injection and site bleeding       Paresthesias: No.       Bolus given via needle..        Secured via.        Insertion/Infusion Method: Single Shot       Complications: none       Injection made incrementally with aspirations every 2 mL.    Medication(s) Administered   Bupivacaine 0.5% PF (Infiltration) - Infiltration   25 mL - 11/8/2024 4:19:00 PM  Medication  "Administration Time: 11/8/2024 4:19 PM     Comments:  Video  present throughout pre-evaluation, consent, timeout, and block.     No apparent complications. Patient tolerated procedure well.       FOR [facility] ([facility]) ONLY:   Pain Team Contact information: please page the Pain Team Via [employer]. Search \"Pain\". During daytime hours, please page the attending first. At night please page the resident first.      "

## 2024-11-08 NOTE — ED TRIAGE NOTES
Patient has a history of diabetes. Patient injured his left second finger over a month ago, was seen at the [facility]. Patient referred here for possible osteomyelitis. Finger is edematous, erythema noted, limited range of motion.     Triage Assessment (Adult)       Row Name 11/08/24 0320          Triage Assessment    Airway WDL WDL        Respiratory WDL    Respiratory WDL WDL        Cardiac WDL    Cardiac WDL WDL        Cognitive/Neuro/Behavioral WDL    Cognitive/Neuro/Behavioral WDL WDL

## 2024-11-08 NOTE — PROGRESS NOTES
39-year-old male with DM2, alcohol abuse, latent TB and chronic hepatitis B admitted this morning with left second finger osteomyelitis and septic arthritis.    H&P reviewed    Orthopedic hand surgery consulted  MRI shows osteomyelitis and septic arthritis  N.p.o. in case of surgery today  Covered with empiric antibiotics Zosyn and vancomycin  Pain control  CIWA protocol

## 2024-11-08 NOTE — CONSULTS
ORTHOPEDIC CONSULTATION    Consultation  Reinaldo Real,  1985, MRN [id number]    [unfilled]  Finger infection [L08.9]  Alcoholic intoxication without complication (H) [F10.920]    PCP: Cassy Berrios, [phone]   Code status:  Full Code       Extended Emergency Contact Information  Primary Emergency Contact: Sa Gio Jessica  Address: [address] [place] [address] [place]  Mobile Phone: [phone]  Relation: Spouse         IMPRESSION:  1. Left index finger infected flexor tenosynovitis.  2. Left index finger cellulitis.   3. Osteomyelitis left index finger.      PLAN:  This patient was discussed with Dr. Yang, on-call surgeon for [facility] and they are in agreement with the following plan.     - NPO for planned I&D left index finger surgery today  - pain management      Thank you for including [facility] in the care of Reinaldo Real. It has been a pleasure participating in Mr. Real's care.        CHIEF COMPLAINT: <principal problem not specified>    HISTORY OF PRESENT ILLNESS: History obtained via [employer]  OH, #[id number]   The patient is seen in orthopedic consultation at the request of Dr. Ritchie.  The patient is a 39 year old RHD male with a PMH of diabetes, alcohol use disorder, hepatitis, TB . He presented to the ED on 2024 with a chief complaint of left index finger pain and swelling after cutting his finger 1 month ago with a knife while chopping onions. Developed infection and treated with Bactrim for cellulitis 1 month ago.  Has not improved.  Seen in urgent care on 2024 and x-ray done that showed possible osteomyelitis for septic joint.  He was referred to the ER, but didn't know where to go, so never showed up. States his wife received a call from the doctor informing him that he needed to come in to the ER, so presented today. States that he was able to express blood and white fluid from the wound on his finger a few days ago.Endorses  pain when trying to bend finger.     PAST MEDICAL HISTORY:  Past Medical History:   Diagnosis Date    Alcohol use disorder, moderate, dependence (H) 03/06/2019    Hepatitis B     History of H. pylori infection     TB (tuberculosis)     Type 2 diabetes mellitus without complication, without long-term current use of insulin (H) 11/30/2016          ALLERGIES:   Review of patient's allergies indicates No Known Allergies      MEDICATIONS UPON ADMISSION:  Medications were reviewed.  They include:   Medications Prior to Admission   Medication Sig Dispense Refill Last Dose/Taking    atorvastatin (LIPITOR) 40 MG tablet Take 1 tablet (40 mg) by mouth daily. 90 tablet 3 11/7/2024 Morning    empagliflozin (JARDIANCE) 25 MG TABS tablet Take 1 tablet (25 mg) by mouth daily. 90 tablet 3 11/7/2024 Morning    glipiZIDE (GLUCOTROL XL) 5 MG 24 hr tablet Take 1 tablet (5 mg) by mouth daily. 90 tablet 3 11/7/2024 Morning    Microlet Lancets MISC Use to test blood sugars two times daily as directed. 200 each 4 Unknown    omeprazole (PRILOSEC) 20 MG DR capsule Take 1 capsule (20 mg) by mouth daily. 90 capsule 0 11/7/2024 Morning    sitagliptin (JANUVIA) 100 MG tablet Take 1 tablet (100 mg) by mouth daily 30 tablet 11 11/7/2024 Morning    blood glucose (CONTOUR NEXT TEST) test strip Use to test blood sugar 2 times daily or as directed. 200 strip 4 Unknown         SOCIAL HISTORY:   he  reports that he has been smoking cigarettes. He has been exposed to tobacco smoke. His smokeless tobacco use includes chew. He reports that he does not drink alcohol and does not use drugs.    FAMILY HISTORY:  family history includes Coronary Artery Disease in his mother; Hypertension in his mother.      REVIEW OF SYSTEMS:   Reviewed with patient. See HPI, otherwise negative       PHYSICAL EXAMINATION:  Vitals: Temp:  [97.4  F (36.3  C)-97.8  F (36.6  C)] 97.8  F (36.6  C)  Pulse:  [60-88] 60  Resp:  [15-20] 20  BP: ()/(61-86) 110/67  SpO2:  [95 %-100  %] 97 %  General: On examination, the patient is resting comfortably, NAD, awake, and alert and oriented to person, place, time, and, and general circumstances   SKIN: There is  evidence of  open wound on the radial aspect of the PIP joint of the left IF.  There is fusiform swelling, notably around PIP joint  Pulses:  radial pulse is intact and equal bilaterally  Sensation: intact and equal bilaterally to the distal upper extremities.  Tenderness: TTP left IF in general and flexor tendon sheath left IF  ROM: able to slightly flex at DIP and MCP left IF, unable to flex at PIP, pain with full extension at MCP  4/4 Kanavel sign's Left IF  Contralateral side= Full range of motion, Negative joint instability findings, 5/5 motor groups about the joint, Non-tender.       RADIOGRAPHIC EVALUATION:  MRI with and without contrast of the left index finger obtained 11/8/2024  is  reviewed by me and shows:    IMPRESSION:  1.  There is an index finger PIP joint effusion with synovitis. There are is abnormal marrow signal involving the index finger middle phalanx base and proximal phalangeal head and distal shaft with erosive changes more pronounced in the middle phalanx   base. Findings are compatible with septic arthritis and osteomyelitis.  2.  There is a wound over the radial aspect of the index finger PIP joint, with soft tissue edema and enhancement over the index finger compatible with cellulitis. No drainable soft tissue collection.  3.  Index finger flexor tenosynovitis from the level of the distal metacarpal to its distal phalangeal insertion, likely infectious.    PERTINENT LABS:  Lab Results: personally reviewed.       Lab Results   Component Value Date    WBC 9.0 11/08/2024    HGB 17.2 11/08/2024    HGB 16.5 02/14/2017    HCT 49.1 11/08/2024    HCT 50.1 02/14/2017    MCV 88 11/08/2024    MCV 92.9 02/14/2017     11/08/2024       Clinically Significant Risk Factors Present on Admission         # Overweight:  "Estimated body mass index is 27.2 kg/m  as calculated from the following:    Height as of this encounter: 1.616 m (5' 3.62\").    Weight as of this encounter: 71 kg (156 lb 9.6 oz).      #SED includes abnormal value:  Recent labs: (last 2 weeks)     11/04/24  1110 11/08/24  0335   SED 20* 21*     Risk Factors for Delirium:           Carline Garcia PA-C, JAMARI  Date: 11/8/2024  Time: 2:35 PM    CC1:   Boris Hernandez DO    CC2:   Cassy Berrios   "

## 2024-11-08 NOTE — PHARMACY-VANCOMYCIN DOSING SERVICE
Pharmacy Vancomycin Initial Note  Date of Service 2024  Patient's  1985  39 year old, male    Indication: Bone and Joint Infection    Current estimated CrCl = Estimated Creatinine Clearance: 114.7 mL/min (based on SCr of 0.76 mg/dL).    Creatinine for last 3 days  2024:  3:35 AM Creatinine 0.76 mg/dL    Recent Vancomycin Level(s) for last 3 days  No results found for requested labs within last 3 days.      Vancomycin IV Administrations (past 72 hours)        No vancomycin orders with administrations in past 72 hours.                    Nephrotoxins and other renal medications (From now, onward)      Start     Dose/Rate Route Frequency Ordered Stop    24 0430  piperacillin-tazobactam (ZOSYN) 3.375 g vial to attach to  mL bag         3.375 g  over 30 Minutes Intravenous ONCE 24 0410      24 0430  vancomycin (VANCOCIN) 1,750 mg in 0.9% NaCl 517.5 mL intermittent infusion         1,750 mg  over 2 Hours Intravenous ONCE 244              Contrast Orders - past 72 hours (72h ago, onward)      None                Plan:  Start vancomycin  1750 mg IV x1 dose to ER.   Please reorder dosing consult for subsequent doses.      Sunshine Stephen [facility]

## 2024-11-08 NOTE — ANESTHESIA PREPROCEDURE EVALUATION
Anesthesia Pre-Procedure Evaluation    Patient: Reinaldo Real   MRN: [id number] : 1985        Procedure : Procedure(s):  INCISION AND DRAINAGE, LEFT INDEX FINGER          Past Medical History:   Diagnosis Date    Alcohol use disorder, moderate, dependence (H) 2019    Hepatitis B     History of H. pylori infection     TB (tuberculosis)     Type 2 diabetes mellitus without complication, without long-term current use of insulin (H) 2016      Past Surgical History:   Procedure Laterality Date    REMOVE FOREIGN BODY LOWER EXTREMITY Left 2/15/2024    Procedure: REMOVAL, FOREIGN BODY LEFT FOOT;  Surgeon: Ag Cheng DPM;  Location: [facility] OR      No Known Allergies   Social History     Tobacco Use    Smoking status: Some Days     Current packs/day: 0.00     Types: Cigarettes     Last attempt to quit: 2013     Years since quitting: 10.9     Passive exposure: Current    Smokeless tobacco: Current     Types: Chew    Tobacco comments:     smokes 0-1/day--Chew betel nut; pt states he started smoking at 10 yo   Substance Use Topics    Alcohol use: No     Comment: Alcoholic Drinks/day: pt states he haven't had a drink since January      Wt Readings from Last 1 Encounters:   24 71 kg (156 lb 9.6 oz)        Anesthesia Evaluation   Pt has had prior anesthetic.     No history of anesthetic complications       ROS/MED HX  ENT/Pulmonary:  - neg pulmonary ROS   (+)                tobacco use,                        Neurologic:  - neg neurologic ROS     Cardiovascular:  - neg cardiovascular ROS     METS/Exercise Tolerance:     Hematologic:  - neg hematologic  ROS     Musculoskeletal:  - neg musculoskeletal ROS     GI/Hepatic:  - neg GI/hepatic ROS   (+)           hepatitis type B, liver disease (alcoholic cirrhosis),       Renal/Genitourinary:  - neg Renal ROS     Endo:  - neg endo ROS   (+)  type II DM, Last HgA1c: 8.1,                   Psychiatric/Substance Use: Comment: Drinks 6-12  "drinks/week per patient.  - neg psychiatric ROS   (+)   alcohol abuse      Infectious Disease:  - neg infectious disease ROS   (+) Recent Fever (infected left index finger),     TB (latent),      Malignancy:  - neg malignancy ROS     Other:  - neg other ROS          Physical Exam    Airway        Mallampati: II   TM distance: > 3 FB   Neck ROM: full   Mouth opening: > 3 cm    Respiratory Devices and Support         Dental       (+) Multiple visibly decayed, broken teeth      Cardiovascular          Rhythm and rate: regular and normal     Pulmonary           breath sounds clear to auscultation           OUTSIDE LABS:  CBC:   Lab Results   Component Value Date    WBC 9.0 11/08/2024    WBC 6.6 11/04/2024    HGB 17.2 11/08/2024    HGB 16.5 11/04/2024    HCT 49.1 11/08/2024    HCT 47.0 11/04/2024     11/08/2024     (L) 11/04/2024     BMP:   Lab Results   Component Value Date     11/08/2024     08/22/2024    POTASSIUM 3.9 11/08/2024    POTASSIUM 4.2 08/22/2024    CHLORIDE 104 11/08/2024    CHLORIDE 101 08/22/2024    CO2 21 (L) 11/08/2024    CO2 25 08/22/2024    BUN 12.2 11/08/2024    BUN 15.3 08/22/2024    CR 0.76 11/08/2024    CR 0.90 08/22/2024    GLC 78 11/08/2024    GLC 96 11/08/2024     COAGS:   Lab Results   Component Value Date    INR 0.93 08/22/2024     POC: No results found for: \"BGM\", \"HCG\", \"HCGS\"  HEPATIC:   Lab Results   Component Value Date    ALBUMIN 4.6 08/22/2024    PROTTOTAL 7.9 08/22/2024    ALT 68 08/22/2024    AST 81 (H) 08/22/2024     (H) 05/10/2021    ALKPHOS 129 08/22/2024    BILITOTAL 0.4 08/22/2024    BILIDIRECT 0.3 12/12/2014     OTHER:   Lab Results   Component Value Date    LACT 1.5 02/14/2024    A1C 8.1 (H) 09/30/2024    RACHEL 8.9 11/08/2024    PHOS 3.9 11/08/2024    MAG 2.5 (H) 11/08/2024    LIPASE 28 02/14/2024    TSH 1.51 09/26/2022    CRP 0.6 02/02/2015    SED 21 (H) 11/08/2024       Anesthesia Plan    ASA Status:  3, emergent    NPO Status:  NPO Appropriate " "   Anesthesia Type: General.     - Airway: Native airway   Induction: Propofol.   Maintenance: TIVA.        Consents    Anesthesia Plan(s) and associated risks, benefits, and realistic alternatives discussed. Questions answered and patient/representative(s) expressed understanding.     - Discussed: Risks, Benefits and Alternatives for BOTH SEDATION and the PROCEDURE were discussed     - Discussed with:  Patient,             Postoperative Care            Comments:    Other Comments: Elevated blood alcohol on admission. CIWA protocol in place. Consented for GA with mask TIVA and nerve block for postop pain control with kristi  (ID [id number])           Adrian Garner MD    I have reviewed the pertinent notes and labs in the chart from the past 30 days and (re)examined the patient.  Any updates or changes from those notes are reflected in this note.                        # DMII: A1C = 8.1 % (Ref range: 0.0 - 5.6 %) within past 6 months    # Overweight: Estimated body mass index is 27.2 kg/m  as calculated from the following:    Height as of this encounter: 1.616 m (5' 3.62\").    Weight as of this encounter: 71 kg (156 lb 9.6 oz).             "

## 2024-11-09 LAB
ALBUMIN SERPL BCG-MCNC: 3.7 G/DL (ref 3.5–5.2)
ALP SERPL-CCNC: 72 U/L (ref 40–150)
ALT SERPL W P-5'-P-CCNC: 43 U/L (ref 0–70)
ANION GAP SERPL CALCULATED.3IONS-SCNC: 10 MMOL/L (ref 7–15)
AST SERPL W P-5'-P-CCNC: 29 U/L (ref 0–45)
BASOPHILS # BLD AUTO: 0 10E3/UL (ref 0–0.2)
BASOPHILS NFR BLD AUTO: 0 %
BILIRUB SERPL-MCNC: 0.8 MG/DL
BUN SERPL-MCNC: 16.8 MG/DL (ref 6–20)
CALCIUM SERPL-MCNC: 8 MG/DL (ref 8.8–10.4)
CHLORIDE SERPL-SCNC: 105 MMOL/L (ref 98–107)
CREAT SERPL-MCNC: 0.97 MG/DL (ref 0.67–1.17)
EGFRCR SERPLBLD CKD-EPI 2021: >90 ML/MIN/1.73M2
EOSINOPHIL # BLD AUTO: 0.2 10E3/UL (ref 0–0.7)
EOSINOPHIL NFR BLD AUTO: 2 %
ERYTHROCYTE [DISTWIDTH] IN BLOOD BY AUTOMATED COUNT: 12.1 % (ref 10–15)
GLUCOSE BLDC GLUCOMTR-MCNC: 100 MG/DL (ref 70–99)
GLUCOSE BLDC GLUCOMTR-MCNC: 136 MG/DL (ref 70–99)
GLUCOSE BLDC GLUCOMTR-MCNC: 186 MG/DL (ref 70–99)
GLUCOSE BLDC GLUCOMTR-MCNC: 199 MG/DL (ref 70–99)
GLUCOSE BLDC GLUCOMTR-MCNC: 200 MG/DL (ref 70–99)
GLUCOSE BLDC GLUCOMTR-MCNC: 270 MG/DL (ref 70–99)
GLUCOSE BLDC GLUCOMTR-MCNC: 290 MG/DL (ref 70–99)
GLUCOSE SERPL-MCNC: 249 MG/DL (ref 70–99)
HCO3 SERPL-SCNC: 22 MMOL/L (ref 22–29)
HCT VFR BLD AUTO: 41.4 % (ref 40–53)
HGB BLD-MCNC: 14.2 G/DL (ref 13.3–17.7)
IMM GRANULOCYTES # BLD: 0 10E3/UL
IMM GRANULOCYTES NFR BLD: 0 %
LYMPHOCYTES # BLD AUTO: 1.9 10E3/UL (ref 0.8–5.3)
LYMPHOCYTES NFR BLD AUTO: 24 %
MCH RBC QN AUTO: 30.8 PG (ref 26.5–33)
MCHC RBC AUTO-ENTMCNC: 34.3 G/DL (ref 31.5–36.5)
MCV RBC AUTO: 90 FL (ref 78–100)
MONOCYTES # BLD AUTO: 0.5 10E3/UL (ref 0–1.3)
MONOCYTES NFR BLD AUTO: 6 %
NEUTROPHILS # BLD AUTO: 5.4 10E3/UL (ref 1.6–8.3)
NEUTROPHILS NFR BLD AUTO: 67 %
NRBC # BLD AUTO: 0 10E3/UL
NRBC BLD AUTO-RTO: 0 /100
PLATELET # BLD AUTO: 139 10E3/UL (ref 150–450)
POTASSIUM SERPL-SCNC: 3.4 MMOL/L (ref 3.4–5.3)
PROT SERPL-MCNC: 6.1 G/DL (ref 6.4–8.3)
RBC # BLD AUTO: 4.61 10E6/UL (ref 4.4–5.9)
SODIUM SERPL-SCNC: 137 MMOL/L (ref 135–145)
VANCOMYCIN SERPL-MCNC: 8.4 UG/ML
WBC # BLD AUTO: 8 10E3/UL (ref 4–11)

## 2024-11-09 PROCEDURE — 82310 ASSAY OF CALCIUM: CPT | Performed by: INTERNAL MEDICINE

## 2024-11-09 PROCEDURE — 84155 ASSAY OF PROTEIN SERUM: CPT | Performed by: INTERNAL MEDICINE

## 2024-11-09 PROCEDURE — 36415 COLL VENOUS BLD VENIPUNCTURE: CPT | Performed by: HOSPITALIST

## 2024-11-09 PROCEDURE — 250N000012 HC RX MED GY IP 250 OP 636 PS 637: Performed by: HOSPITALIST

## 2024-11-09 PROCEDURE — 258N000003 HC RX IP 258 OP 636: Performed by: INTERNAL MEDICINE

## 2024-11-09 PROCEDURE — 36415 COLL VENOUS BLD VENIPUNCTURE: CPT | Performed by: INTERNAL MEDICINE

## 2024-11-09 PROCEDURE — 250N000012 HC RX MED GY IP 250 OP 636 PS 637: Performed by: INTERNAL MEDICINE

## 2024-11-09 PROCEDURE — 85014 HEMATOCRIT: CPT | Performed by: INTERNAL MEDICINE

## 2024-11-09 PROCEDURE — 120N000001 HC R&B MED SURG/OB

## 2024-11-09 PROCEDURE — 250N000013 HC RX MED GY IP 250 OP 250 PS 637: Performed by: HOSPITALIST

## 2024-11-09 PROCEDURE — 250N000013 HC RX MED GY IP 250 OP 250 PS 637: Performed by: INTERNAL MEDICINE

## 2024-11-09 PROCEDURE — 99232 SBSQ HOSP IP/OBS MODERATE 35: CPT | Performed by: HOSPITALIST

## 2024-11-09 PROCEDURE — 250N000011 HC RX IP 250 OP 636: Performed by: INTERNAL MEDICINE

## 2024-11-09 PROCEDURE — 85004 AUTOMATED DIFF WBC COUNT: CPT | Performed by: INTERNAL MEDICINE

## 2024-11-09 PROCEDURE — 80202 ASSAY OF VANCOMYCIN: CPT | Performed by: HOSPITALIST

## 2024-11-09 RX ORDER — ACETAMINOPHEN 325 MG/1
650 TABLET ORAL EVERY 4 HOURS PRN
Status: DISCONTINUED | OUTPATIENT
Start: 2024-11-09 | End: 2024-11-11 | Stop reason: HOSPADM

## 2024-11-09 RX ORDER — OXYCODONE HYDROCHLORIDE 5 MG/1
5 TABLET ORAL EVERY 4 HOURS PRN
Status: DISCONTINUED | OUTPATIENT
Start: 2024-11-09 | End: 2024-11-11 | Stop reason: HOSPADM

## 2024-11-09 RX ADMIN — OXYCODONE HYDROCHLORIDE 5 MG: 5 TABLET ORAL at 19:25

## 2024-11-09 RX ADMIN — ACETAMINOPHEN 650 MG: 325 TABLET ORAL at 05:22

## 2024-11-09 RX ADMIN — ATORVASTATIN CALCIUM 40 MG: 40 TABLET, FILM COATED ORAL at 08:55

## 2024-11-09 RX ADMIN — PIPERACILLIN AND TAZOBACTAM 3.38 G: 3; .375 INJECTION, POWDER, FOR SOLUTION INTRAVENOUS at 10:41

## 2024-11-09 RX ADMIN — ACETAMINOPHEN 650 MG: 325 TABLET ORAL at 09:20

## 2024-11-09 RX ADMIN — CLONIDINE HYDROCHLORIDE 0.1 MG: 0.1 TABLET ORAL at 01:03

## 2024-11-09 RX ADMIN — SODIUM CHLORIDE 1250 MG: 9 INJECTION, SOLUTION INTRAVENOUS at 01:03

## 2024-11-09 RX ADMIN — DOCUSATE SODIUM 100 MG: 100 CAPSULE, LIQUID FILLED ORAL at 19:21

## 2024-11-09 RX ADMIN — INSULIN ASPART 1 UNITS: 100 INJECTION, SOLUTION INTRAVENOUS; SUBCUTANEOUS at 01:03

## 2024-11-09 RX ADMIN — NICOTINE 1 PATCH: 14 PATCH, EXTENDED RELEASE TRANSDERMAL at 08:57

## 2024-11-09 RX ADMIN — ACETAMINOPHEN 650 MG: 325 TABLET ORAL at 19:18

## 2024-11-09 RX ADMIN — DOCUSATE SODIUM 100 MG: 100 CAPSULE, LIQUID FILLED ORAL at 08:55

## 2024-11-09 RX ADMIN — PIPERACILLIN AND TAZOBACTAM 3.38 G: 3; .375 INJECTION, POWDER, FOR SOLUTION INTRAVENOUS at 19:20

## 2024-11-09 RX ADMIN — SODIUM CHLORIDE 1250 MG: 9 INJECTION, SOLUTION INTRAVENOUS at 13:44

## 2024-11-09 RX ADMIN — PIPERACILLIN AND TAZOBACTAM 3.38 G: 3; .375 INJECTION, POWDER, FOR SOLUTION INTRAVENOUS at 03:03

## 2024-11-09 RX ADMIN — INSULIN GLARGINE 10 UNITS: 100 INJECTION, SOLUTION SUBCUTANEOUS at 09:02

## 2024-11-09 NOTE — PROGRESS NOTES
"[facility]    Medicine Progress Note - Hospitalist Service    Date of Admission:  11/8/2024    Assessment & Plan   39-year-old male with DM2, alcohol abuse, latent TB and chronic hepatitis B admitted with left 2nd finger osteomyelitis.    #Osteomyelitis, left 2nd finger  #Abscess  #Radial nerve laceration  s/p I&D, radial nerve reapproximation 11/8  Ortho following  Cx pending  Empiric Zosyn, Vanc  Pain control    #Alcohol intoxication  JF 0.19 on admission  CIWA     #Diabetes type 2  A1c 8.1  Lantus, SSI      DVT ppx: PCDs        Diet: Moderate Consistent Carb (60 g CHO per Meal) Diet    Moody Catheter: Not present  Lines: None     Cardiac Monitoring: ACTIVE order. Indication: Tachyarrhythmias, acute (48 hours)  Code Status: Full Code      Clinically Significant Risk Factors           # Hypocalcemia: Lowest Ca = 8 mg/dL in last 2 days, will monitor and replace as appropriate                   # DMII: A1C = 8.1 % (Ref range: 0.0 - 5.6 %) within past 6 months, PRESENT ON ADMISSION  # Overweight: Estimated body mass index is 27.95 kg/m  as calculated from the following:    Height as of this encounter: 1.616 m (5' 3.62\").    Weight as of this encounter: 73 kg (160 lb 15 oz)., PRESENT ON ADMISSION            Disposition Plan     Medically Ready for Discharge: Anticipated in 2-4 Days             Boris Hernandez DO  Hospitalist Service  [facility]  Securely message with [employer] (more info)  Text page via [employer] Paging/Directory   ______________________________________________________________________    Interval History   No acute events overnight    Physical Exam   Vital Signs: Temp: 98.5  F (36.9  C) Temp src: Oral BP: 104/57 Pulse: 60   Resp: 18 SpO2: 96 % O2 Device: None (Room air) Oxygen Delivery: 2 LPM  Weight: 160 lbs 14.97 oz    General Appearance:  No acute distress  Respiratory: Nonlabored breathing  L hand dressing clean, dry and intact      Medical Decision " Making             Data
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
## 2024-11-09 NOTE — PLAN OF CARE
"  Problem: Adult Inpatient Plan of Care  Goal: Plan of Care Review  Description: The Plan of Care Review/Shift note should be completed every shift.  The Outcome Evaluation is a brief statement about your assessment that the patient is improving, declining, or no change.  This information will be displayed automatically on your shift  note.  Outcome: Progressing  Goal: Patient-Specific Goal (Individualized)  Description: You can add care plan individualizations to a care plan. Examples of Individualization might be:  \"Parent requests to be called daily at 9am for status\", \"I have a hard time hearing out of my right ear\", or \"Do not touch me to wake me up as it startles  me\".  Outcome: Progressing  Goal: Absence of Hospital-Acquired Illness or Injury  Outcome: Progressing  Intervention: Identify and Manage Fall Risk  Recent Flowsheet Documentation  Taken 11/8/2024 1800 by Danica Silva RN  Safety Promotion/Fall Prevention:   activity supervised   assistive device/personal items within reach   clutter free environment maintained  Intervention: Prevent Skin Injury  Recent Flowsheet Documentation  Taken 11/8/2024 1800 by Dancia Silva RN  Body Position: position changed independently  Intervention: Prevent and Manage VTE (Venous Thromboembolism) Risk  Recent Flowsheet Documentation  Taken 11/8/2024 1800 by Danica Silva RN  VTE Prevention/Management: SCDs on (sequential compression devices)  Intervention: Prevent Infection  Recent Flowsheet Documentation  Taken 11/8/2024 1800 by Danica Silva RN  Infection Prevention:   rest/sleep promoted   hand hygiene promoted  Goal: Optimal Comfort and Wellbeing  Outcome: Progressing  Goal: Readiness for Transition of Care  Outcome: Progressing  Flowsheets (Taken 11/8/2024 1800)  Transportation Anticipated: family or friend will provide  Intervention: Mutually Develop Transition Plan  Recent Flowsheet Documentation  Taken 11/8/2024 1800 by Danica Silva RN  Transportation Anticipated: family or " friend will provide  Patient/Family Anticipates Transition to: home with family     Problem: Infection  Goal: Absence of Infection Signs and Symptoms  Outcome: Progressing     Problem: Comorbidity Management  Goal: Blood Glucose Levels Within Targeted Range  Outcome: Progressing  Intervention: Monitor and Manage Glycemia  Recent Flowsheet Documentation  Taken 11/8/2024 1800 by Danica Silva RN  Medication Review/Management: medications reviewed     Problem: Mobility Impairment  Goal: Optimal Mobility  Outcome: Progressing  Intervention: Optimize Mobility  Recent Flowsheet Documentation  Taken 11/8/2024 1800 by Danica Silva RN  Activity Management: activity adjusted per tolerance     Problem: Pain Acute  Goal: Optimal Pain Control and Function  Outcome: Progressing  Intervention: Prevent or Manage Pain  Recent Flowsheet Documentation  Taken 11/8/2024 1800 by Danica Silva RN  Medication Review/Management: medications reviewed     Problem: Alcohol Withdrawal  Goal: Alcohol Withdrawal Symptom Control  Outcome: Progressing  Goal: Optimal Neurologic Function  Outcome: Progressing  Goal: Readiness for Change Identified  Outcome: Progressing       Goal Outcome Evaluation:       Pt is alert and oriented x4. Pt stand-by assist x1 and need some assistance with cares. Needs Jenifer . Pt states not in pain but have numbness and discomfort on left arm. Pt voided and no BM this shift. Pt was bladder scan: 28 mL. IV antibiotic infusing. NSR.     Danica Silva RN

## 2024-11-09 NOTE — PHARMACY-VANCOMYCIN DOSING SERVICE
"Pharmacy Vancomycin Note  Date of Service 2024  Patient's  1985   39 year old, male    Indication: Skin and Soft Tissue Infection and Possible osteomyelitis & maybe septic arthritis  Day of Therapy: 2  Current vancomycin regimen:  1250 mg IV q12h  Current vancomycin monitoring method: AUC  Current vancomycin therapeutic monitoring goal: 400-600 mg*h/L    InsightRX Prediction of Current Vancomycin Regimen  Loading dose: N/A  Regimen: 1250 mg IV every 12 hours.  Start time: 13:47 on 2024  Exposure target: AUC24 (range)400-600 mg/L.hr   AUC24,ss: 413 mg/L.hr  Probability of AUC24 > 400: 58 %  Ctrough,ss: 9.5 mg/L  Probability of Ctrough,ss > 20: 0 %  Probability of nephrotoxicity (Lodise JACK ): 5 %    Current estimated CrCl = Estimated Creatinine Clearance: 91 mL/min (based on SCr of 0.97 mg/dL).    Creatinine for last 3 days  2024:  3:35 AM Creatinine 0.76 mg/dL  2024:  6:29 AM Creatinine 0.97 mg/dL    Recent Vancomycin Levels (past 3 days)  2024: 12:28 PM Vancomycin 8.4 ug/mL    Vancomycin IV Administrations (past 72 hours)                     vancomycin (VANCOCIN) 1,250 mg in 0.9% NaCl 262.5 mL intermittent infusion (mg) 1,250 mg New Bag 24 1344     1,250 mg New Bag  0103     1,250 mg New Bag 24 1241    vancomycin (VANCOCIN) 1,750 mg in 0.9% NaCl 517.5 mL intermittent infusion (mg) 1,750 mg New Bag 24 0513                    Nephrotoxins and other renal medications (From now, onward)      Start     Dose/Rate Route Frequency Ordered Stop    11/10/24 0100  vancomycin (VANCOCIN) 1,500 mg in 0.9% NaCl 265 mL intermittent infusion         1,500 mg  over 90 Minutes Intravenous EVERY 12 HOURS 24 1349      24 1100  piperacillin-tazobactam (ZOSYN) 3.375 g vial to attach to  mL bag        Note to Pharmacy: For [facility], [facility] and [facility]: For Zosyn-naive patients, use the \"Zosyn initial dose + extended infusion\" order panel.    3.375 g  over 240 Minutes " Intravenous EVERY 8 HOURS 11/08/24 0620      11/08/24 0900  [Held by provider]  empagliflozin (JARDIANCE) tablet 25 mg        (On hold since yesterday at 0803 until manually unheld; held by Boris Hernandez DOHold Reason: NPO)   Note to Pharmacy: PTA Sig:Take 1 tablet (25 mg) by mouth daily.      25 mg Oral DAILY 11/08/24 0741                 Contrast Orders - past 72 hours (72h ago, onward)      Start     Dose/Rate Route Frequency Stop    11/08/24 0930  gadobutrol (GADAVIST) injection 7 mL         7 mL Intravenous ONCE 11/08/24 0930            Interpretation of levels and current regimen:  Vancomycin level is reflective of -600 but low pAUC, will increase dose    Has serum creatinine changed greater than 50% in last 72 hours: No    Urine output:  unable to determine    Renal Function: Stable    InsightRX Prediction of Planned New Vancomycin Regimen  Loading dose: N/A  Regimen: 1500 mg IV every 12 hours.  Start time: 13:47 on 11/09/2024  Exposure target: AUC24 (range)400-600 mg/L.hr   AUC24,ss: 495 mg/L.hr  Probability of AUC24 > 400: 92 %  Ctrough,ss: 11.4 mg/L  Probability of Ctrough,ss > 20: 0 %  Probability of nephrotoxicity (Lodise JACK 2009): 7 %    Plan:  Increase Dose to 1500 mg IV q12h  Vancomycin monitoring method: AUC  Vancomycin therapeutic monitoring goal: 400-600 mg*h/L  Pharmacy will check vancomycin levels as appropriate in 1-3 Days.  Serum creatinine levels will be ordered daily for the first week of therapy and at least twice weekly for subsequent weeks.    Yuliya Powell, PharmD, BCPS 11/09/24 1:51 PM

## 2024-11-09 NOTE — PLAN OF CARE
Problem: Adult Inpatient Plan of Care  Goal: Plan of Care Review  Description: The Plan of Care Review/Shift note should be completed every shift.  The Outcome Evaluation is a brief statement about your assessment that the patient is improving, declining, or no change.  This information will be displayed automatically on your shift  note.  Outcome: Progressing     Problem: Comorbidity Management  Goal: Blood Glucose Levels Within Targeted Range  Outcome: Progressing  Intervention: Monitor and Manage Glycemia  Recent Flowsheet Documentation  Taken 11/9/2024 0855 by Floresita Sorensen RN  Medication Review/Management: medications reviewed     Problem: Pain Acute  Goal: Optimal Pain Control and Function  Outcome: Progressing  Intervention: Develop Pain Management Plan  Recent Flowsheet Documentation  Taken 11/9/2024 0920 by Floresita Sorensen RN  Pain Management Interventions: medication (see MAR)  Intervention: Prevent or Manage Pain  Recent Flowsheet Documentation  Taken 11/9/2024 0855 by Floresita Sorensen RN  Medication Review/Management: medications reviewed   Goal Outcome Evaluation:       Patient is Jenifer speaking, utilized. Vitally stable on RA. SR on tele. CIWA score of 0. PRN tylenol given for mild pain. Patient report numbness on left hand, no tingling. Dressing CDI. IV abx administered. Pt sat in chair for breakfast with SBA. Able to make needs known. Education provided about use of call light.

## 2024-11-09 NOTE — ANESTHESIA POSTPROCEDURE EVALUATION
Patient: Reinaldo Burks Farhan    Procedure: Procedure(s):  INCISION AND DRAINAGE ABCESS, LEFT INDEX FINGER, RAIDAL DIGITAL NERVE REPAIR, 2ND PHALAX DEBRIDEMENT OSTEOMYELITIS       Anesthesia Type:  General    Note:  Disposition: Inpatient   Postop Pain Control: Uneventful            Sign Out: Well controlled pain   PONV: No   Neuro/Psych: Uneventful            Sign Out: Acceptable/Baseline neuro status   Airway/Respiratory: Uneventful            Sign Out: Acceptable/Baseline resp. status   CV/Hemodynamics: Uneventful            Sign Out: Acceptable CV status; No obvious hypovolemia; No obvious fluid overload   Other NRE: NONE   DID A NON-ROUTINE EVENT OCCUR? No    Event details/Postop Comments:  Block working well, anticipate block duration of 24+ hours.           Last vitals:  Vitals:    11/08/24 1655 11/08/24 1700 11/08/24 1705   BP: 114/61 119/78 120/74   Pulse: 69 74 74   Resp: 14 19 16   Temp: 36.7  C (98  F) 36.8  C (98.2  F) 36.7  C (98  F)   SpO2: 99% 98% 97%       Electronically Signed By: Luis Mascorro MD  November 8, 2024  6:31 PM

## 2024-11-09 NOTE — PLAN OF CARE
"  Problem: Adult Inpatient Plan of Care  Goal: Optimal Comfort and Wellbeing  Outcome: Progressing     Problem: Mobility Impairment  Goal: Optimal Mobility  Outcome: Progressing  Intervention: Optimize Mobility  Recent Flowsheet Documentation  Taken 11/9/2024 0020 by Tyson Vera RN  Activity Management: activity adjusted per tolerance  Positioning/Transfer Devices:   pillows   in use     Problem: Pain Acute  Goal: Optimal Pain Control and Function  Outcome: Progressing  Intervention: Prevent or Manage Pain  Recent Flowsheet Documentation  Taken 11/9/2024 0020 by Tyson Vera RN  Medication Review/Management: medications reviewed     Problem: Alcohol Withdrawal  Goal: Alcohol Withdrawal Symptom Control  Outcome: Progressing   Goal Outcome Evaluation:  Patient slept well between cares. Lert and oriented x4 and makes his needs known. Formal  used for assessments. Surgical dressing is CDI. Some numbness and pain left arm. Tylenol administered per patient request.  CIWA scores \"1\" Voiding adequate amounts. Plan of Care reviewed.                      "

## 2024-11-09 NOTE — ANESTHESIA CARE TRANSFER NOTE
Patient: Reinaldo Burks Farhan    Procedure: Procedure(s):  INCISION AND DRAINAGE ABCESS, LEFT INDEX FINGER, RAIDAL DIGITAL NERVE REPAIR, 2ND PHALAX DEBRIDEMENT OSTEOMYELITIS       Diagnosis: Finger infection [L08.9]  Diagnosis Additional Information: No value filed.    Anesthesia Type:   General     Note:    Oropharynx: oropharynx clear of all foreign objects  Level of Consciousness: awake  Oxygen Supplementation: room air        Vital Signs Stable: post-procedure vital signs reviewed and stable  Report to RN Given: handoff report given  Patient transferred to: Medical/Surgical Unit    Handoff Report: Identifed the Patient, Identified the Reponsible Provider, Reviewed the pertinent medical history, Discussed the surgical course, Reviewed Intra-OP anesthesia mangement and issues during anesthesia, Set expectations for post-procedure period and Allowed opportunity for questions and acknowledgement of understanding      Vitals:  Vitals Value Taken Time   BP     Temp     Pulse     Resp     SpO2         Electronically Signed By: ROXANA Vinson CRNA  November 8, 2024  6:17 PM

## 2024-11-10 LAB
BACTERIA TISS BX CULT: ABNORMAL
BACTERIA TISS BX CULT: NORMAL
CREAT SERPL-MCNC: 0.7 MG/DL (ref 0.67–1.17)
EGFRCR SERPLBLD CKD-EPI 2021: >90 ML/MIN/1.73M2
GLUCOSE BLDC GLUCOMTR-MCNC: 141 MG/DL (ref 70–99)
GLUCOSE BLDC GLUCOMTR-MCNC: 156 MG/DL (ref 70–99)
GLUCOSE BLDC GLUCOMTR-MCNC: 270 MG/DL (ref 70–99)
GLUCOSE BLDC GLUCOMTR-MCNC: 330 MG/DL (ref 70–99)
HOLD SPECIMEN: NORMAL

## 2024-11-10 PROCEDURE — 250N000013 HC RX MED GY IP 250 OP 250 PS 637: Performed by: HOSPITALIST

## 2024-11-10 PROCEDURE — 82565 ASSAY OF CREATININE: CPT | Performed by: INTERNAL MEDICINE

## 2024-11-10 PROCEDURE — 99233 SBSQ HOSP IP/OBS HIGH 50: CPT | Performed by: HOSPITALIST

## 2024-11-10 PROCEDURE — 250N000013 HC RX MED GY IP 250 OP 250 PS 637: Performed by: INTERNAL MEDICINE

## 2024-11-10 PROCEDURE — 120N000001 HC R&B MED SURG/OB

## 2024-11-10 PROCEDURE — 250N000011 HC RX IP 250 OP 636: Performed by: INTERNAL MEDICINE

## 2024-11-10 PROCEDURE — 250N000011 HC RX IP 250 OP 636: Performed by: HOSPITALIST

## 2024-11-10 PROCEDURE — 258N000003 HC RX IP 258 OP 636: Performed by: HOSPITALIST

## 2024-11-10 PROCEDURE — 36415 COLL VENOUS BLD VENIPUNCTURE: CPT | Performed by: INTERNAL MEDICINE

## 2024-11-10 RX ADMIN — OXYCODONE HYDROCHLORIDE 2.5 MG: 5 TABLET ORAL at 04:39

## 2024-11-10 RX ADMIN — DOCUSATE SODIUM 100 MG: 100 CAPSULE, LIQUID FILLED ORAL at 08:14

## 2024-11-10 RX ADMIN — INSULIN GLARGINE 10 UNITS: 100 INJECTION, SOLUTION SUBCUTANEOUS at 08:13

## 2024-11-10 RX ADMIN — ACETAMINOPHEN 650 MG: 325 TABLET ORAL at 19:25

## 2024-11-10 RX ADMIN — ATORVASTATIN CALCIUM 40 MG: 40 TABLET, FILM COATED ORAL at 08:14

## 2024-11-10 RX ADMIN — SODIUM CHLORIDE 1500 MG: 9 INJECTION, SOLUTION INTRAVENOUS at 00:13

## 2024-11-10 RX ADMIN — PIPERACILLIN AND TAZOBACTAM 3.38 G: 3; .375 INJECTION, POWDER, FOR SOLUTION INTRAVENOUS at 19:11

## 2024-11-10 RX ADMIN — PIPERACILLIN AND TAZOBACTAM 3.38 G: 3; .375 INJECTION, POWDER, FOR SOLUTION INTRAVENOUS at 11:36

## 2024-11-10 RX ADMIN — OXYCODONE HYDROCHLORIDE 5 MG: 5 TABLET ORAL at 00:21

## 2024-11-10 RX ADMIN — OXYCODONE HYDROCHLORIDE 5 MG: 5 TABLET ORAL at 17:00

## 2024-11-10 RX ADMIN — PIPERACILLIN AND TAZOBACTAM 3.38 G: 3; .375 INJECTION, POWDER, FOR SOLUTION INTRAVENOUS at 03:02

## 2024-11-10 RX ADMIN — DOCUSATE SODIUM 100 MG: 100 CAPSULE, LIQUID FILLED ORAL at 19:12

## 2024-11-10 NOTE — PLAN OF CARE
Goal Outcome Evaluation:      Problem: Adult Inpatient Plan of Care  Goal: Optimal Comfort and Wellbeing  Intervention: Monitor Pain and Promote Comfort  Recent Flowsheet Documentation  Taken 11/10/2024 0800 by Dayanna Tineo RN  Pain Management Interventions: (sling for support)   care clustered   distraction   diversional activity provided   emotional support   medication offered but refused   pillow support provided   repositioned   rest   other (see comments)     Problem: Infection  Goal: Absence of Infection Signs and Symptoms  Outcome: Progressing     Problem: Comorbidity Management  Goal: Blood Glucose Levels Within Targeted Range  Intervention: Monitor and Manage Glycemia  Recent Flowsheet Documentation  Taken 11/10/2024 1231 by Dayanna Tineo RN  Medication Review/Management: medications reviewed  Taken 11/10/2024 0800 by Dayanna Tineo RN  Medication Review/Management: medications reviewed     Problem: Alcohol Withdrawal  Goal: Alcohol Withdrawal Symptom Control  Intervention: Minimize or Manage Alcohol Withdrawal Symptoms  Recent Flowsheet Documentation  Taken 11/10/2024 1231 by Dayanna Tineo RN  Sensory Stimulation Regulation:   care clustered   quiet environment promoted  Taken 11/10/2024 0800 by Dayanna Tineo RN  Sensory Stimulation Regulation:   care clustered   quiet environment promoted     Patient is pleasant, cooperative, alert and oriented x 4.  Independent in the room.  IV abx per orders.  CIWA score 1 and 1. Surgical dressing in place to left hand and patient wearing sling for support.  Minimal pain relieved with scheduled meds and ice packs.  Ortho paged to see patient for discharge plan and requesting dressing change orders.  PA to see patient, not yet completed.  Continue with plan of care.

## 2024-11-10 NOTE — PLAN OF CARE
Patient is A&Ox4. He denies pain at start of shift. Up to bathroom independently.  Patient eating food from home. BG's were 100 and 290. Unsure when patient ate before bedtime blood sugar check. Patient able to move thumb and starting to have pain at mid shift. Given prn Oxycodone with relief. IV ABX infusing at end of shift.  Problem: Adult Inpatient Plan of Care  Goal: Optimal Comfort and Wellbeing  Outcome: Progressing     Problem: Infection  Goal: Absence of Infection Signs and Symptoms  Outcome: Progressing     Problem: Comorbidity Management  Goal: Blood Glucose Levels Within Targeted Range  Outcome: Progressing  Intervention: Monitor and Manage Glycemia  Recent Flowsheet Documentation  Taken 11/9/2024 1700 by Vanesa Nuno RN  Medication Review/Management: medications reviewed     Problem: Mobility Impairment  Goal: Optimal Mobility  Outcome: Progressing  Intervention: Optimize Mobility  Recent Flowsheet Documentation  Taken 11/9/2024 1700 by Vanesa Nuno RN  Assistive Device Utilized: gait belt  Activity Management: activity adjusted per tolerance  Positioning/Transfer Devices:   pillows   in use     Problem: Alcohol Withdrawal  Goal: Alcohol Withdrawal Symptom Control  Outcome: Progressing  Intervention: Minimize or Manage Alcohol Withdrawal Symptoms  Recent Flowsheet Documentation  Taken 11/9/2024 1700 by Vanesa Nuno RN  Sensory Stimulation Regulation:   television on   care clustered  Goal: Optimal Neurologic Function  Outcome: Progressing  Intervention: Minimize or Manage Acute Neurologic Symptoms  Recent Flowsheet Documentation  Taken 11/9/2024 1700 by Vanesa Nuno RN  Sensory Stimulation Regulation:   television on   care clustered     Problem: Adult Inpatient Plan of Care  Goal: Absence of Hospital-Acquired Illness or Injury  Intervention: Identify and Manage Fall Risk  Recent Flowsheet Documentation  Taken 11/9/2024 1700 by Vanesa Nuno, RN  Safety Promotion/Fall Prevention:   activity  supervised   assistive device/personal items within reach   nonskid shoes/slippers when out of bed  Intervention: Prevent Skin Injury  Recent Flowsheet Documentation  Taken 11/9/2024 1700 by Vanesa Nuno RN  Body Position: position changed independently  Intervention: Prevent and Manage VTE (Venous Thromboembolism) Risk  Recent Flowsheet Documentation  Taken 11/9/2024 1700 by Vanesa Nuno RN  VTE Prevention/Management: SCDs on (sequential compression devices)  Intervention: Prevent Infection  Recent Flowsheet Documentation  Taken 11/9/2024 1700 by Vanesa Nuno RN  Infection Prevention: hand hygiene promoted     Problem: Pain Acute  Goal: Optimal Pain Control and Function  Intervention: Prevent or Manage Pain  Recent Flowsheet Documentation  Taken 11/9/2024 1700 by Vanesa Nuno RN  Sensory Stimulation Regulation:   television on   care clustered  Medication Review/Management: medications reviewed   Goal Outcome Evaluation:

## 2024-11-10 NOTE — PROGRESS NOTES
"[facility]    Medicine Progress Note - Hospitalist Service    Date of Admission:  11/8/2024    Assessment & Plan   39-year-old male with DM2, alcohol abuse, latent TB and chronic hepatitis B admitted with left 2nd finger osteomyelitis.    #Osteomyelitis, left 2nd finger  #Abscess  #Radial nerve laceration  s/p I&D, radial nerve reapproximation 11/8  Ortho following  Cx grew staph epi  Continue Zosyn for now, stop Vanc  Pain control    #Alcohol intoxication  JF 0.19 on admission  CIWA     #Diabetes type 2  A1c 8.1  Lantus, SSI      DVT ppx: PCDs          Diet: Moderate Consistent Carb (60 g CHO per Meal) Diet    Moody Catheter: Not present  Lines: None     Cardiac Monitoring: None  Code Status: Full Code      Clinically Significant Risk Factors           # Hypocalcemia: Lowest Ca = 8 mg/dL in last 2 days, will monitor and replace as appropriate                   # DMII: A1C = 8.1 % (Ref range: 0.0 - 5.6 %) within past 6 months, PRESENT ON ADMISSION  # Overweight: Estimated body mass index is 28.29 kg/m  as calculated from the following:    Height as of this encounter: 1.616 m (5' 3.62\").    Weight as of this encounter: 73.9 kg (162 lb 14.4 oz)., PRESENT ON ADMISSION            Disposition Plan     Medically Ready for Discharge: Anticipated Tomorrow             Boris Hernandez, DO  Hospitalist Service  [facility]  Securely message with [employer] (more info)  Text page via [employer] Paging/Directory   ______________________________________________________________________    Interval History   No acute events overnight    Physical Exam   Vital Signs: Temp: 97.6  F (36.4  C) Temp src: Oral BP: 110/76 Pulse: 68   Resp: 16 SpO2: 98 % O2 Device: None (Room air)    Weight: 162 lbs 14.4 oz    General Appearance:  No acute distress  Respiratory: Clear to auscultation bilaterally  Cardiovascular: Regular rate and rhythm  L hand dressing clean dry and intact      Medical Decision Making "       50 MINUTES SPENT BY ME on the date of service doing chart review, history, exam, documentation & further activities per the note.      Data

## 2024-11-10 NOTE — PLAN OF CARE
"  Problem: Adult Inpatient Plan of Care  Goal: Plan of Care Review  Description: The Plan of Care Review/Shift note should be completed every shift.  The Outcome Evaluation is a brief statement about your assessment that the patient is improving, declining, or no change.  This information will be displayed automatically on your shift  note.  Outcome: Progressing  Goal: Patient-Specific Goal (Individualized)  Description: You can add care plan individualizations to a care plan. Examples of Individualization might be:  \"Parent requests to be called daily at 9am for status\", \"I have a hard time hearing out of my right ear\", or \"Do not touch me to wake me up as it startles  me\".  Outcome: Progressing  Goal: Absence of Hospital-Acquired Illness or Injury  Outcome: Progressing  Intervention: Identify and Manage Fall Risk  Recent Flowsheet Documentation  Taken 11/10/2024 0021 by Charis Thomas RN  Safety Promotion/Fall Prevention:   activity supervised   assistive device/personal items within reach   nonskid shoes/slippers when out of bed   lighting adjusted   room door open   toileting scheduled  Intervention: Prevent Skin Injury  Recent Flowsheet Documentation  Taken 11/10/2024 0021 by Charis Thomas RN  Body Position: position changed independently  Intervention: Prevent and Manage VTE (Venous Thromboembolism) Risk  Recent Flowsheet Documentation  Taken 11/10/2024 0021 by Charis Thomas RN  VTE Prevention/Management: SCDs on (sequential compression devices)  Intervention: Prevent Infection  Recent Flowsheet Documentation  Taken 11/10/2024 0021 by hCaris Thomas RN  Infection Prevention:   rest/sleep promoted   single patient room provided  Goal: Optimal Comfort and Wellbeing  Outcome: Progressing  Intervention: Monitor Pain and Promote Comfort  Recent Flowsheet Documentation  Taken 11/10/2024 0439 by Charis Thomas RN  Pain Management Interventions: medication (see MAR)  Taken 11/10/2024 0021 by " Charis Thomas, RN  Pain Management Interventions: (elevated)   medication (see MAR)   cold applied  Goal: Readiness for Transition of Care  Outcome: Progressing   Goal Outcome Evaluation:       Pt a/o with pain controlled by prn oxycodone and ice packs. CIWA scores 2 and 2.   Will monitor.

## 2024-11-11 VITALS
HEART RATE: 62 BPM | RESPIRATION RATE: 17 BRPM | SYSTOLIC BLOOD PRESSURE: 113 MMHG | OXYGEN SATURATION: 98 % | HEIGHT: 64 IN | DIASTOLIC BLOOD PRESSURE: 63 MMHG | TEMPERATURE: 98 F | WEIGHT: 160.7 LBS | BODY MASS INDEX: 27.43 KG/M2

## 2024-11-11 LAB
CREAT SERPL-MCNC: 0.76 MG/DL (ref 0.67–1.17)
EGFRCR SERPLBLD CKD-EPI 2021: >90 ML/MIN/1.73M2
GLUCOSE BLDC GLUCOMTR-MCNC: 100 MG/DL (ref 70–99)
GLUCOSE BLDC GLUCOMTR-MCNC: 183 MG/DL (ref 70–99)
HOLD SPECIMEN: NORMAL

## 2024-11-11 PROCEDURE — 250N000011 HC RX IP 250 OP 636: Performed by: INTERNAL MEDICINE

## 2024-11-11 PROCEDURE — 250N000013 HC RX MED GY IP 250 OP 250 PS 637: Performed by: INTERNAL MEDICINE

## 2024-11-11 PROCEDURE — 82565 ASSAY OF CREATININE: CPT | Performed by: INTERNAL MEDICINE

## 2024-11-11 PROCEDURE — 36415 COLL VENOUS BLD VENIPUNCTURE: CPT | Performed by: INTERNAL MEDICINE

## 2024-11-11 PROCEDURE — 250N000013 HC RX MED GY IP 250 OP 250 PS 637: Performed by: HOSPITALIST

## 2024-11-11 PROCEDURE — 99239 HOSP IP/OBS DSCHRG MGMT >30: CPT | Performed by: HOSPITALIST

## 2024-11-11 PROCEDURE — 99254 IP/OBS CNSLTJ NEW/EST MOD 60: CPT | Performed by: STUDENT IN AN ORGANIZED HEALTH CARE EDUCATION/TRAINING PROGRAM

## 2024-11-11 PROCEDURE — 250N000013 HC RX MED GY IP 250 OP 250 PS 637: Performed by: STUDENT IN AN ORGANIZED HEALTH CARE EDUCATION/TRAINING PROGRAM

## 2024-11-11 RX ORDER — LEVOFLOXACIN 500 MG/1
500 TABLET, FILM COATED ORAL DAILY
Qty: 28 TABLET | Refills: 0 | Status: SHIPPED | OUTPATIENT
Start: 2024-11-11 | End: 2024-12-09

## 2024-11-11 RX ORDER — LEVOFLOXACIN 500 MG/1
500 TABLET, FILM COATED ORAL DAILY
Status: DISCONTINUED | OUTPATIENT
Start: 2024-11-11 | End: 2024-11-11 | Stop reason: HOSPADM

## 2024-11-11 RX ORDER — GLIPIZIDE 5 MG/1
5 TABLET, FILM COATED, EXTENDED RELEASE ORAL DAILY
Qty: 90 TABLET | Refills: 3 | Status: SHIPPED | OUTPATIENT
Start: 2024-11-11

## 2024-11-11 RX ORDER — ACETAMINOPHEN 325 MG/1
650 TABLET ORAL EVERY 4 HOURS PRN
COMMUNITY
Start: 2024-11-11

## 2024-11-11 RX ADMIN — DOCUSATE SODIUM 100 MG: 100 CAPSULE, LIQUID FILLED ORAL at 08:58

## 2024-11-11 RX ADMIN — AMOXICILLIN AND CLAVULANATE POTASSIUM 1 TABLET: 875; 125 TABLET, FILM COATED ORAL at 12:12

## 2024-11-11 RX ADMIN — ACETAMINOPHEN 650 MG: 325 TABLET ORAL at 00:24

## 2024-11-11 RX ADMIN — ATORVASTATIN CALCIUM 40 MG: 40 TABLET, FILM COATED ORAL at 08:58

## 2024-11-11 RX ADMIN — LEVOFLOXACIN 500 MG: 500 TABLET, FILM COATED ORAL at 12:12

## 2024-11-11 RX ADMIN — INSULIN GLARGINE 10 UNITS: 100 INJECTION, SOLUTION SUBCUTANEOUS at 08:59

## 2024-11-11 RX ADMIN — PIPERACILLIN AND TAZOBACTAM 3.38 G: 3; .375 INJECTION, POWDER, FOR SOLUTION INTRAVENOUS at 03:02

## 2024-11-11 ASSESSMENT — ACTIVITIES OF DAILY LIVING (ADL)
ADLS_ACUITY_SCORE: 0

## 2024-11-11 NOTE — PLAN OF CARE
"  Problem: Adult Inpatient Plan of Care  Goal: Plan of Care Review  Description: The Plan of Care Review/Shift note should be completed every shift.  The Outcome Evaluation is a brief statement about your assessment that the patient is improving, declining, or no change.  This information will be displayed automatically on your shift  note.  Outcome: Progressing  Goal: Patient-Specific Goal (Individualized)  Description: You can add care plan individualizations to a care plan. Examples of Individualization might be:  \"Parent requests to be called daily at 9am for status\", \"I have a hard time hearing out of my right ear\", or \"Do not touch me to wake me up as it startles  me\".  Outcome: Progressing  Goal: Absence of Hospital-Acquired Illness or Injury  Outcome: Progressing  Intervention: Identify and Manage Fall Risk  Recent Flowsheet Documentation  Taken 11/11/2024 0024 by Charis Thomas RN  Safety Promotion/Fall Prevention:   activity supervised   clutter free environment maintained   nonskid shoes/slippers when out of bed   patient and family education   safety round/check completed  Intervention: Prevent Skin Injury  Recent Flowsheet Documentation  Taken 11/11/2024 0024 by Charis Thomas RN  Body Position: (hand elevated) position changed independently  Intervention: Prevent and Manage VTE (Venous Thromboembolism) Risk  Recent Flowsheet Documentation  Taken 11/11/2024 0024 by Charis Thomas RN  VTE Prevention/Management: SCDs off (sequential compression devices)  Intervention: Prevent Infection  Recent Flowsheet Documentation  Taken 11/11/2024 0024 by Charis Thomas RN  Infection Prevention:   rest/sleep promoted   single patient room provided  Goal: Optimal Comfort and Wellbeing  Outcome: Progressing  Intervention: Monitor Pain and Promote Comfort  Recent Flowsheet Documentation  Taken 11/11/2024 0024 by Charis Thomas RN  Pain Management Interventions: medication (see MAR)  Goal: Readiness " for Transition of Care  Outcome: Progressing   Goal Outcome Evaluation:       Pt a/o with VSS and pain controlled with sling, elevation, and prn tylenol. CIWA scores remain 1 and 1. Will monitor.

## 2024-11-11 NOTE — PROGRESS NOTES
Dressing changed by ortho at bedside. Po augmentin & levaquin admin. Prior to discharge. Discussed discharge paperwork with pt, pt voiced understanding. Personal belongings sent with pt. Pt discharged ambulating to main hospital entrance accompanied by writer to meet spouse at hospital entrance for transport.

## 2024-11-11 NOTE — DISCHARGE SUMMARY
"[facility]  Hospitalist Discharge Summary      Date of Admission:  11/8/2024  Date of Discharge:  11/11/2024  Discharging Provider: Boris Hernandez DO  Discharge Service: Hospitalist Service    Discharge Diagnoses   Osteomyelitis, left second finger  Abscess  Radial nerve laceration  Alcohol intoxication  Diabetes type 2    Clinically Significant Risk Factors     # DMII: A1C = 8.1 % (Ref range: 0.0 - 5.6 %) within past 6 months  # Overweight: Estimated body mass index is 27.91 kg/m  as calculated from the following:    Height as of this encounter: 1.616 m (5' 3.62\").    Weight as of this encounter: 72.9 kg (160 lb 11.2 oz).       Follow-ups Needed After Discharge   Follow-up Appointments       Follow Up      Follow up with Infectious Disease, in 2 weeks. for hospital follow- up.  No follow up labs or test are needed.    Follow-up with orthopedic surgery in 2 weeks for wound check.        Hospital Follow-up with Existing Primary Care Provider (PCP)      Please see details below         Schedule Primary Care visit within: 30 Days               Unresulted Labs Ordered in the Past 30 Days of this Admission       Date and Time Order Name Status Description    11/8/2024  5:49 PM Anaerobic Bacterial Culture Routine Preliminary     11/8/2024  5:45 PM Surgical Pathology Exam In process     11/8/2024  5:34 PM Anaerobic Bacterial Culture Routine Preliminary     11/8/2024  6:07 AM Blood Culture Arm, Right Preliminary         These results will be followed up by DEVON LAZO    Discharge Disposition   Discharged to home  Condition at discharge: Stable    Hospital Course   Patient is a 39-year-old male with DM2, alcohol abuse, latent TB and chronic hepatitis B admitted with left 2nd finger osteomyelitis.    #Osteomyelitis, left 2nd finger  #Abscess  #Radial nerve laceration  s/p I&D, radial nerve reapproximation 11/8  Cx growing staph epi thus far  s/p IV zosyn, vanc  ID consulted- recommend 4 weeks Levaquin + " Augmentin, outpatient ID follow-up in 2 weeks  Patient orthopedic surgery follow-up in 2 weeks    #Alcohol intoxication  JF 0.19 on admission  CIWA   No further signs of withdrawal    #Diabetes type 2  A1c 8.1  Resume PTA Januvia, Jardiance and glipizide  PCP follow-up      Consultations This Hospital Stay   PHARMACY TO DOSE VANCO  ORTHOPEDIC SURGERY IP CONSULT  ORTHOPEDIC SURGERY IP CONSULT  PHARMACY TO DOSE VANCO  INFECTIOUS DISEASES IP CONSULT    Code Status   Full Code    Time Spent on this Encounter   I, Boris Hernandez DO, personally saw the patient today and spent greater than 30 minutes discharging this patient.       Boris Hernandez DO  [address] [address]  Phone: [phone]  Fax: [phone]  ______________________________________________________________________    Physical Exam   Vital Signs: Temp: 98  F (36.7  C) Temp src: Oral BP: 113/63 Pulse: 62   Resp: 17 SpO2: 98 % O2 Device: None (Room air)    Weight: 160 lbs 11.2 oz  General Appearance:  No acute distress  Respiratory: Nonlabored breathing  Left hand dressing clean dry and intact  Neuro: Alert and oriented x 3, normal speech, no tremoring         Primary Care Physician   Cassy Berrios    Discharge Orders      Reason for your hospital stay    Finger infection     Activity    Your activity upon discharge: activity as tolerated     Follow Up    Follow up with Infectious Disease, in 2 weeks. for hospital follow- up.  No follow up labs or test are needed.    Follow-up with orthopedic surgery in 2 weeks for wound check.     Diet    Follow this diet upon discharge: Current Diet:Orders Placed This Encounter      Moderate Consistent Carb (60 g CHO per Meal) Diet     Hospital Follow-up with Existing Primary Care Provider (PCP)    Please see details below            Significant Results and Procedures   Most Recent 3 CBC's:  Recent Labs   Lab Test 11/09/24  0629 11/08/24  0335 11/04/24  1110    WBC 8.0 9.0 6.6   HGB 14.2 17.2 16.5   MCV 90 88 89   * 192 146*     Most Recent 3 BMP's:  Recent Labs   Lab Test 11/11/24  0839 11/11/24  0611 11/10/24  2140 11/10/24  1701 11/10/24  0748 11/10/24  0521 11/09/24  0901 11/09/24  0629 11/08/24  0816 11/08/24  0335 08/22/24  1356   NA  --   --   --   --   --   --   --  137  --  139 140   POTASSIUM  --   --   --   --   --   --   --  3.4  --  3.9 4.2   CHLORIDE  --   --   --   --   --   --   --  105  --  104 101   CO2  --   --   --   --   --   --   --  22  --  21* 25   BUN  --   --   --   --   --   --   --  16.8  --  12.2 15.3   CR  --  0.76  --   --   --  0.70  --  0.97  --  0.76 0.90   ANIONGAP  --   --   --   --   --   --   --  10  --  14 14   RACHEL  --   --   --   --   --   --   --  8.0*  --  8.9 9.4   *  --  330* 156*   < >  --    < > 249*   < > 110* 335*    < > = values in this interval not displayed.     Most Recent 2 LFT's:  Recent Labs   Lab Test 11/09/24 0629 08/22/24  1356   AST 29 81*   ALT 43 68   ALKPHOS 72 129   BILITOTAL 0.8 0.4     7-Day Micro Results       Collected Updated Procedure Result Status      11/08/2024 1746 11/10/2024 2301 Anaerobic Bacterial Culture Routine [[id number]]    Tissue from Finger, Left    Preliminary result Component Value   Culture No anaerobic organisms isolated after 2 days  [P]                11/08/2024 1746 11/08/2024 2310 Gram Stain [[id number]]   Tissue from Finger, Left    Final result Component Value   GS Culture See corresponding culture for results   Gram Stain Result No organisms seen   Gram Stain Result 1+ WBC seen            11/08/2024 1746 11/10/2024 0812 Tissue Aerobic Bacterial Culture Routine [[id number]]    Tissue from Finger, Left    Final result Component Value   Culture 1+ Normal callie               11/08/2024 1733 11/10/2024 2246 Anaerobic Bacterial Culture Routine [[id number]]   Swab from Finger, Left    Preliminary result Component Value   Culture No anaerobic organisms isolated  after 2 days  [P]                11/08/2024 1733 11/08/2024 2310 Gram Stain [[id number]]   Tissue from Finger, Left    Final result Component Value   GS Culture See corresponding culture for results   Gram Stain Result No organisms seen   Gram Stain Result 1+ WBC seen            11/08/2024 1733 11/10/2024 0810 Tissue Aerobic Bacterial Culture Routine [[id number]]    (Abnormal)   Tissue from Finger, Left    Final result Component Value   Culture 1+ Staphylococcus epidermidis    Susceptibilities not routinely done, refer to antibiogram to view typical susceptibility profiles               11/08/2024 1050 11/10/2024 1416 Blood Culture Arm, Right [[id number]]   Blood from Arm, Right    Preliminary result Component Value   Culture No growth after 2 days  [P]                      Most Recent Hemoglobin A1c:  Recent Labs   Lab Test 09/30/24  1255   A1C 8.1*   ,   Results for orders placed or performed during the hospital encounter of 11/08/24   MR Finger Left w/o & w Contrast    Narrative    EXAM: MR FINGER LEFT W/O and W CONTRAST  LOCATION: [facility]  DATE: 11/8/2024    INDICATION: Osteomyelitis.  COMPARISON: None.  TECHNIQUE: Routine. Additional postgadolinium T1 sequences were obtained.  IV CONTRAST: 7 mL Gadavist.    FINDINGS:     TENDONS:   -Extensor tendons: No tear, tendinopathy, or tenosynovitis.  -Flexor tendons: There is index finger flexor tenosynovitis from the level of the distal metacarpal to its distal phalangeal insertion, likely infectious.    LIGAMENTS:  -The index finger PIP joint collateral ligaments are poorly visualized and may be partially torn/eroded.    JOINTS AND BONES:   -There is an index finger PIP joint effusion with synovitis. There is abnormal marrow signal involving the index finger middle phalanx base and proximal phalangeal head and distal shaft with erosive changes more pronounced in the middle phalanx base.   Findings are compatible with septic arthritis  "and osteomyelitis.    MUSCLES AND SOFT TISSUES:   -There is a wound over the radial aspect of the index finger PIP joint. There is soft tissue edema and enhancement over the index finger compatible with cellulitis. No drainable fluid collection.      Impression    IMPRESSION:  1.  There is an index finger PIP joint effusion with synovitis. There are is abnormal marrow signal involving the index finger middle phalanx base and proximal phalangeal head and distal shaft with erosive changes more pronounced in the middle phalanx   base. Findings are compatible with septic arthritis and osteomyelitis.  2.  There is a wound over the radial aspect of the index finger PIP joint, with soft tissue edema and enhancement over the index finger compatible with cellulitis. No drainable soft tissue collection.  3.  Index finger flexor tenosynovitis from the level of the distal metacarpal to its distal phalangeal insertion, likely infectious.   POC US Guidance Needle Placement    Narrative    Ultrasound was performed as guidance to an anesthesia procedure.  Click   \"PACS images\" hyperlink below to view any stored images.  For specific   procedure details, view procedure note authored by anesthesia.       Discharge Medications   Current Discharge Medication List        START taking these medications    Details   acetaminophen (TYLENOL) 325 MG tablet Take 2 tablets (650 mg) by mouth every 4 hours as needed for mild pain.    Associated Diagnoses: Osteomyelitis of left hand, unspecified type (H)      amoxicillin-clavulanate (AUGMENTIN) 875-125 MG tablet Take 1 tablet by mouth every 12 hours for 28 days.  Qty: 56 tablet, Refills: 0    Associated Diagnoses: Osteomyelitis of left hand, unspecified type (H)      levofloxacin (LEVAQUIN) 500 MG tablet Take 1 tablet (500 mg) by mouth daily for 28 days.  Qty: 28 tablet, Refills: 0    Associated Diagnoses: Osteomyelitis of left hand, unspecified type (H)           CONTINUE these medications which " have CHANGED    Details   empagliflozin (JARDIANCE) 25 MG TABS tablet Take 1 tablet (25 mg) by mouth daily.  Qty: 90 tablet, Refills: 3    Associated Diagnoses: Type 2 diabetes mellitus without complication, without long-term current use of insulin (H)      glipiZIDE (GLUCOTROL XL) 5 MG 24 hr tablet Take 1 tablet (5 mg) by mouth daily.  Qty: 90 tablet, Refills: 3    Associated Diagnoses: Type 2 diabetes mellitus without complication, without long-term current use of insulin (H)      sitagliptin (JANUVIA) 100 MG tablet Take 1 tablet (100 mg) by mouth daily.  Qty: 30 tablet, Refills: 11    Associated Diagnoses: Type 2 diabetes mellitus without complication, without long-term current use of insulin (H)           CONTINUE these medications which have NOT CHANGED    Details   atorvastatin (LIPITOR) 40 MG tablet Take 1 tablet (40 mg) by mouth daily.  Qty: 90 tablet, Refills: 3    Associated Diagnoses: Mixed hyperlipidemia      Microlet Lancets MISC Use to test blood sugars two times daily as directed.  Qty: 200 each, Refills: 4    Associated Diagnoses: Type 2 diabetes mellitus without complication, without long-term current use of insulin (H)      omeprazole (PRILOSEC) 20 MG DR capsule Take 1 capsule (20 mg) by mouth daily.  Qty: 90 capsule, Refills: 0    Associated Diagnoses: Encounter for medication refill      blood glucose (CONTOUR NEXT TEST) test strip Use to test blood sugar 2 times daily or as directed.  Qty: 200 strip, Refills: 4    Associated Diagnoses: Type 2 diabetes mellitus without complication, without long-term current use of insulin (H)           Allergies   No Known Allergies

## 2024-11-11 NOTE — PLAN OF CARE
Patient is A&Ox4. He is cooperative with cares. Given 5 mg Oxycodone at 5 PM with relief. Patient is able to move thumb and fingers more than last night.  BG at HS was 330 and orders for coverage which was given. Patient up to bathroom independently.   Problem: Adult Inpatient Plan of Care  Goal: Optimal Comfort and Wellbeing  Outcome: Progressing  Intervention: Monitor Pain and Promote Comfort  Recent Flowsheet Documentation  Taken 11/10/2024 1700 by Vanesa Nuno RN  Pain Management Interventions:   care clustered   distraction   diversional activity provided   emotional support   medication offered but refused   pillow support provided   repositioned   rest   other (see comments)     Problem: Infection  Goal: Absence of Infection Signs and Symptoms  Outcome: Progressing     Problem: Mobility Impairment  Goal: Optimal Mobility  Outcome: Progressing  Intervention: Optimize Mobility  Recent Flowsheet Documentation  Taken 11/10/2024 1700 by Vanesa Nuno RN  Activity Management:   activity adjusted per tolerance   activity encouraged   up ad kalin  Positioning/Transfer Devices:   pillows   in use   other (see comments)     Problem: Pain Acute  Goal: Optimal Pain Control and Function  Outcome: Progressing  Intervention: Develop Pain Management Plan  Recent Flowsheet Documentation  Taken 11/10/2024 1700 by Vanesa Nuno RN  Pain Management Interventions:   care clustered   distraction   diversional activity provided   emotional support   medication offered but refused   pillow support provided   repositioned   rest   other (see comments)  Intervention: Prevent or Manage Pain  Recent Flowsheet Documentation  Taken 11/10/2024 1700 by Vanesa Nuno RN  Sensory Stimulation Regulation:   care clustered   quiet environment promoted  Bowel Elimination Promotion:   adequate fluid intake promoted   ambulation promoted  Medication Review/Management: medications reviewed     Problem: Alcohol Withdrawal  Goal: Alcohol  Withdrawal Symptom Control  Outcome: Progressing  Intervention: Minimize or Manage Alcohol Withdrawal Symptoms  Recent Flowsheet Documentation  Taken 11/10/2024 1700 by Vanesa Nuno RN  Sensory Stimulation Regulation:   care clustered   quiet environment promoted  Goal: Optimal Neurologic Function  Outcome: Progressing  Intervention: Minimize or Manage Acute Neurologic Symptoms  Recent Flowsheet Documentation  Taken 11/10/2024 1700 by Vanesa Nuno RN  Sensory Stimulation Regulation:   care clustered   quiet environment promoted     Problem: Adult Inpatient Plan of Care  Goal: Absence of Hospital-Acquired Illness or Injury  Intervention: Identify and Manage Fall Risk  Recent Flowsheet Documentation  Taken 11/10/2024 1700 by Vanesa Nuno RN  Safety Promotion/Fall Prevention:   activity supervised   assistive device/personal items within reach   clutter free environment maintained   nonskid shoes/slippers when out of bed   patient and family education   safety round/check completed  Intervention: Prevent Skin Injury  Recent Flowsheet Documentation  Taken 11/10/2024 1700 by Vanesa Nuno RN  Body Position: position changed independently  Intervention: Prevent and Manage VTE (Venous Thromboembolism) Risk  Recent Flowsheet Documentation  Taken 11/10/2024 1700 by Vanesa Nuno RN  VTE Prevention/Management: SCDs off (sequential compression devices)  Intervention: Prevent Infection  Recent Flowsheet Documentation  Taken 11/10/2024 1700 by Vanesa Nuno RN  Infection Prevention:   hand hygiene promoted   personal protective equipment utilized   rest/sleep promoted   single patient room provided     Problem: Comorbidity Management  Goal: Blood Glucose Levels Within Targeted Range  Intervention: Monitor and Manage Glycemia  Recent Flowsheet Documentation  Taken 11/10/2024 1700 by Vanesa Nuno RN  Medication Review/Management: medications reviewed   Goal Outcome Evaluation:

## 2024-11-11 NOTE — DISCHARGE INSTRUCTIONS
Monitor your blood glucose closely as there pharmacist states your new antibiotic Levaquin may interact with your diabetic medication glipizide & enhance it to make your blood sugar lowers. Monitor closer while on antibiotic.

## 2024-11-11 NOTE — PROGRESS NOTES
"Orthopedic Progress Note      Assessment: 3 Days Post-Op  Left index finger radial sensory nerve laceration  Left index finger osteomyelitis proximal radial second phalanx  Left index finger abscess    Plan:   - Continue PT/OT  - Weightbearing status: NWB to left hand, may continue to wear sling over LUE for comfort  - Pain Management; continue current regimen  - Infectious disease consulted, appreciate recs  - DVT prophylaxis: SCDs and early ambulation  - Antibiotics: Currently on IV Zosyn   - Labs: WBC 8.0 on 11/9. Hgb 14.2 on 11/09, no indications for transfusion at this time.   - Intraoperative cx pending, preliminary results with staph epidermidis growth  - Dressing: Dressing was changed to simple circumferential dressing this morning, keep c/d/I  - Discharge: Pending medical clearance and ID recommendations.   - Follow-up with Dr. Yang's team in 2 weeks for wound check    Subjective:    Patient reports feeling well today. Patient primarily Jenifer speaking, able to communicate effectively this morning without interpretor. Understands questions. Patient reports no pain into the LUE this morning. States pain is well controlled on current medication regimen. Notes some pain when dressing was being changed. Denies any numbness or tingling into the LUE. Denies chest pain or SOB. Denies fever or chills. Denies nausea or vomiting. Denies lightheadedness or dizziness. Tolerating PO intake well. Voiding independently without difficulty. Has passed gas, however no BM.  All questions/concerns answered.      Objective:  /63 (BP Location: Right arm)   Pulse 62   Temp 98  F (36.7  C) (Oral)   Resp 17   Ht 1.616 m (5' 3.62\")   Wt 72.9 kg (160 lb 11.2 oz)   SpO2 98%   BMI 27.91 kg/m        Patient is A&Ox3, sitting up at bedside  Calves without tenderness, neg Penny's  Brisk capillary refill in the toes.   Palpable Left radial pulse. Left hand warm & well-perfused.  Sensation is intact.  TTP over the left index " finger incisional area. Able to move fingers without difficulty. Thumb opposition intact. Able to give thumbs up.   Left index finger with well approximated wound located radial to the midline of the left index finger. Some mild active bleeding noted at the proximal aspect of the wound. No pus or other active drainage noted. Mild to moderate edema noted in proximal portion of left index finger.   AIN, median, and motor nerve intact.   Wrist ROM intact. Unable to appropriately assess  strength 2/2 limited ROM of left index finger.    Pertinent Labs   Lab Results: personally reviewed.   Lab Results   Component Value Date    INR 0.93 08/22/2024    INR 0.93 02/28/2024    INR 1.07 08/09/2023     Lab Results   Component Value Date    WBC 8.0 11/09/2024    HGB 14.2 11/09/2024    HCT 41.4 11/09/2024    MCV 90 11/09/2024     (L) 11/09/2024     Lab Results   Component Value Date     11/09/2024    CO2 22 11/09/2024       Report completed by:  Janelle Capps PA-C/ Dr. Yang  Date: 11/11/2024  [facility]
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
## 2024-11-13 LAB — BACTERIA BLD CULT: NO GROWTH

## 2024-11-14 LAB
PATH REPORT.COMMENTS IMP SPEC: NORMAL
PATH REPORT.COMMENTS IMP SPEC: NORMAL
PATH REPORT.FINAL DX SPEC: NORMAL
PATH REPORT.GROSS SPEC: NORMAL
PATH REPORT.MICROSCOPIC SPEC OTHER STN: NORMAL
PATH REPORT.RELEVANT HX SPEC: NORMAL
PHOTO IMAGE: NORMAL

## 2024-11-15 LAB — BACTERIA SPEC CULT: NORMAL

## 2024-11-16 LAB — BACTERIA TISS BX CULT: ABNORMAL

## 2024-11-26 ENCOUNTER — OFFICE VISIT (OUTPATIENT)
Dept: INFECTIOUS DISEASES | Facility: CLINIC | Age: 39
End: 2024-11-26
Attending: STUDENT IN AN ORGANIZED HEALTH CARE EDUCATION/TRAINING PROGRAM
Payer: COMMERCIAL

## 2024-11-26 VITALS
WEIGHT: 162.1 LBS | BODY MASS INDEX: 28.16 KG/M2 | HEART RATE: 75 BPM | OXYGEN SATURATION: 97 % | DIASTOLIC BLOOD PRESSURE: 80 MMHG | TEMPERATURE: 98.2 F | SYSTOLIC BLOOD PRESSURE: 128 MMHG

## 2024-11-26 DIAGNOSIS — M86.9 OSTEOMYELITIS OF LEFT HAND, UNSPECIFIED TYPE (H): Primary | ICD-10-CM

## 2024-11-26 PROCEDURE — 99213 OFFICE O/P EST LOW 20 MIN: CPT | Performed by: STUDENT IN AN ORGANIZED HEALTH CARE EDUCATION/TRAINING PROGRAM

## 2024-11-26 PROCEDURE — G2211 COMPLEX E/M VISIT ADD ON: HCPCS | Performed by: STUDENT IN AN ORGANIZED HEALTH CARE EDUCATION/TRAINING PROGRAM

## 2024-11-26 NOTE — PROGRESS NOTES
[facility] post-hospital stay follow up.    Name: Reinaldo Real  :   1985  MRN:   [id number]  PCP:    Cassy Berrios  DOS:    24      CC: Post Hospital Stay follow up for right index finger osteomyelitis    HPI/Interval History:  Reinaldo Real is a 39 year old old male with the history of diabetes mellitus, alcohol abuse with fatty liver disease who was recently seen in the hospital with left index finger osteomyelitis with septic arthritis on MRI following injury to the right index finger 3 months prior.  Had I&D.  Cultures were positive for staph epi at the time of discharge.  Since then cultures have identified actinomyces.  On p.o. Levaquin plus Augmentin.  Tolerating treatment well.  Locally swelling has gone down remarkably.    ===========================  Past Medical History:  Past Medical History:   Diagnosis Date    Alcohol use disorder, moderate, dependence (H) 2019    Hepatitis B     History of H. pylori infection     TB (tuberculosis)     Type 2 diabetes mellitus without complication, without long-term current use of insulin (H) 2016       Past Surgical History:  Past Surgical History:   Procedure Laterality Date    INCISION AND DRAINAGE FINGER, COMBINED Left 2024    Procedure: INCISION AND DRAINAGE ABCESS, LEFT INDEX FINGER, RAIDAL DIGITAL NERVE REPAIR,;  Surgeon: Surinder Yang MD;  Location: [facility] OR    IRRIGATION AND DEBRIDEMENT FINGER, COMBINED  2024    Procedure: Left index finger debridement osteomyelitis proximal radial second phalanx;  Surgeon: Surinder Yang MD;  Location: [facility] OR    REMOVE FOREIGN BODY LOWER EXTREMITY Left 2/15/2024    Procedure: REMOVAL, FOREIGN BODY LEFT FOOT;  Surgeon: Ag Cheng DPM;  Location: [facility] OR       Social History:  Social History     Socioeconomic History    Marital status:      Spouse name: Not on file    Number of children: Not on file    Years of education: Not on file     Highest education level: Not on file   Occupational History    Not on file   Tobacco Use    Smoking status: Some Days     Current packs/day: 0.00     Types: Cigarettes     Last attempt to quit: 2013     Years since quittin.0     Passive exposure: Current    Smokeless tobacco: Current     Types: Chew    Tobacco comments:     smokes 0-1/day--Chew betel nut; pt states he started smoking at 12 yo   Vaping Use    Vaping status: Never Used   Substance and Sexual Activity    Alcohol use: No     Comment: Alcoholic Drinks/day: pt states he haven't had a drink since January    Drug use: No    Sexual activity: Not on file   Other Topics Concern    Not on file   Social History Narrative    The patient works as a PCA at [employer].     Social Drivers of Health     Financial Resource Strain: Unknown (2024)    Financial Resource Strain     Within the past 12 months, have you or your family members you live with been unable to get utilities (heat, electricity) when it was really needed?: Patient declined   Food Insecurity: Unknown (2024)    Food Insecurity     Within the past 12 months, did you worry that your food would run out before you got money to buy more?: Patient declined     Within the past 12 months, did the food you bought just not last and you didn t have money to get more?: Patient declined   Transportation Needs: Unknown (2024)    Transportation Needs     Within the past 12 months, has lack of transportation kept you from medical appointments, getting your medicines, non-medical meetings or appointments, work, or from getting things that you need?: Patient declined   Physical Activity: Not on file   Stress: Not on file   Social Connections: Not on file   Interpersonal Safety: Low Risk  (2024)    Interpersonal Safety     Do you feel physically and emotionally safe where you currently live?: Yes     Within the past 12 months, have you been hit, slapped, kicked or otherwise  physically hurt by someone?: No     Within the past 12 months, have you been humiliated or emotionally abused in other ways by your partner or ex-partner?: No   Housing Stability: Unknown (11/8/2024)    Housing Stability     Do you have housing? : Patient declined     Are you worried about losing your housing?: Patient declined       Family Medical History:  Family History   Problem Relation Age of Onset    Coronary Artery Disease Mother     Hypertension Mother     Diabetes No family hx of        Allergies:   No Known Allergies    Medications:  Current Outpatient Medications   Medication Sig Dispense Refill    acetaminophen (TYLENOL) 325 MG tablet Take 2 tablets (650 mg) by mouth every 4 hours as needed for mild pain.      amoxicillin-clavulanate (AUGMENTIN) 875-125 MG tablet Take 1 tablet by mouth every 12 hours for 28 days. 56 tablet 0    atorvastatin (LIPITOR) 40 MG tablet Take 1 tablet (40 mg) by mouth daily. 90 tablet 3    blood glucose (CONTOUR NEXT TEST) test strip Use to test blood sugar 2 times daily or as directed. 200 strip 4    empagliflozin (JARDIANCE) 25 MG TABS tablet Take 1 tablet (25 mg) by mouth daily. 90 tablet 3    glipiZIDE (GLUCOTROL XL) 5 MG 24 hr tablet Take 1 tablet (5 mg) by mouth daily. 90 tablet 3    levofloxacin (LEVAQUIN) 500 MG tablet Take 1 tablet (500 mg) by mouth daily for 28 days. 28 tablet 0    Microlet Lancets MISC Use to test blood sugars two times daily as directed. 200 each 4    omeprazole (PRILOSEC) 20 MG DR capsule Take 1 capsule (20 mg) by mouth daily. 90 capsule 0    sitagliptin (JANUVIA) 100 MG tablet Take 1 tablet (100 mg) by mouth daily. 30 tablet 11       Immunizations:  Immunization History   Administered Date(s) Administered    COVID-19 12+ (Pfizer) 09/30/2024    COVID-19 MONOVALENT 12+ (Pfizer) 05/01/2021, 05/22/2021    HepB 11/27/2013    Hepatitis B, Adult 01/14/2013, 03/21/2013, 11/27/2013    Influenza (IIV3) PF 10/23/2013    Influenza Vaccine 18-64 (Flublok)  02/08/2021    Influenza Vaccine >6 months,quad, PF 10/13/2017, 12/10/2019, 10/02/2022    Influenza Vaccine, 6+MO IM (QUADRIVALENT W/PRESERVATIVES) 11/21/2014, 10/27/2015, 11/02/2016, 10/25/2021    Influenza, Split Virus, Trivalent, Pf (Fluzone\Fluarix) 09/30/2024    Influenza,INJ,MDCK,PF,Quad >6mo(Flucelvax) 11/29/2023    MMR 01/14/2003, 01/14/2013, 03/21/2013    TD,PF 7+ (Tenivac) 01/14/2013    TDAP (Adacel,Boostrix) 05/21/2013, 06/17/2020    TDAP Vaccine (Boostrix) 11/27/2013    Varicella 12/06/2013       ==================    Review of System:  12 points review of system is negative except for findings in the HPI.    Exam  VS: /80 (BP Location: Right arm, Patient Position: Sitting, Cuff Size: Adult Regular)   Pulse 75   Temp 98.2  F (36.8  C)   Wt 73.5 kg (162 lb 1.6 oz)   SpO2 97%   BMI 28.16 kg/m      Gen: Pleasant in no acute distress.  HEENT: NCAT. EOMI.  Neck: No LAD.  Lungs: Clear to auscultation bilaterally with no crackles or wheezes.   Card: RRR. No RMG. Peripheral pulses present and symmetrical. No edema.   Abd: Soft NT ND. No hepatomegaly or splenomegaly.  Ext: Right index finger much less swollen.  Sutures remain in place.  Lymph: No cervical or supraclavicular adenopathy.  Skin: No rash  Neuro: Alert and oriented to place time and person. Cranial nerves grossly intact.     Labs:   Contains abnormal data Anaerobic Bacterial Culture Routine  Order: [id number]  Collected 11/8/2024  5:46 PM       Status: Final result       Visible to patient: No (inaccessible in MyChart)    Specimen Information: Finger, Left; Tissue   1 Result Note  Culture 1+ [place] (Actinomyces) meyeri Abnormal    Susceptibilities not routinely done, refer to antibiogram to view typical susceptibility profiles      This specimen was received on a swab. Results may not be optimal. For maximum sensitivity of detection submit tissue, fluid or needle aspirate.        Resulting Agency: [facility]          Specimen Collected: 11/08/24   5:46 PM Last Resulted: 11/16/24  7:13 AM     Tissue Aerobic Bacterial Culture Routine  Order: [id number]  Collected 11/8/2024  5:33 PM       Status: Final result       Visible to patient: No (inaccessible in MyChart)    Specimen Information: Finger, Left; Tissue   0 Result Notes  Culture 1+ Staphylococcus epidermidis Abnormal    Susceptibilities not routinely done, refer to antibiogram to view typical susceptibility profiles      This specimen was received on a swab. Results may not be optimal. For maximum sensitivity of detection submit tissue, fluid or needle aspirate.          Imaging:  Reviewed.    Assessment:  Reinaldo Real is a 39 year old old male with left index finger osteomyelitis.  Cultures with staph epi and actinomyces.  On p.o. Levaquin for plus Augmentin.  Swelling much better.      Recommendations:  Completed 28-day course of Levaquin plus Augmentin.  Follow-up with ID as needed.      Dajuan Israel MD  [facility]  [facility]  Office Telephone [phone].  Fax [phone]  [facility] paging

## 2024-12-03 NOTE — PROGRESS NOTES
Medication Therapy Management (MTM) Encounter    ASSESSMENT:                            Medication Adherence/Access: No issues identified.    1. Type 2 diabetes mellitus without complication, without long-term current use of insulin (H) (Primary)  A1c not at goal <7%, though blood sugars are fairly consistently meeting goal  fasting.  For now, encouraged patient to continue current therapy without change. Appropriate to continue use of metformin at this time, refill sent today.     2. Gastroesophageal reflux disease, unspecified whether esophagitis present  Encouraged patient to trial reduced dose of omeprazole as discussed at last visit, once daily as prescribed. Reasonable to continue PPI at this time, though would recommend addressing alternative such as H2 blocker at next visit if warranted per PCP.      3. Mixed hyperlipidemia  LDL at goal <100, continue current statin.     4. Osteomyelitis, unspecified site, unspecified type (H)  Continue until completion of antibiotics.      PLAN:                            Use Omepraozle 20 mg once daily  Refill sent for metformin to be continued     Medication issues to be addressed at a future visit:   GLP-1?    Follow-up: Return in about 2 months (around 2/5/2025).    SUBJECTIVE/OBJECTIVE:                          Renialdo Real is a 39 year old male seen for a follow-up visit. And  a transitions of care visit. He was discharged from [facility] on 11/11/24 for osteomelitis.  (ID# [id number]) was used during today's visit.      Reason for visit: CATHY visit and f/up.    Allergies/ADRs: Reviewed in chart  Past Medical History: Reviewed in chart  Tobacco: He reports that he has been smoking cigarettes. He has been exposed to tobacco smoke. His smokeless tobacco use includes chew.Nicotine/Tobacco Cessation Plan  Information offered: Patient not interested at this time  Alcohol: Less than 1 beverage / month    Medication Adherence/Access: Patient takes medications directly  from bottles.  Patient takes medications 2 time(s) per day.   Per patient, misses medication 0 times per week.   Patient does not bring his medications with today but able to recall how he takes each.      Osteomyelitis  Augmentin 875/125 twice daily for 28 days    Levofloxacin 500 mg daily for 28 days  Notes that he ran out of the levofloxacin but he still has some left of the augmentin. Notes that it has been getting better, overall no concerns today.     Diabetes   Empaglifloxin (Jardiance) 25 mg daily in the morning   Metformin  mg 1 tablet twice daily in the morning (max tolerated dose) - appears this was removed from medication list in hospital, though he has been taking since discharge  Januvia 100 mg daily  Glipizide XL 5 mg daily - notes that he almost ran out of this   Not experiencing medication side effects.   Current diabetes symptoms: Neuropathy in his hands sometimes at night.   Diet/Exercise: Eating 3 meals per day.   Med Hx: metformin >1000 mg/day (vomiting), Onglyza (switched to Bydureon), Bydureon Bcise (injection site pain)     Blood sugar monitoring: Brings traditional meter today. Prefers traditional glucometer instead of CGM.   Date FBG 2 hours post   12/5 /143    12/3 80    12/2  126   11/29 113    11/27  261   11/26 178    11/25 112    Eye exam in the last 12 months? No  Foot exam: due    Hyperlipidemia   Atorvastatin 40mg daily  Patient reports no significant myalgias or other side effects.  The 10-year ASCVD risk score (Freedom CANELA, et al., 2019) is: 6.3%    Values used to calculate the score:      Age: 39 years      Sex: Male      Is Non- : No      Diabetic: Yes      Tobacco smoker: Yes      Systolic Blood Pressure: 128 mmHg      Is BP treated: No      HDL Cholesterol: 41 mg/dL      Total Cholesterol: 157 mg/dL     GERD:   Omeprazole 20 mg twice daily   Reports medication effective for him. Notes that he has tried only taking once daily in the past and is  willing to try this again.     Today's Vitals: Wt 163 lb (73.9 kg)   BMI 28.31 kg/m    ----------------      I spent 30 minutes with this patient today. All changes were made via collaborative practice agreement with Cassy Berrios MD. A copy of the visit note was provided to the patient's provider(s).    A summary of these recommendations was declined by the patient.    Miriam Vallecillo, PharmD, BCACP  Medication Therapy Management Pharmacist     Medication Therapy Recommendations  Gastroesophageal reflux disease, unspecified whether esophagitis present   1 Current Medication: omeprazole (PRILOSEC) 20 MG DR capsule   Current Medication Sig: Take 1 capsule (20 mg) by mouth daily.   Rationale: Dose too high - Dosage too high - Safety   Recommendation: Decrease Frequency   Status: Accepted per CPA   Identified Date: 12/5/2024 Completed Date: 12/5/2024

## 2024-12-04 DIAGNOSIS — B19.10 HEPATITIS B INFECTION WITHOUT DELTA AGENT WITHOUT HEPATIC COMA, UNSPECIFIED CHRONICITY: Primary | ICD-10-CM

## 2024-12-05 ENCOUNTER — OFFICE VISIT (OUTPATIENT)
Dept: PHARMACY | Facility: CLINIC | Age: 39
End: 2024-12-05
Payer: COMMERCIAL

## 2024-12-05 VITALS — WEIGHT: 163 LBS | BODY MASS INDEX: 28.31 KG/M2

## 2024-12-05 DIAGNOSIS — E78.2 MIXED HYPERLIPIDEMIA: ICD-10-CM

## 2024-12-05 DIAGNOSIS — M86.9 OSTEOMYELITIS, UNSPECIFIED SITE, UNSPECIFIED TYPE (H): ICD-10-CM

## 2024-12-05 DIAGNOSIS — K21.9 GASTROESOPHAGEAL REFLUX DISEASE, UNSPECIFIED WHETHER ESOPHAGITIS PRESENT: ICD-10-CM

## 2024-12-05 DIAGNOSIS — E11.9 TYPE 2 DIABETES MELLITUS WITHOUT COMPLICATION, WITHOUT LONG-TERM CURRENT USE OF INSULIN (H): Primary | ICD-10-CM

## 2024-12-05 RX ORDER — METFORMIN HYDROCHLORIDE 500 MG/1
1000 TABLET, EXTENDED RELEASE ORAL
Qty: 60 TABLET | Refills: 11 | Status: SHIPPED | OUTPATIENT
Start: 2024-12-05

## 2025-01-08 ENCOUNTER — TELEPHONE (OUTPATIENT)
Dept: FAMILY MEDICINE | Facility: CLINIC | Age: 40
End: 2025-01-08

## 2025-01-08 ENCOUNTER — OFFICE VISIT (OUTPATIENT)
Dept: FAMILY MEDICINE | Facility: CLINIC | Age: 40
End: 2025-01-08
Attending: FAMILY MEDICINE
Payer: COMMERCIAL

## 2025-01-08 VITALS
BODY MASS INDEX: 28.35 KG/M2 | SYSTOLIC BLOOD PRESSURE: 115 MMHG | HEIGHT: 64 IN | WEIGHT: 166.08 LBS | OXYGEN SATURATION: 99 % | DIASTOLIC BLOOD PRESSURE: 78 MMHG | HEART RATE: 87 BPM | TEMPERATURE: 97.5 F | RESPIRATION RATE: 16 BRPM

## 2025-01-08 DIAGNOSIS — F51.01 PRIMARY INSOMNIA: ICD-10-CM

## 2025-01-08 DIAGNOSIS — E11.9 TYPE 2 DIABETES MELLITUS WITHOUT COMPLICATION, WITHOUT LONG-TERM CURRENT USE OF INSULIN (H): Primary | ICD-10-CM

## 2025-01-08 DIAGNOSIS — M86.9 OSTEOMYELITIS OF LEFT HAND, UNSPECIFIED TYPE (H): Primary | ICD-10-CM

## 2025-01-08 DIAGNOSIS — M86.9 OSTEOMYELITIS OF LEFT HAND, UNSPECIFIED TYPE (H): ICD-10-CM

## 2025-01-08 DIAGNOSIS — Z76.0 ENCOUNTER FOR MEDICATION REFILL: ICD-10-CM

## 2025-01-08 LAB
EST. AVERAGE GLUCOSE BLD GHB EST-MCNC: 157 MG/DL
HBA1C MFR BLD: 7.1 % (ref 0–5.6)

## 2025-01-08 PROCEDURE — 83036 HEMOGLOBIN GLYCOSYLATED A1C: CPT | Performed by: FAMILY MEDICINE

## 2025-01-08 PROCEDURE — 36415 COLL VENOUS BLD VENIPUNCTURE: CPT | Performed by: FAMILY MEDICINE

## 2025-01-08 RX ORDER — GLIPIZIDE 5 MG/1
5 TABLET, FILM COATED, EXTENDED RELEASE ORAL DAILY
Qty: 90 TABLET | Refills: 3 | Status: SHIPPED | OUTPATIENT
Start: 2025-01-08

## 2025-01-08 RX ORDER — METFORMIN HYDROCHLORIDE 500 MG/1
1000 TABLET, EXTENDED RELEASE ORAL 2 TIMES DAILY WITH MEALS
Qty: 360 TABLET | Refills: 3 | Status: SHIPPED | OUTPATIENT
Start: 2025-01-08

## 2025-01-08 NOTE — TELEPHONE ENCOUNTER
Please call pt and explain I would like him to follow up with Ortho due to his finger still being swollen. Please help schedule. Thank you!     Cassy Berrios MD

## 2025-01-08 NOTE — PROGRESS NOTES
"  Assessment & Plan     Type 2 diabetes mellitus without complication, without long-term current use of insulin (H): A1c improved to 7.1. will increase metformin to 1,000 mg BID since this is optimal dosing. Congratulated him on this.   - Hemoglobin A1c  - Albumin Random Urine Quantitative with Creat Ratio  - empagliflozin (JARDIANCE) 25 MG TABS tablet  Dispense: 90 tablet; Refill: 3  - sitagliptin (JANUVIA) 100 MG tablet  Dispense: 90 tablet; Refill: 3  - metFORMIN (GLUCOPHAGE XR) 500 MG 24 hr tablet  Dispense: 360 tablet; Refill: 3  - glipiZIDE (GLUCOTROL XL) 5 MG 24 hr tablet  Dispense: 90 tablet; Refill: 3  - Adult Eye  Referral    Osteomyelitis of left hand, unspecified type (H): though improved, left 2nd finger is still notably swollen and is tender with movement. I will consult with Ortho about whether to re-image or to have him see them again.     Encounter for medication refill  - omeprazole (PRILOSEC) 20 MG DR capsule  Dispense: 90 capsule; Refill: 3    Primary insomnia: to use PRN. Also discussed eating more protein at dinner and less rice to keep him full, which may help with sleep and less night hunger.   - melatonin 3 MG tablet  Dispense: 90 tablet; Refill: 3    The longitudinal plan of care for the diagnosis(es)/condition(s) as documented were addressed during this visit. Due to the added complexity in care, I will continue to support Moo in the subsequent management and with ongoing continuity of care.          BMI  Estimated body mass index is 28.85 kg/m  as calculated from the following:    Height as of this encounter: 1.616 m (5' 3.62\").    Weight as of this encounter: 75.3 kg (166 lb 1.3 oz).       Subjective   Moo is a 39 year old, presenting for the following health issues:  Diabetes      1/8/2025     3:44 PM   Additional Questions   Roomed by Antionette GARG MA   Accompanied by SELF     History of Present Illness       Diabetes:   He presents for follow up of diabetes.  He is checking home " "blood glucose two times daily.   He checks blood glucose before meals and after meals.  Blood glucose is never over 200 and never under 70.  When his blood glucose is low, the patient is asymptomatic for confusion, blurred vision, lethargy and reports not feeling dizzy, shaky, or weak.   He has no concerns regarding his diabetes at this time.   He is not experiencing numbness or burning in feet, excessive thirst, blurry vision, weight changes or redness, sores or blisters on feet. The patient has had a diabetic eye exam in the last 12 months. Eye exam performed on not remember. Location of last eye exam [facility].        He eats 0-1 servings of fruits and vegetables daily.He consumes 2 sweetened beverage(s) daily.He exercises with enough effort to increase his heart rate 9 or less minutes per day.  He exercises with enough effort to increase his heart rate 3 or less days per week.   He is taking medications regularly.      At night, sometimes he's not able to sleep and feels hungry, so he has a snack, usually rice. Then, he checks his blood sugar and it is 180 or 190. This happens 1-2 times per week.     Alcohol- not drinking anymore. He hasn't drank heavily for several years but had previously been drinking beer socially but stopped 1 month ago.     His left 2nd finger is still somewhat swollen and is tender with movement.     Objective    /78   Pulse 87   Temp 97.5  F (36.4  C) (Temporal)   Resp 16   Ht 1.616 m (5' 3.62\")   Wt 75.3 kg (166 lb 1.3 oz)   SpO2 99%   BMI 28.85 kg/m    Body mass index is 28.85 kg/m .  Physical Exam   GENERAL: alert and no distress  MS: no gross musculoskeletal defects noted, no edema  ORTHO: left 2nd finger is swollen, not red or warm. Tender with flexion.   SKIN: no suspicious lesions or rashes  NEURO: Normal strength and tone, mentation intact and speech normal  PSYCH: mentation appears normal, affect normal/bright            Signed Electronically by: Cassy Berrios, " MD

## 2025-01-09 LAB
CREAT UR-MCNC: 49.4 MG/DL
MICROALBUMIN UR-MCNC: <12 MG/L
MICROALBUMIN/CREAT UR: NORMAL MG/G{CREAT}

## 2025-01-10 NOTE — TELEPHONE ENCOUNTER
1st attempt. Called x2. Left a message for patient to return call. If patient return call and need assistance please route call to Merna PALOMARES otherwise Ortho scheduling line is [phone] .

## 2025-01-20 DIAGNOSIS — M86.9: Primary | ICD-10-CM

## 2025-01-27 ENCOUNTER — ANCILLARY PROCEDURE (OUTPATIENT)
Dept: GENERAL RADIOLOGY | Facility: CLINIC | Age: 40
End: 2025-01-27
Attending: STUDENT IN AN ORGANIZED HEALTH CARE EDUCATION/TRAINING PROGRAM
Payer: COMMERCIAL

## 2025-01-27 ENCOUNTER — OFFICE VISIT (OUTPATIENT)
Dept: ORTHOPEDICS | Facility: CLINIC | Age: 40
End: 2025-01-27
Attending: FAMILY MEDICINE
Payer: COMMERCIAL

## 2025-01-27 DIAGNOSIS — M86.9: ICD-10-CM

## 2025-01-27 DIAGNOSIS — M86.9 OSTEOMYELITIS OF LEFT HAND, UNSPECIFIED TYPE (H): ICD-10-CM

## 2025-01-27 PROCEDURE — 99204 OFFICE O/P NEW MOD 45 MIN: CPT | Performed by: STUDENT IN AN ORGANIZED HEALTH CARE EDUCATION/TRAINING PROGRAM

## 2025-01-27 PROCEDURE — 73130 X-RAY EXAM OF HAND: CPT | Mod: LT | Performed by: RADIOLOGY

## 2025-01-27 NOTE — LETTER
1/27/2025      Reinaldo Real  [address] [place] [address]      Dear Colleague,    Thank you for referring your patient, Reinaldo Real, to the [facility] [place]. Please see a copy of my visit note below.    Orthopaedic Surgery Hand and Upper Extremity Clinic H&P Note:  Date: Jan 27, 2025     Patient Name: Reinaldo Real  MRN: [id number]    Consult requested by: Cassy Berrios    CHIEF COMPLAINT: Left index finger stiffness    Dominant Hand: Right  Occupation: PCA      HPI:  Mr. Reinaldo Real is a 39 year old male right hand dominant who presents with left index finger stiffness.  The patient cut his left index finger around September near the PIP joint.  He developed cellulitis of the finger and was seen in urgent care 9/30/2024 and started on antibiotics.  He was then seen again in urgent care on 11/4/2024 where radiographs demonstrated possible osteomyelitis.  The patient then presented to the emergency department November 8, 2024.  MRI was consistent with osteomyelitis and septic arthritis.  The patient was taken for irrigation debridement of abscess as well as proximal radial middle phalanx as well as radial sensory nerve repair.  He is no longer on antibiotics.  He presents today due to stiffness of the joint.  He does not have too much pain.    PAST MEDICAL HISTORY:  Past Medical History:   Diagnosis Date     Alcohol use disorder, moderate, dependence (H) 03/06/2019     Hepatitis B      History of H. pylori infection      TB (tuberculosis)      Type 2 diabetes mellitus without complication, without long-term current use of insulin (H) 11/30/2016       PAST SURGICAL HISTORY:  Past Surgical History:   Procedure Laterality Date     INCISION AND DRAINAGE FINGER, COMBINED Left 11/8/2024    Procedure: INCISION AND DRAINAGE ABCESS, LEFT INDEX FINGER, RAIDAL DIGITAL NERVE REPAIR,;  Surgeon: Surinder Yang MD;  Location: [facility] [place] OR     IRRIGATION AND DEBRIDEMENT FINGER, COMBINED   2024    Procedure: Left index finger debridement osteomyelitis proximal radial second phalanx;  Surgeon: Surinder Yang MD;  Location: [facility] [place]     REMOVE FOREIGN BODY LOWER EXTREMITY Left 2/15/2024    Procedure: REMOVAL, FOREIGN BODY LEFT FOOT;  Surgeon: Ag Cheng DPM;  Location: [facility] [place] OR       MEDICATIONS:  Current Outpatient Medications   Medication Sig Dispense Refill     acetaminophen (TYLENOL) 325 MG tablet Take 2 tablets (650 mg) by mouth every 4 hours as needed for mild pain.       atorvastatin (LIPITOR) 40 MG tablet Take 1 tablet (40 mg) by mouth daily. 90 tablet 3     blood glucose (CONTOUR NEXT TEST) test strip Use to test blood sugar 2 times daily or as directed. 200 strip 4     empagliflozin (JARDIANCE) 25 MG TABS tablet Take 1 tablet (25 mg) by mouth daily. 90 tablet 3     glipiZIDE (GLUCOTROL XL) 5 MG 24 hr tablet Take 1 tablet (5 mg) by mouth daily. 90 tablet 3     melatonin 3 MG tablet Take 1-2 tablets (3-6 mg) by mouth nightly as needed for sleep. 90 tablet 3     metFORMIN (GLUCOPHAGE XR) 500 MG 24 hr tablet Take 2 tablets (1,000 mg) by mouth 2 times daily (with meals). 360 tablet 3     Microlet Lancets MISC Use to test blood sugars two times daily as directed. 200 each 4     omeprazole (PRILOSEC) 20 MG DR capsule Take 1 capsule (20 mg) by mouth daily. 90 capsule 3     sitagliptin (JANUVIA) 100 MG tablet Take 1 tablet (100 mg) by mouth daily. 90 tablet 3     No current facility-administered medications for this visit.       ALLERGIES:   No Known Allergies    FAMILY HISTORY:  No pertinent family history    SOCIAL HISTORY:  Social History     Tobacco Use     Smoking status: Some Days     Current packs/day: 0.00     Types: Cigarettes     Last attempt to quit: 2013     Years since quittin.1     Passive exposure: Current     Smokeless tobacco: Current     Types: Chew     Tobacco comments:     smokes 0-1/day--Chew betel nut; pt states he started smoking at 12 yo    Vaping Use     Vaping status: Never Used   Substance Use Topics     Alcohol use: No     Comment: Alcoholic Drinks/day: pt states he haven't had a drink since January     Drug use: No       The patient's past medical, family, and social history was reviewed and confirmed.    REVIEW OF SYMPTOMS:      General: Negative   Eyes: Negative   Ear, Nose and Throat: Negative   Respiratory: Negative   Cardiovascular: Negative   Gastrointestinal: Negative   Genito-urinary: Negative   Musculoskeletal: see HPI  Neurological: Negative   Psychological: Negative  HEME: Negative   ENDO: Negative   SKIN: Negative    VITALS:  There were no vitals filed for this visit.    EXAM:  General: NAD, A&Ox3  HEENT: NC/AT  CV: RRR by peripheral pulse  Pulmonary: Non-labored breathing on RA  LUE:  Well-healed surgical incision.  There is no erythema, drainage, or evidence of infection  Mild residual swelling of left index finger  PIP joint range of motion about 15 to 35 degrees  Intact FDP, FDS, EDC  Tingling in the radial sensory nerve distribution at the tip of the finger, intact ulnar  Warm well-perfused, capillary refill less than 2 seconds       IMAGING:    X-rays of the left hand obtained today demonstrates osseous erosion at the radial aspect of the middle phalangeal base at the PIP joint.  Edges appear sclerotic at this point compared to the erosive changes seen on 11/4/2024 which I agree were concerning for osteomyelitis    MRI 11/8/2024  IMPRESSION:  1.  There is an index finger PIP joint effusion with synovitis. There are is abnormal marrow signal involving the index finger middle phalanx base and proximal phalangeal head and distal shaft with erosive changes more pronounced in the middle phalanx   base. Findings are compatible with septic arthritis and osteomyelitis.  2.  There is a wound over the radial aspect of the index finger PIP joint, with soft tissue edema and enhancement over the index finger compatible with cellulitis. No  drainable soft tissue collection.  3.  Index finger flexor tenosynovitis from the level of the distal metacarpal to its distal phalangeal insertion, likely infectious.    I have personally reviewed the above images and labs.       IMPRESSION AND RECOMMENDATIONS:  Mr. Reinaldo Real is a 39 year old male right hand dominant with left index finger stiffness status post irrigation and debridement for osteomyelitis and septic arthritis 11/8/2024    I showed the patient his radiographs and discussed the diagnosis, prognosis, and treatment options in detail with the patient today.  Visit was conducted with the help of a [employer] Make[employer] video .    I showed the patient the erosive changes and arthrosis of the left index finger.  Given the architectural disruption, I advised that the joint will not range properly again.  If he has pain, options include a PIP arthroplasty versus arthrodesis.    The patient is not interested in any repeat surgery.  Recommended continued range of motion and stretching exercises.  He may weight-bear as tolerated without restrictions.    All questions answered.  The patient voiced understanding and agreement.  Follow-up with me as needed.    Silviano Hayes MD    Hand, Upper Extremity & Microvascular Surgery  Department of Orthopaedic Surgery  [facility]          Again, thank you for allowing me to participate in the care of your patient.        Sincerely,        Silviano Hayes MD    Electronically signed

## 2025-01-27 NOTE — NURSING NOTE
Reason For Visit:   Chief Complaint   Patient presents with    Consult     Osteomyelitis of the left hand       Primary MD: Cassy Berrios  Ref. MD: Nic    Age: 39 year old    ?  Yes, specify language: Jenifer      There were no vitals taken for this visit.      Pain Assessment  Patient Currently in Pain: Yes  0-10 Pain Scale: 2  Primary Pain Location: Hand (left)    Hand Dominance Evaluation  Hand Dominance: Right          QuickDASH Assessment       No data to display                   Current Outpatient Medications   Medication Sig Dispense Refill    acetaminophen (TYLENOL) 325 MG tablet Take 2 tablets (650 mg) by mouth every 4 hours as needed for mild pain.      atorvastatin (LIPITOR) 40 MG tablet Take 1 tablet (40 mg) by mouth daily. 90 tablet 3    blood glucose (CONTOUR NEXT TEST) test strip Use to test blood sugar 2 times daily or as directed. 200 strip 4    empagliflozin (JARDIANCE) 25 MG TABS tablet Take 1 tablet (25 mg) by mouth daily. 90 tablet 3    glipiZIDE (GLUCOTROL XL) 5 MG 24 hr tablet Take 1 tablet (5 mg) by mouth daily. 90 tablet 3    melatonin 3 MG tablet Take 1-2 tablets (3-6 mg) by mouth nightly as needed for sleep. 90 tablet 3    metFORMIN (GLUCOPHAGE XR) 500 MG 24 hr tablet Take 2 tablets (1,000 mg) by mouth 2 times daily (with meals). 360 tablet 3    Microlet Lancets MISC Use to test blood sugars two times daily as directed. 200 each 4    omeprazole (PRILOSEC) 20 MG DR capsule Take 1 capsule (20 mg) by mouth daily. 90 capsule 3    sitagliptin (JANUVIA) 100 MG tablet Take 1 tablet (100 mg) by mouth daily. 90 tablet 3       No Known Allergies    Ashli Andre, ATC

## 2025-01-27 NOTE — PROGRESS NOTES
[facility] H&P Note:  Date: Jan 27, 2025     Patient Name: Reinaldo Real  MRN: [id number]    Consult requested by: Cassy Berrios    CHIEF COMPLAINT: Left index finger stiffness    Dominant Hand: Right  Occupation: PCA      HPI:  Mr. Reinaldo Real is a 39 year old male right hand dominant who presents with left index finger stiffness.  The patient cut his left index finger around September near the PIP joint.  He developed cellulitis of the finger and was seen in urgent care 9/30/2024 and started on antibiotics.  He was then seen again in urgent care on 11/4/2024 where radiographs demonstrated possible osteomyelitis.  The patient then presented to the emergency department November 8, 2024.  MRI was consistent with osteomyelitis and septic arthritis.  The patient was taken for irrigation debridement of abscess as well as proximal radial middle phalanx as well as radial sensory nerve repair.  He is no longer on antibiotics.  He presents today due to stiffness of the joint.  He does not have too much pain.    PAST MEDICAL HISTORY:  Past Medical History:   Diagnosis Date    Alcohol use disorder, moderate, dependence (H) 03/06/2019    Hepatitis B     History of H. pylori infection     TB (tuberculosis)     Type 2 diabetes mellitus without complication, without long-term current use of insulin (H) 11/30/2016       PAST SURGICAL HISTORY:  Past Surgical History:   Procedure Laterality Date    INCISION AND DRAINAGE FINGER, COMBINED Left 11/8/2024    Procedure: INCISION AND DRAINAGE ABCESS, LEFT INDEX FINGER, RAIDAL DIGITAL NERVE REPAIR,;  Surgeon: Surinder Yang MD;  [place] [facility] [place]    IRRIGATION AND DEBRIDEMENT FINGER, COMBINED  11/8/2024    Procedure: Left index finger debridement osteomyelitis proximal radial second phalanx;  Surgeon: Surinder Yang MD;  [place] [facility] [place]    REMOVE FOREIGN BODY LOWER EXTREMITY Left 2/15/2024    Procedure: REMOVAL, FOREIGN BODY  LEFT FOOT;  Surgeon: Ag Cheng DPM;  Location: [facility] Main OR       MEDICATIONS:  Current Outpatient Medications   Medication Sig Dispense Refill    acetaminophen (TYLENOL) 325 MG tablet Take 2 tablets (650 mg) by mouth every 4 hours as needed for mild pain.      atorvastatin (LIPITOR) 40 MG tablet Take 1 tablet (40 mg) by mouth daily. 90 tablet 3    blood glucose (CONTOUR NEXT TEST) test strip Use to test blood sugar 2 times daily or as directed. 200 strip 4    empagliflozin (JARDIANCE) 25 MG TABS tablet Take 1 tablet (25 mg) by mouth daily. 90 tablet 3    glipiZIDE (GLUCOTROL XL) 5 MG 24 hr tablet Take 1 tablet (5 mg) by mouth daily. 90 tablet 3    melatonin 3 MG tablet Take 1-2 tablets (3-6 mg) by mouth nightly as needed for sleep. 90 tablet 3    metFORMIN (GLUCOPHAGE XR) 500 MG 24 hr tablet Take 2 tablets (1,000 mg) by mouth 2 times daily (with meals). 360 tablet 3    Microlet Lancets MISC Use to test blood sugars two times daily as directed. 200 each 4    omeprazole (PRILOSEC) 20 MG DR capsule Take 1 capsule (20 mg) by mouth daily. 90 capsule 3    sitagliptin (JANUVIA) 100 MG tablet Take 1 tablet (100 mg) by mouth daily. 90 tablet 3     No current facility-administered medications for this visit.       ALLERGIES:   No Known Allergies    FAMILY HISTORY:  No pertinent family history    SOCIAL HISTORY:  Social History     Tobacco Use    Smoking status: Some Days     Current packs/day: 0.00     Types: Cigarettes     Last attempt to quit: 2013     Years since quittin.1     Passive exposure: Current    Smokeless tobacco: Current     Types: Chew    Tobacco comments:     smokes 0-1/day--Chew betel nut; pt states he started smoking at 12 yo   Vaping Use    Vaping status: Never Used   Substance Use Topics    Alcohol use: No     Comment: Alcoholic Drinks/day: pt states he haven't had a drink since January    Drug use: No       The patient's past medical, family, and social history was reviewed and  confirmed.    REVIEW OF SYMPTOMS:      General: Negative   Eyes: Negative   Ear, Nose and Throat: Negative   Respiratory: Negative   Cardiovascular: Negative   Gastrointestinal: Negative   Genito-urinary: Negative   Musculoskeletal: see HPI  Neurological: Negative   Psychological: Negative  HEME: Negative   ENDO: Negative   SKIN: Negative    VITALS:  There were no vitals filed for this visit.    EXAM:  General: NAD, A&Ox3  HEENT: NC/AT  CV: RRR by peripheral pulse  Pulmonary: Non-labored breathing on RA  LUE:  Well-healed surgical incision.  There is no erythema, drainage, or evidence of infection  Mild residual swelling of left index finger  PIP joint range of motion about 15 to 35 degrees  Intact FDP, FDS, EDC  Tingling in the radial sensory nerve distribution at the tip of the finger, intact ulnar  Warm well-perfused, capillary refill less than 2 seconds       IMAGING:    X-rays of the left hand obtained today demonstrates osseous erosion at the radial aspect of the middle phalangeal base at the PIP joint.  Edges appear sclerotic at this point compared to the erosive changes seen on 11/4/2024 which I agree were concerning for osteomyelitis    MRI 11/8/2024  IMPRESSION:  1.  There is an index finger PIP joint effusion with synovitis. There are is abnormal marrow signal involving the index finger middle phalanx base and proximal phalangeal head and distal shaft with erosive changes more pronounced in the middle phalanx   base. Findings are compatible with septic arthritis and osteomyelitis.  2.  There is a wound over the radial aspect of the index finger PIP joint, with soft tissue edema and enhancement over the index finger compatible with cellulitis. No drainable soft tissue collection.  3.  Index finger flexor tenosynovitis from the level of the distal metacarpal to its distal phalangeal insertion, likely infectious.    I have personally reviewed the above images and labs.       IMPRESSION AND  RECOMMENDATIONS:  Mr. Reinaldo Real is a 39 year old male right hand dominant with left index finger stiffness status post irrigation and debridement for osteomyelitis and septic arthritis 11/8/2024    I showed the patient his radiographs and discussed the diagnosis, prognosis, and treatment options in detail with the patient today.  Visit was conducted with the help of a Ulule video .    I showed the patient the erosive changes and arthrosis of the left index finger.  Given the architectural disruption, I advised that the joint will not range properly again.  If he has pain, options include a PIP arthroplasty versus arthrodesis.    The patient is not interested in any repeat surgery.  Recommended continued range of motion and stretching exercises.  He may weight-bear as tolerated without restrictions.    All questions answered.  The patient voiced understanding and agreement.  Follow-up with me as needed.    Silviano Hayes MD    Hand, Upper Extremity & Microvascular Surgery  Department of Orthopaedic Surgery  [facility]

## 2025-02-19 ENCOUNTER — TELEPHONE (OUTPATIENT)
Dept: FAMILY MEDICINE | Facility: CLINIC | Age: 40
End: 2025-02-19
Payer: COMMERCIAL

## 2025-02-19 NOTE — TELEPHONE ENCOUNTER
Patient Quality Outreach    Patient is due for the following:   Physical Preventive Adult Physical    Action(s) Taken:   Patient has upcoming appointment, these items will be addressed at that time. Changed appt to Physical and DM f/up    Type of outreach:    Chart review performed, no outreach needed.    Questions for provider review:    None           Marion Hull MA  Chart routed to N/A.

## 2025-04-06 DIAGNOSIS — E11.9 TYPE 2 DIABETES MELLITUS WITHOUT COMPLICATION, WITHOUT LONG-TERM CURRENT USE OF INSULIN (H): ICD-10-CM

## 2025-04-06 RX ORDER — METFORMIN HYDROCHLORIDE 500 MG/1
1000 TABLET, EXTENDED RELEASE ORAL 2 TIMES DAILY WITH MEALS
Qty: 360 TABLET | Refills: 0 | OUTPATIENT
Start: 2025-04-06

## 2025-05-22 NOTE — INTERVAL H&P NOTE
"I have reviewed the surgical (or preoperative) H&P that is linked to this encounter, and examined the patient. There are no significant changes    Clinical Conditions Present on Arrival:  Clinically Significant Risk Factors Present on Admission           # Hypocalcemia: Lowest Ca = 8.3 mg/dL in last 2 days, will monitor and replace as appropriate       # Thrombocytopenia: Lowest platelets = 113 in last 2 days, will monitor for bleeding  # Overweight: Estimated body mass index is 27.81 kg/m  as calculated from the following:    Height as of this encounter: 1.626 m (5' 4\").    Weight as of this encounter: 73.5 kg (162 lb).       " Unable to assess due to medical condition

## 2025-08-04 ENCOUNTER — TELEPHONE (OUTPATIENT)
Dept: FAMILY MEDICINE | Facility: CLINIC | Age: 40
End: 2025-08-04
Payer: COMMERCIAL

## (undated) DEVICE — DRSG ABD TNDRSRB WET PRUF 8IN X 10IN STRL  9194A

## (undated) DEVICE — GLOVE SURG PI ULTRA TOUCH M SZ 8-1/2 LF

## (undated) DEVICE — SUTURE MONOCRYL 3-0 18 PS2 UND MCP497G

## (undated) DEVICE — SLING ARM FOAM L 0814-0794

## (undated) DEVICE — ESU GROUND PAD ADULT REM W/15' CORD E7507DB

## (undated) DEVICE — SU PROLENE 4-0 PS-2 18" 8682G

## (undated) DEVICE — PREP DYNA-HEX 4% CHG SCRUB 4OZ BOTTLE MDS098710

## (undated) DEVICE — CUSTOM PACK LOWER EXTREMITY SOP5BLEHEA

## (undated) DEVICE — Device

## (undated) DEVICE — BNDG ELASTIC 3"X5YDS UNSTERILE 6611-30

## (undated) DEVICE — SUTURE ETHILON 9-0 BV130-5 2809G

## (undated) DEVICE — DRAPE STOCKINETTE IMPERVIOUS 12" 1587

## (undated) DEVICE — TOURNIQUET TOURNI-COT FINGER GREEN LG  TCL

## (undated) DEVICE — BLADE KNIFE SURG 15 371115

## (undated) DEVICE — SYR 10ML LL W/O NDL 302995

## (undated) DEVICE — SOL WATER IRRIG 1000ML BOTTLE 2F7114

## (undated) DEVICE — CUSTOM PACK UPPER EXTREMITY SOP5BUEHEC

## (undated) DEVICE — DRSG KERLIX 4 1/2"X4YDS ROLL 6715

## (undated) DEVICE — DRSG XEROFORM 1X8"

## (undated) DEVICE — SOL ISOPROPYL RUBBING ALCOHOL USP 70% 4OZ HDX-20 I0020

## (undated) DEVICE — GLOVE SURG PI ULTRA TOUCH M SZ 8 LF

## (undated) DEVICE — DRSG TEGADERM 2 3/8X2 3/4" 1624W

## (undated) DEVICE — TRAY PREP DRY SKIN SCRUB 067

## (undated) DEVICE — BNDG KLING 3" 2232

## (undated) DEVICE — DRSG GAUZE 4X4" TRAY 6939

## (undated) DEVICE — SUCTION CANISTER MEDIVAC LINER 3000ML W/LID 65651-530

## (undated) DEVICE — DRSG ADAPTIC 3X3" 6112

## (undated) DEVICE — SU ETHILON 4-0 PS-2 18" BLACK 1667H

## (undated) DEVICE — NDL 25GA 2"  8881200441

## (undated) DEVICE — BNDG KLING 2" 2231

## (undated) DEVICE — TOURNIQUET CUFF 24" REPRO GREEN 60-7070-104

## (undated) DEVICE — PREP SKIN WIPE 420400

## (undated) DEVICE — PADDING CAST 3IN WEBRIL STRL 2394

## (undated) DEVICE — SUTURE MONOCRYL+ 2-0 27IN CT2 MCP333H

## (undated) DEVICE — GOWN XXL 9575

## (undated) DEVICE — SOL NACL 0.9% IRRIG 1000ML BOTTLE 2F7124

## (undated) DEVICE — SUCTION MANIFOLD NEPTUNE 2 SYS 1 PORT 702-025-000

## (undated) DEVICE — GLOVE BIOGEL PI INDICATOR 8.0 LF 41680

## (undated) DEVICE — DRAPE STERI TOWEL LG 1010

## (undated) DEVICE — NDL 25GA 1.5" 305127

## (undated) DEVICE — SU ETHILON 3-0 PS-2 18" 1669H

## (undated) DEVICE — DRSG GAUZE 4X4" 3033

## (undated) RX ORDER — ONDANSETRON 2 MG/ML
INJECTION INTRAMUSCULAR; INTRAVENOUS
Status: DISPENSED
Start: 2024-02-15

## (undated) RX ORDER — PROPOFOL 10 MG/ML
INJECTION, EMULSION INTRAVENOUS
Status: DISPENSED
Start: 2024-02-15

## (undated) RX ORDER — FENTANYL CITRATE 50 UG/ML
INJECTION, SOLUTION INTRAMUSCULAR; INTRAVENOUS
Status: DISPENSED
Start: 2024-11-08

## (undated) RX ORDER — PROPOFOL 10 MG/ML
INJECTION, EMULSION INTRAVENOUS
Status: DISPENSED
Start: 2024-11-08

## (undated) RX ORDER — POVIDONE-IODINE 10 MG/G
OINTMENT TOPICAL
Status: DISPENSED
Start: 2024-02-15

## (undated) RX ORDER — LIDOCAINE HYDROCHLORIDE 20 MG/ML
INJECTION, SOLUTION INFILTRATION; PERINEURAL
Status: DISPENSED
Start: 2024-02-15

## (undated) RX ORDER — GINSENG 100 MG
CAPSULE ORAL
Status: DISPENSED
Start: 2024-11-08

## (undated) RX ORDER — ONDANSETRON 2 MG/ML
INJECTION INTRAMUSCULAR; INTRAVENOUS
Status: DISPENSED
Start: 2024-11-08

## (undated) RX ORDER — BUPIVACAINE HYDROCHLORIDE 5 MG/ML
INJECTION, SOLUTION EPIDURAL; INTRACAUDAL
Status: DISPENSED
Start: 2024-02-15

## (undated) RX ORDER — DEXAMETHASONE SODIUM PHOSPHATE 10 MG/ML
INJECTION, SOLUTION INTRAMUSCULAR; INTRAVENOUS
Status: DISPENSED
Start: 2024-11-08

## (undated) RX ORDER — FENTANYL CITRATE 50 UG/ML
INJECTION, SOLUTION INTRAMUSCULAR; INTRAVENOUS
Status: DISPENSED
Start: 2024-02-15

## (undated) RX ORDER — DEXAMETHASONE SODIUM PHOSPHATE 10 MG/ML
INJECTION, SOLUTION INTRAMUSCULAR; INTRAVENOUS
Status: DISPENSED
Start: 2024-02-15

## (undated) RX ORDER — LIDOCAINE HYDROCHLORIDE 10 MG/ML
INJECTION, SOLUTION EPIDURAL; INFILTRATION; INTRACAUDAL; PERINEURAL
Status: DISPENSED
Start: 2024-02-15